# Patient Record
Sex: FEMALE | Race: WHITE | Employment: OTHER | RURAL
[De-identification: names, ages, dates, MRNs, and addresses within clinical notes are randomized per-mention and may not be internally consistent; named-entity substitution may affect disease eponyms.]

---

## 2017-02-27 ENCOUNTER — OFFICE VISIT (OUTPATIENT)
Dept: FAMILY MEDICINE CLINIC | Age: 72
End: 2017-02-27

## 2017-02-27 VITALS
BODY MASS INDEX: 32.44 KG/M2 | TEMPERATURE: 96.1 F | SYSTOLIC BLOOD PRESSURE: 140 MMHG | HEIGHT: 64 IN | DIASTOLIC BLOOD PRESSURE: 68 MMHG | HEART RATE: 73 BPM | OXYGEN SATURATION: 95 % | RESPIRATION RATE: 16 BRPM | WEIGHT: 190 LBS

## 2017-02-27 DIAGNOSIS — Z00.00 MEDICARE ANNUAL WELLNESS VISIT, SUBSEQUENT: Primary | ICD-10-CM

## 2017-02-27 DIAGNOSIS — M47.15 OSTEOARTHRITIS OF SPINE WITH MYELOPATHY, THORACOLUMBAR REGION: ICD-10-CM

## 2017-02-27 DIAGNOSIS — N18.2 CKD (CHRONIC KIDNEY DISEASE) STAGE 2, GFR 60-89 ML/MIN: ICD-10-CM

## 2017-02-27 DIAGNOSIS — D50.9 IRON DEFICIENCY ANEMIA, UNSPECIFIED: ICD-10-CM

## 2017-02-27 DIAGNOSIS — E66.9 OBESITY (BMI 30-39.9): ICD-10-CM

## 2017-02-27 DIAGNOSIS — I10 ESSENTIAL HYPERTENSION: ICD-10-CM

## 2017-02-27 DIAGNOSIS — F41.0 PANIC ATTACKS: ICD-10-CM

## 2017-02-27 RX ORDER — ATENOLOL 50 MG/1
50 TABLET ORAL
Qty: 30 TAB | Refills: 3 | Status: SHIPPED | OUTPATIENT
Start: 2017-02-27 | End: 2017-08-23 | Stop reason: SDUPTHER

## 2017-02-27 RX ORDER — ATENOLOL 50 MG/1
50 TABLET ORAL
Refills: 1 | COMMUNITY
Start: 2017-01-24 | End: 2017-02-27 | Stop reason: SDUPTHER

## 2017-02-27 RX ORDER — DIAZEPAM 5 MG/1
5 TABLET ORAL
Qty: 30 TAB | Refills: 3 | Status: SHIPPED | OUTPATIENT
Start: 2017-02-27 | End: 2017-06-28 | Stop reason: SDUPTHER

## 2017-02-27 RX ORDER — GABAPENTIN 100 MG/1
100 CAPSULE ORAL
Refills: 3 | COMMUNITY
Start: 2017-02-15 | End: 2017-08-23

## 2017-02-27 RX ORDER — TRAMADOL HYDROCHLORIDE 50 MG/1
50 TABLET ORAL
Qty: 90 TAB | Refills: 3 | Status: SHIPPED | OUTPATIENT
Start: 2017-02-27 | End: 2017-08-23 | Stop reason: SDUPTHER

## 2017-02-27 NOTE — MR AVS SNAPSHOT
Visit Information Date & Time Provider Department Dept. Phone Encounter #  
 2/27/2017 10:30 AM Moshe Mcintosh [phone] Follow-up Instructions Return in about 6 months (around 8/27/2017), or if symptoms worsen or fail to improve, for For chronic medical issues. Katherene Means Upcoming Health Maintenance Date Due  
 GLAUCOMA SCREENING Q2Y 1/15/2017 BREAST CANCER SCRN MAMMOGRAM 1/15/2018 MEDICARE YEARLY EXAM 2/28/2018 COLONOSCOPY 2/23/2021 DTaP/Tdap/Td series (2 - Td) 10/11/2026 Allergies as of 2/27/2017  Review Complete On: 2/27/2017 By: Katie Tello MD  
  
 Severity Noted Reaction Type Reactions Crestor [Rosuvastatin]  06/11/2010    Myalgia Pcn [Penicillins]  09/03/2009    Unknown (comments) Current Immunizations  Reviewed on 11/1/2016 Name Date Influenza High Dose Vaccine PF 10/12/2016 Pneumococcal Conjugate (PCV-13) 1/13/2015 Pneumococcal Vaccine (Unspecified Type) 10/15/2012 Not reviewed this visit You Were Diagnosed With   
  
 Codes Comments Medicare annual wellness visit, subsequent    -  Primary ICD-10-CM: Z00.00 ICD-9-CM: V70.0 Iron deficiency anemia, unspecified     ICD-10-CM: D50.9 ICD-9-CM: 280.9 CKD (chronic kidney disease) stage 2, GFR 60-89 ml/min     ICD-10-CM: N18.2 ICD-9-CM: 979. 2 Panic attacks     ICD-10-CM: F41.0 ICD-9-CM: 300.01 Osteoarthritis of spine with myelopathy, thoracolumbar region     ICD-10-CM: M47.15 
ICD-9-CM: 721.41 Essential hypertension     ICD-10-CM: I10 
ICD-9-CM: 401.9 Obesity (BMI 30-39. 9)     ICD-10-CM: E66.9 ICD-9-CM: 278.00 Vitals BP  
  
  
  
  
  
 140/68 (BP 1 Location: Right arm, BP Patient Position: Sitting) Vitals History BMI and BSA Data Body Mass Index Body Surface Area  
 32.61 kg/m 2 1.97 m 2 Preferred Pharmacy Pharmacy Name Phone [facility]Y #[id number] - [place]  [address] [phone] Your Updated Medication List  
  
   
This list is accurate as of: 2/27/17 11:22 AM.  Always use your most recent med list.  
  
  
  
  
 aspirin delayed-release 81 mg tablet Take 81 mg by mouth daily. atenolol 50 mg tablet Commonly known as:  TENORMIN Take 1 Tab by mouth once over twenty-four (24) hours for 30 days. CALCIUM 500+D 500 mg(1,250mg) -200 unit per tablet Generic drug:  calcium-vitamin D Take 1 Tab by mouth two (2) times daily (with meals). clindamycin phosphate 1 % Swab  
1 Dose by Apply Externally route as needed. CRANBERRY CONCENTRATE Cap Generic drug:  vitamin c-vitamin e Take 1 Cap by mouth once over twenty-four (24) hours. diazePAM 5 mg tablet Commonly known as:  VALIUM Take 1 Tab by mouth nightly for 30 days. Max Daily Amount: 5 mg.  
  
 docusate sodium 100 mg capsule Commonly known as:  Charleston Simons Take 100 mg by mouth two (2) times a day.  
  
 gabapentin 100 mg capsule Commonly known as:  NEURONTIN Take 100 mg by mouth nightly. traMADol 50 mg tablet Commonly known as:  ULTRAM  
Take 1 Tab by mouth three (3) times daily as needed for up to 30 days. Max Daily Amount: 150 mg. 1-2 tablets by mouth 3 times a day. Max Daily amount: 6 tabs VITAMIN B-12 1,000 mcg tablet Generic drug:  cyanocobalamin Take 1,000 mcg by mouth daily. VITAMIN D3 1,000 unit tablet Generic drug:  cholecalciferol Take 1,000 Units by mouth daily. Prescriptions Printed Refills  
 traMADol (ULTRAM) 50 mg tablet 3 Sig: Take 1 Tab by mouth three (3) times daily as needed for up to 30 days. Max Daily Amount: 150 mg. 1-2 tablets by mouth 3 times a day. Max Daily amount: 6 tabs Class: Print Route: Oral  
 diazePAM (VALIUM) 5 mg tablet 3 Sig: Take 1 Tab by mouth nightly for 30 days. Max Daily Amount: 5 mg. Class: Print  Route: Oral  
  
 Prescriptions Sent to Pharmacy Refills  
 atenolol (TENORMIN) 50 mg tablet 3 Sig: Take 1 Tab by mouth once over twenty-four (24) hours for 30 days. Class: Normal  
 Pharmacy: [id number] SHAYY Velasquez  [address]  #: [phone] Route: Oral  
  
Follow-up Instructions Return in about 6 months (around 8/27/2017), or if symptoms worsen or fail to improve, for For chronic medical issues. .  
  
  
Patient Instructions Well Visit, Over 72: Care Instructions Your Care Instructions Physical exams can help you stay healthy. Your doctor has checked your overall health and may have suggested ways to take good care of yourself. He or she also may have recommended tests. At home, you can help prevent illness with healthy eating, regular exercise, and other steps. Follow-up care is a key part of your treatment and safety. Be sure to make and go to all appointments, and call your doctor if you are having problems. It's also a good idea to know your test results and keep a list of the medicines you take. How can you care for yourself at home? · Reach and stay at a healthy weight. This will lower your risk for many problems, such as obesity, diabetes, heart disease, and high blood pressure. · Get at least 30 minutes of exercise on most days of the week. Walking is a good choice. You also may want to do other activities, such as running, swimming, cycling, or playing tennis or team sports. · Do not smoke. Smoking can make health problems worse. If you need help quitting, talk to your doctor about stop-smoking programs and medicines. These can increase your chances of quitting for good. · Protect your skin from too much sun. When you're outdoors from 10 a.m. to 4 p.m., stay in the shade or cover up with clothing and a hat with a wide brim. Wear sunglasses that block UV rays. Even when it's cloudy, put broad-spectrum sunscreen (SPF 30 or higher) on any exposed skin. · See a dentist one or two times a year for checkups and to have your teeth cleaned. · Wear a seat belt in the car. · Limit alcohol to 2 drinks a day for men and 1 drink a day for women. Too much alcohol can cause health problems. Follow your doctor's advice about when to have certain tests. These tests can spot problems early. For men and women · Cholesterol. Your doctor will tell you how often to have this done based on your overall health and other things that can increase your risk for heart attack and stroke. · Blood pressure. Have your blood pressure checked during a routine doctor visit. Your doctor will tell you how often to check your blood pressure based on your age, your blood pressure results, and other factors. · Diabetes. Ask your doctor whether you should have tests for diabetes. · Vision. Experts recommend that you have yearly exams for glaucoma and other age-related eye problems. · Hearing. Tell your doctor if you notice any change in your hearing. You can have tests to find out how well you hear. · Colon cancer tests. Keep having colon cancer tests as your doctor recommends. You can have one of several types of tests. · Heart attack and stroke risk. At least every 4 to 6 years, you should have your risk for heart attack and stroke assessed. Your doctor uses factors such as your age, blood pressure, cholesterol, and whether you smoke or have diabetes to show what your risk for a heart attack or stroke is over the next 10 years. · Osteoporosis. Talk to your doctor about whether you should have a bone density test to find out whether you have thinning bones. Also ask your doctor about whether you should take calcium and vitamin D supplements. For women · Pap test and pelvic exam. You may no longer need a Pap test. Talk with your doctor about whether to stop or continue to have Pap tests.  
· Breast exam and mammogram. Ask how often you should have a mammogram, which is an X-ray of your breasts. A mammogram can spot breast cancer before it can be felt and when it is easiest to treat. · Thyroid disease. Talk to your doctor about whether to have your thyroid checked as part of a regular physical exam. Women have an increased chance of a thyroid problem. For men · Prostate exam. Talk to your doctor about whether you should have a blood test (called a PSA test) for prostate cancer. Experts disagree on whether men should have this test. Some experts recommend that you discuss the benefits and risks of the test with your doctor. · Abdominal aortic aneurysm. Ask your doctor whether you should have a test to check for an aneurysm. You may need a test if you ever smoked or if your parent, brother, sister, or child has had an aneurysm. When should you call for help? Watch closely for changes in your health, and be sure to contact your doctor if you have any problems or symptoms that concern you. Where can you learn more? Go to http://thiago-uli.info/. Enter Z313 in the search box to learn more about \"Well Visit, Over 65: Care Instructions. \" Current as of: July 19, 2016 Content Version: 11.1 © 9604-3253 [employer]. Care instructions adapted under license by [employer] (which disclaims liability or warranty for this information). If you have questions about a medical condition or this instruction, always ask your healthcare professional. [address] any warranty or liability for your use of this information. Back Stretches: Exercises Your Care Instructions Here are some examples of exercises for stretching your back. Start each exercise slowly. Ease off the exercise if you start to have pain. Your doctor or physical therapist will tell you when you can start these exercises and which ones will work best for you. How to do the exercises Overhead stretch 1. Stand comfortably with your feet shoulder-width apart. 2. Looking straight ahead, raise both arms over your head and reach toward the ceiling. Do not allow your head to tilt back. 3. Hold for 15 to 30 seconds, then lower your arms to your sides. 4. Repeat 2 to 4 times. Side stretch 1. Stand comfortably with your feet shoulder-width apart. 2. Raise one arm over your head, and then lean to the other side. 3. Slide your hand down your leg as you let the weight of your arm gently stretch your side muscles. Hold for 15 to 30 seconds. 4. Repeat 2 to 4 times on each side. Press-up 1. Lie on your stomach, supporting your body with your forearms. 2. Press your elbows down into the floor to raise your upper back. As you do this, relax your stomach muscles and allow your back to arch without using your back muscles. As your press up, do not let your hips or pelvis come off the floor. 3. Hold for 15 to 30 seconds, then relax. 4. Repeat 2 to 4 times. Relax and rest 
 
1. Lie on your back with a rolled towel under your neck and a pillow under your knees. Extend your arms comfortably to your sides. 2. Relax and breathe normally. 3. Remain in this position for about 10 minutes. 4. If you can, do this 2 or 3 times each day. Follow-up care is a key part of your treatment and safety. Be sure to make and go to all appointments, and call your doctor if you are having problems. It's also a good idea to know your test results and keep a list of the medicines you take. Where can you learn more? Go to http://thiago-uli.info/. Enter Q814 in the search box to learn more about \"Back Stretches: Exercises. \" Current as of: May 23, 2016 Content Version: 11.1 © 3252-6961 [employer]. Care instructions adapted under license by [employer] (which disclaims liability or warranty for this information).  If you have questions about a medical condition or this instruction, always ask your healthcare professional. [address] any warranty or liability for your use of this information. Introducing [facility]! Dear Ashli Stark: 
Thank you for requesting a [employer] account. Our records indicate that you already have an active [employer] account. You can access your account anytime at [email or web address] Did you know that you can access your hospital and ER discharge instructions at any time in [employer]? You can also review all of your test results from your hospital stay or ER visit. Additional Information If you have questions, please visit the Frequently Asked Questions section of the [employer] website at [email or web address] Remember, [employer] is NOT to be used for urgent needs. For medical emergencies, dial 911. Now available from your iPhone and Android! Please provide this summary of care documentation to your next provider. Your primary care clinician is listed as [address]. If you have any questions after today's visit, please call [phone].

## 2017-02-27 NOTE — PROGRESS NOTES
Identified pt with two pt identifiers(name and ). Chief Complaint   Patient presents with    Annual Wellness Visit     last [address] was done 2016    Medication Evaluation     would like to clarify if is safe to use gabapentin at night when she is also taking valium and also has questions about how often she should be taking the gabapentin         Health Maintenance Due   Topic    GLAUCOMA SCREENING Q2Y     MEDICARE YEARLY EXAM        Wt Readings from Last 3 Encounters:   16 194 lb (88 kg)   16 194 lb (88 kg)   10/11/16 193 lb 6.4 oz (87.7 kg)     Temp Readings from Last 3 Encounters:   16 95.8 °F (35.4 °C) (Oral)   16 96.5 °F (35.8 °C) (Oral)   10/11/16 95.4 °F (35.2 °C) (Oral)     BP Readings from Last 3 Encounters:   16 (!) 165/100   16 155/85   10/11/16 138/77     Pulse Readings from Last 3 Encounters:   16 84   16 (!) 50   10/11/16 81         Learning Assessment:  :     Learning Assessment 5/10/2016 1/15/2014   PRIMARY LEARNER Patient Patient   PRIMARY LANGUAGE ENGLISH ENGLISH   LEARNER PREFERENCE PRIMARY DEMONSTRATION READING   ANSWERED BY patient  self   RELATIONSHIP SELF SELF       Depression Screening:  :     PHQ 2 / 9, over the last two weeks 2016   Little interest or pleasure in doing things Not at all   Feeling down, depressed or hopeless Not at all   Total Score PHQ 2 0       Fall Risk Assessment:  :     Fall Risk Assessment, last 12 mths 2016   Able to walk? Yes   Fall in past 12 months? No       Abuse Screening:  :     Abuse Screening Questionnaire 2016   Do you ever feel afraid of your partner? N   Are you in a relationship with someone who physically or mentally threatens you? N   Is it safe for you to go home?  Y       Coordination of Care Questionnaire:  :     1) Have you been to an emergency room, urgent care clinic since your last visit? no   Hospitalized since your last visit? no             2) Have you seen or consulted any other health care providers outside of [address] [place] [address] since your last visit? no  (Include any pap smears or colon screenings in this section.)    3) Do you have an Advance Directive on file? yes  Are you interested in receiving information about Advance Directives? no    Patient is accompanied by self. Reviewed record in preparation for visit and have obtained necessary documentation. Medication reconciliation up to date and corrected with patient at this time. Functional Ability:   Does the patient exhibit a steady gait? Yes, walks assisted with cane   How long did it take the patient to get up and walk from a sitting position? A few seconds   Is the patient self reliant?  (ie can do own laundry, meals, household chores)  yes     Does the patient handle his/her own medications? yes     Does the patient handle his/her own money? yes     Is the patients home safe (ie good lighting, handrails on stairs and bath, etc.)? yes     Did you notice or did patient express any hearing difficulties? yes     Did you notice or did patient express any vision difficulties? Yes, she wears glasses to correct vision     Were distance and reading eye charts used? no       Advance Care Planning:   Patient was offered the opportunity to discuss advance care planning:  yes     Does patient have an Advance Directive:  yes   If no, did you provide information on Caring Connections?   N/A

## 2017-02-27 NOTE — PATIENT INSTRUCTIONS
Well Visit, Over 72: Care Instructions  Your Care Instructions  Physical exams can help you stay healthy. Your doctor has checked your overall health and may have suggested ways to take good care of yourself. He or she also may have recommended tests. At home, you can help prevent illness with healthy eating, regular exercise, and other steps. Follow-up care is a key part of your treatment and safety. Be sure to make and go to all appointments, and call your doctor if you are having problems. It's also a good idea to know your test results and keep a list of the medicines you take. How can you care for yourself at home? · Reach and stay at a healthy weight. This will lower your risk for many problems, such as obesity, diabetes, heart disease, and high blood pressure. · Get at least 30 minutes of exercise on most days of the week. Walking is a good choice. You also may want to do other activities, such as running, swimming, cycling, or playing tennis or team sports. · Do not smoke. Smoking can make health problems worse. If you need help quitting, talk to your doctor about stop-smoking programs and medicines. These can increase your chances of quitting for good. · Protect your skin from too much sun. When you're outdoors from 10 a.m. to 4 p.m., stay in the shade or cover up with clothing and a hat with a wide brim. Wear sunglasses that block UV rays. Even when it's cloudy, put broad-spectrum sunscreen (SPF 30 or higher) on any exposed skin. · See a dentist one or two times a year for checkups and to have your teeth cleaned. · Wear a seat belt in the car. · Limit alcohol to 2 drinks a day for men and 1 drink a day for women. Too much alcohol can cause health problems. Follow your doctor's advice about when to have certain tests. These tests can spot problems early. For men and women  · Cholesterol.  Your doctor will tell you how often to have this done based on your overall health and other things that can increase your risk for heart attack and stroke. · Blood pressure. Have your blood pressure checked during a routine doctor visit. Your doctor will tell you how often to check your blood pressure based on your age, your blood pressure results, and other factors. · Diabetes. Ask your doctor whether you should have tests for diabetes. · Vision. Experts recommend that you have yearly exams for glaucoma and other age-related eye problems. · Hearing. Tell your doctor if you notice any change in your hearing. You can have tests to find out how well you hear. · Colon cancer tests. Keep having colon cancer tests as your doctor recommends. You can have one of several types of tests. · Heart attack and stroke risk. At least every 4 to 6 years, you should have your risk for heart attack and stroke assessed. Your doctor uses factors such as your age, blood pressure, cholesterol, and whether you smoke or have diabetes to show what your risk for a heart attack or stroke is over the next 10 years. · Osteoporosis. Talk to your doctor about whether you should have a bone density test to find out whether you have thinning bones. Also ask your doctor about whether you should take calcium and vitamin D supplements. For women  · Pap test and pelvic exam. You may no longer need a Pap test. Talk with your doctor about whether to stop or continue to have Pap tests. · Breast exam and mammogram. Ask how often you should have a mammogram, which is an X-ray of your breasts. A mammogram can spot breast cancer before it can be felt and when it is easiest to treat. · Thyroid disease. Talk to your doctor about whether to have your thyroid checked as part of a regular physical exam. Women have an increased chance of a thyroid problem. For men  · Prostate exam. Talk to your doctor about whether you should have a blood test (called a PSA test) for prostate cancer.  Experts disagree on whether men should have this test. Some experts recommend that you discuss the benefits and risks of the test with your doctor. · Abdominal aortic aneurysm. Ask your doctor whether you should have a test to check for an aneurysm. You may need a test if you ever smoked or if your parent, brother, sister, or child has had an aneurysm. When should you call for help? Watch closely for changes in your health, and be sure to contact your doctor if you have any problems or symptoms that concern you. Where can you learn more? Go to http://thiago-uli.info/. Enter N095 in the search box to learn more about \"Well Visit, Over 65: Care Instructions. \"  Current as of: July 19, 2016  Content Version: 11.1  © 5686-0330 [employer]. Care instructions adapted under license by [employer] (which disclaims liability or warranty for this information). If you have questions about a medical condition or this instruction, always ask your healthcare professional. [place] [address] any warranty or liability for your use of this information. Back Stretches: Exercises  Your Care Instructions  Here are some examples of exercises for stretching your back. Start each exercise slowly. Ease off the exercise if you start to have pain. Your doctor or physical therapist will tell you when you can start these exercises and which ones will work best for you. How to do the exercises  Overhead stretch    1. Stand comfortably with your feet shoulder-width apart. 2. Looking straight ahead, raise both arms over your head and reach toward the ceiling. Do not allow your head to tilt back. 3. Hold for 15 to 30 seconds, then lower your arms to your sides. 4. Repeat 2 to 4 times. Side stretch    1. Stand comfortably with your feet shoulder-width apart. 2. Raise one arm over your head, and then lean to the other side. 3. Slide your hand down your leg as you let the weight of your arm gently stretch your side muscles.  Hold for 15 to 30 seconds. 4. Repeat 2 to 4 times on each side. Press-up    1. Lie on your stomach, supporting your body with your forearms. 2. Press your elbows down into the floor to raise your upper back. As you do this, relax your stomach muscles and allow your back to arch without using your back muscles. As your press up, do not let your hips or pelvis come off the floor. 3. Hold for 15 to 30 seconds, then relax. 4. Repeat 2 to 4 times. Relax and rest    1. Lie on your back with a rolled towel under your neck and a pillow under your knees. Extend your arms comfortably to your sides. 2. Relax and breathe normally. 3. Remain in this position for about 10 minutes. 4. If you can, do this 2 or 3 times each day. Follow-up care is a key part of your treatment and safety. Be sure to make and go to all appointments, and call your doctor if you are having problems. It's also a good idea to know your test results and keep a list of the medicines you take. Where can you learn more? Go to http://thiago-uli.info/. Enter R001 in the search box to learn more about \"Back Stretches: Exercises. \"  Current as of: May 23, 2016  Content Version: 11.1  © 7434-6925 [employer]. Care instructions adapted under license by [employer] (which disclaims liability or warranty for this information). If you have questions about a medical condition or this instruction, always ask your healthcare professional. [place] [address] any warranty or liability for your use of this information.

## 2017-02-27 NOTE — PROGRESS NOTES
Demarco Presley is a 71 y.o. female and presents for annual Medicare Wellness Visit. Problem List: Reviewed with patient and discussed risk factors. Patient Active Problem List   Diagnosis Code    Knee pain M25.569    CKD (chronic kidney disease) stage 2, GFR 60-89 ml/min N18.2    Insomnia G47.00    Osteoarthritis M19.90    Senile osteoporosis M81.0    Iron deficiency anemia, unspecified D50.9    Anxiety state, unspecified F41.1    Palpitations R00.2    Bronchitis J40    UTI (lower urinary tract infection) N39.0    Intertrigo L30.4    Screening for cholesterol level Z13.220    Need for hepatitis C screening test Z11.59    Dysuria R30.0    Lung crackles R09.89    Medicare annual wellness visit, subsequent Z00.00       Current medical providers:  Patient Care Team:  Madelyn Bright MD as PCP - General (Pediatrics)  Carlos Bolden    PSH: Reviewed with patient  Past Surgical History:   Procedure Laterality Date    HX FEMUR FRACTURE TX      Left femur - 2008, R femur - 2009    HX GI  2010    Perforated ulcer, in relation to pyloric stenosis    HX ORTHOPAEDIC      6 thoracic vertabrae that were fractured in 2005     [facility] FLX W/NDSC US XM RCTM ET AL LMTD&ADJ [address] [facility]  12/6/10    Normal except Diverticulosis        SH: Reviewed with patient  Social History   Substance Use Topics    Smoking status: Former Smoker    Smokeless tobacco: Never Used    Alcohol use No       FH: Reviewed with patient  Family History   Problem Relation Age of Onset    Cancer Sister      metastatic lung Cancer    Cancer Mother      colon       Medications/Allergies: Reviewed with patient  Current Outpatient Prescriptions on File Prior to Visit   Medication Sig Dispense Refill    clindamycin phosphate 1 % swab 1 Dose by Apply Externally route as needed.  docusate sodium (COLACE) 100 mg capsule Take 100 mg by mouth two (2) times a day.       vitamin c-vitamin e (CRANBERRY CONCENTRATE) cap Take 1 Cap by mouth once over twenty-four (24) hours.  cholecalciferol (VITAMIN D3) 1,000 unit tablet Take 1,000 Units by mouth daily.  cyanocobalamin (VITAMIN B-12) 1,000 mcg tablet Take 1,000 mcg by mouth daily.  calcium-vitamin D (CALCIUM 500+D) 500 mg(1,250mg) -200 unit per tablet Take 1 Tab by mouth two (2) times daily (with meals).  aspirin delayed-release 81 mg tablet Take 81 mg by mouth daily. No current facility-administered medications on file prior to visit. Allergies   Allergen Reactions    Crestor [Rosuvastatin] Myalgia    Pcn [Penicillins] Unknown (comments)       Objective:  Visit Vitals    /68 (BP 1 Location: Right arm, BP Patient Position: Sitting)    Pulse 73    Temp 96.1 °F (35.6 °C) (Oral)    Resp 16    Ht 5' 4\" (1.626 m)    Wt 190 lb (86.2 kg)    SpO2 95%    BMI 32.61 kg/m2    Body mass index is 32.61 kg/(m^2). Assessment of cognitive impairment: Alert and oriented x 3    Depression Screen:   PHQ 2 / 9, over the last two weeks 6/21/2016   Little interest or pleasure in doing things Not at all   Feeling down, depressed or hopeless Not at all   Total Score PHQ 2 0       Fall Risk Assessment:    Fall Risk Assessment, last 12 mths 6/21/2016   Able to walk? Yes   Fall in past 12 months? No       Functional Ability:   See nursing note. Advance Care Planning:   See nursing note.      Plan:      Orders Placed This Encounter    gabapentin (NEURONTIN) 100 mg capsule    DISCONTD: atenolol (TENORMIN) 50 mg tablet    atenolol (TENORMIN) 50 mg tablet    traMADol (ULTRAM) 50 mg tablet    diazePAM (VALIUM) 5 mg tablet       Health Maintenance   Topic Date Due    GLAUCOMA SCREENING Q2Y  01/15/2017    BREAST CANCER SCRN MAMMOGRAM  01/15/2018    MEDICARE YEARLY EXAM  02/28/2018    COLONOSCOPY  02/23/2021    DTaP/Tdap/Td series (2 - Td) 10/11/2026    Hepatitis C Screening  Completed    OSTEOPOROSIS SCREENING (DEXA)  Completed    ZOSTER VACCINE AGE 60>  Completed    Pneumococcal 65+ High/Highest Risk  Completed    INFLUENZA AGE 9 TO ADULT  Completed       ASSESSMENT and PLAN    ICD-10-CM ICD-9-CM    1. Medicare annual wellness visit, subsequent Z00.00 V70.0 Anticipatory guidance discussed. Immunizations reviewed  HM updated. 2. Iron deficiency anemia, unspecified D50.9 280.9    3. CKD (chronic kidney disease) stage 2, GFR 60-89 ml/min N18.2 585.2    4. Panic attacks F41.0 300.01 diazePAM (VALIUM) 5 mg tablet   5. Osteoarthritis of spine with myelopathy, thoracolumbar region M47.15 721.41 traMADol (ULTRAM) 50 mg tablet   6. Essential hypertension I10 401.9 atenolol (TENORMIN) 50 mg tablet   7. Obesity (BMI 30-39. 9) E66.9 278.00      72F who presented for MWV. She has been doing well. She is currently taking Valium for night panic attacks and has been on the medication for almost 40 years. She denies over use and takes as prescribed. We have discussed in the past weaning, but this may not be feasible. She has not escalated her use. She is otherwise doing well. Continues to suffer with inoperable OA of spine with myofacial pain. I have discussed the diagnosis with the patient and the intended plan as seen in the above orders. The patient has received an after-visit summary and questions were answered concerning future plans. Medication Side Effects and Warnings were discussed with patient: yes   Patient Labs were reviewed and or requested: yes   Patient Past Records were reviewed and or requested: N/A    *Patient verbalized understanding and agreement with the plan. A copy of the After Visit Summary with personalized health plan was given to the patient today. Follow-up Disposition:  Return in about 6 months (around 8/27/2017), or if symptoms worsen or fail to improve, for For chronic medical issues. Wil Malik

## 2017-04-23 DIAGNOSIS — R00.2 PALPITATIONS: ICD-10-CM

## 2017-04-25 RX ORDER — ATENOLOL 50 MG/1
TABLET ORAL
Qty: 180 TAB | Refills: 0 | Status: SHIPPED | OUTPATIENT
Start: 2017-04-25 | End: 2017-08-23 | Stop reason: SDUPTHER

## 2017-06-28 DIAGNOSIS — F41.0 PANIC ATTACKS: ICD-10-CM

## 2017-06-29 RX ORDER — DIAZEPAM 5 MG/1
TABLET ORAL
Qty: 30 TAB | Refills: 1 | Status: SHIPPED | OUTPATIENT
Start: 2017-06-29 | End: 2017-08-23 | Stop reason: SDUPTHER

## 2017-08-23 ENCOUNTER — OFFICE VISIT (OUTPATIENT)
Dept: FAMILY MEDICINE CLINIC | Age: 72
End: 2017-08-23

## 2017-08-23 VITALS
HEIGHT: 64 IN | HEART RATE: 72 BPM | SYSTOLIC BLOOD PRESSURE: 126 MMHG | OXYGEN SATURATION: 97 % | WEIGHT: 186 LBS | TEMPERATURE: 96.5 F | BODY MASS INDEX: 31.76 KG/M2 | RESPIRATION RATE: 20 BRPM | DIASTOLIC BLOOD PRESSURE: 68 MMHG

## 2017-08-23 DIAGNOSIS — I10 ESSENTIAL HYPERTENSION: ICD-10-CM

## 2017-08-23 DIAGNOSIS — M47.15 OSTEOARTHRITIS OF SPINE WITH MYELOPATHY, THORACOLUMBAR REGION: ICD-10-CM

## 2017-08-23 DIAGNOSIS — F41.0 PANIC ATTACKS: ICD-10-CM

## 2017-08-23 DIAGNOSIS — E78.00 PURE HYPERCHOLESTEROLEMIA: ICD-10-CM

## 2017-08-23 DIAGNOSIS — E55.9 VITAMIN D DEFICIENCY: Primary | ICD-10-CM

## 2017-08-23 RX ORDER — TRAMADOL HYDROCHLORIDE 50 MG/1
50 TABLET ORAL
Qty: 90 TAB | Refills: 5 | Status: SHIPPED | OUTPATIENT
Start: 2017-08-23 | End: 2018-04-17 | Stop reason: SDUPTHER

## 2017-08-23 RX ORDER — DIAZEPAM 5 MG/1
5 TABLET ORAL
Qty: 30 TAB | Refills: 3 | Status: SHIPPED | OUTPATIENT
Start: 2017-08-23 | End: 2018-04-26 | Stop reason: SDUPTHER

## 2017-08-23 RX ORDER — CLINDAMYCIN PHOSPHATE 10 MG/G
GEL TOPICAL
Refills: 11 | COMMUNITY
Start: 2017-08-19 | End: 2019-11-24

## 2017-08-23 RX ORDER — TRAMADOL HYDROCHLORIDE 50 MG/1
TABLET ORAL
Refills: 3 | COMMUNITY
Start: 2017-07-09 | End: 2017-08-23 | Stop reason: SDUPTHER

## 2017-08-23 RX ORDER — ATENOLOL 50 MG/1
50 TABLET ORAL
Qty: 30 TAB | Refills: 5 | Status: SHIPPED | OUTPATIENT
Start: 2017-08-23 | End: 2017-10-19

## 2017-08-23 NOTE — MR AVS SNAPSHOT
Visit Information Date & Time Provider Department Dept. Phone Encounter #  
 8/23/2017  9:30 AM Moshe Berg [phone] [id number] Follow-up Instructions Return in about 6 months (around 2/28/2018), or if symptoms worsen or fail to improve. Upcoming Health Maintenance Date Due  
 GLAUCOMA SCREENING Q2Y 1/15/2017 INFLUENZA AGE 9 TO ADULT 8/1/2017 BREAST CANCER SCRN MAMMOGRAM 1/15/2018 MEDICARE YEARLY EXAM 2/28/2018 COLONOSCOPY 2/23/2021 DTaP/Tdap/Td series (2 - Td) 10/11/2026 Allergies as of 8/23/2017  Review Complete On: 8/23/2017 By: Alexander Prescott Severity Noted Reaction Type Reactions Crestor [Rosuvastatin]  06/11/2010    Myalgia Pcn [Penicillins]  09/03/2009    Unknown (comments) Current Immunizations  Reviewed on 11/1/2016 Name Date Influenza High Dose Vaccine PF 10/12/2016 Pneumococcal Conjugate (PCV-13) 1/13/2015 Pneumococcal Vaccine (Unspecified Type) 10/15/2012 Not reviewed this visit You Were Diagnosed With   
  
 Codes Comments Osteoarthritis of spine with myelopathy, thoracolumbar region     ICD-10-CM: M47.15 
ICD-9-CM: 721.41 Essential hypertension     ICD-10-CM: I10 
ICD-9-CM: 401.9 Panic attacks     ICD-10-CM: F41.0 ICD-9-CM: 300.01 Vitals BP Pulse Temp Resp Height(growth percentile) Weight(growth percentile) 126/68 72 96.5 °F (35.8 °C) (Temporal) 20 5' 4\" (1.626 m) 186 lb (84.4 kg) SpO2 BMI OB Status Smoking Status 97% 31.93 kg/m2 Postmenopausal Former Smoker Vitals History BMI and BSA Data Body Mass Index Body Surface Area  
 31.93 kg/m 2 1.95 m 2 Preferred Pharmacy Pharmacy Name Phone [id number] [place] - [address] [phone] Your Updated Medication List  
  
   
This list is accurate as of: 8/23/17 10:07 AM.  Always use your most recent med list.  
  
  
  
  
 aspirin delayed-release 81 mg tablet Take 81 mg by mouth daily. atenolol 50 mg tablet Commonly known as:  TENORMIN Take 1 Tab by mouth once over twenty-four (24) hours for 30 days. CALCIUM 500+D 500 mg(1,250mg) -200 unit per tablet Generic drug:  calcium-vitamin D Take 1 Tab by mouth two (2) times daily (with meals). * clindamycin phosphate 1 % Swab  
1 Dose by Apply Externally route as needed. * clindamycin 1 % topical gel Commonly known as:  CLINDAGEL  
APPLY TO FOLDS EVERY DAY  
  
 CRANBERRY CONCENTRATE Cap Generic drug:  vitamin c-vitamin e Take 1 Cap by mouth once over twenty-four (24) hours. diazePAM 5 mg tablet Commonly known as:  VALIUM Take 1 Tab by mouth nightly. Max Daily Amount: 5 mg. DIPHENHYDRAMINE HCL PO Take  by mouth. docusate sodium 100 mg capsule Commonly known as:  G[employer] Guppy Take 100 mg by mouth two (2) times a day. traMADol 50 mg tablet Commonly known as:  ULTRAM  
Take 1 Tab by mouth three (3) times daily as needed for up to 30 days. Max Daily Amount: 150 mg. 1-2 tablets by mouth 3 times a day. Max Daily amount: 6 tabs VITAMIN B-12 1,000 mcg tablet Generic drug:  cyanocobalamin Take 1,000 mcg by mouth daily. VITAMIN D3 1,000 unit tablet Generic drug:  cholecalciferol Take 1,000 Units by mouth daily. * Notice: This list has 2 medication(s) that are the same as other medications prescribed for you. Read the directions carefully, and ask your doctor or other care provider to review them with you. Prescriptions Printed Refills  
 traMADol (ULTRAM) 50 mg tablet 5 Sig: Take 1 Tab by mouth three (3) times daily as needed for up to 30 days. Max Daily Amount: 150 mg. 1-2 tablets by mouth 3 times a day. Max Daily amount: 6 tabs Class: Print Route: Oral  
 diazePAM (VALIUM) 5 mg tablet 3 Sig: Take 1 Tab by mouth nightly. Max Daily Amount: 5 mg. Class: Print Route: Oral  
  
Prescriptions Sent to Pharmacy Refills  
 atenolol (TENORMIN) 50 mg tablet 5 Sig: Take 1 Tab by mouth once over twenty-four (24) hours for 30 days. Class: Normal  
 Pharmacy: [facility]/pharmacy #[id number] - [place] - [address]  #: [phone] Route: Oral  
  
Follow-up Instructions Return in about 6 months (around 2/28/2018), or if symptoms worsen or fail to improve. Introducing [facility]! Dear Theresa Cancer: 
Thank you for requesting a [employer] account. Our records indicate that you already have an active [employer] account. You can access your account anytime at [email or web address] Did you know that you can access your hospital and ER discharge instructions at any time in [employer]? You can also review all of your test results from your hospital stay or ER visit. Additional Information If you have questions, please visit the Frequently Asked Questions section of the [employer] website at [email or web address] Remember, [employer] is NOT to be used for urgent needs. For medical emergencies, dial 911. Now available from your iPhone and Android! Please provide this summary of care documentation to your next provider. Your primary care clinician is listed as [address]. If you have any questions after today's visit, please call [phone].

## 2017-09-01 NOTE — PROGRESS NOTES
HISTORY OF PRESENT ILLNESS  Echo Hoover is a 71 y.o. female. HPI    ROS    Physical Exam    ASSESSMENT and PLAN    ICD-10-CM ICD-9-CM    1. Vitamin D deficiency E55.9 268.9 VITAMIN D, 25 HYDROXY   2. Osteoarthritis of spine with myelopathy, thoracolumbar region M47.15 721.41 traMADol (ULTRAM) 50 mg tablet   3. Essential hypertension I10 401.9 atenolol (TENORMIN) 50 mg tablet      METABOLIC PANEL, COMPREHENSIVE      CBC WITH AUTOMATED DIFF   4. Panic attacks F41.0 300.01 diazePAM (VALIUM) 5 mg tablet   5. Pure hypercholesterolemia E78.00 272.0 LIPID PANEL      CRP, HIGH SENSITIVITY     I have discussed the diagnosis with the patient and the intended treatment plan as seen in the above orders. The patient has received an after-visit summary and questions were answered concerning future plans. Asked to return should symptoms worsen or not improve with treatment. Any pending labs and studies will be relayed to patient when they become available. Pt verbalizes understanding of plan of care and denies further questions or concerns at this time. Follow-up Disposition:  Return in about 6 months (around 2/28/2018), or if symptoms worsen or fail to improve.

## 2017-09-07 ENCOUNTER — TELEPHONE (OUTPATIENT)
Dept: FAMILY MEDICINE CLINIC | Age: 72
End: 2017-09-07

## 2017-09-07 RX ORDER — CITALOPRAM 10 MG/1
10 TABLET ORAL DAILY
Qty: 30 TAB | Refills: 5 | Status: SHIPPED | OUTPATIENT
Start: 2017-09-07 | End: 2018-02-24 | Stop reason: SDUPTHER

## 2017-09-07 RX ORDER — BUPROPION HYDROCHLORIDE 100 MG/1
100 TABLET ORAL 2 TIMES DAILY
Qty: 60 TAB | Refills: 5 | Status: SHIPPED | OUTPATIENT
Start: 2017-09-07 | End: 2018-02-22 | Stop reason: SDUPTHER

## 2017-09-07 NOTE — TELEPHONE ENCOUNTER
Celexa prescribed; was already on tramadol.  Insurance contacting pharmacy about serotonin toxicity       Best contact: [phone] [facility]

## 2017-09-07 NOTE — TELEPHONE ENCOUNTER
Pt reports she would like Dr. Kush Gillespie to prescribe the medication she believes will be most suitable for pt's situation as she has never been on any medication of this type in the past and therefore has no preference.

## 2017-09-07 NOTE — TELEPHONE ENCOUNTER
Pt saw doctor 2 weeks ago and wanted to give pt anti depression medication and pt would like to actually try it please call

## 2017-09-08 NOTE — TELEPHONE ENCOUNTER
Pt stated two medications were called in the generic for Wellbutrin and will cause pt to choke and needs something else to take because does not come in any other form.

## 2017-09-08 NOTE — TELEPHONE ENCOUNTER
Returned call to pt and she reports she initially picked up the celexa the same day it was prescribed and took that for a day which was fine, as the pill size is small and then when pharmacy advised her the wellbutrin was ready and the better of the two medications in conjunction with her tramadol, she tried to take the wellbutrin instead and choked on the pill due to its size. She prefers to use the celexa, if possible out of the two medications. I warned her of the slight chance of toxicity if taken together with the tramadol. She was advised not to take both pills together at the same time and to space the dose of tramadol at least five hours away from the celexa. She was further advised if she should experience any side effects to report them right away. Pt verbalized understanding of our entire conversation. I conferred with Dr. Ale Gale as well and she agreed, as long as the pt is aware of possible s/e and has been given the indications to be aware of and wishes to proceed with medication, a trial of the medication would be ok.

## 2017-10-19 ENCOUNTER — OFFICE VISIT (OUTPATIENT)
Dept: FAMILY MEDICINE CLINIC | Age: 72
End: 2017-10-19

## 2017-10-19 VITALS
SYSTOLIC BLOOD PRESSURE: 143 MMHG | WEIGHT: 191.8 LBS | HEIGHT: 64 IN | HEART RATE: 89 BPM | OXYGEN SATURATION: 94 % | DIASTOLIC BLOOD PRESSURE: 87 MMHG | RESPIRATION RATE: 18 BRPM | BODY MASS INDEX: 32.74 KG/M2 | TEMPERATURE: 96 F

## 2017-10-19 DIAGNOSIS — Z91.89 AT RISK FOR POLYPHARMACY: Primary | ICD-10-CM

## 2017-10-19 DIAGNOSIS — M54.50 ACUTE RIGHT-SIDED LOW BACK PAIN WITHOUT SCIATICA: ICD-10-CM

## 2017-10-19 RX ORDER — NALOXONE HYDROCHLORIDE 4 MG/.1ML
SPRAY NASAL
Qty: 1 EACH | Refills: 0 | Status: SHIPPED | OUTPATIENT
Start: 2017-10-19 | End: 2019-02-22 | Stop reason: SDUPTHER

## 2017-10-19 RX ORDER — GABAPENTIN 100 MG/1
CAPSULE ORAL
Qty: 270 CAP | Refills: 4 | Status: SHIPPED | OUTPATIENT
Start: 2017-10-19 | End: 2018-12-18 | Stop reason: SDUPTHER

## 2017-10-19 RX ORDER — ATENOLOL 50 MG/1
TABLET ORAL
Refills: 5 | COMMUNITY
Start: 2017-10-04 | End: 2018-04-17 | Stop reason: SDUPTHER

## 2017-10-19 RX ORDER — TRAMADOL HYDROCHLORIDE 50 MG/1
50 TABLET ORAL
COMMUNITY
End: 2018-03-22 | Stop reason: SDUPTHER

## 2017-10-19 NOTE — PROGRESS NOTES
Chief Complaint   Patient presents with    LOW BACK PAIN     1. Have you been to the ER, urgent care clinic since your last visit? Hospitalized since your last visit? NO    2. Have you seen or consulted any other health care providers outside of the [address] [place] [address] since your last visit? Include any pap smears or colon screening.  NO Pt arrived to unit wit mother with left upper jaw pain that started yesterday. Pts mother reports that she has not been to dentist recently. Up to date with vaccines. States she gave daughter childrens pain reliever this am.  Ayad Lavender of time, was given by grandmother.

## 2017-10-19 NOTE — MR AVS SNAPSHOT
Visit Information Date & Time Provider Department Dept. Phone Encounter #  
 10/19/2017  1:00 PM Moshe Ortiz [id number] Upcoming Health Maintenance Date Due  
 GLAUCOMA SCREENING Q2Y 1/15/2017 INFLUENZA AGE 9 TO ADULT 8/1/2017 BREAST CANCER SCRN MAMMOGRAM 1/15/2018 MEDICARE YEARLY EXAM 2/28/2018 COLONOSCOPY 2/23/2021 DTaP/Tdap/Td series (2 - Td) 10/11/2026 Allergies as of 10/19/2017  Review Complete On: 10/19/2017 By: Kristal Ramirez LPN Severity Noted Reaction Type Reactions Crestor [Rosuvastatin]  06/11/2010    Myalgia Pcn [Penicillins]  09/03/2009    Unknown (comments) Current Immunizations  Reviewed on 11/1/2016 Name Date Influenza High Dose Vaccine PF 9/5/2017 12:00 AM, 10/12/2016 Pneumococcal Conjugate (PCV-13) 1/13/2015 Pneumococcal Vaccine (Unspecified Type) 10/15/2012 Not reviewed this visit You Were Diagnosed With   
  
 Codes Comments At risk for polypharmacy    -  Primary ICD-10-CM: Z91.89 ICD-9-CM: V49.89 Acute right-sided low back pain without sciatica     ICD-10-CM: M54.5 ICD-9-CM: 724.2 Vitals BP Pulse Temp Resp Height(growth percentile) Weight(growth percentile) 143/87 (BP 1 Location: Right arm, BP Patient Position: Sitting) 89 96 °F (35.6 °C) (Oral) 18 5' 4\" (1.626 m) 191 lb 12.8 oz (87 kg) SpO2 BMI OB Status Smoking Status 94% 32.92 kg/m2 Postmenopausal Former Smoker Vitals History BMI and BSA Data Body Mass Index Body Surface Area  
 32.92 kg/m 2 1.98 m 2 Preferred Pharmacy Pharmacy Name Phone [facility] #[id number] - [place] - [address] [phone] Your Updated Medication List  
  
   
This list is accurate as of: 10/19/17  2:01 PM.  Always use your most recent med list.  
  
  
  
  
 aspirin delayed-release 81 mg tablet Take 81 mg by mouth daily. atenolol 50 mg tablet Commonly known as:  TENORMIN  
TAKE 1 TABLET BY MOUTH ONCE OVER TWENTY-FOUR HOURS FOR 30 DAYS. CALCIUM 500+D 500 mg(1,250mg) -200 unit per tablet Generic drug:  calcium-vitamin D Take 1 Tab by mouth two (2) times daily (with meals). * clindamycin phosphate 1 % Swab  
1 Dose by Apply Externally route as needed. * clindamycin 1 % topical gel Commonly known as:  CLINDAGEL  
APPLY TO FOLDS EVERY DAY  
  
 CRANBERRY CONCENTRATE Cap Generic drug:  vitamin c-vitamin e Take 1 Cap by mouth once over twenty-four (24) hours. diazePAM 5 mg tablet Commonly known as:  VALIUM Take 1 Tab by mouth nightly. Max Daily Amount: 5 mg. DIPHENHYDRAMINE HCL PO Take  by mouth. FLUZONE HIGH-DOSE 2017-18 (PF) Syrg injection Generic drug:  influenza vaccine 2017-18 (65 yrs+)(PF)  
TO BE ADMINISTERED BY PHARMACIST FOR IMMUNIZATION  
  
 gabapentin 100 mg capsule Commonly known as:  NEURONTIN Take one tablet at bedtime and morning. May add to middle of day if needed. naloxone 4 mg/actuation nasal spray Commonly known as:  [employer] Use 1 spray intranasally into 1 nostril. Use a new Narcan nasal spray for subsequent doses and administer into alternating nostrils. May repeat every 2 to 3 minutes as needed. * traMADol 50 mg tablet Commonly known as:  ULTRAM  
Take 50 mg by mouth every six (6) hours as needed for Pain. * traMADol 50 mg tablet Commonly known as:  ULTRAM  
Take 1 Tab by mouth three (3) times daily as needed for up to 30 days. Max Daily Amount: 150 mg. 1-2 tablets by mouth 3 times a day. Max Daily amount: 6 tabs VITAMIN B-12 1,000 mcg tablet Generic drug:  cyanocobalamin Take 1,000 mcg by mouth daily. VITAMIN D3 1,000 unit tablet Generic drug:  cholecalciferol Take 1,000 Units by mouth daily. * Notice: This list has 4 medication(s) that are the same as other medications prescribed for you.  Read the directions carefully, and ask your doctor or other care provider to review them with you. Prescriptions Printed Refills  
 naloxone (NARCAN) 4 mg/actuation nasal spray 0 Sig: Use 1 spray intranasally into 1 nostril. Use a new Narcan nasal spray for subsequent doses and administer into alternating nostrils. May repeat every 2 to 3 minutes as needed. Class: Print Prescriptions Sent to Pharmacy Refills  
 gabapentin (NEURONTIN) 100 mg capsule 4 Sig: Take one tablet at bedtime and morning. May add to middle of day if needed. Class: Normal  
 Pharmacy: [facility]/pharmacy #[id number] - [place], [place] - [address] Ph #: [phone] We Performed the Following REFERRAL TO MASSAGE THERAPY [UAS941 Custom] Comments:  
 Needs massage of lower back, especially right side. She does have a h/o multiple thoracic fractures, but stable. She is able to have light massage therapy to help the pain in the lower back. Referral Information Referral ID Referred By Referred To  
  
 [id number] Acosta Breaker E Not Available Visits Status Start Date End Date 1 New Request 10/19/17 10/19/18 If your referral has a status of pending review or denied, additional information will be sent to support the outcome of this decision. Introducing [facility]! Dear Camille Clark: 
Thank you for requesting a [employer] account. Our records indicate that you already have an active [employer] account. You can access your account anytime at [email or web address] Did you know that you can access your hospital and ER discharge instructions at any time in [employer]? You can also review all of your test results from your hospital stay or ER visit. Additional Information If you have questions, please visit the Frequently Asked Questions section of the [employer] website at [email or web address] Remember, [employer] is NOT to be used for urgent needs. For medical emergencies, dial 911. Now available from your iPhone and Android! Please provide this summary of care documentation to your next provider. Your primary care clinician is listed as [address]. If you have any questions after today's visit, please call [phone].

## 2017-10-31 NOTE — PROGRESS NOTES
CC:  Chief Complaint   Patient presents with   Seleta Curio PAIN     HISTORY OF PRESENT ILLNESS  Kali Steel is a 71 y.o. female. HPI Comments: 80F with significant OA and osteoporosis with pathological fractures. She is here today with new c/o lower back pain not responding to present therapy. The patient is on quite a few sedating medications, but still seems to require more meds. I have discussed this with her in the past and concerned that she is on polypharmacology. We have discussed having her see a pain manager and will actively work on this. Currently, things are difficult at home. Her  has terminal cancer and is in hospice. He is with her today and stable, but will continue to grow weaker. He has been a constant support. She does note that her mood and energy have improved since starting Citalopram.     LOW BACK PAIN         ROS:  Review of Systems   Musculoskeletal: Positive for back pain. All other systems reviewed and are negative. Past Medical History:  Past Medical History:   Diagnosis Date    Anxiety     Arthritis     knees,shoulders,fingers,back,neck     CKD (chronic kidney disease) stage 2, GFR 60-89 ml/min     Dr. Zohaib Lebron Femur fracture, left [facility]) 2008    Femur fracture, right ([facility] [address] 2009    Insomnia     Intertrigo 7/7/2015    Knee pain     Dr. Zenia Eddy Osteopenia     Dr. Zenia Eddy Psychiatric disorder     anxiety    Pyloric stenosis 2010    Sleep disorder     Insomnia    Thoracic vertebral fracture ([facility] [address] 2005    Ulcer ([facility] [address] 2012    Perferatied ulcer     Medication:  Current Outpatient Prescriptions   Medication Sig Dispense Refill    atenolol (TENORMIN) 50 mg tablet TAKE 1 TABLET BY MOUTH ONCE OVER TWENTY-FOUR HOURS FOR 30 DAYS. 5    FLUZONE HIGH-DOSE 2017-18, PF, syrg injection TO BE ADMINISTERED BY PHARMACIST FOR IMMUNIZATION  0    traMADol (ULTRAM) 50 mg tablet Take 50 mg by mouth every six (6) hours as needed for Pain.       naloxone (NARCAN) 4 mg/actuation nasal spray Use 1 spray intranasally into 1 nostril. Use a new Narcan nasal spray for subsequent doses and administer into alternating nostrils. May repeat every 2 to 3 minutes as needed. 1 Each 0    gabapentin (NEURONTIN) 100 mg capsule Take one tablet at bedtime and morning. May add to middle of day if needed. 270 Cap 4    DIPHENHYDRAMINE HCL PO Take  by mouth.  clindamycin (CLINDAGEL) 1 % topical gel APPLY TO FOLDS EVERY DAY  11    diazePAM (VALIUM) 5 mg tablet Take 1 Tab by mouth nightly. Max Daily Amount: 5 mg. 30 Tab 3    clindamycin phosphate 1 % swab 1 Dose by Apply Externally route as needed.  vitamin c-vitamin e (CRANBERRY CONCENTRATE) cap Take 1 Cap by mouth once over twenty-four (24) hours.  cholecalciferol (VITAMIN D3) 1,000 unit tablet Take 1,000 Units by mouth daily.  cyanocobalamin (VITAMIN B-12) 1,000 mcg tablet Take 1,000 mcg by mouth daily.  calcium-vitamin D (CALCIUM 500+D) 500 mg(1,250mg) -200 unit per tablet Take 1 Tab by mouth two (2) times daily (with meals).  traMADol (ULTRAM) 50 mg tablet Take 1 Tab by mouth three (3) times daily as needed for up to 30 days. Max Daily Amount: 150 mg. 1-2 tablets by mouth 3 times a day. Max Daily amount: 6 tabs 90 Tab 5    aspirin delayed-release 81 mg tablet Take 81 mg by mouth daily. OBJECTIVE:  /87 (BP 1 Location: Right arm, BP Patient Position: Sitting)  Pulse 89  Temp 96 °F (35.6 °C) (Oral)   Resp 18  Ht 5' 4\" (1.626 m)  Wt 191 lb 12.8 oz (87 kg)  SpO2 94%  BMI 32.92 kg/m2  Physical Exam   Constitutional: She is oriented to person, place, and time. HENT:   Head: Normocephalic and atraumatic. Eyes: Conjunctivae and EOM are normal. Pupils are equal, round, and reactive to light. Cardiovascular: Normal rate and regular rhythm. Pulmonary/Chest: Effort normal and breath sounds normal.   Musculoskeletal: She exhibits tenderness (Lower paraspinous muscles. ).    Uses cane for ambulation. Walks very slowly with limp. Antalgic gait. Neurological: She is alert and oriented to person, place, and time. She exhibits abnormal muscle tone. Coordination abnormal.   Skin: Skin is warm. Psychiatric: She has a normal mood and affect. Her behavior is normal.   Nursing note and vitals reviewed. ASSESSMENT and PLAN    ICD-10-CM ICD-9-CM    1. At risk for polypharmacy Z91.89 V49.89 naloxone (NARCAN) 4 mg/actuation nasal spray   2. Acute right-sided low back pain without sciatica M54.5 724.2 REFERRAL TO MASSAGE THERAPY      gabapentin (NEURONTIN) 100 mg capsule     Scheduled Medications:   traMADol (ULTRAM) 50 mg tablet Take 50 mg by mouth every six (6) hours as needed for Pain.  naloxone (NARCAN) 4 mg/actuation nasal spray Use 1 spray intranasally into 1 nostril. Use a new Narcan nasal spray for subsequent doses and administer into alternating nostrils. May repeat every 2 to 3 minutes as needed. 1 Each    gabapentin (NEURONTIN) 100 mg capsule Take one tablet at bedtime and morning. May add to middle of day if needed. 270 Cap    diazePAM (VALIUM) 5 mg tablet Take 1 Tab by mouth nightly. Max Daily Amount: 5 mg. 30 Tab     I note that she is on Valium as well and I cautioned her about this with Tramadol. The patient does have significant pain. We have a Pain management schedule. She will need UDS when she returns. Given the medications, I did give her a script for Narcan. Additionally, I warned her not to take Valium and Tramadol together. She has been taking the medications responsibly so far and at this time, will continue to monitor very carefully. I have discussed the diagnosis with the patient and the intended treatment plan as seen in the above orders. The patient has received an after-visit summary and questions were answered concerning future plans. Asked to return should symptoms worsen or not improve with treatment.  Any pending labs and studies will be relayed to patient when they become available. Pt verbalizes understanding of plan of care and denies further questions or concerns at this time. Follow-up Disposition:  Return in about 4 months (around 2/19/2018), or if symptoms worsen or fail to improve.

## 2018-01-28 DIAGNOSIS — I10 ESSENTIAL HYPERTENSION: ICD-10-CM

## 2018-01-30 RX ORDER — ATENOLOL 50 MG/1
TABLET ORAL
Qty: 30 TAB | Refills: 4 | Status: SHIPPED | OUTPATIENT
Start: 2018-01-30 | End: 2018-07-05 | Stop reason: SDUPTHER

## 2018-02-22 RX ORDER — BUPROPION HYDROCHLORIDE 100 MG/1
TABLET ORAL
Qty: 60 TAB | Refills: 0 | Status: SHIPPED | OUTPATIENT
Start: 2018-02-22 | End: 2018-03-22 | Stop reason: SDUPTHER

## 2018-02-26 RX ORDER — CITALOPRAM 10 MG/1
TABLET ORAL
Qty: 30 TAB | Refills: 5 | Status: SHIPPED | OUTPATIENT
Start: 2018-02-26 | End: 2018-04-17 | Stop reason: SDUPTHER

## 2018-03-22 DIAGNOSIS — F33.1 MODERATE EPISODE OF RECURRENT MAJOR DEPRESSIVE DISORDER (HCC): ICD-10-CM

## 2018-03-22 DIAGNOSIS — G89.4 CHRONIC PAIN SYNDROME: Primary | ICD-10-CM

## 2018-03-22 RX ORDER — BUPROPION HYDROCHLORIDE 100 MG/1
TABLET ORAL
Qty: 60 TAB | Refills: 0 | Status: SHIPPED | OUTPATIENT
Start: 2018-03-22 | End: 2018-04-17 | Stop reason: SDUPTHER

## 2018-03-22 RX ORDER — TRAMADOL HYDROCHLORIDE 50 MG/1
50-100 TABLET ORAL
Qty: 90 TAB | Refills: 0 | Status: SHIPPED | OUTPATIENT
Start: 2018-03-22 | End: 2018-04-17 | Stop reason: SDUPTHER

## 2018-04-17 ENCOUNTER — OFFICE VISIT (OUTPATIENT)
Dept: FAMILY MEDICINE CLINIC | Age: 73
End: 2018-04-17

## 2018-04-17 VITALS
HEART RATE: 106 BPM | BODY MASS INDEX: 31.21 KG/M2 | RESPIRATION RATE: 20 BRPM | DIASTOLIC BLOOD PRESSURE: 70 MMHG | WEIGHT: 182.8 LBS | SYSTOLIC BLOOD PRESSURE: 110 MMHG | HEIGHT: 64 IN | TEMPERATURE: 97.6 F | OXYGEN SATURATION: 95 %

## 2018-04-17 DIAGNOSIS — G89.4 CHRONIC PAIN SYNDROME: ICD-10-CM

## 2018-04-17 DIAGNOSIS — S39.012A BACK STRAIN, INITIAL ENCOUNTER: Primary | ICD-10-CM

## 2018-04-17 DIAGNOSIS — F33.1 MODERATE EPISODE OF RECURRENT MAJOR DEPRESSIVE DISORDER (HCC): ICD-10-CM

## 2018-04-17 RX ORDER — CITALOPRAM 10 MG/1
TABLET ORAL
Qty: 90 TAB | Refills: 1 | Status: SHIPPED | OUTPATIENT
Start: 2018-04-17 | End: 2018-08-16 | Stop reason: SDUPTHER

## 2018-04-17 RX ORDER — TRAMADOL HYDROCHLORIDE 50 MG/1
50-100 TABLET ORAL
Qty: 90 TAB | Refills: 0 | Status: SHIPPED | OUTPATIENT
Start: 2018-04-17 | End: 2018-05-30 | Stop reason: SDUPTHER

## 2018-04-17 RX ORDER — BUPROPION HYDROCHLORIDE 100 MG/1
TABLET ORAL
Qty: 180 TAB | Refills: 1 | Status: SHIPPED | OUTPATIENT
Start: 2018-04-17 | End: 2018-08-16 | Stop reason: SDUPTHER

## 2018-04-17 NOTE — PROGRESS NOTES
Subjective:      Evelyn Armenta is a 68 y.o. female here with complaint of lower back pain. She states that she was attempting to remove her late 's pants approximately one week ago when she tweaked her back. Pain is located across her lower back and has been constant. Pain is described as pulling, 7/10 in intensity. Worse with standing and leaning forward. Denies urinary symptoms, bladder or bowel incontinence, paresthesias. She is currently on gabapentin and Tramadol (takes 2 in the AM and 2 in the mid afternoon). Current Outpatient Prescriptions   Medication Sig Dispense Refill    buPROPion (WELLBUTRIN) 100 mg tablet TAKE 1 TABLET BY MOUTH TWO TIMES A DAY FOR 30 DAYS. 60 Tab 0    traMADol (ULTRAM) 50 mg tablet Take 1-2 Tabs by mouth every eight (8) hours as needed for Pain. Max Daily Amount: 300 mg. 90 Tab 0    citalopram (CELEXA) 10 mg tablet TAKE 1 TABLET BY MOUTH DAILY 30 Tab 5    atenolol (TENORMIN) 50 mg tablet TAKE 1 TABLET BY MOUTH ONCE OVER TWENTY-FOUR HOURS FOR 30 DAYS. 30 Tab 4    gabapentin (NEURONTIN) 100 mg capsule Take one tablet at bedtime and morning. May add to middle of day if needed. 270 Cap 4    clindamycin (CLINDAGEL) 1 % topical gel APPLY TO FOLDS EVERY DAY  11    clindamycin phosphate 1 % swab 1 Dose by Apply Externally route as needed.  vitamin c-vitamin e (CRANBERRY CONCENTRATE) cap Take 1 Cap by mouth once over twenty-four (24) hours.  cholecalciferol (VITAMIN D3) 1,000 unit tablet Take 1,000 Units by mouth daily.  calcium-vitamin D (CALCIUM 500+D) 500 mg(1,250mg) -200 unit per tablet Take 1 Tab by mouth two (2) times daily (with meals).  naloxone (NARCAN) 4 mg/actuation nasal spray Use 1 spray intranasally into 1 nostril. Use a new Narcan nasal spray for subsequent doses and administer into alternating nostrils. May repeat every 2 to 3 minutes as needed. 1 Each 0    diazePAM (VALIUM) 5 mg tablet Take 1 Tab by mouth nightly. Max Daily Amount: 5 mg.  27 Tab 3       Allergies   Allergen Reactions    Crestor [Rosuvastatin] Myalgia    Pcn [Penicillins] Unknown (comments)       Past Medical History:   Diagnosis Date    Anxiety     Arthritis     knees,shoulders,fingers,back,neck     CKD (chronic kidney disease) stage 2, GFR 60-89 ml/min     Dr. Kaitlin oBss Femur fracture, left [facility]) 2008    Femur fracture, right ([facility] [address] 2009    Insomnia     Intertrigo 7/7/2015    Knee pain     Dr. Helen Mckeon Osteopenia     Dr. Hernández Seen disorder     anxiety    Pyloric stenosis 2010    Sleep disorder     Insomnia    Thoracic vertebral fracture ([facility] 2005    Ulcer 2012    Perferatied ulcer       Social History   Substance Use Topics    Smoking status: Former Smoker    Smokeless tobacco: Never Used    Alcohol use No        Review of Systems  Pertinent items are noted in HPI. Objective:     Visit Vitals    /70 (BP 1 Location: Left arm, BP Patient Position: Sitting)    Pulse (!) 106    Temp 97.6 °F (36.4 °C) (Oral)    Resp 20    Ht 5' 4\" (1.626 m)    Wt 182 lb 12.8 oz (82.9 kg)    SpO2 95%    BMI 31.38 kg/m2      General appearance - alert, well appearing, and in no distress  Eyes - pupils equal and reactive, extraocular eye movements intact, sclera anicteric  Chest - clear to auscultation, no wheezes, rales or rhonchi, symmetric air entry, no tachypnea, retractions or cyanosis  Heart - normal rate, regular rhythm, normal S1, S2, no murmurs, rubs, clicks or gallops  Back -  antalgic gait, limited range of motion, tenderness noted bilateral lumbar paraspinal muscles     Assessment/Plan:   Ethan Fisher is a 68 y.o. female seen for:     1. Back strain, initial encounter: discussed heat therapy, home stretches. 2. Chronic pain syndrome: controlled substance on file. Her provider is currently out of the office. Medication refilled. - traMADol (ULTRAM) 50 mg tablet; Take 1-2 Tabs by mouth every eight (8) hours as needed for Pain.  Max Daily Amount: 300 mg. Dispense: 90 Tab; Refill: 0    I have discussed the diagnosis with the patient and the intended plan as seen in the above orders. The patient has received an after-visit summary and questions were answered concerning future plans. I have discussed medication side effects and warnings with the patient as well. Patient verbalizes understanding of plan of care and denies further questions or concerns at this time. Informed patient to return to the office if symptoms worsen or if new symptoms arise. Follow-up Disposition:  Return if symptoms worsen or fail to improve.

## 2018-04-17 NOTE — PATIENT INSTRUCTIONS
Back Strain: Care Instructions  Your Care Instructions    Back strain happens when you overstretch, or pull, a muscle in your back. You may hurt your back in an accident or when you exercise or lift something. Most back pain will get better with rest and time. You can take care of yourself at home to help your back heal.  Follow-up care is a key part of your treatment and safety. Be sure to make and go to all appointments, and call your doctor if you are having problems. It's also a good idea to know your test results and keep a list of the medicines you take. How can you care for yourself at home? · Try to stay as active as you can, but stop or reduce any activity that causes pain. · Put ice or a cold pack on the sore muscle for 10 to 20 minutes at a time to stop swelling. Try this every 1 to 2 hours for 3 days (when you are awake) or until the swelling goes down. Put a thin cloth between the ice pack and your skin. · After 2 or 3 days, apply a heating pad on low or a warm cloth to your back. Some doctors suggest that you go back and forth between hot and cold treatments. · Take pain medicines exactly as directed. ¨ If the doctor gave you a prescription medicine for pain, take it as prescribed. ¨ If you are not taking a prescription pain medicine, ask your doctor if you can take an over-the-counter medicine. · Try sleeping on your side with a pillow between your legs. Or put a pillow under your knees when you lie on your back. These measures can ease pain in your lower back. · Return to your usual level of activity slowly. When should you call for help? Call 911 anytime you think you may need emergency care. For example, call if:  ? · You are unable to move a leg at all. ?Call your doctor now or seek immediate medical care if:  ? · You have new or worse symptoms in your legs, belly, or buttocks. Symptoms may include:  ¨ Numbness or tingling. ¨ Weakness. ¨ Pain.    ? · You lose bladder or bowel control. ? Watch closely for changes in your health, and be sure to contact your doctor if you are not getting better as expected. Where can you learn more? Go to http://thiago-uli.info/. Enter W718 in the search box to learn more about \"Back Strain: Care Instructions. \"  Current as of: March 21, 2017  Content Version: 11.4  © 9303-1066 [employer]. Care instructions adapted under license by [employer] (which disclaims liability or warranty for this information). If you have questions about a medical condition or this instruction, always ask your healthcare professional. [place] [address] any warranty or liability for your use of this information.

## 2018-04-17 NOTE — PROGRESS NOTES
Identified pt with two pt identifiers(name and ). Chief Complaint   Patient presents with    Back Pain     Complains of severe back pain radiating into right hip        Health Maintenance Due   Topic    GLAUCOMA SCREENING Q2Y     BREAST CANCER SCRN MAMMOGRAM     MEDICARE YEARLY EXAM        Wt Readings from Last 3 Encounters:   10/19/17 191 lb 12.8 oz (87 kg)   17 186 lb (84.4 kg)   17 190 lb (86.2 kg)     Temp Readings from Last 3 Encounters:   10/19/17 96 °F (35.6 °C) (Oral)   17 96.5 °F (35.8 °C) (Temporal)   17 96.1 °F (35.6 °C) (Oral)     BP Readings from Last 3 Encounters:   10/19/17 143/87   17 126/68   17 140/68     Pulse Readings from Last 3 Encounters:   10/19/17 89   17 72   17 73         Learning Assessment:  :     Learning Assessment 5/10/2016 1/15/2014   PRIMARY LEARNER Patient Patient   PRIMARY LANGUAGE ENGLISH ENGLISH   LEARNER PREFERENCE PRIMARY DEMONSTRATION READING   ANSWERED BY patient  self   RELATIONSHIP SELF SELF       Depression Screening:  :     PHQ over the last two weeks 2018   Little interest or pleasure in doing things More than half the days   Feeling down, depressed or hopeless Not at all   Total Score PHQ 2 2       Fall Risk Assessment:  :     Fall Risk Assessment, last 12 mths 2018   Able to walk? Yes   Fall in past 12 months? No       Abuse Screening:  :     Abuse Screening Questionnaire 2016   Do you ever feel afraid of your partner? N   Are you in a relationship with someone who physically or mentally threatens you? N   Is it safe for you to go home?  Y       Coordination of Care Questionnaire:  :     1) Have you been to an emergency room, urgent care clinic since your last visit? no   Hospitalized since your last visit? no             2) Have you seen or consulted any other health care providers outside of [facility] since your last visit? no  (Include any pap smears or colon screenings in this section.)    3) Do you have an Advance Directive on file? yes  Are you interested in receiving information about Advance Directives? no    Reviewed record in preparation for visit and have obtained necessary documentation. Medication reconciliation up to date and corrected with patient at this time.

## 2018-04-17 NOTE — MR AVS SNAPSHOT
[facility] 
 
 
 [address] [facility] 
[phone] Patient: Rahel Cook MRN: [id number] XEV:[id number] Visit Information Date & Time Provider Department Dept. Phone Encounter #  
 4/17/2018 10:15 AM Norm BreauxJia [phone] [id number] Follow-up Instructions Return if symptoms worsen or fail to improve. Your Appointments 4/26/2018  9:00 AM  
Medicare Physical with Emma Miller MD  
[address] [facility]) Appt Note: MWV/follow up [address] [facility] [address] [facility]  
  
   
 [address] Upcoming Health Maintenance Date Due  
 GLAUCOMA SCREENING Q2Y 1/15/2017 BREAST CANCER SCRN MAMMOGRAM 1/15/2018 MEDICARE YEARLY EXAM 3/14/2018 DTaP/Tdap/Td series (2 - Td) 10/11/2026 Allergies as of 4/17/2018  Review Complete On: 4/17/2018 By: Norm Breaux MD  
  
 Severity Noted Reaction Type Reactions Crestor [Rosuvastatin]  06/11/2010    Myalgia Pcn [Penicillins]  09/03/2009    Unknown (comments) Current Immunizations  Reviewed on 11/1/2016 Name Date Influenza High Dose Vaccine PF 9/5/2017 12:00 AM, 10/12/2016 Pneumococcal Conjugate (PCV-13) 1/13/2015 Pneumococcal Vaccine (Unspecified Type) 10/15/2012 Not reviewed this visit You Were Diagnosed With   
  
 Codes Comments Back strain, initial encounter    -  Primary ICD-10-CM: U35.894X ICD-9-CM: 222. 9 Chronic pain syndrome     ICD-10-CM: G89.4 ICD-9-CM: 338. 4 Vitals BP Pulse Temp Resp Height(growth percentile) Weight(growth percentile) 110/70 (BP 1 Location: Left arm, BP Patient Position: Sitting) (!) 106 97.6 °F (36.4 °C) (Oral) 20 5' 4\" (1.626 m) 182 lb 12.8 oz (82.9 kg) SpO2 BMI OB Status Smoking Status 95% 31.38 kg/m2 Postmenopausal Former Smoker BMI and BSA Data Body Mass Index Body Surface Area  
 31.38 kg/m 2 1.93 m 2 Preferred Pharmacy Pharmacy Name Phone [id number] [address] [phone] Your Updated Medication List  
  
   
This list is accurate as of 4/17/18 10:50 AM.  Always use your most recent med list.  
  
  
  
  
 atenolol 50 mg tablet Commonly known as:  TENORMIN  
TAKE 1 TABLET BY MOUTH ONCE OVER TWENTY-FOUR HOURS FOR 30 DAYS. buPROPion 100 mg tablet Commonly known as:  WELLBUTRIN  
TAKE 1 TABLET BY MOUTH TWO TIMES A DAY FOR 30 DAYS. CALCIUM 500+D 500 mg(1,250mg) -200 unit per tablet Generic drug:  calcium-vitamin D Take 1 Tab by mouth two (2) times daily (with meals). citalopram 10 mg tablet Commonly known as:  CELEXA  
TAKE 1 TABLET BY MOUTH DAILY * clindamycin phosphate 1 % Swab  
1 Dose by Apply Externally route as needed. * clindamycin 1 % topical gel Commonly known as:  CLINDAGEL  
APPLY TO FOLDS EVERY DAY  
  
 CRANBERRY CONCENTRATE Cap Generic drug:  vitamin c-vitamin e Take 1 Cap by mouth once over twenty-four (24) hours. diazePAM 5 mg tablet Commonly known as:  VALIUM Take 1 Tab by mouth nightly. Max Daily Amount: 5 mg.  
  
 gabapentin 100 mg capsule Commonly known as:  NEURONTIN Take one tablet at bedtime and morning. May add to middle of day if needed. naloxone 4 mg/actuation nasal spray Commonly known as:  [employer] Use 1 spray intranasally into 1 nostril. Use a new Narcan nasal spray for subsequent doses and administer into alternating nostrils. May repeat every 2 to 3 minutes as needed. traMADol 50 mg tablet Commonly known as:  ULTRAM  
Take 1-2 Tabs by mouth every eight (8) hours as needed for Pain. Max Daily Amount: 300 mg. VITAMIN D3 1,000 unit tablet Generic drug:  cholecalciferol Take 1,000 Units by mouth daily. * Notice: This list has 2 medication(s) that are the same as other medications prescribed for you. Read the directions carefully, and ask your doctor or other care provider to review them with you. Prescriptions Printed Refills  
 traMADol (ULTRAM) 50 mg tablet 0 Sig: Take 1-2 Tabs by mouth every eight (8) hours as needed for Pain. Max Daily Amount: 300 mg. Class: Print Route: Oral  
  
Follow-up Instructions Return if symptoms worsen or fail to improve. Patient Instructions Back Strain: Care Instructions Your Care Instructions Back strain happens when you overstretch, or pull, a muscle in your back. You may hurt your back in an accident or when you exercise or lift something. Most back pain will get better with rest and time. You can take care of yourself at home to help your back heal. 
Follow-up care is a key part of your treatment and safety. Be sure to make and go to all appointments, and call your doctor if you are having problems. It's also a good idea to know your test results and keep a list of the medicines you take. How can you care for yourself at home? · Try to stay as active as you can, but stop or reduce any activity that causes pain. · Put ice or a cold pack on the sore muscle for 10 to 20 minutes at a time to stop swelling. Try this every 1 to 2 hours for 3 days (when you are awake) or until the swelling goes down. Put a thin cloth between the ice pack and your skin. · After 2 or 3 days, apply a heating pad on low or a warm cloth to your back. Some doctors suggest that you go back and forth between hot and cold treatments. · Take pain medicines exactly as directed. ¨ If the doctor gave you a prescription medicine for pain, take it as prescribed. ¨ If you are not taking a prescription pain medicine, ask your doctor if you can take an over-the-counter medicine. · Try sleeping on your side with a pillow between your legs.  Or put a pillow under your knees when you lie on your back. These measures can ease pain in your lower back. · Return to your usual level of activity slowly. When should you call for help? Call 911 anytime you think you may need emergency care. For example, call if: 
? · You are unable to move a leg at all. ?Call your doctor now or seek immediate medical care if: 
? · You have new or worse symptoms in your legs, belly, or buttocks. Symptoms may include: ¨ Numbness or tingling. ¨ Weakness. ¨ Pain. ? · You lose bladder or bowel control. ? Watch closely for changes in your health, and be sure to contact your doctor if you are not getting better as expected. Where can you learn more? Go to http://thiago-uli.info/. Enter T195 in the search box to learn more about \"Back Strain: Care Instructions. \" Current as of: March 21, 2017 Content Version: 11.4 © 3299-2067 [employer]. Care instructions adapted under license by [employer] (which disclaims liability or warranty for this information). If you have questions about a medical condition or this instruction, always ask your healthcare professional. [place] [address] any warranty or liability for your use of this information. Introducing [facility]! Dear Mitul Shresthatingham: 
Thank you for requesting a [employer] account. Our records indicate that you have previously registered for a [employer] account but its currently inactive. Please call our [employer] support line at [phone]. Additional Information If you have questions, please visit the Frequently Asked Questions section of the [employer] website at [email or web address] and K interprisest/. Remember, [employer] is NOT to be used for urgent needs. For medical emergencies, dial 911. Now available from your iPhone and Android! Please provide this summary of care documentation to your next provider. Your primary care clinician is listed as [address]. If you have any questions after today's visit, please call [phone].

## 2018-04-26 ENCOUNTER — OFFICE VISIT (OUTPATIENT)
Dept: FAMILY MEDICINE CLINIC | Age: 73
End: 2018-04-26

## 2018-04-26 ENCOUNTER — HOSPITAL ENCOUNTER (OUTPATIENT)
Dept: LAB | Age: 73
Discharge: HOME | End: 2018-04-26
Payer: MEDICARE

## 2018-04-26 VITALS
RESPIRATION RATE: 18 BRPM | WEIGHT: 186.2 LBS | HEART RATE: 83 BPM | SYSTOLIC BLOOD PRESSURE: 120 MMHG | DIASTOLIC BLOOD PRESSURE: 84 MMHG | TEMPERATURE: 97.8 F | OXYGEN SATURATION: 95 % | BODY MASS INDEX: 31.79 KG/M2 | HEIGHT: 64 IN

## 2018-04-26 DIAGNOSIS — Z13.5 SCREENING FOR GLAUCOMA: ICD-10-CM

## 2018-04-26 DIAGNOSIS — F41.0 PANIC ATTACKS: ICD-10-CM

## 2018-04-26 DIAGNOSIS — M54.50 ACUTE RIGHT-SIDED LOW BACK PAIN WITHOUT SCIATICA: ICD-10-CM

## 2018-04-26 DIAGNOSIS — R82.90 ABNORMAL URINE: ICD-10-CM

## 2018-04-26 DIAGNOSIS — E55.9 VITAMIN D DEFICIENCY: ICD-10-CM

## 2018-04-26 DIAGNOSIS — M15.9 PRIMARY OSTEOARTHRITIS INVOLVING MULTIPLE JOINTS: ICD-10-CM

## 2018-04-26 DIAGNOSIS — Z00.00 MEDICARE ANNUAL WELLNESS VISIT, SUBSEQUENT: Primary | ICD-10-CM

## 2018-04-26 DIAGNOSIS — E78.00 PURE HYPERCHOLESTEROLEMIA: ICD-10-CM

## 2018-04-26 DIAGNOSIS — G89.29 CHRONIC BILATERAL LOW BACK PAIN WITHOUT SCIATICA: ICD-10-CM

## 2018-04-26 DIAGNOSIS — M54.50 CHRONIC BILATERAL LOW BACK PAIN WITHOUT SCIATICA: ICD-10-CM

## 2018-04-26 LAB
BILIRUB UR QL STRIP: NEGATIVE
GLUCOSE UR-MCNC: NEGATIVE MG/DL
KETONES P FAST UR STRIP-MCNC: NEGATIVE MG/DL
PH UR STRIP: 6 [PH] (ref 4.6–8)
PROT UR QL STRIP: NEGATIVE
SP GR UR STRIP: 1.02 (ref 1–1.03)
UA UROBILINOGEN AMB POC: ABNORMAL (ref 0.2–1)
URINALYSIS CLARITY POC: CLEAR
URINALYSIS COLOR POC: YELLOW
URINE BLOOD POC: ABNORMAL
URINE LEUKOCYTES POC: ABNORMAL
URINE NITRITES POC: NEGATIVE

## 2018-04-26 PROCEDURE — 80053 COMPREHEN METABOLIC PANEL: CPT

## 2018-04-26 PROCEDURE — 87086 URINE CULTURE/COLONY COUNT: CPT

## 2018-04-26 PROCEDURE — 86141 C-REACTIVE PROTEIN HS: CPT

## 2018-04-26 PROCEDURE — 87088 URINE BACTERIA CULTURE: CPT

## 2018-04-26 PROCEDURE — 85025 COMPLETE CBC W/AUTO DIFF WBC: CPT

## 2018-04-26 PROCEDURE — 36415 COLL VENOUS BLD VENIPUNCTURE: CPT

## 2018-04-26 PROCEDURE — 80061 LIPID PANEL: CPT

## 2018-04-26 PROCEDURE — 82306 VITAMIN D 25 HYDROXY: CPT

## 2018-04-26 PROCEDURE — 87186 SC STD MICRODIL/AGAR DIL: CPT

## 2018-04-26 RX ORDER — DIAZEPAM 5 MG/1
5 TABLET ORAL
Qty: 30 TAB | Refills: 3 | Status: SHIPPED | OUTPATIENT
Start: 2018-04-26 | End: 2019-02-22 | Stop reason: SDUPTHER

## 2018-04-26 RX ORDER — LIDOCAINE 50 MG/G
1 PATCH TOPICAL EVERY 24 HOURS
Qty: 30 PATCH | Refills: 5 | Status: SHIPPED | OUTPATIENT
Start: 2018-04-26 | End: 2018-05-26

## 2018-04-26 NOTE — MR AVS SNAPSHOT
[address] [place] [address] 
 
 
 [facility] [address] [facility] 
[phone] Patient: Donte Ingram MRN: [id number] LCN:[id number] Visit Information Date & Time Provider Department Dept. Phone Encounter #  
 4/26/2018  9:00 AM Moshe Ramos [id number] Follow-up Instructions Return if symptoms worsen or fail to improve. Upcoming Health Maintenance Date Due  
 GLAUCOMA SCREENING Q2Y 1/15/2017 BREAST CANCER SCRN MAMMOGRAM 4/26/2018* MEDICARE YEARLY EXAM 4/27/2019 DTaP/Tdap/Td series (2 - Td) 10/11/2026 *Topic was postponed. The date shown is not the original due date. Allergies as of 4/26/2018  Review Complete On: 4/26/2018 By: Codie Toth MD  
  
 Severity Noted Reaction Type Reactions Crestor [Rosuvastatin]  06/11/2010    Myalgia Pcn [Penicillins]  09/03/2009    Unknown (comments) Current Immunizations  Reviewed on 11/1/2016 Name Date Influenza High Dose Vaccine PF 9/5/2017 12:00 AM, 10/12/2016 Pneumococcal Conjugate (PCV-13) 1/13/2015 Pneumococcal Vaccine (Unspecified Type) 10/15/2012 Not reviewed this visit You Were Diagnosed With   
  
 Codes Comments Medicare annual wellness visit, subsequent    -  Primary ICD-10-CM: Z00.00 ICD-9-CM: V70.0 Chronic bilateral low back pain without sciatica     ICD-10-CM: M54.5, G89.29 ICD-9-CM: 724.2, 338.29 Panic attacks     ICD-10-CM: F41.0 ICD-9-CM: 300.01 Primary osteoarthritis involving multiple joints     ICD-10-CM: M15.0 ICD-9-CM: 715.09 Acute right-sided low back pain without sciatica     ICD-10-CM: M54.5 ICD-9-CM: 724.2 Screening for glaucoma     ICD-10-CM: Z13.5 ICD-9-CM: V80.1 Abnormal urine     ICD-10-CM: R82.90 ICD-9-CM: 791.9 Pure hypercholesterolemia     ICD-10-CM: E78.00 ICD-9-CM: 272.0 Vitamin D deficiency     ICD-10-CM: E55.9 ICD-9-CM: 268.9 Vitals BP Pulse Temp Resp Height(growth percentile) Weight(growth percentile) 120/84 (BP 1 Location: Right arm, BP Patient Position: Sitting) 83 97.8 °F (36.6 °C) (Oral) 18 5' 4\" (1.626 m) 186 lb 3.2 oz (84.5 kg) SpO2 BMI OB Status Smoking Status 95% 31.96 kg/m2 Postmenopausal Former Smoker BMI and BSA Data Body Mass Index Body Surface Area 31.96 kg/m 2 1.95 m 2 Preferred Pharmacy Pharmacy Name Phone [id number] [address] [phone] Your Updated Medication List  
  
   
This list is accurate as of 4/26/18 10:25 AM.  Always use your most recent med list.  
  
  
  
  
 atenolol 50 mg tablet Commonly known as:  TENORMIN  
TAKE 1 TABLET BY MOUTH ONCE OVER TWENTY-FOUR HOURS FOR 30 DAYS. buPROPion 100 mg tablet Commonly known as:  WELLBUTRIN  
TAKE 1 TABLET BY MOUTH TWO TIMES A DAY  
  
 CALCIUM 500+D 500 mg(1,250mg) -200 unit per tablet Generic drug:  calcium-vitamin D Take 1 Tab by mouth two (2) times daily (with meals). citalopram 10 mg tablet Commonly known as:  CELEXA  
TAKE 1 TABLET BY MOUTH DAILY * clindamycin phosphate 1 % Swab  
1 Dose by Apply Externally route as needed. * clindamycin 1 % topical gel Commonly known as:  CLINDAGEL  
APPLY TO FOLDS EVERY DAY  
  
 CRANBERRY CONCENTRATE Cap Generic drug:  vitamin c-vitamin e Take 1 Cap by mouth once over twenty-four (24) hours. diazePAM 5 mg tablet Commonly known as:  VALIUM Take 1 Tab by mouth nightly. Max Daily Amount: 5 mg.  
  
 gabapentin 100 mg capsule Commonly known as:  NEURONTIN Take one tablet at bedtime and morning. May add to middle of day if needed. lidocaine 5 % Commonly known as:  LIDODERM  
1 Patch by TransDERmal route every twenty-four (24) hours for 30 days. Apply patch to the affected area for 12 hours a day and remove for 12 hours a day.  
  
 naloxone 4 mg/actuation nasal spray Commonly known as:  [employer] Use 1 spray intranasally into 1 nostril. Use a new Narcan nasal spray for subsequent doses and administer into alternating nostrils. May repeat every 2 to 3 minutes as needed. traMADol 50 mg tablet Commonly known as:  ULTRAM  
Take 1-2 Tabs by mouth every eight (8) hours as needed for Pain. Max Daily Amount: 300 mg. VITAMIN D3 1,000 unit tablet Generic drug:  cholecalciferol Take 1,000 Units by mouth daily. * Notice: This list has 2 medication(s) that are the same as other medications prescribed for you. Read the directions carefully, and ask your doctor or other care provider to review them with you. Prescriptions Printed Refills  
 diazePAM (VALIUM) 5 mg tablet 3 Sig: Take 1 Tab by mouth nightly. Max Daily Amount: 5 mg. Class: Print Route: Oral  
 lidocaine (LIDODERM) 5 % 5 Si Patch by TransDERmal route every twenty-four (24) hours for 30 days. Apply patch to the affected area for 12 hours a day and remove for 12 hours a day. Class: Print Route: TransDERmal  
  
We Performed the Following CBC WITH AUTOMATED DIFF [87704 CPT(R)] CRP, HIGH SENSITIVITY [45021 CPT(R)] CULTURE, URINE [id number] CPT(R)] LIPID PANEL [54020 CPT(R)] METABOLIC PANEL, COMPREHENSIVE [01706 CPT(R)] REFERRAL TO OPHTHALMOLOGY [REF57 Custom] Comments:  
 Screening for glaucoma VITAMIN D, 25 HYDROXY [id number] CPT(R)] Follow-up Instructions Return if symptoms worsen or fail to improve. Referral Information Referral ID Referred By Referred To  
  
 [id number] [facility]  [address], [address] Visits Status Start Date End Date 1 New Request 18 If your referral has a status of pending review or denied, additional information will be sent to support the outcome of this decision. Patient Instructions Medicare Wellness Visit, Female The best way to live healthy is to have a healthy lifestyle by eating a well-balanced diet, exercising regularly, limiting alcohol and stopping smoking. Regular physical exams and screening tests are another way to keep healthy. Preventive exams provided by your health care provider can find health problems before they become diseases or illnesses. Preventive services including immunizations, screening tests, monitoring and exams can help you take care of your own health. All people over age 72 should have a pneumovax  and and a prevnar shot to prevent pneumonia. These are once in a lifetime unless you and your provider decide differently. All people over 65 should have a yearly flu shot and a tetanus vaccine every 10 years. A bone mass density to screen for osteoporosis or thinning of the bones should be done every 2 years after 65. Screening for diabetes mellitus with a blood sugar test should be done every year. Glaucoma is a disease of the eye due to increased ocular pressure that can lead to blindness and it should be done every year by an eye professional. 
 
Cardiovascular screening tests that check for elevated lipids (fatty part of blood) which can lead to heart disease and strokes should be done every 5 years. Colorectal screening that evaluates for blood or polyps in your colon should be done yearly as a stool test or every five years as a flexible sigmoidoscope or every 10 years as a colonoscopy up to age 76. Breast cancer screening with a mammogram is recommended biennially  for women age 54-69. Screening for cervical cancer with a pap smear and pelvic exam is recommended for women after age 72 years every 2 years up to age 79 or when the provider and patient decide to stop. If there is a history of cervical abnormalities or other increased risk for cancer then the test is recommended yearly. Hepatitis C screening is also recommended for anyone born between [birth year] through [address]. A shingles vaccine is also recommended once in a lifetime after age 61. Your Medicare Wellness Exam is recommended annually. Here is a list of your current Health Maintenance items with a due date: 
Health Maintenance Due Topic Date Due  Glaucoma Screening   01/15/2017  Breast Cancer Screening  01/15/2018 [employer] Annual Well Visit  03/14/2018 Medicare Wellness Visit, Female The best way to live healthy is to have a healthy lifestyle by eating a well-balanced diet, exercising regularly, limiting alcohol and stopping smoking. Regular physical exams and screening tests are another way to keep healthy. Preventive exams provided by your health care provider can find health problems before they become diseases or illnesses. Preventive services including immunizations, screening tests, monitoring and exams can help you take care of your own health. All people over age 72 should have a pneumovax  and and a prevnar shot to prevent pneumonia. These are once in a lifetime unless you and your provider decide differently. All people over 65 should have a yearly flu shot and a tetanus vaccine every 10 years. A bone mass density to screen for osteoporosis or thinning of the bones should be done every 2 years after 65. Screening for diabetes mellitus with a blood sugar test should be done every year. Glaucoma is a disease of the eye due to increased ocular pressure that can lead to blindness and it should be done every year by an eye professional. 
 
Cardiovascular screening tests that check for elevated lipids (fatty part of blood) which can lead to heart disease and strokes should be done every 5 years.  
 
Colorectal screening that evaluates for blood or polyps in your colon should be done yearly as a stool test or every five years as a flexible sigmoidoscope or every 10 years as a colonoscopy up to age 76. Breast cancer screening with a mammogram is recommended biennially  for women age 54-69. Screening for cervical cancer with a pap smear and pelvic exam is recommended for women after age 72 years every 2 years up to age 79 or when the provider and patient decide to stop. If there is a history of cervical abnormalities or other increased risk for cancer then the test is recommended yearly. Hepatitis C screening is also recommended for anyone born between [birth year] through [address]. A shingles vaccine is also recommended once in a lifetime after age 61. Your Medicare Wellness Exam is recommended annually. Here is a list of your current Health Maintenance items with a due date: 
Health Maintenance Due Topic Date Due  Glaucoma Screening   01/15/2017  Breast Cancer Screening  01/15/2018 Julian Prior Annual Well Visit  03/14/2018 Well Visit, Over 72: Care Instructions Your Care Instructions Physical exams can help you stay healthy. Your doctor has checked your overall health and may have suggested ways to take good care of yourself. He or she also may have recommended tests. At home, you can help prevent illness with healthy eating, regular exercise, and other steps. Follow-up care is a key part of your treatment and safety. Be sure to make and go to all appointments, and call your doctor if you are having problems. It's also a good idea to know your test results and keep a list of the medicines you take. How can you care for yourself at home? · Reach and stay at a healthy weight. This will lower your risk for many problems, such as obesity, diabetes, heart disease, and high blood pressure. · Get at least 30 minutes of exercise on most days of the week. Walking is a good choice. You also may want to do other activities, such as running, swimming, cycling, or playing tennis or team sports. · Do not smoke. Smoking can make health problems worse. If you need help quitting, talk to your doctor about stop-smoking programs and medicines. These can increase your chances of quitting for good. · Protect your skin from too much sun. When you're outdoors from 10 a.m. to 4 p.m., stay in the shade or cover up with clothing and a hat with a wide brim. Wear sunglasses that block UV rays. Even when it's cloudy, put broad-spectrum sunscreen (SPF 30 or higher) on any exposed skin. · See a dentist one or two times a year for checkups and to have your teeth cleaned. · Wear a seat belt in the car. · Limit alcohol to 2 drinks a day for men and 1 drink a day for women. Too much alcohol can cause health problems. Follow your doctor's advice about when to have certain tests. These tests can spot problems early. For men and women · Cholesterol. Your doctor will tell you how often to have this done based on your overall health and other things that can increase your risk for heart attack and stroke. · Blood pressure. Have your blood pressure checked during a routine doctor visit. Your doctor will tell you how often to check your blood pressure based on your age, your blood pressure results, and other factors. · Diabetes. Ask your doctor whether you should have tests for diabetes. · Vision. Experts recommend that you have yearly exams for glaucoma and other age-related eye problems. · Hearing. Tell your doctor if you notice any change in your hearing. You can have tests to find out how well you hear. · Colon cancer tests. Keep having colon cancer tests as your doctor recommends. You can have one of several types of tests. · Heart attack and stroke risk. At least every 4 to 6 years, you should have your risk for heart attack and stroke assessed.  Your doctor uses factors such as your age, blood pressure, cholesterol, and whether you smoke or have diabetes to show what your risk for a heart attack or stroke is over the next 10 years. · Osteoporosis. Talk to your doctor about whether you should have a bone density test to find out whether you have thinning bones. Also ask your doctor about whether you should take calcium and vitamin D supplements. For women · Pap test and pelvic exam. You may no longer need a Pap test. Talk with your doctor about whether to stop or continue to have Pap tests. · Breast exam and mammogram. Ask how often you should have a mammogram, which is an X-ray of your breasts. A mammogram can spot breast cancer before it can be felt and when it is easiest to treat. · Thyroid disease. Talk to your doctor about whether to have your thyroid checked as part of a regular physical exam. Women have an increased chance of a thyroid problem. For men · Prostate exam. Talk to your doctor about whether you should have a blood test (called a PSA test) for prostate cancer. Experts disagree on whether men should have this test. Some experts recommend that you discuss the benefits and risks of the test with your doctor. · Abdominal aortic aneurysm. Ask your doctor whether you should have a test to check for an aneurysm. You may need a test if you ever smoked or if your parent, brother, sister, or child has had an aneurysm. When should you call for help? Watch closely for changes in your health, and be sure to contact your doctor if you have any problems or symptoms that concern you. Where can you learn more? Go to http://thiago-uli.info/. Enter R071 in the search box to learn more about \"Well Visit, Over 65: Care Instructions. \" Current as of: May 12, 2017 Content Version: 11.4 © 8595-6681 [employer]. Care instructions adapted under license by [employer] (which disclaims liability or warranty for this information).  If you have questions about a medical condition or this instruction, always ask your healthcare professional. [employer] disclaims any warranty or liability for your use of this information. Introducing [facility]! [facility] introduces PlaceVine patient portal. Now you can access parts of your medical record, email your doctor's office, and request medication refills online. 1. In your internet browser, go to [email or web address] 2. Click on the First Time User? Click Here link in the Sign In box. You will see the New Member Sign Up page. 3. Enter your PlaceVine Access Code exactly as it appears below. You will not need to use this code after youve completed the sign-up process. If you do not sign up before the expiration date, you must request a new code. · [employer]ine Access Code: [email or web address] Expires: 7/25/2018 10:16 AM 
 
4. Enter the last four digits of your Social Security Number (xxxx) and Date of Birth (mm/dd/yyyy) as indicated and click Submit. You will be taken to the next sign-up page. 5. Create a PlaceVine ID. This will be your PlaceVine login ID and cannot be changed, so think of one that is secure and easy to remember. 6. Create a PlaceVine password. You can change your password at any time. 7. Enter your Password Reset Question and Answer. This can be used at a later time if you forget your password. 8. Enter your e-mail address. You will receive e-mail notification when new information is available in [address] 9. Click Sign Up. You can now view and download portions of your medical record. 10. Click the Download Summary menu link to download a portable copy of your medical information. If you have questions, please visit the Frequently Asked Questions section of the [employer]ine website. Remember, Place[employer]ine is NOT to be used for urgent needs. For medical emergencies, dial 911. Now available from your iPhone and Android! Please provide this summary of care documentation to your next provider. Your primary care clinician is listed as [address]. If you have any questions after today's visit, please call [phone].

## 2018-04-26 NOTE — PROGRESS NOTES
Identified pt with two pt identifiers(name and ). Chief Complaint   Patient presents with   [facility] Visit     Medicare Wellness Visit        Health Maintenance Due   Topic    GLAUCOMA SCREENING Q2Y     BREAST CANCER SCRN MAMMOGRAM     MEDICARE YEARLY EXAM        Wt Readings from Last 3 Encounters:   18 182 lb 12.8 oz (82.9 kg)   10/19/17 191 lb 12.8 oz (87 kg)   17 186 lb (84.4 kg)     Temp Readings from Last 3 Encounters:   18 97.6 °F (36.4 °C) (Oral)   10/19/17 96 °F (35.6 °C) (Oral)   17 96.5 °F (35.8 °C) (Temporal)     BP Readings from Last 3 Encounters:   18 110/70   10/19/17 143/87   17 126/68     Pulse Readings from Last 3 Encounters:   18 (!) 106   10/19/17 89   17 72         Learning Assessment:  :     Learning Assessment 5/10/2016 1/15/2014   PRIMARY LEARNER Patient Patient   PRIMARY LANGUAGE ENGLISH ENGLISH   LEARNER PREFERENCE PRIMARY DEMONSTRATION READING   ANSWERED BY patient  self   RELATIONSHIP SELF SELF       Depression Screening:  :     PHQ over the last two weeks 2018   Little interest or pleasure in doing things More than half the days   Feeling down, depressed or hopeless More than half the days   Total Score PHQ 2 4       Fall Risk Assessment:  :     Fall Risk Assessment, last 12 mths 2018   Able to walk? Yes   Fall in past 12 months? No       Abuse Screening:  :     Abuse Screening Questionnaire 2016   Do you ever feel afraid of your partner? N   Are you in a relationship with someone who physically or mentally threatens you? N   Is it safe for you to go home?  Y       Coordination of Care Questionnaire:  :     1) Have you been to an emergency room, urgent care clinic since your last visit? no   Hospitalized since your last visit? no             2) Have you seen or consulted any other health care providers outside of [facility] since your last visit?  no  (Include any pap smears or colon screenings in this section.)    3) Do you have an Advance Directive on file? yes  Are you interested in receiving information about Advance Directives? no    Reviewed record in preparation for visit and have obtained necessary documentation. Medication reconciliation up to date and corrected with patient at this time.

## 2018-04-26 NOTE — PROGRESS NOTES
This is the Subsequent Medicare Annual Wellness Exam, performed 12 months or more after the Initial AWV or the last Subsequent AWV    I have reviewed the patient's medical history in detail and updated the computerized patient record. History   73F with severe OA and chronic pain who presents for MWV. Since her last visit, she has lost her  of 46 years. It has been an extremely difficult time. Multiple changes will be needed. Past Medical History:   Diagnosis Date    Anxiety     Arthritis     knees,shoulders,fingers,back,neck     CKD (chronic kidney disease) stage 2, GFR 60-89 ml/min     Dr. Oleary Neither Femur fracture, left [facility]) 2008    Femur fracture, right ([facility] [address] 2009    Insomnia     Intertrigo 7/7/2015    Knee pain     Dr. Sandra Meyer Osteopenia     Dr. Sandra Meyer Psychiatric disorder     anxiety    Pyloric stenosis 2010    Sleep disorder     Insomnia    Thoracic vertebral fracture ([facility] [address] 2005    Ulcer 2012    Perferatied ulcer      Past Surgical History:   Procedure Laterality Date    HX FEMUR FRACTURE TX      Left femur - 2008, R femur - 2009    HX GI  2010    Perforated ulcer, in relation to pyloric stenosis    HX ORTHOPAEDIC      6 thoracic vertabrae that were fractured in 2005     [facility] RCTM ET AL LMTD&ADJ STRUX  12/6/10    Normal except Diverticulosis     Current Outpatient Prescriptions   Medication Sig Dispense Refill    diazePAM (VALIUM) 5 mg tablet Take 1 Tab by mouth nightly. Max Daily Amount: 5 mg. 30 Tab 3    lidocaine (LIDODERM) 5 % 1 Patch by TransDERmal route every twenty-four (24) hours for 30 days. Apply patch to the affected area for 12 hours a day and remove for 12 hours a day. 30 Patch 5    traMADol (ULTRAM) 50 mg tablet Take 1-2 Tabs by mouth every eight (8) hours as needed for Pain.  Max Daily Amount: 300 mg. 90 Tab 0    buPROPion (WELLBUTRIN) 100 mg tablet TAKE 1 TABLET BY MOUTH TWO TIMES A  Tab 1    citalopram (CELEXA) 10 mg tablet TAKE 1 TABLET BY MOUTH DAILY 90 Tab 1    atenolol (TENORMIN) 50 mg tablet TAKE 1 TABLET BY MOUTH ONCE OVER TWENTY-FOUR HOURS FOR 30 DAYS. 30 Tab 4    naloxone (NARCAN) 4 mg/actuation nasal spray Use 1 spray intranasally into 1 nostril. Use a new Narcan nasal spray for subsequent doses and administer into alternating nostrils. May repeat every 2 to 3 minutes as needed. 1 Each 0    gabapentin (NEURONTIN) 100 mg capsule Take one tablet at bedtime and morning. May add to middle of day if needed. 270 Cap 4    clindamycin (CLINDAGEL) 1 % topical gel APPLY TO FOLDS EVERY DAY  11    cholecalciferol (VITAMIN D3) 1,000 unit tablet Take 1,000 Units by mouth daily.  calcium-vitamin D (CALCIUM 500+D) 500 mg(1,250mg) -200 unit per tablet Take 1 Tab by mouth two (2) times daily (with meals).  clindamycin phosphate 1 % swab 1 Dose by Apply Externally route as needed.  vitamin c-vitamin e (CRANBERRY CONCENTRATE) cap Take 1 Cap by mouth once over twenty-four (24) hours.        Allergies   Allergen Reactions    Crestor [Rosuvastatin] Myalgia    Pcn [Penicillins] Unknown (comments)     Family History   Problem Relation Age of Onset    Cancer Sister      metastatic lung Cancer    Cancer Mother      colon     Social History   Substance Use Topics    Smoking status: Former Smoker    Smokeless tobacco: Never Used    Alcohol use No     Patient Active Problem List   Diagnosis Code    Knee pain M25.569    CKD (chronic kidney disease) stage 2, GFR 60-89 ml/min N18.2    Insomnia G47.00    Osteoarthritis M19.90    Senile osteoporosis M81.0    Iron deficiency anemia, unspecified D50.9    Anxiety state, unspecified F41.1    Palpitations R00.2    Bronchitis J40    UTI (lower urinary tract infection) N39.0    Intertrigo L30.4    Screening for cholesterol level Z13.220    Need for hepatitis C screening test Z11.59    Dysuria R30.0    Lung crackles R09.89    Medicare annual wellness visit, subsequent Z00.00       Depression Risk Factor Screening:     PHQ over the last two weeks 4/26/2018   Little interest or pleasure in doing things More than half the days   Feeling down, depressed or hopeless More than half the days   Total Score PHQ 2 4     Alcohol Risk Factor Screening: You do not drink alcohol or very rarely. Functional Ability and Level of Safety:   Hearing Loss  Hearing is good. Activities of Daily Living  The home contains: handrails and grab bars  Patient does total self care    Fall Risk  Fall Risk Assessment, last 12 mths 4/26/2018   Able to walk? Yes   Fall in past 12 months? No       Abuse Screen  Patient is not abused    Cognitive Screening   Evaluation of Cognitive Function:  Has your family/caregiver stated any concerns about your memory: no  Normal    Patient Care Team   Patient Care Team:  Cain Coleman MD as PCP - General (Pediatrics)  Kathie Liao    Assessment/Plan   Education and counseling provided:  Are appropriate based on today's review and evaluation  End-of-Life planning (with patient's consent)  Screening Mammography  Bone mass measurement (DEXA)  Screening for glaucoma    Diagnoses and all orders for this visit:    1. Medicare annual wellness visit, subsequent  Anticipatory guidance discussed. Immunizations reviewed  HM updated. 2. Chronic bilateral low back pain without sciatica    3. Panic attacks  -     diazePAM (VALIUM) 5 mg tablet; Take 1 Tab by mouth nightly. Max Daily Amount: 5 mg. 4. Primary osteoarthritis involving multiple joints    5. Acute right-sided low back pain without sciatica    Other orders  -     lidocaine (LIDODERM) 5 %; 1 Patch by TransDERmal route every twenty-four (24) hours for 30 days. Apply patch to the affected area for 12 hours a day and remove for 12 hours a day. Health Maintenance Due   Topic Date Due    GLAUCOMA SCREENING Q2Y  Ordered    BREAST CANCER SCRN MAMMOGRAM  Patient has refused/declined.  Risk/Benefits discussed in detail.  MEDICARE YEARLY EXAM  4/27/2018     I have discussed the diagnosis with the patient and the intended treatment plan as seen in the above orders. The patient has received an after-visit summary and questions were answered concerning future plans. Asked to return should symptoms worsen or not improve with treatment. Any pending labs and studies will be relayed to patient when they become available. Pt verbalizes understanding of plan of care and denies further questions or concerns at this time. Follow-up Disposition:  Return if symptoms worsen or fail to improve.

## 2018-04-26 NOTE — ACP (ADVANCE CARE PLANNING)
Patient recently lost her  who was her POA. She is going to update her ACP and received a new form today.

## 2018-04-26 NOTE — PATIENT INSTRUCTIONS
Medicare Wellness Visit, Female    The best way to live healthy is to have a healthy lifestyle by eating a well-balanced diet, exercising regularly, limiting alcohol and stopping smoking. Regular physical exams and screening tests are another way to keep healthy. Preventive exams provided by your health care provider can find health problems before they become diseases or illnesses. Preventive services including immunizations, screening tests, monitoring and exams can help you take care of your own health. All people over age 72 should have a pneumovax  and and a prevnar shot to prevent pneumonia. These are once in a lifetime unless you and your provider decide differently. All people over 65 should have a yearly flu shot and a tetanus vaccine every 10 years. A bone mass density to screen for osteoporosis or thinning of the bones should be done every 2 years after 65. Screening for diabetes mellitus with a blood sugar test should be done every year. Glaucoma is a disease of the eye due to increased ocular pressure that can lead to blindness and it should be done every year by an eye professional.    Cardiovascular screening tests that check for elevated lipids (fatty part of blood) which can lead to heart disease and strokes should be done every 5 years. Colorectal screening that evaluates for blood or polyps in your colon should be done yearly as a stool test or every five years as a flexible sigmoidoscope or every 10 years as a colonoscopy up to age 76. Breast cancer screening with a mammogram is recommended biennially  for women age 54-69. Screening for cervical cancer with a pap smear and pelvic exam is recommended for women after age 72 years every 2 years up to age 79 or when the provider and patient decide to stop. If there is a history of cervical abnormalities or other increased risk for cancer then the test is recommended yearly.     Hepatitis C screening is also recommended for anyone born between [birth year] through [address]. A shingles vaccine is also recommended once in a lifetime after age [facility]. Your Medicare Wellness Exam is recommended annually. Here is a list of your current Health Maintenance items with a due date:  Health Maintenance Due   Topic Date Due    Glaucoma Screening   01/15/2017    Breast Cancer Screening  01/15/2018    Annual Well Visit  03/14/2018         Medicare Wellness Visit, Female    The best way to live healthy is to have a healthy lifestyle by eating a well-balanced diet, exercising regularly, limiting alcohol and stopping smoking. Regular physical exams and screening tests are another way to keep healthy. Preventive exams provided by your health care provider can find health problems before they become diseases or illnesses. Preventive services including immunizations, screening tests, monitoring and exams can help you take care of your own health. All people over age 72 should have a pneumovax  and and a prevnar shot to prevent pneumonia. These are once in a lifetime unless you and your provider decide differently. All people over 65 should have a yearly flu shot and a tetanus vaccine every 10 years. A bone mass density to screen for osteoporosis or thinning of the bones should be done every 2 years after 65. Screening for diabetes mellitus with a blood sugar test should be done every year. Glaucoma is a disease of the eye due to increased ocular pressure that can lead to blindness and it should be done every year by an eye professional.    Cardiovascular screening tests that check for elevated lipids (fatty part of blood) which can lead to heart disease and strokes should be done every 5 years. Colorectal screening that evaluates for blood or polyps in your colon should be done yearly as a stool test or every five years as a flexible sigmoidoscope or every 10 years as a colonoscopy up to age 76.     Breast cancer screening with a mammogram is recommended biennially  for women age 54-69. Screening for cervical cancer with a pap smear and pelvic exam is recommended for women after age 72 years every 2 years up to age 79 or when the provider and patient decide to stop. If there is a history of cervical abnormalities or other increased risk for cancer then the test is recommended yearly. Hepatitis C screening is also recommended for anyone born between [birth year] through [address]. A shingles vaccine is also recommended once in a lifetime after age 61. Your Medicare Wellness Exam is recommended annually. Here is a list of your current Health Maintenance items with a due date:  Health Maintenance Due   Topic Date Due    Glaucoma Screening   01/15/2017    Breast Cancer Screening  01/15/2018    Annual Well Visit  03/14/2018          Well Visit, Over 72: Care Instructions  Your Care Instructions    Physical exams can help you stay healthy. Your doctor has checked your overall health and may have suggested ways to take good care of yourself. He or she also may have recommended tests. At home, you can help prevent illness with healthy eating, regular exercise, and other steps. Follow-up care is a key part of your treatment and safety. Be sure to make and go to all appointments, and call your doctor if you are having problems. It's also a good idea to know your test results and keep a list of the medicines you take. How can you care for yourself at home? · Reach and stay at a healthy weight. This will lower your risk for many problems, such as obesity, diabetes, heart disease, and high blood pressure. · Get at least 30 minutes of exercise on most days of the week. Walking is a good choice. You also may want to do other activities, such as running, swimming, cycling, or playing tennis or team sports. · Do not smoke. Smoking can make health problems worse. If you need help quitting, talk to your doctor about stop-smoking programs and medicines.  These can increase your chances of quitting for good. · Protect your skin from too much sun. When you're outdoors from 10 a.m. to 4 p.m., stay in the shade or cover up with clothing and a hat with a wide brim. Wear sunglasses that block UV rays. Even when it's cloudy, put broad-spectrum sunscreen (SPF 30 or higher) on any exposed skin. · See a dentist one or two times a year for checkups and to have your teeth cleaned. · Wear a seat belt in the car. · Limit alcohol to 2 drinks a day for men and 1 drink a day for women. Too much alcohol can cause health problems. Follow your doctor's advice about when to have certain tests. These tests can spot problems early. For men and women  · Cholesterol. Your doctor will tell you how often to have this done based on your overall health and other things that can increase your risk for heart attack and stroke. · Blood pressure. Have your blood pressure checked during a routine doctor visit. Your doctor will tell you how often to check your blood pressure based on your age, your blood pressure results, and other factors. · Diabetes. Ask your doctor whether you should have tests for diabetes. · Vision. Experts recommend that you have yearly exams for glaucoma and other age-related eye problems. · Hearing. Tell your doctor if you notice any change in your hearing. You can have tests to find out how well you hear. · Colon cancer tests. Keep having colon cancer tests as your doctor recommends. You can have one of several types of tests. · Heart attack and stroke risk. At least every 4 to 6 years, you should have your risk for heart attack and stroke assessed. Your doctor uses factors such as your age, blood pressure, cholesterol, and whether you smoke or have diabetes to show what your risk for a heart attack or stroke is over the next 10 years. · Osteoporosis. Talk to your doctor about whether you should have a bone density test to find out whether you have thinning bones. Also ask your doctor about whether you should take calcium and vitamin D supplements. For women  · Pap test and pelvic exam. You may no longer need a Pap test. Talk with your doctor about whether to stop or continue to have Pap tests. · Breast exam and mammogram. Ask how often you should have a mammogram, which is an X-ray of your breasts. A mammogram can spot breast cancer before it can be felt and when it is easiest to treat. · Thyroid disease. Talk to your doctor about whether to have your thyroid checked as part of a regular physical exam. Women have an increased chance of a thyroid problem. For men  · Prostate exam. Talk to your doctor about whether you should have a blood test (called a PSA test) for prostate cancer. Experts disagree on whether men should have this test. Some experts recommend that you discuss the benefits and risks of the test with your doctor. · Abdominal aortic aneurysm. Ask your doctor whether you should have a test to check for an aneurysm. You may need a test if you ever smoked or if your parent, brother, sister, or child has had an aneurysm. When should you call for help? Watch closely for changes in your health, and be sure to contact your doctor if you have any problems or symptoms that concern you. Where can you learn more? Go to http://thiago-uli.info/. Enter R539 in the search box to learn more about \"Well Visit, Over 65: Care Instructions. \"  Current as of: May 12, 2017  Content Version: 11.4  © 5586-1160 [employer]. Care instructions adapted under license by [employer] (which disclaims liability or warranty for this information). If you have questions about a medical condition or this instruction, always ask your healthcare professional. [place] [address] any warranty or liability for your use of this information.

## 2018-04-27 LAB
25(OH)D3+25(OH)D2 SERPL-MCNC: 50.4 NG/ML (ref 30–100)
ALBUMIN SERPL-MCNC: 3.9 G/DL (ref 3.5–4.8)
ALBUMIN/GLOB SERPL: 1.5 {RATIO} (ref 1.2–2.2)
ALP SERPL-CCNC: 97 IU/L (ref 39–117)
ALT SERPL-CCNC: 6 IU/L (ref 0–32)
AST SERPL-CCNC: 13 IU/L (ref 0–40)
BASOPHILS # BLD AUTO: 0 X10E3/UL (ref 0–0.2)
BASOPHILS NFR BLD AUTO: 1 %
BILIRUB SERPL-MCNC: 0.5 MG/DL (ref 0–1.2)
BUN SERPL-MCNC: 15 MG/DL (ref 8–27)
BUN/CREAT SERPL: 13 (ref 12–28)
CALCIUM SERPL-MCNC: 9.1 MG/DL (ref 8.7–10.3)
CHLORIDE SERPL-SCNC: 102 MMOL/L (ref 96–106)
CHOLEST SERPL-MCNC: 181 MG/DL (ref 100–199)
CO2 SERPL-SCNC: 26 MMOL/L (ref 18–29)
CREAT SERPL-MCNC: 1.14 MG/DL (ref 0.57–1)
CRP SERPL HS-MCNC: 11.05 MG/L (ref 0–3)
EOSINOPHIL # BLD AUTO: 0.2 X10E3/UL (ref 0–0.4)
EOSINOPHIL NFR BLD AUTO: 3 %
ERYTHROCYTE [DISTWIDTH] IN BLOOD BY AUTOMATED COUNT: 15 % (ref 12.3–15.4)
GFR SERPLBLD CREATININE-BSD FMLA CKD-EPI: 48 ML/MIN/1.73
GFR SERPLBLD CREATININE-BSD FMLA CKD-EPI: 56 ML/MIN/1.73
GLOBULIN SER CALC-MCNC: 2.6 G/DL (ref 1.5–4.5)
GLUCOSE SERPL-MCNC: 73 MG/DL (ref 65–99)
HCT VFR BLD AUTO: 38.9 % (ref 34–46.6)
HDLC SERPL-MCNC: 77 MG/DL
HGB BLD-MCNC: 12.6 G/DL (ref 11.1–15.9)
IMM GRANULOCYTES # BLD: 0 X10E3/UL (ref 0–0.1)
IMM GRANULOCYTES NFR BLD: 1 %
INTERPRETATION, 910389: NORMAL
INTERPRETATION: NORMAL
LDLC SERPL CALC-MCNC: 83 MG/DL (ref 0–99)
LYMPHOCYTES # BLD AUTO: 0.8 X10E3/UL (ref 0.7–3.1)
LYMPHOCYTES NFR BLD AUTO: 13 %
MCH RBC QN AUTO: 33.1 PG (ref 26.6–33)
MCHC RBC AUTO-ENTMCNC: 32.4 G/DL (ref 31.5–35.7)
MCV RBC AUTO: 102 FL (ref 79–97)
MONOCYTES # BLD AUTO: 0.7 X10E3/UL (ref 0.1–0.9)
MONOCYTES NFR BLD AUTO: 10 %
NEUTROPHILS # BLD AUTO: 5 X10E3/UL (ref 1.4–7)
NEUTROPHILS NFR BLD AUTO: 72 %
PDF IMAGE, 910387: NORMAL
PLATELET # BLD AUTO: 186 X10E3/UL (ref 150–379)
POTASSIUM SERPL-SCNC: 5 MMOL/L (ref 3.5–5.2)
PROT SERPL-MCNC: 6.5 G/DL (ref 6–8.5)
RBC # BLD AUTO: 3.81 X10E6/UL (ref 3.77–5.28)
SODIUM SERPL-SCNC: 143 MMOL/L (ref 134–144)
TRIGL SERPL-MCNC: 107 MG/DL (ref 0–149)
VLDLC SERPL CALC-MCNC: 21 MG/DL (ref 5–40)
WBC # BLD AUTO: 6.7 X10E3/UL (ref 3.4–10.8)

## 2018-04-28 LAB
BACTERIA UR CULT: ABNORMAL
BACTERIA UR CULT: ABNORMAL
OTHER ANTIBIOTIC SUSC ISLT: ABNORMAL

## 2018-05-30 DIAGNOSIS — G89.4 CHRONIC PAIN SYNDROME: ICD-10-CM

## 2018-05-31 RX ORDER — TRAMADOL HYDROCHLORIDE 50 MG/1
TABLET ORAL
Qty: 90 TAB | Refills: 0 | Status: SHIPPED | OUTPATIENT
Start: 2018-05-31 | End: 2018-07-06 | Stop reason: SDUPTHER

## 2018-07-05 DIAGNOSIS — I10 ESSENTIAL HYPERTENSION: ICD-10-CM

## 2018-07-05 RX ORDER — ATENOLOL 50 MG/1
TABLET ORAL
Qty: 30 TAB | Refills: 4 | Status: SHIPPED | OUTPATIENT
Start: 2018-07-05 | End: 2018-07-06 | Stop reason: SDUPTHER

## 2018-07-06 DIAGNOSIS — G89.4 CHRONIC PAIN SYNDROME: ICD-10-CM

## 2018-07-06 DIAGNOSIS — I10 ESSENTIAL HYPERTENSION: ICD-10-CM

## 2018-07-06 NOTE — TELEPHONE ENCOUNTER
Forwarded from call center. ..      Pt would like a refill on her \"tramadol\" 50 mg  And \"atenolol 50mg\"     [facility] [phone]     Best contact for the pt is [phone]

## 2018-07-09 RX ORDER — ATENOLOL 50 MG/1
TABLET ORAL
Qty: 90 TAB | Refills: 1 | Status: SHIPPED | OUTPATIENT
Start: 2018-07-09 | End: 2018-09-09 | Stop reason: SDUPTHER

## 2018-07-09 RX ORDER — TRAMADOL HYDROCHLORIDE 50 MG/1
TABLET ORAL
Qty: 90 TAB | Refills: 0 | Status: SHIPPED | OUTPATIENT
Start: 2018-07-09 | End: 2018-08-27 | Stop reason: SDUPTHER

## 2018-08-16 DIAGNOSIS — F33.1 MODERATE EPISODE OF RECURRENT MAJOR DEPRESSIVE DISORDER (HCC): ICD-10-CM

## 2018-08-16 RX ORDER — BUPROPION HYDROCHLORIDE 100 MG/1
TABLET ORAL
Qty: 180 TAB | Refills: 1 | Status: SHIPPED | OUTPATIENT
Start: 2018-08-16 | End: 2019-03-26 | Stop reason: SDUPTHER

## 2018-08-16 RX ORDER — CITALOPRAM 10 MG/1
TABLET ORAL
Qty: 90 TAB | Refills: 1 | Status: SHIPPED | OUTPATIENT
Start: 2018-08-16 | End: 2019-01-07 | Stop reason: SDUPTHER

## 2018-08-27 DIAGNOSIS — G89.4 CHRONIC PAIN SYNDROME: ICD-10-CM

## 2018-08-27 RX ORDER — TRAMADOL HYDROCHLORIDE 50 MG/1
TABLET ORAL
Qty: 90 TAB | Refills: 0 | Status: SHIPPED | OUTPATIENT
Start: 2018-08-27 | End: 2019-02-22 | Stop reason: SDUPTHER

## 2018-09-09 DIAGNOSIS — I10 ESSENTIAL HYPERTENSION: ICD-10-CM

## 2018-09-09 RX ORDER — ATENOLOL 50 MG/1
TABLET ORAL
Qty: 30 TAB | Refills: 1 | Status: SHIPPED | OUTPATIENT
Start: 2018-09-09 | End: 2018-11-02 | Stop reason: SDUPTHER

## 2018-11-02 DIAGNOSIS — I10 ESSENTIAL HYPERTENSION: ICD-10-CM

## 2018-11-02 RX ORDER — ATENOLOL 50 MG/1
TABLET ORAL
Qty: 30 TAB | Refills: 1 | Status: SHIPPED | OUTPATIENT
Start: 2018-11-02 | End: 2018-12-19 | Stop reason: SDUPTHER

## 2018-12-18 DIAGNOSIS — M54.50 ACUTE RIGHT-SIDED LOW BACK PAIN WITHOUT SCIATICA: ICD-10-CM

## 2018-12-18 RX ORDER — GABAPENTIN 100 MG/1
CAPSULE ORAL
Qty: 270 CAP | Refills: 4 | Status: SHIPPED | OUTPATIENT
Start: 2018-12-18 | End: 2019-02-26 | Stop reason: SDUPTHER

## 2018-12-19 DIAGNOSIS — I10 ESSENTIAL HYPERTENSION: ICD-10-CM

## 2018-12-19 RX ORDER — ATENOLOL 50 MG/1
TABLET ORAL
Qty: 30 TAB | Refills: 1 | Status: SHIPPED | OUTPATIENT
Start: 2018-12-19 | End: 2019-02-26 | Stop reason: SDUPTHER

## 2018-12-19 NOTE — TELEPHONE ENCOUNTER
Patient is due for a follow up appointment. I have refilled her labs, but she needs to be seen when possible in the next month for routine follow up and well care.

## 2019-01-07 RX ORDER — CITALOPRAM 10 MG/1
TABLET ORAL
Qty: 90 TAB | Refills: 1 | Status: SHIPPED | OUTPATIENT
Start: 2019-01-07 | End: 2019-10-25 | Stop reason: SDUPTHER

## 2019-02-22 ENCOUNTER — OFFICE VISIT (OUTPATIENT)
Dept: FAMILY MEDICINE CLINIC | Age: 74
End: 2019-02-22

## 2019-02-22 ENCOUNTER — HOSPITAL ENCOUNTER (OUTPATIENT)
Dept: LAB | Age: 74
Discharge: HOME | End: 2019-02-22
Payer: MEDICARE

## 2019-02-22 VITALS
RESPIRATION RATE: 20 BRPM | HEART RATE: 77 BPM | WEIGHT: 180.6 LBS | OXYGEN SATURATION: 96 % | DIASTOLIC BLOOD PRESSURE: 70 MMHG | HEIGHT: 64 IN | TEMPERATURE: 97.2 F | SYSTOLIC BLOOD PRESSURE: 108 MMHG | BODY MASS INDEX: 30.83 KG/M2

## 2019-02-22 DIAGNOSIS — E55.9 VITAMIN D DEFICIENCY: Primary | ICD-10-CM

## 2019-02-22 DIAGNOSIS — Z91.89 AT RISK FOR POLYPHARMACY: ICD-10-CM

## 2019-02-22 DIAGNOSIS — G89.4 CHRONIC PAIN SYNDROME: ICD-10-CM

## 2019-02-22 DIAGNOSIS — F41.0 PANIC ATTACKS: ICD-10-CM

## 2019-02-22 DIAGNOSIS — Z13.220 SCREENING FOR HYPERLIPIDEMIA: ICD-10-CM

## 2019-02-22 DIAGNOSIS — Z51.81 ENCOUNTER FOR MEDICATION MONITORING: ICD-10-CM

## 2019-02-22 PROCEDURE — 80307 DRUG TEST PRSMV CHEM ANLYZR: CPT

## 2019-02-22 PROCEDURE — 36415 COLL VENOUS BLD VENIPUNCTURE: CPT

## 2019-02-22 PROCEDURE — 80346 BENZODIAZEPINES1-12: CPT

## 2019-02-22 PROCEDURE — 82306 VITAMIN D 25 HYDROXY: CPT

## 2019-02-22 PROCEDURE — 80061 LIPID PANEL: CPT

## 2019-02-22 PROCEDURE — 85025 COMPLETE CBC W/AUTO DIFF WBC: CPT

## 2019-02-22 PROCEDURE — 80053 COMPREHEN METABOLIC PANEL: CPT

## 2019-02-22 RX ORDER — NALOXONE HYDROCHLORIDE 4 MG/.1ML
SPRAY NASAL
Qty: 1 EACH | Refills: 0 | Status: SHIPPED | OUTPATIENT
Start: 2019-02-22 | End: 2019-12-17

## 2019-02-22 RX ORDER — TRAMADOL HYDROCHLORIDE 50 MG/1
TABLET ORAL
Qty: 90 TAB | Refills: 0 | Status: SHIPPED | OUTPATIENT
Start: 2019-02-22 | End: 2019-08-03 | Stop reason: SDUPTHER

## 2019-02-22 RX ORDER — DIAZEPAM 5 MG/1
5 TABLET ORAL
Qty: 30 TAB | Refills: 3 | Status: SHIPPED | OUTPATIENT
Start: 2019-02-22 | End: 2019-11-04 | Stop reason: SDUPTHER

## 2019-02-22 NOTE — PROGRESS NOTES
Identified pt with two pt identifiers(name and ). Chief Complaint Patient presents with  Medication Evaluation Evaluation for medication refills Health Maintenance Due Topic  Shingrix Vaccine Age 50> (1 of 2)  GLAUCOMA SCREENING Q2Y   
 BREAST CANCER SCRN MAMMOGRAM   
 Influenza Age 5 to Adult Wt Readings from Last 3 Encounters:  
18 186 lb 3.2 oz (84.5 kg) 18 182 lb 12.8 oz (82.9 kg) 10/19/17 191 lb 12.8 oz (87 kg) Temp Readings from Last 3 Encounters:  
18 97.8 °F (36.6 °C) (Oral) 18 97.6 °F (36.4 °C) (Oral) 10/19/17 96 °F (35.6 °C) (Oral) BP Readings from Last 3 Encounters:  
18 120/84  
18 110/70  
10/19/17 143/87 Pulse Readings from Last 3 Encounters:  
18 83  
18 (!) 106  
10/19/17 89 Learning Assessment: 
:  
 
Learning Assessment 5/10/2016 1/15/2014 PRIMARY LEARNER Patient Patient PRIMARY LANGUAGE ENGLISH ENGLISH  
LEARNER PREFERENCE PRIMARY DEMONSTRATION READING  
ANSWERED BY patient  self RELATIONSHIP SELF SELF Depression Screening: 
:  
 
3 most recent PHQ Screens 2019 Little interest or pleasure in doing things More than half the days Feeling down, depressed, irritable, or hopeless More than half the days Total Score PHQ 2 4 Fall Risk Assessment: 
:  
 
Fall Risk Assessment, last 12 mths 2019 Able to walk? Yes Fall in past 12 months? No  
 
 
Abuse Screening: 
:  
 
Abuse Screening Questionnaire 2016 Do you ever feel afraid of your partner? Ed Clark Are you in a relationship with someone who physically or mentally threatens you? Ed Clark Is it safe for you to go home? Ashley Mas Coordination of Care Questionnaire: 
:  
 
1) Have you been to an emergency room, urgent care clinic since your last visit? no  
Hospitalized since your last visit? no          
 
2) Have you seen or consulted any other health care providers outside of [address] [place] [address] Dougie since your last visit? no  (Include any pap smears or colon screenings in this section.) 3) Do you have an Advance Directive on file? yes Are you interested in receiving information about Advance Directives? no 
 
Reviewed record in preparation for visit and have obtained necessary documentation. Medication reconciliation up to date and corrected with patient at this time.

## 2019-02-22 NOTE — PROGRESS NOTES
Chief Complaint Patient presents with  Medication Evaluation Evaluation for medication refills She is a 69 y.o. female who presents for follow up and also needs refills and labs. She has been stable. Very actively grieving her husbands loss. Her children check in on her and she remains independent at home. She is rattled with OA and multiple joint aches and pains. Mostly stemming from ostepenia-osteoporosis. She reports no recent hospitalizations or falls. Reviewed PmHx, RxHx, FmHx, SocHx, AllgHx and updated and dated in the chart. Patient Active Problem List  
 Diagnosis  Medicare annual wellness visit, subsequent  Lung crackles  Dysuria  Screening for cholesterol level  Need for hepatitis C screening test  
 Intertrigo  UTI (lower urinary tract infection)  Bronchitis  Palpitations  Anxiety state, unspecified  Iron deficiency anemia, unspecified  Osteoarthritis  Senile osteoporosis  Knee pain  CKD (chronic kidney disease) stage 2, GFR 60-89 ml/min  Insomnia Review of Systems - negative except as listed above in the HPI Objective:  
 
Vitals:  
 02/22/19 1055 BP: 108/70 Pulse: 77 Resp: 20 Temp: 97.2 °F (36.2 °C) TempSrc: Oral  
SpO2: 96% Weight: 180 lb 9.6 oz (81.9 kg) Height: 5' 4\" (1.626 m) Physical Examination: General appearance - alert, well appearing, and in no distress and overweight Mental status - alert, oriented to person, place, and time Chest - clear to auscultation, no wheezes, rales or rhonchi, symmetric air entry Heart - normal rate, regular rhythm, normal S1, S2, no murmurs, rubs, clicks or gallops Neurological - alert, oriented, normal speech, no focal findings or movement disorder noted Musculoskeletal - exam unchanged Extremities - peripheral pulses normal, no pedal edema, no clubbing or cyanosis Assessment/ Plan:  
 
74F who has chronic pain and a contract with me.  She is here for refills and I have reviewed her . No concerning findings. She has significant OA and just to get around, takes the below medications - or she would loose some of her mobility. In addition, she has a h/o severe panic attacks and takes the Valium ONLY when she is having these. I note that she recently lost her  with whom she had been together for 53 years. This has been difficult, but she is coping. Diagnoses and all orders for this visit: 
 
1. Chronic pain syndrome 
-     traMADol (ULTRAM) 50 mg tablet; TAKE 1-2 TABLETS BY MOUTH EVERY8 HOURS AS NEEDED FOR PAIN . MAX DAILY AMOUNT: 300 MG 2. Panic attacks 
-     diazePAM (VALIUM) 5 mg tablet; Take 1 Tab by mouth nightly. Max Daily Amount: 5 mg. 3. At risk for polypharmacy 
-     naloxone (NARCAN) 4 mg/actuation nasal spray; Use 1 spray intranasally into 1 nostril. Use a new Narcan nasal spray for subsequent doses and administer into alternating nostrils. May repeat every 2 to 3 minutes as needed. I have discussed the diagnosis with the patient and the intended treatment plan as seen in the above orders. The patient has received an after-visit summary and questions were answered concerning future plans. Asked to return should symptoms worsen or not improve with treatment. Any pending labs and studies will be relayed to patient when they become available. Pt verbalizes understanding of plan of care and denies further questions or concerns at this time. Follow-up Disposition: 
Return if symptoms worsen or fail to improve.  
 
Dougie Avitia MD 
 
 
 
 
 
 
 
 
 
 
 
.

## 2019-02-26 DIAGNOSIS — M54.50 ACUTE RIGHT-SIDED LOW BACK PAIN WITHOUT SCIATICA: ICD-10-CM

## 2019-02-26 DIAGNOSIS — I10 ESSENTIAL HYPERTENSION: ICD-10-CM

## 2019-02-26 RX ORDER — GABAPENTIN 100 MG/1
CAPSULE ORAL
Qty: 270 CAP | Refills: 1 | Status: SHIPPED | OUTPATIENT
Start: 2019-02-26 | End: 2019-11-24

## 2019-02-26 RX ORDER — ATENOLOL 50 MG/1
TABLET ORAL
Qty: 30 TAB | Refills: 1 | Status: SHIPPED | OUTPATIENT
Start: 2019-02-26 | End: 2019-04-29 | Stop reason: SDUPTHER

## 2019-02-28 LAB
25(OH)D3+25(OH)D2 SERPL-MCNC: 45.5 NG/ML (ref 30–100)
7AMINOCLONAZEPAM SERPL CFM-MCNC: NEGATIVE NG/ML
ALBUMIN SERPL-MCNC: 4.3 G/DL (ref 3.5–4.8)
ALBUMIN/GLOB SERPL: 1.5 {RATIO} (ref 1.2–2.2)
ALP SERPL-CCNC: 73 IU/L (ref 39–117)
ALPRAZ SPEC-MCNC: NEGATIVE NG/ML
ALT SERPL-CCNC: 7 IU/L (ref 0–32)
AMPHETAMINES SERPL QL SCN: NEGATIVE NG/ML
AST SERPL-CCNC: 19 IU/L (ref 0–40)
BARBITURATES SERPL QL SCN: NEGATIVE UG/ML
BASOPHILS # BLD AUTO: 0 X10E3/UL (ref 0–0.2)
BASOPHILS NFR BLD AUTO: 0 %
BENZODIAZ SERPL QL SCN: ABNORMAL NG/ML
BENZODIAZ SPEC QL: POSITIVE
BILIRUB SERPL-MCNC: 0.6 MG/DL (ref 0–1.2)
BUN SERPL-MCNC: 26 MG/DL (ref 8–27)
BUN/CREAT SERPL: 18 (ref 12–28)
CALCIUM SERPL-MCNC: 9.7 MG/DL (ref 8.7–10.3)
CANNABINOIDS SERPL QL SCN: NEGATIVE NG/ML
CHLORDIAZEP SPEC-MCNC: NEGATIVE NG/ML
CHLORIDE SERPL-SCNC: 103 MMOL/L (ref 96–106)
CHOLEST SERPL-MCNC: 182 MG/DL (ref 100–199)
CLONAZEPAM SERPL CFM-MCNC: NEGATIVE NG/ML
CO2 SERPL-SCNC: 21 MMOL/L (ref 20–29)
COCAINE+BZE SERPL QL SCN: NEGATIVE NG/ML
CREAT SERPL-MCNC: 1.45 MG/DL (ref 0.57–1)
DESALKYLFLURAZ SERPL CFM-MCNC: NEGATIVE NG/ML
DIAZEPAM SPEC-MCNC: 20 NG/ML
EOSINOPHIL # BLD AUTO: 0.2 X10E3/UL (ref 0–0.4)
EOSINOPHIL NFR BLD AUTO: 3 %
ERYTHROCYTE [DISTWIDTH] IN BLOOD BY AUTOMATED COUNT: 14.1 % (ref 12.3–15.4)
ETHANOL UR-MCNC: NEGATIVE GM/DL
FLURAZEPAM SPEC-MCNC: NEGATIVE NG/ML
GLOBULIN SER CALC-MCNC: 2.9 G/DL (ref 1.5–4.5)
GLUCOSE SERPL-MCNC: 81 MG/DL (ref 65–99)
HCT VFR BLD AUTO: 42 % (ref 34–46.6)
HDLC SERPL-MCNC: 75 MG/DL
HGB BLD-MCNC: 14 G/DL (ref 11.1–15.9)
IMM GRANULOCYTES # BLD AUTO: 0 X10E3/UL (ref 0–0.1)
IMM GRANULOCYTES NFR BLD AUTO: 1 %
INTERPRETATION, 910389: NORMAL
INTERPRETATION: NORMAL
LDLC SERPL CALC-MCNC: 84 MG/DL (ref 0–99)
LORAZEPAM SPEC-MCNC: NEGATIVE NG/ML
LYMPHOCYTES # BLD AUTO: 0.9 X10E3/UL (ref 0.7–3.1)
LYMPHOCYTES NFR BLD AUTO: 11 %
MCH RBC QN AUTO: 34.3 PG (ref 26.6–33)
MCHC RBC AUTO-ENTMCNC: 33.3 G/DL (ref 31.5–35.7)
MCV RBC AUTO: 103 FL (ref 79–97)
METHADONE SERPL QL SCN: NEGATIVE NG/ML
MIDAZOLAM SPEC-MCNC: NEGATIVE NG/ML
MONOCYTES # BLD AUTO: 0.9 X10E3/UL (ref 0.1–0.9)
MONOCYTES NFR BLD AUTO: 11 %
NEUTROPHILS # BLD AUTO: 6.1 X10E3/UL (ref 1.4–7)
NEUTROPHILS NFR BLD AUTO: 74 %
NORCHLORDIAZEP SERPL-MCNC: NEGATIVE NG/ML
NORDIAZEPAM SPEC-MCNC: 55 NG/ML
OPIATES SERPL QL SCN: NEGATIVE NG/ML
OXAZEPAM SPEC-MCNC: NEGATIVE NG/ML
OXYCODONES, 738315: NEGATIVE NG/ML
PCP SERPL QL SCN: NEGATIVE NG/ML
PDF IMAGE, 910387: NORMAL
PLATELET # BLD AUTO: 220 X10E3/UL (ref 150–379)
POTASSIUM SERPL-SCNC: 5 MMOL/L (ref 3.5–5.2)
PROPOXYPH SERPL QL SCN: NEGATIVE NG/ML
PROT SERPL-MCNC: 7.2 G/DL (ref 6–8.5)
RBC # BLD AUTO: 4.08 X10E6/UL (ref 3.77–5.28)
SODIUM SERPL-SCNC: 141 MMOL/L (ref 134–144)
TEMAZEPAM SPEC-MCNC: NEGATIVE NG/ML
TRIAZOLAM SPEC-MCNC: NEGATIVE NG/ML
TRIGL SERPL-MCNC: 113 MG/DL (ref 0–149)
VLDLC SERPL CALC-MCNC: 23 MG/DL (ref 5–40)
WBC # BLD AUTO: 8.3 X10E3/UL (ref 3.4–10.8)

## 2019-03-26 DIAGNOSIS — F33.1 MODERATE EPISODE OF RECURRENT MAJOR DEPRESSIVE DISORDER (HCC): ICD-10-CM

## 2019-03-27 RX ORDER — BUPROPION HYDROCHLORIDE 100 MG/1
TABLET ORAL
Qty: 180 TAB | Refills: 1 | Status: SHIPPED | OUTPATIENT
Start: 2019-03-27 | End: 2019-09-16 | Stop reason: SDUPTHER

## 2019-04-29 DIAGNOSIS — I10 ESSENTIAL HYPERTENSION: ICD-10-CM

## 2019-04-29 RX ORDER — ATENOLOL 50 MG/1
TABLET ORAL
Qty: 30 TAB | Refills: 1 | Status: SHIPPED | OUTPATIENT
Start: 2019-04-29 | End: 2019-05-24 | Stop reason: SDUPTHER

## 2019-05-24 DIAGNOSIS — I10 ESSENTIAL HYPERTENSION: ICD-10-CM

## 2019-05-24 RX ORDER — ATENOLOL 50 MG/1
TABLET ORAL
Qty: 30 TAB | Refills: 0 | Status: SHIPPED | OUTPATIENT
Start: 2019-05-24 | End: 2019-06-19 | Stop reason: SDUPTHER

## 2019-06-19 DIAGNOSIS — I10 ESSENTIAL HYPERTENSION: ICD-10-CM

## 2019-06-20 RX ORDER — ATENOLOL 50 MG/1
TABLET ORAL
Qty: 30 TAB | Refills: 0 | Status: SHIPPED | OUTPATIENT
Start: 2019-06-20 | End: 2019-07-13 | Stop reason: SDUPTHER

## 2019-07-13 DIAGNOSIS — I10 ESSENTIAL HYPERTENSION: ICD-10-CM

## 2019-07-15 RX ORDER — ATENOLOL 50 MG/1
TABLET ORAL
Qty: 30 TAB | Refills: 0 | Status: SHIPPED | OUTPATIENT
Start: 2019-07-15 | End: 2019-08-04 | Stop reason: SDUPTHER

## 2019-08-03 DIAGNOSIS — G89.4 CHRONIC PAIN SYNDROME: ICD-10-CM

## 2019-08-04 DIAGNOSIS — I10 ESSENTIAL HYPERTENSION: ICD-10-CM

## 2019-08-05 RX ORDER — ATENOLOL 50 MG/1
TABLET ORAL
Qty: 30 TAB | Refills: 0 | Status: SHIPPED | OUTPATIENT
Start: 2019-08-05 | End: 2019-09-18 | Stop reason: SDUPTHER

## 2019-08-05 RX ORDER — TRAMADOL HYDROCHLORIDE 50 MG/1
50-100 TABLET ORAL
Qty: 150 TAB | Refills: 0 | Status: SHIPPED | OUTPATIENT
Start: 2019-08-05 | End: 2019-11-24

## 2019-09-16 DIAGNOSIS — F33.1 MODERATE EPISODE OF RECURRENT MAJOR DEPRESSIVE DISORDER (HCC): ICD-10-CM

## 2019-09-16 RX ORDER — BUPROPION HYDROCHLORIDE 100 MG/1
TABLET ORAL
Qty: 180 TAB | Refills: 1 | Status: ON HOLD | OUTPATIENT
Start: 2019-09-16 | End: 2019-11-27 | Stop reason: SDUPTHER

## 2019-09-18 DIAGNOSIS — I10 ESSENTIAL HYPERTENSION: ICD-10-CM

## 2019-09-19 RX ORDER — ATENOLOL 50 MG/1
TABLET ORAL
Qty: 30 TAB | Refills: 0 | Status: SHIPPED | OUTPATIENT
Start: 2019-09-19 | End: 2019-10-15 | Stop reason: SDUPTHER

## 2019-10-15 DIAGNOSIS — I10 ESSENTIAL HYPERTENSION: ICD-10-CM

## 2019-10-15 RX ORDER — ATENOLOL 50 MG/1
TABLET ORAL
Qty: 30 TAB | Refills: 0 | Status: SHIPPED | OUTPATIENT
Start: 2019-10-15 | End: 2019-11-16 | Stop reason: SDUPTHER

## 2019-10-25 RX ORDER — CITALOPRAM 10 MG/1
TABLET ORAL
Qty: 90 TAB | Refills: 1 | Status: SHIPPED | OUTPATIENT
Start: 2019-10-25 | End: 2019-11-24

## 2019-11-04 DIAGNOSIS — F41.0 PANIC ATTACKS: ICD-10-CM

## 2019-11-04 RX ORDER — DIAZEPAM 5 MG/1
TABLET ORAL
Qty: 30 TAB | Refills: 1 | Status: SHIPPED | OUTPATIENT
Start: 2019-11-04 | End: 2019-11-24

## 2019-11-16 DIAGNOSIS — I10 ESSENTIAL HYPERTENSION: ICD-10-CM

## 2019-11-17 RX ORDER — ATENOLOL 50 MG/1
TABLET ORAL
Qty: 30 TAB | Refills: 0 | Status: SHIPPED | OUTPATIENT
Start: 2019-11-17 | End: 2019-11-24

## 2019-11-24 ENCOUNTER — APPOINTMENT (OUTPATIENT)
Dept: CT IMAGING | Age: 74
DRG: 556 | End: 2019-11-24
Attending: EMERGENCY MEDICINE
Payer: MEDICARE

## 2019-11-24 ENCOUNTER — APPOINTMENT (OUTPATIENT)
Dept: GENERAL RADIOLOGY | Age: 74
DRG: 556 | End: 2019-11-24
Attending: EMERGENCY MEDICINE
Payer: MEDICARE

## 2019-11-24 ENCOUNTER — HOSPITAL ENCOUNTER (INPATIENT)
Age: 74
LOS: 3 days | Discharge: NURSING, REHAB OR LONG-TERM CARE | DRG: 556 | End: 2019-11-27
Attending: EMERGENCY MEDICINE | Admitting: FAMILY MEDICINE
Payer: MEDICARE

## 2019-11-24 ENCOUNTER — APPOINTMENT (OUTPATIENT)
Dept: NON INVASIVE DIAGNOSTICS | Age: 74
DRG: 556 | End: 2019-11-24
Attending: STUDENT IN AN ORGANIZED HEALTH CARE EDUCATION/TRAINING PROGRAM
Payer: MEDICARE

## 2019-11-24 ENCOUNTER — APPOINTMENT (OUTPATIENT)
Dept: GENERAL RADIOLOGY | Age: 74
DRG: 556 | End: 2019-11-24
Attending: STUDENT IN AN ORGANIZED HEALTH CARE EDUCATION/TRAINING PROGRAM
Payer: MEDICARE

## 2019-11-24 DIAGNOSIS — G89.29 CHRONIC PAIN OF RIGHT KNEE: ICD-10-CM
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
## 2019-11-24 DIAGNOSIS — R26.2 INABILITY TO WALK: ICD-10-CM

## 2019-11-24 DIAGNOSIS — F33.1 MODERATE EPISODE OF RECURRENT MAJOR DEPRESSIVE DISORDER (HCC): ICD-10-CM

## 2019-11-24 DIAGNOSIS — M25.561 CHRONIC PAIN OF RIGHT KNEE: ICD-10-CM

## 2019-11-24 DIAGNOSIS — M15.9 PRIMARY OSTEOARTHRITIS INVOLVING MULTIPLE JOINTS: ICD-10-CM

## 2019-11-24 DIAGNOSIS — F41.9 ANXIETY: ICD-10-CM

## 2019-11-24 DIAGNOSIS — S79.911A HIP INJURY, RIGHT, INITIAL ENCOUNTER: ICD-10-CM

## 2019-11-24 DIAGNOSIS — M25.552 LEFT HIP PAIN: ICD-10-CM

## 2019-11-24 PROBLEM — N18.30 CKD (CHRONIC KIDNEY DISEASE) STAGE 3, GFR 30-59 ML/MIN (HCC): Status: ACTIVE | Noted: 2019-11-24

## 2019-11-24 PROBLEM — R82.90 ABNORMAL URINALYSIS: Status: ACTIVE | Noted: 2019-11-24
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
## 2019-11-24 LAB
ALBUMIN SERPL-MCNC: 3.4 G/DL (ref 3.5–5)
ALBUMIN/GLOB SERPL: 0.8 {RATIO} (ref 1.1–2.2)
ALP SERPL-CCNC: 74 U/L (ref 45–117)
ALT SERPL-CCNC: 14 U/L (ref 12–78)
ANION GAP SERPL CALC-SCNC: 8 MMOL/L (ref 5–15)
APPEARANCE UR: CLEAR
AST SERPL-CCNC: 25 U/L (ref 15–37)
BACTERIA URNS QL MICRO: ABNORMAL /HPF
BASOPHILS # BLD: 0 K/UL (ref 0–0.1)
BASOPHILS NFR BLD: 0 % (ref 0–1)
BILIRUB SERPL-MCNC: 0.7 MG/DL (ref 0.2–1)
BILIRUB UR QL: NEGATIVE
BNP SERPL-MCNC: 826 PG/ML
BUN SERPL-MCNC: 16 MG/DL (ref 6–20)
BUN/CREAT SERPL: 12 (ref 12–20)
CALCIUM SERPL-MCNC: 8.9 MG/DL (ref 8.5–10.1)
CHLORIDE SERPL-SCNC: 108 MMOL/L (ref 97–108)
CK SERPL-CCNC: 179 U/L (ref 26–192)
CO2 SERPL-SCNC: 25 MMOL/L (ref 21–32)
COLOR UR: ABNORMAL
COMMENT, HOLDF: NORMAL
COMMENT, HOLDF: NORMAL
CREAT SERPL-MCNC: 1.3 MG/DL (ref 0.55–1.02)
DIFFERENTIAL METHOD BLD: ABNORMAL
ECHO LA AREA 4C: 22.6 CM2
ECHO LA VOL 2C: 70.41 ML (ref 22–52)
ECHO LA VOL 4C: 63.86 ML (ref 22–52)
ECHO LA VOL BP: 67.6 ML (ref 22–52)
ECHO LA VOL/BSA BIPLANE: 35.71 ML/M2 (ref 16–28)
ECHO LA VOLUME INDEX A2C: 37.19 ML/M2 (ref 16–28)
ECHO LA VOLUME INDEX A4C: 33.73 ML/M2 (ref 16–28)
ECHO LV INTERNAL DIMENSION DIASTOLIC MMODE: 4.01 CM
ECHO LV INTERNAL DIMENSION SYSTOLIC MMODE: 2.62 CM
ECHO LV IVSD MMODE: 1.14 CM
ECHO LV POSTERIOR WALL DIASTOLIC MMODE: 1.21 CM
ECHO MV A VELOCITY: 124.59 CM/S
ECHO MV AREA PHT: 4.5 CM2
ECHO MV E DECELERATION TIME (DT): 168 MS
ECHO MV E VELOCITY: 93.71 CM/S
ECHO MV E/A RATIO: 0.75
ECHO MV PRESSURE HALF TIME (PHT): 48.7 MS
ECHO PULMONARY ARTERY SYSTOLIC PRESSURE (PASP): 75 MMHG
ECHO TV REGURGITANT MAX VELOCITY: 438.81 CM/S
ECHO TV REGURGITANT PEAK GRADIENT: 77 MMHG
EOSINOPHIL # BLD: 0.1 K/UL (ref 0–0.4)
EOSINOPHIL NFR BLD: 1 % (ref 0–7)
EPITH CASTS URNS QL MICRO: ABNORMAL /LPF
ERYTHROCYTE [DISTWIDTH] IN BLOOD BY AUTOMATED COUNT: 14.6 % (ref 11.5–14.5)
GLOBULIN SER CALC-MCNC: 4.2 G/DL (ref 2–4)
GLUCOSE SERPL-MCNC: 101 MG/DL (ref 65–100)
GLUCOSE UR STRIP.AUTO-MCNC: NEGATIVE MG/DL
HCT VFR BLD AUTO: 42.6 % (ref 35–47)
HGB BLD-MCNC: 13.2 G/DL (ref 11.5–16)
HGB UR QL STRIP: NEGATIVE
HYALINE CASTS URNS QL MICRO: ABNORMAL /LPF (ref 0–5)
IMM GRANULOCYTES # BLD AUTO: 0.1 K/UL (ref 0–0.04)
IMM GRANULOCYTES NFR BLD AUTO: 1 % (ref 0–0.5)
KETONES UR QL STRIP.AUTO: NEGATIVE MG/DL
LEUKOCYTE ESTERASE UR QL STRIP.AUTO: ABNORMAL
LVFS: 34.59 %
LYMPHOCYTES # BLD: 0.6 K/UL (ref 0.8–3.5)
LYMPHOCYTES NFR BLD: 7 % (ref 12–49)
MCH RBC QN AUTO: 33.7 PG (ref 26–34)
MCHC RBC AUTO-ENTMCNC: 31 G/DL (ref 30–36.5)
MCV RBC AUTO: 108.7 FL (ref 80–99)
MONOCYTES # BLD: 0.8 K/UL (ref 0–1)
MONOCYTES NFR BLD: 9 % (ref 5–13)
MV DEC SLOPE: 5.58
NEUTS SEG # BLD: 7.4 K/UL (ref 1.8–8)
NEUTS SEG NFR BLD: 82 % (ref 32–75)
NITRITE UR QL STRIP.AUTO: POSITIVE
NRBC # BLD: 0 K/UL (ref 0–0.01)
NRBC BLD-RTO: 0 PER 100 WBC
PH UR STRIP: 5.5 [PH] (ref 5–8)
PLATELET # BLD AUTO: 186 K/UL (ref 150–400)
PMV BLD AUTO: 9.3 FL (ref 8.9–12.9)
POTASSIUM SERPL-SCNC: 4.4 MMOL/L (ref 3.5–5.1)
PROT SERPL-MCNC: 7.6 G/DL (ref 6.4–8.2)
PROT UR STRIP-MCNC: NEGATIVE MG/DL
RBC # BLD AUTO: 3.92 M/UL (ref 3.8–5.2)
RBC #/AREA URNS HPF: ABNORMAL /HPF (ref 0–5)
RBC MORPH BLD: ABNORMAL
SAMPLES BEING HELD,HOLD: NORMAL
SAMPLES BEING HELD,HOLD: NORMAL
SODIUM SERPL-SCNC: 141 MMOL/L (ref 136–145)
SP GR UR REFRACTOMETRY: 1.01 (ref 1–1.03)
TROPONIN I SERPL-MCNC: <0.05 NG/ML
UR CULT HOLD, URHOLD: NORMAL
UROBILINOGEN UR QL STRIP.AUTO: 0.2 EU/DL (ref 0.2–1)
WBC # BLD AUTO: 9 K/UL (ref 3.6–11)
WBC URNS QL MICRO: ABNORMAL /HPF (ref 0–4)

## 2019-11-24 PROCEDURE — 82550 ASSAY OF CK (CPK): CPT

## 2019-11-24 PROCEDURE — 83880 ASSAY OF NATRIURETIC PEPTIDE: CPT

## 2019-11-24 PROCEDURE — 74011250637 HC RX REV CODE- 250/637: Performed by: STUDENT IN AN ORGANIZED HEALTH CARE EDUCATION/TRAINING PROGRAM

## 2019-11-24 PROCEDURE — 84484 ASSAY OF TROPONIN QUANT: CPT

## 2019-11-24 PROCEDURE — 74011250636 HC RX REV CODE- 250/636: Performed by: STUDENT IN AN ORGANIZED HEALTH CARE EDUCATION/TRAINING PROGRAM

## 2019-11-24 PROCEDURE — 72192 CT PELVIS W/O DYE: CPT

## 2019-11-24 PROCEDURE — 93005 ELECTROCARDIOGRAM TRACING: CPT

## 2019-11-24 PROCEDURE — 85025 COMPLETE CBC W/AUTO DIFF WBC: CPT

## 2019-11-24 PROCEDURE — 87077 CULTURE AEROBIC IDENTIFY: CPT

## 2019-11-24 PROCEDURE — 97530 THERAPEUTIC ACTIVITIES: CPT

## 2019-11-24 PROCEDURE — 87086 URINE CULTURE/COLONY COUNT: CPT

## 2019-11-24 PROCEDURE — 99285 EMERGENCY DEPT VISIT HI MDM: CPT

## 2019-11-24 PROCEDURE — 96372 THER/PROPH/DIAG INJ SC/IM: CPT

## 2019-11-24 PROCEDURE — 74011250636 HC RX REV CODE- 250/636: Performed by: EMERGENCY MEDICINE

## 2019-11-24 PROCEDURE — 71101 X-RAY EXAM UNILAT RIBS/CHEST: CPT

## 2019-11-24 PROCEDURE — 99218 HC RM OBSERVATION: CPT

## 2019-11-24 PROCEDURE — 77030038269 HC DRN EXT URIN PURWCK BARD -A

## 2019-11-24 PROCEDURE — 97165 OT EVAL LOW COMPLEX 30 MIN: CPT

## 2019-11-24 PROCEDURE — 73552 X-RAY EXAM OF FEMUR 2/>: CPT

## 2019-11-24 PROCEDURE — 74011000250 HC RX REV CODE- 250: Performed by: STUDENT IN AN ORGANIZED HEALTH CARE EDUCATION/TRAINING PROGRAM

## 2019-11-24 PROCEDURE — 87186 SC STD MICRODIL/AGAR DIL: CPT

## 2019-11-24 PROCEDURE — 36415 COLL VENOUS BLD VENIPUNCTURE: CPT

## 2019-11-24 PROCEDURE — 96374 THER/PROPH/DIAG INJ IV PUSH: CPT

## 2019-11-24 PROCEDURE — 65660000000 HC RM CCU STEPDOWN

## 2019-11-24 PROCEDURE — 81001 URINALYSIS AUTO W/SCOPE: CPT

## 2019-11-24 PROCEDURE — 97161 PT EVAL LOW COMPLEX 20 MIN: CPT

## 2019-11-24 PROCEDURE — 97535 SELF CARE MNGMENT TRAINING: CPT

## 2019-11-24 PROCEDURE — 71046 X-RAY EXAM CHEST 2 VIEWS: CPT

## 2019-11-24 PROCEDURE — 93306 TTE W/DOPPLER COMPLETE: CPT

## 2019-11-24 PROCEDURE — 80053 COMPREHEN METABOLIC PANEL: CPT

## 2019-11-24 PROCEDURE — 73502 X-RAY EXAM HIP UNI 2-3 VIEWS: CPT

## 2019-11-24 PROCEDURE — 96375 TX/PRO/DX INJ NEW DRUG ADDON: CPT

## 2019-11-24 RX ORDER — SULFAMETHOXAZOLE AND TRIMETHOPRIM 800; 160 MG/1; MG/1
1 TABLET ORAL EVERY 12 HOURS
Status: COMPLETED | OUTPATIENT
Start: 2019-11-24 | End: 2019-11-26

## 2019-11-24 RX ORDER — LANOLIN ALCOHOL/MO/W.PET/CERES
1000 CREAM (GRAM) TOPICAL DAILY
COMMUNITY
End: 2019-12-17 | Stop reason: ALTCHOICE

## 2019-11-24 RX ORDER — TRAMADOL HYDROCHLORIDE 50 MG/1
50 TABLET ORAL
Status: ON HOLD | COMMUNITY
End: 2019-11-27 | Stop reason: SDUPTHER

## 2019-11-24 RX ORDER — FUROSEMIDE 10 MG/ML
20 INJECTION INTRAMUSCULAR; INTRAVENOUS ONCE
Status: COMPLETED | OUTPATIENT
Start: 2019-11-24 | End: 2019-11-24

## 2019-11-24 RX ORDER — DIAZEPAM 5 MG/1
5 TABLET ORAL
Status: ON HOLD | COMMUNITY
End: 2019-11-27 | Stop reason: SDUPTHER

## 2019-11-24 RX ORDER — BUPROPION HYDROCHLORIDE 100 MG/1
100 TABLET ORAL 2 TIMES DAILY
Status: DISCONTINUED | OUTPATIENT
Start: 2019-11-24 | End: 2019-11-27 | Stop reason: HOSPADM

## 2019-11-24 RX ORDER — NYSTATIN 100000 U/G
CREAM TOPICAL 2 TIMES DAILY
Status: DISCONTINUED | OUTPATIENT
Start: 2019-11-24 | End: 2019-11-27 | Stop reason: HOSPADM

## 2019-11-24 RX ORDER — FENTANYL CITRATE 50 UG/ML
50 INJECTION, SOLUTION INTRAMUSCULAR; INTRAVENOUS ONCE
Status: COMPLETED | OUTPATIENT
Start: 2019-11-24 | End: 2019-11-24

## 2019-11-24 RX ORDER — ATENOLOL 25 MG/1
50 TABLET ORAL
Status: DISCONTINUED | OUTPATIENT
Start: 2019-11-24 | End: 2019-11-27 | Stop reason: HOSPADM

## 2019-11-24 RX ORDER — GABAPENTIN 100 MG/1
100 CAPSULE ORAL
Status: ON HOLD | COMMUNITY
End: 2019-11-27 | Stop reason: SDUPTHER

## 2019-11-24 RX ORDER — CITALOPRAM 10 MG/1
10 TABLET ORAL
Status: ON HOLD | COMMUNITY
End: 2019-11-27 | Stop reason: SDUPTHER

## 2019-11-24 RX ORDER — GABAPENTIN 100 MG/1
100 CAPSULE ORAL
Status: DISCONTINUED | OUTPATIENT
Start: 2019-11-24 | End: 2019-11-27 | Stop reason: HOSPADM

## 2019-11-24 RX ORDER — ENOXAPARIN SODIUM 100 MG/ML
40 INJECTION SUBCUTANEOUS EVERY 24 HOURS
Status: DISCONTINUED | OUTPATIENT
Start: 2019-11-24 | End: 2019-11-27 | Stop reason: HOSPADM

## 2019-11-24 RX ORDER — CITALOPRAM 20 MG/1
10 TABLET, FILM COATED ORAL
Status: DISCONTINUED | OUTPATIENT
Start: 2019-11-24 | End: 2019-11-27 | Stop reason: HOSPADM

## 2019-11-24 RX ORDER — SODIUM CHLORIDE 0.9 % (FLUSH) 0.9 %
5-40 SYRINGE (ML) INJECTION AS NEEDED
Status: DISCONTINUED | OUTPATIENT
Start: 2019-11-24 | End: 2019-11-27 | Stop reason: HOSPADM

## 2019-11-24 RX ORDER — DOCUSATE SODIUM 100 MG/1
100 CAPSULE, LIQUID FILLED ORAL
COMMUNITY
End: 2019-12-17

## 2019-11-24 RX ORDER — ACETAMINOPHEN 500 MG
1000 TABLET ORAL
Status: DISCONTINUED | OUTPATIENT
Start: 2019-11-24 | End: 2019-11-24

## 2019-11-24 RX ORDER — LIDOCAINE 4 G/100G
1 PATCH TOPICAL EVERY 24 HOURS
Status: DISCONTINUED | OUTPATIENT
Start: 2019-11-24 | End: 2019-11-27 | Stop reason: HOSPADM

## 2019-11-24 RX ORDER — ACETAMINOPHEN 325 MG/1
650 TABLET ORAL
Status: DISCONTINUED | OUTPATIENT
Start: 2019-11-24 | End: 2019-11-27 | Stop reason: HOSPADM

## 2019-11-24 RX ORDER — FERROUS SULFATE, DRIED 160(50) MG
1 TABLET, EXTENDED RELEASE ORAL DAILY
Status: DISCONTINUED | OUTPATIENT
Start: 2019-11-24 | End: 2019-11-27 | Stop reason: HOSPADM

## 2019-11-24 RX ORDER — ACETAMINOPHEN/DIPHENHYDRAMINE 500MG-25MG
1 TABLET ORAL
COMMUNITY
End: 2019-12-17

## 2019-11-24 RX ORDER — ATENOLOL 50 MG/1
50 TABLET ORAL
COMMUNITY
End: 2019-12-02 | Stop reason: SDUPTHER

## 2019-11-24 RX ORDER — SODIUM CHLORIDE 0.9 % (FLUSH) 0.9 %
5-40 SYRINGE (ML) INJECTION EVERY 8 HOURS
Status: DISCONTINUED | OUTPATIENT
Start: 2019-11-24 | End: 2019-11-27 | Stop reason: HOSPADM

## 2019-11-24 RX ORDER — DIAZEPAM 5 MG/1
5 TABLET ORAL
Status: DISCONTINUED | OUTPATIENT
Start: 2019-11-24 | End: 2019-11-27 | Stop reason: HOSPADM

## 2019-11-24 RX ORDER — TRAMADOL HYDROCHLORIDE 50 MG/1
50 TABLET ORAL
Status: DISCONTINUED | OUTPATIENT
Start: 2019-11-24 | End: 2019-11-27 | Stop reason: HOSPADM

## 2019-11-24 RX ORDER — MELATONIN
1000 DAILY
Status: DISCONTINUED | OUTPATIENT
Start: 2019-11-24 | End: 2019-11-24

## 2019-11-24 RX ORDER — FENTANYL CITRATE 50 UG/ML
50 INJECTION, SOLUTION INTRAMUSCULAR; INTRAVENOUS ONCE
Status: ACTIVE | OUTPATIENT
Start: 2019-11-24 | End: 2019-11-24

## 2019-11-24 RX ADMIN — TRAMADOL HYDROCHLORIDE 50 MG: 50 TABLET, FILM COATED ORAL at 12:10

## 2019-11-24 RX ADMIN — TRAMADOL HYDROCHLORIDE 50 MG: 50 TABLET, FILM COATED ORAL at 21:48

## 2019-11-24 RX ADMIN — FUROSEMIDE 20 MG: 10 INJECTION, SOLUTION INTRAMUSCULAR; INTRAVENOUS at 11:49

## 2019-11-24 RX ADMIN — Medication 10 ML: at 06:00

## 2019-11-24 RX ADMIN — BUPROPION HYDROCHLORIDE 100 MG: 100 TABLET, FILM COATED ORAL at 19:23

## 2019-11-24 RX ADMIN — SULFAMETHOXAZOLE AND TRIMETHOPRIM 1 TABLET: 800; 160 TABLET ORAL at 21:48

## 2019-11-24 RX ADMIN — ATENOLOL 50 MG: 50 TABLET ORAL at 21:48

## 2019-11-24 RX ADMIN — NYSTATIN: 100000 CREAM TOPICAL at 11:51

## 2019-11-24 RX ADMIN — SULFAMETHOXAZOLE AND TRIMETHOPRIM 1 TABLET: 800; 160 TABLET ORAL at 11:48

## 2019-11-24 RX ADMIN — BUPROPION HYDROCHLORIDE 100 MG: 100 TABLET, FILM COATED ORAL at 11:50

## 2019-11-24 RX ADMIN — Medication 10 ML: at 19:44

## 2019-11-24 RX ADMIN — ENOXAPARIN SODIUM 40 MG: 40 INJECTION SUBCUTANEOUS at 12:10

## 2019-11-24 RX ADMIN — OYSTER SHELL CALCIUM WITH VITAMIN D 1 TABLET: 500; 200 TABLET, FILM COATED ORAL at 11:50

## 2019-11-24 RX ADMIN — ACETAMINOPHEN 650 MG: 325 TABLET ORAL at 19:44

## 2019-11-24 RX ADMIN — ACETAMINOPHEN 650 MG: 325 TABLET ORAL at 11:48

## 2019-11-24 RX ADMIN — FENTANYL CITRATE 50 MCG: 50 INJECTION, SOLUTION INTRAMUSCULAR; INTRAVENOUS at 04:23

## 2019-11-24 RX ADMIN — NYSTATIN: 100000 CREAM TOPICAL at 19:23

## 2019-11-24 RX ADMIN — CITALOPRAM HYDROBROMIDE 10 MG: 20 TABLET ORAL at 21:48

## 2019-11-24 RX ADMIN — Medication 10 ML: at 11:52

## 2019-11-24 NOTE — ED PROVIDER NOTES
History anxiety, arthritis, chronic kidney disease, bilateral femur fractures, osteopenia, peptic ulcer disease, vertebral fracture; she presents via EMS accompanied by family members after sustaining a ground-level fall about 4 hours ago. She states that she got up to go to the bathroom when the fall occurred. She was walking with her cane when 1 of her feet got tangled in a blanket causing her to fall. She initially had right-sided rib pain and distal left thigh pain. However, soon after she felt a pop in her right hip and that has been hurting bad since then. She denies headache/head injury/LOC. Her back pain is no worse than normal. 
 
 
  
 
Past Medical History:  
Diagnosis Date  Anxiety  Arthritis   
 knees,shoulders,fingers,back,neck  CKD (chronic kidney disease) stage 2, GFR 60-89 ml/min Dr. Negra Peñaloza  Femur fracture, left ([address] 2008  Femur fracture, right ([facility] [address] 2009  Insomnia  Intertrigo 7/7/2015  Knee pain Dr. Roby aPrra  Osteopenia Dr. Roby Parra  Psychiatric disorder   
 anxiety  Pyloric stenosis 2010  Sleep disorder Insomnia  Thoracic vertebral fracture ([facility] [address] 2005  Ulcer 2012 Perferatied ulcer Past Surgical History:  
Procedure Laterality Date  HX FEMUR FRACTURE TX Left femur - 2008, R femur - 2009  HX GI  2010 Perforated ulcer, in relation to pyloric stenosis  HX ORTHOPAEDIC    
 6 thoracic vertabrae that were fractured in 2005  [place] COLSC FLX W/NDSC US XM RCTM ET AL LMTD&ADJ STRUX  12/6/10 Normal except Diverticulosis Family History:  
Problem Relation Age of Onset  Cancer Sister   
     metastatic lung Cancer  Cancer Mother   
     colon Social History Socioeconomic History  Marital status:  Spouse name: Not on file  Number of children: Not on file  Years of education: Not on file  Highest education level: Not on file Occupational History  Not on file Social Needs  Financial resource strain: Not on file  Food insecurity:  
  Worry: Not on file Inability: Not on file  Transportation needs:  
  Medical: Not on file Non-medical: Not on file Tobacco Use  Smoking status: Former Smoker  Smokeless tobacco: Never Used Substance and Sexual Activity  Alcohol use: No  
  Alcohol/week: 0.0 standard drinks  Drug use: No  
 Sexual activity: Yes  
  Partners: Male Lifestyle  Physical activity:  
  Days per week: Not on file Minutes per session: Not on file  Stress: Not on file Relationships  Social connections:  
  Talks on phone: Not on file Gets together: Not on file Attends [employer] service: Not on file Active member of club or organization: Not on file Attends meetings of clubs or organizations: Not on file Relationship status: Not on file  Intimate partner violence:  
  Fear of current or ex partner: Not on file Emotionally abused: Not on file Physically abused: Not on file Forced sexual activity: Not on file Other Topics Concern   Service No  
 Blood Transfusions Yes Comment: x5 due to gastric ulcer  Caffeine Concern No  
 Occupational Exposure No  
 Hobby Hazards No  
 Sleep Concern Yes  Stress Concern Yes  Weight Concern Yes  Special Diet No  
 Back Care Yes  Exercise Yes  Bike Helmet No  
 Seat Belt Yes  Self-Exams Yes Social History Narrative  Not on file ALLERGIES: Crestor [rosuvastatin] and Pcn [penicillins] Review of Systems All other systems reviewed and are negative. Vitals:  
 11/24/19 0344 BP: 117/81 Pulse: 94 Resp: 18 Temp: 98.7 °F (37.1 °C) Weight: 83.5 kg (184 lb) Height: 5' 4\" (1.626 m) Physical Exam 
Vitals signs and nursing note reviewed. Constitutional:   
   Appearance: She is well-developed. HENT:  
   Head: Normocephalic and atraumatic. Eyes: Conjunctiva/sclera: Conjunctivae normal.  
Neck: Musculoskeletal: Neck supple. Trachea: No tracheal deviation. Cardiovascular:  
   Rate and Rhythm: Normal rate and regular rhythm. Heart sounds: Normal heart sounds. No murmur. No friction rub. No gallop. Pulmonary:  
   Effort: Pulmonary effort is normal.  
   Breath sounds: Normal breath sounds. Abdominal:  
   Palpations: Abdomen is soft. Tenderness: There is no tenderness. Musculoskeletal:     
   General: No deformity. Comments: She is tender over her right hip and resists any range of motion. No rib tenderness upon palpation. Mild distal left anteromedial thigh tenderness. Skin: 
   General: Skin is warm and dry. Neurological:  
   Mental Status: She is alert. Comments: oriented MDM Procedures Progress note: We attempted to stand the patient but she is unable to support her own weight due to \"muscle spasms\". She normally is able to ambulate without difficulty with a walker and a cane. I do not feel she is going to be able to get home and function in her current condition. Julio Cesar Parada MD 
6:27 AM 
 
Hospitalist Perfect Serve for Admission 6:28 AM 
 
ED Room Number: ER03/03 Patient Name and age:  Jazz Parks 69 y.o.  female Working Diagnosis: Fall, unable to stand  
readmission: no 
Isolation Requirements:  no 
Recommended Level of Care:  med/surg Code Status:  Full Code Department:[facility] ED - [phone] Other: Patient presented via EMS after a fall with right hip, left thigh, and right sided rib pain. X-rays of her right ribs, right hip, and left femur showed no obvious acute fractures. CT of her pelvis was done as well and showed no fracture. We have attempted to stand her and walk her and she is unable to stand due to pain. Unfortunately, it looks like she will need admission for PT evaluation and possible rehab. Consult note: I spoke with the [address] [place] [address] family medicine resident. They plan to come see her.   Teresa Romero MD 
6:38 AM

## 2019-11-24 NOTE — H&P
[facility] [address]  Office [phone] Fax [phone] Admission H&P Name: Allegra Zamora MRN: [id number]  Sex: Female YOB: 1945  Age: 69 y.o. PCP: Chalino Fisher MD  
 
Source of Information: patient, medical records Chief complaint: GLF History of Present Illness Allegra Zamora is a 69 y.o. female with known bilateral femur fractures, PUD, Osteoporosis, OA, CKD3, Anxiety, and Iron deficiency Anemia who presents to the ER complaining of GLF around midnight. She states she was getting up from bed to go to the bathroom and she was walking with her cane when her foot got caught in a blanket which caused her to fall. She fell on her R side. Denies hitting her head or LOC. She had some R sided rib pain and left hip pain. She states the pain is worse with movement and that she has muscle spasms. She was unable to bear weight in the ED, so needs to be assessed by PT/OT. Denies chest pain, SOB, nausea, vomiting, abdominal pain, dizziness. In the Emergency Department, vitals were remarkable for temp 98.7, HR 94, /81, RR 18, SpO2 99% RA. Labs were remarkable for Cr 1.30 (BL 1.1-1.2),  UA + Nitrites, Moderate LE, 3+ bacteria. Imaging that was done includes R hip Xray with lucency of R acteabulum, L femur Xray with no acute abnormality, R rib xray without rib fracture, and a CT of pelvis without acute fracture. Treatment was fentanyl 50mcg x 1. EKG: Sinus tachycardia. Patient Vitals for the past 12 hrs: 
 Temp Pulse Resp BP SpO2  
11/24/19 0600    145/85   
11/24/19 0426     99 % 11/24/19 0415    143/72 96 % 11/24/19 0400    141/80 100 % 11/24/19 0345    117/81 (!) 81 % 11/24/19 0344 98.7 °F (37.1 °C) 94 18 117/81  Review of Systems Review of Systems Constitutional: Negative for chills, fatigue and fever. HENT: Negative for congestion, rhinorrhea and sore throat. Eyes: Negative for photophobia and visual disturbance. Respiratory: Negative for chest tightness and shortness of breath. Cardiovascular: Negative for chest pain, palpitations and leg swelling. Gastrointestinal: Negative for abdominal pain, nausea and vomiting. Genitourinary: Negative for dysuria, frequency, hematuria and urgency. Musculoskeletal: Positive for back pain and myalgias. Negative for arthralgias and joint swelling. Skin: Negative for wound. Erythema in skin folds of abdomen Neurological: Positive for weakness. Negative for dizziness and headaches. Psychiatric/Behavioral: Negative for confusion. The patient is nervous/anxious. Home Medications Prior to Admission medications Medication Sig Start Date End Date Taking? Authorizing Provider  
atenolol (TENORMIN) 50 mg tablet TAKE 1 TABLET BY MOUTH ONCE OVER TWENTY-FOUR HOURS FOR 30 DAYS. 11/17/19   Jessica Galvan MD  
diazePAM (VALIUM) 5 mg tablet TAKE 1 TABLET BY MOUTH NIGHTLY. MAX DAILY AMOUNT: 5MG 11/4/19   Jessica Galvan MD  
citalopram (CELEXA) 10 mg tablet TAKE 1 TABLET BY MOUTH EVERY DAY 10/25/19   Jessica Galvan MD  
buPROPion [facility]) 100 mg tablet TAKE 1 TABLET BY MOUTH TWO TIMES A DAY 9/16/19   Jessica Galvan MD  
traMADol [employer]) 50 mg tablet Take 1-2 Tabs by mouth every eight (8) hours as needed for Pain for up to 30 days. Max Daily Amount: 300 mg. TAKE 1 TO 2 TABLETS BY MOUTH EVERY 8 HOURS AS NEEDED FOR PAIN . MAX DAILY AMOUNT 300 MG 8/5/19 9/4/19  Jessica Galvan MD  
gabapentin (NEURONTIN) 100 mg capsule Take one tablet at bedtime and morning. May add to middle of day if needed. 2/26/19   Jessica Galvan MD  
naloxone [facility]) 4 mg/actuation nasal spray Use 1 spray intranasally into 1 nostril. Use a new Narcan nasal spray for subsequent doses and administer into alternating nostrils. May repeat every 2 to 3 minutes as needed.  2/22/19   Jessica Galvan MD  
 clindamycin (CLINDAGEL) 1 % topical gel APPLY TO FOLDS EVERY DAY 8/19/17   Provider, Historical  
clindamycin phosphate 1 % swab 1 Dose by Apply Externally route as needed. Provider, Historical  
vitamin c-vitamin e (CRANBERRY CONCENTRATE) cap Take 1 Cap by mouth once over twenty-four (24) hours. Provider, Historical  
cholecalciferol (VITAMIN D3) 1,000 unit tablet Take 1,000 Units by mouth daily. Provider, Historical  
calcium-vitamin D (CALCIUM 500+D) 500 mg(1,250mg) -200 unit per tablet Take 1 Tab by mouth two (2) times daily (with meals). Provider, Historical  
 
 
Allergies Allergies Allergen Reactions  Crestor [Rosuvastatin] Myalgia  Pcn [Penicillins] Unknown (comments) Past Medical History Past Medical History:  
Diagnosis Date  Anxiety  Arthritis   
 knees,shoulders,fingers,back,neck  CKD (chronic kidney disease) stage 2, GFR 60-89 ml/min Dr. Олег Bynum  Femur fracture, left ([address] 2008  Femur fracture, right ([address] 2009  Insomnia  Intertrigo 7/7/2015  Knee pain Dr. Trejo Simple  Osteopenia Dr. Trejo Simple  Psychiatric disorder   
 anxiety  Pyloric stenosis 2010  Sleep disorder Insomnia  Thoracic vertebral fracture ([address] 2005  Ulcer 2012 Perferatied ulcer Previous Hospitalization(s) Past Surgical History:  
Procedure Laterality Date  HX FEMUR FRACTURE TX Left femur - 2008, R femur - 2009  HX GI  2010 Perforated ulcer, in relation to pyloric stenosis  HX ORTHOPAEDIC    
 6 thoracic vertabrae that were fractured in 2005  [place] COLSC FLX W/NDSC US XM RCTM ET AL LMTD&ADJ STRUX  12/6/10 Normal except Diverticulosis Family History Family History Problem Relation Age of Onset  Cancer Sister   
     metastatic lung Cancer  Cancer Mother   
     colon Social History Social History Socioeconomic History  Marital status:  Spouse name: Not on file  Number of children: Not on file  Years of education: Not on file  Highest education level: Not on file Occupational History  Not on file Social Needs  Financial resource strain: Not on file  Food insecurity:  
  Worry: Not on file Inability: Not on file  Transportation needs:  
  Medical: Not on file Non-medical: Not on file Tobacco Use  Smoking status: Former Smoker  Smokeless tobacco: Never Used Substance and Sexual Activity  Alcohol use: No  
  Alcohol/week: 0.0 standard drinks  Drug use: No  
 Sexual activity: Yes  
  Partners: Male Lifestyle  Physical activity:  
  Days per week: Not on file Minutes per session: Not on file  Stress: Not on file Relationships  Social connections:  
  Talks on phone: Not on file Gets together: Not on file Attends [employer] service: Not on file Active member of club or organization: Not on file Attends meetings of clubs or organizations: Not on file Relationship status: Not on file  Intimate partner violence:  
  Fear of current or ex partner: Not on file Emotionally abused: Not on file Physically abused: Not on file Forced sexual activity: Not on file Other Topics Concern   Service No  
 Blood Transfusions Yes Comment: x5 due to gastric ulcer  Caffeine Concern No  
 Occupational Exposure No  
 Hobby Hazards No  
 Sleep Concern Yes  Stress Concern Yes  Weight Concern Yes  Special Diet No  
 Back Care Yes  Exercise Yes  Bike Helmet No  
 Seat Belt Yes  Self-Exams Yes Social History Narrative  Not on file Alcohol history: Not at all Smoking history: Non-smoker Illicit drug history: Not at all Living arrangement: patient lives alone. Ambulates: With cane Vital Signs Visit Vitals /85 Pulse 94 Temp 98.7 °F (37.1 °C) Resp 18 Ht 5' 4\" (1.626 m) Wt 184 lb (83.5 kg) SpO2 99% BMI 31.58 kg/m² Physical Exam 
Physical Exam 
Constitutional:   
   General: She is not in acute distress. Appearance: She is obese. She is not ill-appearing. HENT:  
   Head: Normocephalic and atraumatic. Mouth/Throat:  
   Mouth: Mucous membranes are moist.  
   Pharynx: No posterior oropharyngeal erythema. Eyes:  
   Conjunctiva/sclera: Conjunctivae normal.  
Cardiovascular:  
   Rate and Rhythm: Normal rate and regular rhythm. Pulses: Normal pulses. Heart sounds: Normal heart sounds. No murmur. Pulmonary:  
   Effort: Pulmonary effort is normal. No respiratory distress. Breath sounds: Normal breath sounds. No wheezing. Chest:  
   Chest wall: No tenderness. Abdominal:  
   General: Bowel sounds are normal. There is no distension. Palpations: Abdomen is soft. Tenderness: There is no tenderness. Musculoskeletal:     
   General: Tenderness present. No swelling or deformity. Right lower leg: No edema. Left lower leg: No edema. Comments: L hip and low back TTP Skin: 
   General: Skin is warm. Comments: +erythema in skin folds on abdomen Neurological:  
   General: No focal deficit present. Mental Status: She is alert. She is disoriented. Sensory: No sensory deficit. Motor: Weakness present. Laboratory Data Recent Results (from the past 8 hour(s)) CBC WITH AUTOMATED DIFF Collection Time: 11/24/19  4:18 AM  
Result Value Ref Range WBC 9.0 3.6 - 11.0 K/uL  
 RBC 3.92 3.80 - 5.20 M/uL  
 HGB 13.2 11.5 - 16.0 g/dL HCT 42.6 35.0 - 47.0 % .7 (H) 80.0 - 99.0 FL  
 MCH 33.7 26.0 - 34.0 PG  
 MCHC 31.0 30.0 - 36.5 g/dL  
 RDW 14.6 (H) 11.5 - 14.5 % PLATELET 961 999 - 458 K/uL MPV 9.3 8.9 - 12.9 FL  
 NRBC 0.0 0  WBC ABSOLUTE NRBC 0.00 0.00 - 0.01 K/uL NEUTROPHILS 82 (H) 32 - 75 % LYMPHOCYTES 7 (L) 12 - 49 % MONOCYTES 9 5 - 13 % EOSINOPHILS 1 0 - 7 % BASOPHILS 0 0 - 1 % IMMATURE GRANULOCYTES 1 (H) 0.0 - 0.5 % ABS. NEUTROPHILS 7.4 1.8 - 8.0 K/UL  
 ABS. LYMPHOCYTES 0.6 (L) 0.8 - 3.5 K/UL  
 ABS. MONOCYTES 0.8 0.0 - 1.0 K/UL  
 ABS. EOSINOPHILS 0.1 0.0 - 0.4 K/UL  
 ABS. BASOPHILS 0.0 0.0 - 0.1 K/UL  
 ABS. IMM. GRANS. 0.1 (H) 0.00 - 0.04 K/UL  
 DF SMEAR SCANNED    
 RBC COMMENTS NORMOCYTIC, NORMOCHROMIC METABOLIC PANEL, COMPREHENSIVE Collection Time: 11/24/19  4:18 AM  
Result Value Ref Range Sodium 141 136 - 145 mmol/L Potassium 4.4 3.5 - 5.1 mmol/L Chloride 108 97 - 108 mmol/L  
 CO2 25 21 - 32 mmol/L Anion gap 8 5 - 15 mmol/L Glucose 101 (H) 65 - 100 mg/dL BUN 16 6 - 20 MG/DL Creatinine 1.30 (H) 0.55 - 1.02 MG/DL  
 BUN/Creatinine ratio 12 12 - 20 GFR est AA 49 (L) >60 ml/min/1.73m2 GFR est non-AA 40 (L) >60 ml/min/1.73m2 Calcium 8.9 8.5 - 10.1 MG/DL Bilirubin, total 0.7 0.2 - 1.0 MG/DL  
 ALT (SGPT) 14 12 - 78 U/L  
 AST (SGOT) 25 15 - 37 U/L Alk. phosphatase 74 45 - 117 U/L Protein, total 7.6 6.4 - 8.2 g/dL Albumin 3.4 (L) 3.5 - 5.0 g/dL Globulin 4.2 (H) 2.0 - 4.0 g/dL A-G Ratio 0.8 (L) 1.1 - 2.2 SAMPLES BEING HELD Collection Time: 11/24/19  4:18 AM  
Result Value Ref Range SAMPLES BEING HELD SST,RD,LORENA   
 COMMENT Add-on orders for these samples will be processed based on acceptable specimen integrity and analyte stability, which may vary by analyte. URINALYSIS W/MICROSCOPIC Collection Time: 11/24/19  4:18 AM  
Result Value Ref Range Color YELLOW/STRAW Appearance CLEAR CLEAR Specific gravity 1.011 1.003 - 1.030    
 pH (UA) 5.5 5.0 - 8.0 Protein NEGATIVE  NEG mg/dL Glucose NEGATIVE  NEG mg/dL Ketone NEGATIVE  NEG mg/dL Bilirubin NEGATIVE  NEG Blood NEGATIVE  NEG Urobilinogen 0.2 0.2 - 1.0 EU/dL Nitrites POSITIVE (A) NEG Leukocyte Esterase MODERATE (A) NEG    
 WBC  0 - 4 /hpf  
 RBC 0-5 0 - 5 /hpf Epithelial cells FEW FEW /lpf Bacteria 3+ (A) NEG /hpf Hyaline cast 0-2 0 - 5 /lpf URINE CULTURE HOLD SAMPLE Collection Time: 11/24/19  4:18 AM  
Result Value Ref Range Urine culture hold URINE ON HOLD IN MICROBIOLOGY DEPT FOR 3 DAYS. IF UNPRESERVED URINE IS SUBMITTED, IT CANNOT BE USED FOR ADDITIONAL TESTING AFTER 24 HRS, RECOLLECTION WILL BE REQUIRED. Imaging CXR Results  (Last 48 hours) None CT Results  (Last 48 hours)  
          
 11/24/19 0548  CT PELV WO CONT Final result Impression:  IMPRESSION:  
1. An acute fracture is not demonstrated Narrative:  INDICATION:  Falls/pain/abnormal plain films EXAM: CT pelvis. Radiographs same day Thin section axial images were obtained. From these sagittal and coronal  
reformats were performed. CT dose reduction was achieved through use of a  
standardized protocol tailored for this examination and automatic exposure  
control for dose modulation. FINDINGS: Findings in the posterior right iliac wing and right acetabulum  
artifactual radiographs and no fractures in these regions were demonstrated. Alignment of the hips is normal. An acute fracture the pelvis is not identified. There are intramedullary rods within both femurs fixating chronic fractures  
which have healed. There is collapse of L5 which is chronic. There is no soft  
tissue hematoma. Intrapelvic bowel is nondistended. Diverticulosis of the  
sigmoid colon. You Muck Assessment and Plan Ciara Cerna is a 69 y.o. female with PMH of bilateral femur fractures, PUD, Osteoporosis, OA, CKD3, Anxiety, and Iron deficiency Anemia, who is admitted for GLF with inability to bear weight. GLF with inability to bear weight Pain and muscle spasms in hips, back, ribs.  No fractures found on R hip Xray, L femur Xray, R rib Xray, or CT pelvis. 
-admit to medical, vitals per unit routine 
-daily CBC, BMP 
-PT/OT eval 
-tylenol 650mg [id number] prn 
 
 Sinus tachycardia HR POA 94, EKG shows sinus tachycardia with . 
-will get CXR, BNP, ECHO Abnormal UA: Asymptomatic UTI. UA + Nitrites, Moderate LE, 3+ bacteria. 
-home cranberry concentrate 
-will follow up urine culture 
-will treat with bactrim x 3 days and discontinue if urine culture negative CKD3 Cr POA 1.30 (BL 1.1-1.2) -avoid nephrotoxic agents Osteoporosis s/p bilateral femur fractures in [id number] 
-continue home Calcium-vit D 500mg-200U BID and Vitamin D3 1000U daily Intertrigo in skin folds on abdomen, looks like Candida. Being treated with clindamycin ointment OP without improvement. 
-will start nystatin cream 
 
Anxiety 
-continue home wellbutrin 100mg BID and celexa 10mg daily 
-will make her home valium 5mg nightly as prn nightly Iron Deficiency Anemia Hgb POA 13.2 
-daily CBC 
-no home meds Obesity 
- PT with BMI of Body mass index is 31.58 kg/m². - Encouraging lifestyle modifications and further follow up outpatient. FEN/GI - Regular diet. Activity - Ambulate with assistance DVT prophylaxis - Lovenox GI prophylaxis - Not indicated at this time Fall prophylaxis - Fall precautions ordered. Disposition - Admit to Medical. Plan to d/c to TBD. Consulted PT/OT Code Status - DNR, discussed with patient / caregivers. Next of [facility] [address] and [address] (son) [phone] Patient Kari Pena will be discussed with Dr. Morena Santillan. 6:52 AM, 11/24/19 Rosa Greene DO Family Medicine Resident For Billing Chief Complaint Patient presents with  Fall Hospital Problems  Date Reviewed: 2/27/2019 None

## 2019-11-24 NOTE — PROGRESS NOTES
Problem: Self Care Deficits Care Plan (Adult) Goal: *Acute Goals and Plan of Care (Insert Text) Description FUNCTIONAL STATUS PRIOR TO ADMISSION: Patient was modified independent using a single point cane for functional mobility. HOME SUPPORT: The patient lived alone with family to provide assistance. Occupational Therapy Goals Initiated 11/24/2019 1. Patient will perform grooming with supervision/set-up within 7 day(s). 2.  Patient will perform bathing with moderate assistance  within 7 day(s). 3.  Patient will perform lower body dressing with moderate assistance  within 7 day(s). 4.  Patient will perform toilet transfers with moderate assistance  within 7 day(s). 5.  Patient will perform all aspects of toileting with moderate assistance  within 7 day(s). 6.  Patient will participate in upper extremity therapeutic exercise/activities with supervision/set-up for 10 minutes within 7 day(s). 7.  Patient will utilize energy conservation techniques during functional activities with verbal cues within 7 day(s). Outcome: Progressing Towards Goal 
 OCCUPATIONAL THERAPY EVALUATION Patient: Danyelle Ochoa (89 y.o. female) Date: 11/24/2019 Primary Diagnosis: Fall from ground level Balbian Good Left hip pain [M25.552] Precautions:   Fall, Skin ASSESSMENT Based on the objective data described below, the patient presents with increase pain, decrease activity tolerance, decrease strength, decrease AROM R UE, B LE edema, decrease mobility and decrease independence with self-care. Per pt, she was living alone and able to care for self. She drives 6-7Y per week to grocery store and has 16 GABINO or accessible ramp. Today, pt is total assist care. Pt c/o of \"spasms. \"  She was unable to roll side to side or bend knees up. Very limited eval due to pain. Will con't to follow pt. Anticipate she will need therapy at discharge. Current Level of Function Impacting Discharge (ADLs/self-care): total assist care Functional Outcome Measure: The patient scored 5/100 on the Barthel outcome measure which is indicative of impairment. Other factors to consider for discharge: lives alone Patient will benefit from skilled therapy intervention to address the above noted impairments. PLAN : 
Recommendations and Planned Interventions: self care training, functional mobility training, therapeutic exercise, balance training, therapeutic activities, endurance activities, patient education, home safety training, and family training/education Frequency/Duration: Patient will be followed by occupational therapy 5 times a week to address goals. Recommendation for discharge: (in order for the patient to meet his/her long term goals) Therapy up to 5 days/week in SNF setting This discharge recommendation: A follow-up discussion with the attending provider and/or case management is planned IF patient discharges home will need the following DME: to be determined (TBD) SUBJECTIVE:  
Patient stated Let me see if I can do it.  OBJECTIVE DATA SUMMARY:  
HISTORY:  
Past Medical History:  
Diagnosis Date Anxiety Arthritis   
 knees,shoulders,fingers,back,neck CKD (chronic kidney disease) stage 2, GFR 60-89 ml/min Dr. Laisha Maguire Femur fracture, left ([address] 2008 Femur fracture, right ([address] 2009 Insomnia Intertrigo 7/7/2015 Knee pain Dr. Imelda Cabello Osteopenia Dr. Imelda Cabello Psychiatric disorder   
 anxiety Pyloric stenosis 2010 Sleep disorder Insomnia Thoracic vertebral fracture ([address] 2005 Ulcer 2012 Perferatied ulcer Past Surgical History:  
Procedure Laterality Date [facility] Left femur - 2008, R femur - 2009 HX GI  2010 Perforated ulcer, in relation to pyloric stenosis HX ORTHOPAEDIC    
 6 thoracic vertabrae that were fractured in 2005 [place] COLSC FLX W/NDSC US XM RCTM ET AL LMTD&ADJ STRUX  12/6/10 Normal except Diverticulosis Expanded or extensive additional review of patient history:  
 
Home Situation Home Environment: Private residence # Steps to Enter: 16 
Rails to Enter: Yes Hand Rails : Left Wheelchair Ramp: Yes One/Two Story Residence: One story Living Alone: Yes Support Systems: Family member(s) Current DME Used/Available at Home: Mack Samir, straight, Walker, rolling, Wheelchair, Shower chair, Grab bars, Other (comment)(Hand You's) Tub or Shower Type: Shower Hand dominance: Right EXAMINATION OF PERFORMANCE DEFICITS: 
Cognitive/Behavioral Status: 
Neurologic State: Alert Orientation Level: Oriented X4 Safety/Judgement: Awareness of environment Skin: intact Edema: B LEs Hearing: 
  
 
Vision/Perceptual:   
    
    
    
  
    
    
  
 
Range of Motion: 
 
AROM: Generally decreased, functional 
PROM: Generally decreased, functional 
  
  
  
  
  
  
 
Strength: 
 
Strength: Generally decreased, functional 
  
  
  
  
 
Coordination: 
Coordination: Within functional limits Fine Motor Skills-Upper: Left Intact; Right Intact Gross Motor Skills-Upper: Left Intact; Right Intact Tone & Sensation: 
 
Tone: Normal 
Sensation: Intact Balance: 
  
 
Functional Mobility and Transfers for ADLs: 
Bed Mobility: 
Rolling: Total assistance Supine to Sit: Total assistance Sit to Supine: Total assistance Transfers: ADL Assessment: 
Feeding: Setup Upper Body Dressing: Total assistance Lower Body Dressing: Total assistance Toileting: Total assistance ADL Intervention and task modifications: 
  
 
  
 
  
 
  
 
  
 
  
 
  
 
Cognitive Retraining Safety/Judgement: Awareness of environment Therapeutic Exercise: 
  
Functional Measure: 
Barthel Index: 
 
Bathin Bladder: 0 Bowels: 0 Groomin Dressin Feedin Mobility: 0 Stairs: 0 Toilet Use: 0 Transfer (Bed to Chair and Back): 0 Total: 5/100 The Barthel ADL Index: Guidelines 1. The index should be used as a record of what a patient does, not as a record of what a patient could do. 2. The main aim is to establish degree of independence from any help, physical or verbal, however minor and for whatever reason. 3. The need for supervision renders the patient not independent. 4. A patient's performance should be established using the best available evidence. Asking the patient, friends/relatives and nurses are the usual sources, but direct observation and common sense are also important. However direct testing is not needed. 5. Usually the patient's performance over the preceding 24-48 hours is important, but occasionally longer periods will be relevant. 6. Middle categories imply that the patient supplies over 50 per cent of the effort. 7. Use of aids to be independent is allowed. Elvin Holliday., Barthel, DAvivaW. (1343). Functional evaluation: the Barthel Index. [address] [facility] (14)2. Laurent Hodgkins der [place], J.J.M.F, Nathalie Barrientos., Kelsie Spicer., [place], [address] [place] [address] (1999). Measuring the change indisability after inpatient rehabilitation; comparison of the responsiveness of the Barthel Index and Functional [place] Measure. Journal of Neurology, Neurosurgery, and Psychiatry, 66(4), 617-261. PATEL Nagy, EM Colón, & Zafar Quesada, MAvivaA. (2004.) Assessment of post-stroke quality of life in cost-effectiveness studies: The usefulness of the Barthel Index and the EuroQoL-5D. [facility], 13, 014-42 Occupational Therapy Evaluation Charge Determination History Examination Decision-Making LOW Complexity : Brief history review  LOW Complexity : 1-3 performance deficits relating to physical, cognitive , or psychosocial skils that result in activity limitations and / or participation restrictions  LOW Complexity : No comorbidities that affect functional and no verbal or physical assistance needed to complete eval tasks Based on the above components, the patient evaluation is determined to be of the following complexity level: LOW Pain Rating: 
Increase pain, no verbal score given Activity Tolerance:  
Poor Please refer to the flowsheet for vital signs taken during this treatment. After treatment patient left in no apparent distress:   
Supine in bed COMMUNICATION/EDUCATION:  
The patients plan of care was discussed with: Physical Therapist and Registered Nurse. Home safety education was provided and the patient/caregiver indicated understanding., Patient/family have participated as able in goal setting and plan of care. , and Patient/family agree to work toward stated goals and plan of care. This patients plan of care is appropriate for delegation to [facility]. Thank you for this referral. 
Cesar Hampton OTR/L Time Calculation: 35 mins

## 2019-11-24 NOTE — PROGRESS NOTES
11/24/2019 
3:16 PM 
 
Reason for Admission:   GLF 
                
RRAT Score:     10 Plan for utilizing home health:   No hx of hh, but open to it Current Advanced Directive/Advance Care Plan: Not on file Justin Santillan Transition of Care Plan:                   
CM met with pt and verified demographics. Pt lives alone in a 1 story home with about 16 steps to enter the home. PTA, pt was independent with ADLs and driving. PT has Rx coverage and uses [facility] [address]. Pt has DME- cane, WC, rollator. Pt's PCP verified-Dr. Lavinia Paz, NN notified). PT evaluated pt and are recommending SNF, however pt would prefer to go home with HH-PT. Provided pt and son with list of [place] and SNF. CM will continue to follow for needs. Plan: 1. CM continue to follow for needs. 2. SNF vs. HH Care Management Interventions PCP Verified by CM: Yes(Dr. Lavinia Teixeira, Cary Paz NN) Mode of Transport at Discharge: Other (see comment)(Tomy Adler ) Transition of Care Consult (CM Consult): Discharge Planning Current Support Network: Lives Alone, Own Home Confirm Follow Up Transport: Self Plan discussed with Pt/Family/Caregiver: Yes Discharge Location Discharge Placement: Unable to determine at this time DANIKA Vance Care Management

## 2019-11-24 NOTE — PROGRESS NOTES
[facility]: MED RECONCILIATION Comments/Recommendations:  
Patient is awake, alert, and knowledgeable about home medications Provides a medication list which is found to be inaccurate on review. Pharmacist reviewed prescription refill history with Rx Query. The patient was compounding a cream at home with clindamycin but it became too difficult so she stopped. Counseled the patient that the use of diazepam, Tylenol PM, and tramadol increase her risk of falling. She reports she uses these medications infrequently and did not use any of these medications last night. She has taken diazepam for years as needed for panic attacks. Her atenolol also helps with her panic attacks which usually occur at night. Discussed trying Tylenol at bedtime instead of Tylenol PM. Advised the patient to not take her tramadol with her diazepam due to increased risk of respiratory depression and death. Allergies: Pcn [penicillins] Prior to Admission Medications:  
 
Medication Documentation Review Audit Reviewed by Tavno Vides PHARMD (Pharmacist) on 11/24/19 at 1000 Medication Sig Documenting Provider Last Dose Status Taking?  
atenolol (TENORMIN) 50 mg tablet Take 50 mg by mouth nightly. Provider, Historical 11/23/2019 Unknown time Active Yes  
buPROPion (WELLBUTRIN) 100 mg tablet TAKE 1 TABLET BY MOUTH TWO TIMES A DAY Karen Ritchie MD 11/23/2019 Unknown time Active Yes  
calcium-vitamin D (CALCIUM 500+D) 500 mg(1,250mg) -200 unit per tablet Take 1 Tab by mouth daily. Provider, Historical 11/23/2019 Unknown time Active Yes  
citalopram (CELEXA) 10 mg tablet Take 10 mg by mouth nightly. Provider, Historical 11/23/2019 Unknown time Active Yes  
cyanocobalamin 1,000 mcg tablet Take 1,000 mcg by mouth daily. Provider, Historical 11/23/2019 Unknown time Active Yes  
diazePAM (VALIUM) 5 mg tablet Take 5 mg by mouth nightly as needed for Anxiety. Provider, Historical  Active Yes diphenhydrAMINE-acetaminophen (TYLENOL PM EXTRA STRENGTH)  mg tab Take 1 Tab by mouth nightly as needed (sleep). Provider, Historical  Active Yes  
docusate sodium (COLACE) 100 mg capsule Take 100 mg by mouth two (2) times daily as needed for Constipation. Provider, Historical  Active Yes  
gabapentin (NEURONTIN) 100 mg capsule Take 100 mg by mouth two (2) times daily as needed (pain). Provider, Historical  Active Yes  
naloxone (NARCAN) 4 mg/actuation nasal spray Use 1 spray intranasally into 1 nostril. Use a new Narcan nasal spray for subsequent doses and administer into alternating nostrils. May repeat every 2 to 3 minutes as needed. Franko Morris MD  Active Yes  
traMADol Christopher Leoncio) 50 mg tablet Take 50 mg by mouth daily as needed for Pain. Provider, Historical  Active Yes  
vitamin c-vitamin e (CRANBERRY CONCENTRATE) cap Take 1 Cap by mouth daily. Provider, Historical 11/23/2019 Unknown time Active Yes Thank you, Elin Irizarry, PharmD, BCPS

## 2019-11-24 NOTE — PROGRESS NOTES
CMS Note 
11/24/2019 Patient received and signevatd both the Observation and Cb Baptiste. Patient was given copies for their record. Karyle Chol CMS

## 2019-11-24 NOTE — ED NOTES
Performed bedside assessment prior to patient's anticipated transfer to inpatient bed. Spot check O2 sats were 81% on room air, so 3L O2 via nasal cannula was applied, and sats improved to 98%. Pt admits shortness of breath x 6 months or so and has not been evaluated for this new symptom. She has 2+ pitting edema to bilateral extremities, which she reports has been present for about the same length of time (6 months). She has S3 heart sounds and fine crackles to bilateral bases. Called [facility] to discuss findings, and MD came to ER for bedside evaluation. Will complete additional orders and cardiac workup.

## 2019-11-24 NOTE — ED NOTES
Called an updated report to receiving RN on 4th floor, patient will require remote telemetry. She is in no apparent distress at this time, continues with good oxygen sats on 3L Nasal Cannula. Patient and family updated and patient to be transported to inpatient room shortly.

## 2019-11-24 NOTE — ROUTINE PROCESS
TRANSFER - OUT REPORT: 
 
Verbal report given to Rayo RN (name) on [address]  being transferred to 4th floor (unit) for routine progression of care Report consisted of patients Situation, Background, Assessment and  
Recommendations(SBAR). Information from the following report(s) SBAR and ED Summary was reviewed with the receiving nurse. Lines:  
Peripheral IV 11/24/19 Left Forearm (Active) Site Assessment Clean, dry, & intact 11/24/2019  3:48 AM  
Phlebitis Assessment 0 11/24/2019  3:48 AM  
Infiltration Assessment 0 11/24/2019  3:48 AM  
Dressing Status Clean, dry, & intact 11/24/2019  3:48 AM  
Dressing Type Tape;Transparent 11/24/2019  3:48 AM  
Hub Color/Line Status Pink;Flushed;Patent 11/24/2019  3:48 AM  
  
 
Opportunity for questions and clarification was provided. Patient transported with: 
 O2 @ 2 liters Tech

## 2019-11-24 NOTE — ED NOTES
Attempted to stand patient, c/o \"muscle spasms. \" 3 assist with walker, patient to standing position, states inability to bear weight. Dr. Fatoumata Patel updated, at bedside to evaluate patient

## 2019-11-24 NOTE — ED TRIAGE NOTES
Pt arrives via EMS c/o a GLF after tripping over a blanket at home. States that she waited 2 hours prior to calling EMS. Initial c/o L knee pain, R rib pain. When attempting to stand with EMS, EMS reports hearing \"pop\" in R hip, now pain there. Hx of bilateral femur fx, both with titanium rods. Denies hitting head or LOC

## 2019-11-24 NOTE — PROGRESS NOTES
Problem: Mobility Impaired (Adult and Pediatric) Goal: *Acute Goals and Plan of Care (Insert Text) Description FUNCTIONAL STATUS PRIOR TO ADMISSION: Patient was modified independent using a SPC for functional mobility. HOME SUPPORT PRIOR TO ADMISSION: The patient lived alone with son(next door) to provide assistance. Physical Therapy Goals Initiated 11/24/2019 1. Patient will move from supine to sit and sit to supine , scoot up and down and roll side to side in bed with minimal assistance/contact guard assist within 7 day(s). 2.  Patient will transfer from bed to chair and chair to bed with minimal assistance/contact guard assist using the least restrictive device within 7 day(s). 3.  Patient will perform sit to stand with minimal assistance/contact guard assist within 7 day(s). 4.  Patient will ambulate with minimal assistance/contact guard assist for 15 feet with the least restrictive device within 7 day(s). 5.  Will assess stair goal as appropriate. Outcome: Progressing Towards Goal 
 PHYSICAL THERAPY EVALUATION Patient: Toma Kussmaul (89 y.o. female) Date: 11/24/2019 Primary Diagnosis: Fall from ground level Benna Mk Left hip pain [M25.552] Precautions:   Fall, Skin ASSESSMENT Based on the objective data described below, the patient presents with significant pain, decrease activity tolerance, decrease strength, decrease AROM in all extremities, B LE edema, decrease mobility and decrease independence with functional mobility and ADLs. Per pt, she was living alone and able to care for self. She drives 6-6M per week to grocery store and has 16 GABINO or accessible ramp. Pt fell yesterday tripping over a blanket and remained on the floor for 2 hours. Today, pt is total A for all mobility and attempts to roll for jamaal changing. Imaging negative. Pt c/o of \"spasms. \"  She was unable to roll side to side or bend knees up without physical assist.  Very limited eval due to pain. Also noted to require 4L/min supplemental O2 to maintain saturations >90%. Will con't to follow pt. Anticipate she will need therapy at discharge. Current Level of Function Impacting Discharge (mobility/balance): Total A Functional Outcome Measure: The patient scored 5 on the Barthel outcome measure. Other factors to consider for discharge: O2 requirement, safety, total A Patient will benefit from skilled therapy intervention to address the above noted impairments. PLAN : 
Recommendations and Planned Interventions: bed mobility training, transfer training, gait training, therapeutic exercises, neuromuscular re-education, patient and family training/education, and therapeutic activities Frequency/Duration: Patient will be followed by physical therapy:  5 times a week to address goals. Recommendation for discharge: (in order for the patient to meet his/her long term goals) Therapy up to 5 days/week in SNF setting This discharge recommendation: 
Has been made in collaboration with the attending provider and/or case management IF patient discharges home will need the following DME: patient owns DME required for discharge SUBJECTIVE:  
Patient stated The spasms are so bad.  OBJECTIVE DATA SUMMARY:  
HISTORY:   
Past Medical History:  
Diagnosis Date Anxiety Arthritis   
 knees,shoulders,fingers,back,neck CKD (chronic kidney disease) stage 2, GFR 60-89 ml/min Dr. Paul Jernigan Femur fracture, left ([address] 2008 Femur fracture, right ([address] 2009 Insomnia Intertrigo 7/7/2015 Knee pain Dr. Roberta Parra Osteopenia Dr. Roberta Parra Psychiatric disorder   
 anxiety Pyloric stenosis 2010 Sleep disorder Insomnia Thoracic vertebral fracture ([address] 2005 Ulcer 2012 Perferatied ulcer Past Surgical History:  
Procedure Laterality Date  [facility]    
 Left femur - , R femur - 2009 HX GI  2010 Perforated ulcer, in relation to pyloric stenosis HX ORTHOPAEDIC    
 6 thoracic vertabrae that were fractured in  [place] COLSC FLX W/NDSC US XM RCTM ET AL LMTD&ADJ STRUX  12/6/10 Normal except Diverticulosis Personal factors and/or comorbidities impacting plan of care: anxiety, arthritis, rafita femur fracture history Home Situation Home Environment: Private residence # Steps to Enter: 16 
Rails to Enter: Yes Hand Rails : Left Wheelchair Ramp: Yes One/Two Story Residence: One story Living Alone: Yes Support Systems: Family member(s) Current DME Used/Available at Home: [employer] [employer] [address], [place], straight, Walker, rolling, Wheelchair, Shower chair, Grab bars, Other (comment)(Hand You's) Tub or Shower Type: Shower EXAMINATION/PRESENTATION/DECISION MAKING:  
Critical Behavior: 
Neurologic State: Alert Orientation Level: Oriented X4 Safety/Judgement: Awareness of environment Hearing: 
  
Skin:  scattered brusing Edema:  
Range Of Motion: 
AROM: Generally decreased, functional 
  
  
  
PROM: Generally decreased, functional 
  
  
  
Strength:   
Strength: Generally decreased, functional 
  
  
  
  
  
  
Tone & Sensation:  
Tone: Normal 
  
  
  
  
Sensation: Intact Coordination: 
Coordination: Within functional limits Vision:  
  
Functional Mobility: 
Bed Mobility: 
Rolling: Total assistance Supine to Sit: Total assistance Sit to Supine: Total assistance Transfers: 
  
  
     
  
     
  
  
Balance:  
  
Ambulation/Gait Training: 
 
 
Functional Measure: 
Barthel Index: 
 
Bathin Bladder: 0 Bowels: 0 Groomin Dressin Feedin Mobility: 0 Stairs: 0 Toilet Use: 0 Transfer (Bed to Chair and Back): 0 Total: 5/100 The Barthel ADL Index: Guidelines 1. The index should be used as a record of what a patient does, not as a record of what a patient could do. 2. The main aim is to establish degree of independence from any help, physical or verbal, however minor and for whatever reason. 3. The need for supervision renders the patient not independent. 4. A patient's performance should be established using the best available evidence. Asking the patient, friends/relatives and nurses are the usual sources, but direct observation and common sense are also important. However direct testing is not needed. 5. Usually the patient's performance over the preceding 24-48 hours is important, but occasionally longer periods will be relevant. 6. Middle categories imply that the patient supplies over 50 per cent of the effort. 7. Use of aids to be independent is allowed. Marcus Rodgers., Barthel, D.W. (2377). Functional evaluation: the Barthel Index. [address] [facility] (14)2. JERONIMO Goss, Viry Ray., Macy Barba., [place], [address] [place] [address] (1999). Measuring the change indisability after inpatient rehabilitation; comparison of the responsiveness of the Barthel Index and Functional [place] Measure. Journal of Neurology, Neurosurgery, and Psychiatry, 66(4), 310-384. Jos Huang, N.J.A, EM Colón, & Monique Colvin M.A. (2004.) Assessment of post-stroke quality of life in cost-effectiveness studies: The usefulness of the Barthel Index and the EuroQoL-5D. [facility], 13, 285-84 Physical Therapy Evaluation Charge Determination History Examination Presentation Decision-Making MEDIUM  Complexity : 1-2 comorbidities / personal factors will impact the outcome/ POC  MEDIUM Complexity : 3 Standardized tests and measures addressing body structure, function, activity limitation and / or participation in recreation  LOW Complexity : Stable, uncomplicated  Other outcome measures Barthel  LOW Based on the above components, the patient evaluation is determined to be of the following complexity level: LOW Activity Tolerance: Poor, desaturates with exertion and requires oxygen, and requires rest breaks Please refer to the flowsheet for vital signs taken during this treatment. After treatment patient left in no apparent distress:  
Supine in bed, Call bell within reach, and Caregiver / family present COMMUNICATION/EDUCATION:  
The patients plan of care was discussed with: Registered Nurse. Fall prevention education was provided and the patient/caregiver indicated understanding., Patient/family have participated as able in goal setting and plan of care. , and Patient/family agree to work toward stated goals and plan of care. Thank you for this referral. 
Aaron Pozo, PT Time Calculation: 36 mins

## 2019-11-24 NOTE — ED NOTES
Bedside and Verbal shift change report given to [address] [place] [address] Ghassan (oncoming nurse) by Iram Douglas (offgoing nurse). Report included the following information SBAR, Kardex, ED Summary, Intake/Output, MAR and Recent Results.

## 2019-11-25 ENCOUNTER — APPOINTMENT (OUTPATIENT)
Dept: VASCULAR SURGERY | Age: 74
DRG: 556 | End: 2019-11-25
Attending: STUDENT IN AN ORGANIZED HEALTH CARE EDUCATION/TRAINING PROGRAM
Payer: MEDICARE

## 2019-11-25 ENCOUNTER — APPOINTMENT (OUTPATIENT)
Dept: CT IMAGING | Age: 74
DRG: 556 | End: 2019-11-25
Attending: STUDENT IN AN ORGANIZED HEALTH CARE EDUCATION/TRAINING PROGRAM
Payer: MEDICARE

## 2019-11-25 PROBLEM — R53.81 PHYSICAL DEBILITY: Status: ACTIVE | Noted: 2019-11-25

## 2019-11-25 LAB
ANION GAP SERPL CALC-SCNC: 5 MMOL/L (ref 5–15)
ATRIAL RATE: 110 BPM
BUN SERPL-MCNC: 12 MG/DL (ref 6–20)
BUN/CREAT SERPL: 10 (ref 12–20)
CALCIUM SERPL-MCNC: 8.8 MG/DL (ref 8.5–10.1)
CALCULATED P AXIS, ECG09: 42 DEGREES
CALCULATED R AXIS, ECG10: 53 DEGREES
CALCULATED T AXIS, ECG11: 25 DEGREES
CHLORIDE SERPL-SCNC: 104 MMOL/L (ref 97–108)
CO2 SERPL-SCNC: 30 MMOL/L (ref 21–32)
CREAT SERPL-MCNC: 1.17 MG/DL (ref 0.55–1.02)
D DIMER PPP FEU-MCNC: 7.02 MG/L FEU (ref 0–0.65)
DIAGNOSIS, 93000: NORMAL
ERYTHROCYTE [DISTWIDTH] IN BLOOD BY AUTOMATED COUNT: 14.6 % (ref 11.5–14.5)
GLUCOSE SERPL-MCNC: 85 MG/DL (ref 65–100)
HCT VFR BLD AUTO: 39.9 % (ref 35–47)
HGB BLD-MCNC: 12.1 G/DL (ref 11.5–16)
MCH RBC QN AUTO: 33.2 PG (ref 26–34)
MCHC RBC AUTO-ENTMCNC: 30.3 G/DL (ref 30–36.5)
MCV RBC AUTO: 109.6 FL (ref 80–99)
NRBC # BLD: 0 K/UL (ref 0–0.01)
NRBC BLD-RTO: 0 PER 100 WBC
P-R INTERVAL, ECG05: 130 MS
PLATELET # BLD AUTO: 136 K/UL (ref 150–400)
PMV BLD AUTO: 9.1 FL (ref 8.9–12.9)
POTASSIUM SERPL-SCNC: 4.2 MMOL/L (ref 3.5–5.1)
Q-T INTERVAL, ECG07: 344 MS
QRS DURATION, ECG06: 66 MS
QTC CALCULATION (BEZET), ECG08: 465 MS
RBC # BLD AUTO: 3.64 M/UL (ref 3.8–5.2)
SODIUM SERPL-SCNC: 139 MMOL/L (ref 136–145)
VENTRICULAR RATE, ECG03: 110 BPM
WBC # BLD AUTO: 6.7 K/UL (ref 3.6–11)

## 2019-11-25 PROCEDURE — 36415 COLL VENOUS BLD VENIPUNCTURE: CPT

## 2019-11-25 PROCEDURE — 92610 EVALUATE SWALLOWING FUNCTION: CPT

## 2019-11-25 PROCEDURE — 97535 SELF CARE MNGMENT TRAINING: CPT

## 2019-11-25 PROCEDURE — 80048 BASIC METABOLIC PNL TOTAL CA: CPT

## 2019-11-25 PROCEDURE — 94760 N-INVAS EAR/PLS OXIMETRY 1: CPT

## 2019-11-25 PROCEDURE — 74011250637 HC RX REV CODE- 250/637: Performed by: STUDENT IN AN ORGANIZED HEALTH CARE EDUCATION/TRAINING PROGRAM

## 2019-11-25 PROCEDURE — 85027 COMPLETE CBC AUTOMATED: CPT

## 2019-11-25 PROCEDURE — 65660000000 HC RM CCU STEPDOWN

## 2019-11-25 PROCEDURE — 74011250636 HC RX REV CODE- 250/636: Performed by: STUDENT IN AN ORGANIZED HEALTH CARE EDUCATION/TRAINING PROGRAM

## 2019-11-25 PROCEDURE — 74011000250 HC RX REV CODE- 250: Performed by: STUDENT IN AN ORGANIZED HEALTH CARE EDUCATION/TRAINING PROGRAM

## 2019-11-25 PROCEDURE — 77010033678 HC OXYGEN DAILY

## 2019-11-25 PROCEDURE — 85379 FIBRIN DEGRADATION QUANT: CPT

## 2019-11-25 PROCEDURE — 97530 THERAPEUTIC ACTIVITIES: CPT

## 2019-11-25 PROCEDURE — 93970 EXTREMITY STUDY: CPT

## 2019-11-25 RX ORDER — MORPHINE SULFATE 2 MG/ML
1 INJECTION, SOLUTION INTRAMUSCULAR; INTRAVENOUS ONCE
Status: COMPLETED | OUTPATIENT
Start: 2019-11-25 | End: 2019-11-25

## 2019-11-25 RX ORDER — DOCUSATE SODIUM 100 MG/1
100 CAPSULE, LIQUID FILLED ORAL DAILY
Status: DISCONTINUED | OUTPATIENT
Start: 2019-11-25 | End: 2019-11-27 | Stop reason: HOSPADM

## 2019-11-25 RX ADMIN — SULFAMETHOXAZOLE AND TRIMETHOPRIM 1 TABLET: 800; 160 TABLET ORAL at 21:55

## 2019-11-25 RX ADMIN — BUPROPION HYDROCHLORIDE 100 MG: 100 TABLET, FILM COATED ORAL at 09:50

## 2019-11-25 RX ADMIN — TRAMADOL HYDROCHLORIDE 50 MG: 50 TABLET, FILM COATED ORAL at 20:01

## 2019-11-25 RX ADMIN — GABAPENTIN 100 MG: 100 CAPSULE ORAL at 15:59

## 2019-11-25 RX ADMIN — Medication 10 ML: at 21:55

## 2019-11-25 RX ADMIN — OYSTER SHELL CALCIUM WITH VITAMIN D 1 TABLET: 500; 200 TABLET, FILM COATED ORAL at 09:50

## 2019-11-25 RX ADMIN — SULFAMETHOXAZOLE AND TRIMETHOPRIM 1 TABLET: 800; 160 TABLET ORAL at 09:50

## 2019-11-25 RX ADMIN — TRAMADOL HYDROCHLORIDE 50 MG: 50 TABLET, FILM COATED ORAL at 12:36

## 2019-11-25 RX ADMIN — ATENOLOL 50 MG: 50 TABLET ORAL at 21:55

## 2019-11-25 RX ADMIN — Medication 10 ML: at 17:47

## 2019-11-25 RX ADMIN — TRAMADOL HYDROCHLORIDE 50 MG: 50 TABLET, FILM COATED ORAL at 05:53

## 2019-11-25 RX ADMIN — BUPROPION HYDROCHLORIDE 100 MG: 100 TABLET, FILM COATED ORAL at 18:11

## 2019-11-25 RX ADMIN — GABAPENTIN 100 MG: 100 CAPSULE ORAL at 00:47

## 2019-11-25 RX ADMIN — DOCUSATE SODIUM 100 MG: 100 CAPSULE, LIQUID FILLED ORAL at 09:49

## 2019-11-25 RX ADMIN — NYSTATIN: 100000 CREAM TOPICAL at 09:50

## 2019-11-25 RX ADMIN — CITALOPRAM HYDROBROMIDE 10 MG: 20 TABLET ORAL at 21:55

## 2019-11-25 RX ADMIN — MORPHINE SULFATE 1 MG: 2 INJECTION, SOLUTION INTRAMUSCULAR; INTRAVENOUS at 17:43

## 2019-11-25 RX ADMIN — NYSTATIN: 100000 CREAM TOPICAL at 18:18

## 2019-11-25 RX ADMIN — DIAZEPAM 5 MG: 5 TABLET ORAL at 00:47

## 2019-11-25 RX ADMIN — ENOXAPARIN SODIUM 40 MG: 40 INJECTION SUBCUTANEOUS at 09:49

## 2019-11-25 NOTE — PROGRESS NOTES
Problem: Mobility Impaired (Adult and Pediatric) Goal: *Acute Goals and Plan of Care (Insert Text) Description FUNCTIONAL STATUS PRIOR TO ADMISSION: Patient was modified independent using a SPC for functional mobility. HOME SUPPORT PRIOR TO ADMISSION: The patient lived alone with son(next door) to provide assistance. Physical Therapy Goals Initiated 11/24/2019 1. Patient will move from supine to sit and sit to supine , scoot up and down and roll side to side in bed with minimal assistance/contact guard assist within 7 day(s). 2.  Patient will transfer from bed to chair and chair to bed with minimal assistance/contact guard assist using the least restrictive device within 7 day(s). 3.  Patient will perform sit to stand with minimal assistance/contact guard assist within 7 day(s). 4.  Patient will ambulate with minimal assistance/contact guard assist for 15 feet with the least restrictive device within 7 day(s). 5.  Will assess stair goal as appropriate. Outcome: Progressing Towards Goal 
 PHYSICAL THERAPY TREATMENT Patient: Selina Rdz (18 y.o. female) Date: 11/25/2019 Diagnosis: Fall from ground level Amira Quivers Left hip pain [M25.552] Fall from ground level Amira Quivers <principal problem not specified> Precautions: Fall, Skin Chart, physical therapy assessment, plan of care and goals were reviewed. ASSESSMENT Patient continues with skilled PT services and is slowly progressing towards goals. Pt is unrealistic with her goal to return home at this time. She continues to require Max A x 2 for bed mobility, sit<>stand transfer and unable to attempt ambulation d/t L knee pain. Transferred to [facility] and then recliner by moving the bed and replacing with commode. Poor standing tolerance, decreased weight bearing through LEs and continues to c/o muscle spasms. Left up in chair with all needs in reach and son present in room. Current Level of Function Impacting Discharge (mobility/balance): Max A x 2 Other factors to consider for discharge: living alone prior to admission PLAN : 
Patient continues to benefit from skilled intervention to address the above impairments. Continue treatment per established plan of care. to address goals. Recommendation for discharge: (in order for the patient to meet his/her long term goals) Therapy up to 5 days/week in SNF setting This discharge recommendation: 
Has been made in collaboration with the attending provider and/or case management IF patient discharges home will need the following DME: to be determined (TBD) SUBJECTIVE:  
Patient stated I need to get the S&*^ outta here. (referring to a BM) OBJECTIVE DATA SUMMARY:  
Critical Behavior: 
Neurologic State: Alert Orientation Level: Oriented X4 Cognition: Follows commands Safety/Judgement: Awareness of environment Functional Mobility Training: 
Bed Mobility: 
Rolling: Maximum assistance Supine to Sit: Maximum assistance;Assist x2 Transfers: 
Sit to Stand: Maximum assistance;Assist x2 Stand to Sit: Maximum assistance;Assist x2 Balance: 
Sitting: Intact Sitting - Static: Good (unsupported) Sitting - Dynamic: Fair (occasional) Standing: Impaired Standing - Static: Poor Standing - Dynamic : Poor;Constant support Ambulation/Gait Training: 
  
  
  
  
 
 
Activity Tolerance:  
Fair-limited by pain and requiring O2 Please refer to the flowsheet for vital signs taken during this treatment. After treatment patient left in no apparent distress:  
Sitting in chair, Call bell within reach, and Caregiver / family present COMMUNICATION/COLLABORATION:  
The patients plan of care was discussed with: Registered Nurse Laura Arreguin, PT Time Calculation: 37 mins

## 2019-11-25 NOTE — PROGRESS NOTES
Speech Pathology bedside swallow evaluation/discharge Patient: Jayden Anderson (79 y.o. female) Date: 11/25/2019 Primary Diagnosis: Fall from ground level Don Bar Left hip pain [M25.552] Fall from ground level Don Bar Precautions:   Fall, Skin ASSESSMENT : 
Based on the objective data described below, the patient presents with a functional oral pharyngeal swallow. No overt s/s of aspiration noted during eval. Patient also presents with intact speech and language skills. No aphasia noted. No dysarthria noted. No voice concerns. Patient c/o difficulty digesting soft solids like bread, her stomach backing up, and requiring small meals d/t feelings of fullness. Suspect this is d/t esophageal concerns, as patient was able to sit up to eat and breathe, talk, and eat during session. Patient was admitted 11-24 with GLF resulting in L knee pain and R rib pain. CXR: mild interstitial prominence may reflect pulmonary edema. PMH: anxiety, arthritis, CKD, bilateral femoral fractures, osteopenia, PUD, vertebral fracture Skilled acute therapy provided by a speech-language pathologist is not indicated at this time. PLAN : 
Recommendations: 
Ok for regular diet, thins Discharge Recommendations: None SUBJECTIVE:  
Patient stated I have a hard time with bread. OBJECTIVE:  
 
Past Medical History:  
Diagnosis Date  Anxiety  Arthritis   
 knees,shoulders,fingers,back,neck  CKD (chronic kidney disease) stage 2, GFR 60-89 ml/min Dr. Ramos Innocent  Femur fracture, left ([address] 2008  Femur fracture, right ([address] 2009  Insomnia  Intertrigo 7/7/2015  Knee pain Dr. Chase Whitehead  Osteopenia Dr. Chase Whitehead  Psychiatric disorder   
 anxiety  Pyloric stenosis 2010  Sleep disorder Insomnia  Thoracic vertebral fracture ([address] 2005  Ulcer 2012 Perferatied ulcer Past Surgical History:  
Procedure Laterality Date  HX FEMUR FRACTURE [place]    
 Left femur - 2008, R femur - 2009  HX GI  2010 Perforated ulcer, in relation to pyloric stenosis  HX ORTHOPAEDIC    
 6 thoracic vertabrae that were fractured in 2005  MD COLSC FLX W/NDSC US XM RCTM ET AL LMTD&ADJ STRUX  12/6/10 Normal except Diverticulosis Prior Level of Function/Home Situation: lives independently Home Situation Home Environment: Private residence # Steps to Enter: 16 
Rails to Enter: Yes Hand Rails : Left Wheelchair Ramp: Yes One/Two Story Residence: One story Living Alone: Yes Support Systems: Family member(s) Current DME Used/Available at Home: Sandy Olya, straight, Walker, rolling, Wheelchair, Shower chair, Grab bars, Other (comment)(Hand You's) Tub or Shower Type: Shower Diet prior to admission: regular, thins Current Diet:  Regular, thins Cognitive and Communication Status: 
Neurologic State: Alert Orientation Level: Oriented X4 Cognition: Follows commands Perception: Appears intact Perseveration: No perseveration noted Safety/Judgement: Awareness of environment Oral Assessment: 
Oral Assessment Labial: No impairment Dentition: Upper dentures; Lower dentures Oral Hygiene: [facility] Lingual: No impairment Velum: No impairment Mandible: No impairment Gag Reflex: (deferred testing) P.O. Trials: 
Patient Position: patient sitting upright in bed Vocal quality prior to P.O.: No impairment Consistency Presented: Thin liquid; Solid How Presented: Self-fed/presented;Spoon How Much: (3 bites of jello; 4 bites of mashed potatoes; 2 sips of water) ORAL PHASE: 
Bolus Acceptance: No impairment Bolus Formation/Control: No impairment Propulsion: No impairment Oral Residue: None PHARYNGEAL PHASE: 
Initiation of Swallow: No impairment Laryngeal Elevation: Functional 
Aspiration Signs/Symptoms: None Pharyngeal Phase Characteristics: No impairment, issues, or problems Effective Modifications: None Cues for Modifications: None Comments: Patient reported significant discomfort in back from cracked vertebrae. NOMS:  
The NOMS functional outcome measure was used to quantify this patient's level of swallowing impairment. Based on the NOMS, the patient was determined to be at level 7 for swallow function NOMS Swallowing Levels: 
Level 1 (CN): NPO Level 2 (CM): NPO but takes consistency in therapy Level 3 (CL): Takes less than 50% of nutrition p.o. and continues with nonoral feedings; and/or safe with mod cues; and/or max diet restriction Level 4 (CK): Safe swallow but needs mod cues; and/or mod diet restriction; and/or still requires some nonoral feeding/supplements Level 5 (CJ): Safe swallow with min diet restriction; and/or needs min cues Level 6 (CI): Independent with p.o.; rare cues; usually self cues; may need to avoid some foods or needs extra time Level 7 (CH): Independent for all p.o. ISABEL. (2003). National Outcomes Measurement System (NOMS): Adult Speech-Language Pathology User's Guide. After treatment:  
[] Patient left in no apparent distress sitting up in chair 
[x] Patient left in no apparent distress in bed 
[x] Call bell left within reach [x] Nursing notified 
[] Caregiver present 
[] Bed alarm activated COMMUNICATION/EDUCATION:  
The patients plan of care including findings, recommendations, and recommended diet changes were discussed with: Registered Nurse. Patient was educated regarding purpose of SLP visit and verbalized understanding. Son was also present and verbalized understanding. [] Posted safety precautions in patient's room. [] Patient/family have participated as able and agree with findings and recommendations. [] Patient is unable to participate in plan of care at this time. Thank you for this referral. 
Nini Slipper Time Calculation: 15 mins Regarding student involvement in patient care: A student participated in this treatment session.  Per CMS Medicare statements and ISABEL guidelines I certify that the following was true: 1. I was present and directly observed the entire session. 2. I made all skilled judgments and clinical decisions regarding care. 3. I am the practitioner responsible for assessment, treatment, and documentation.

## 2019-11-25 NOTE — PROGRESS NOTES
11/25/2019 
3:13 PM 
CM met with pt and pt's son re dispo and PT's SNF recommendation. Pt expressed she wished to return home, but expressed an understanding that this is not a safe discharge plan. Pt and son to discuss SNF recommendation, and provide preferences in the morning on 11/25/19. Pt will require 3 midnight stay under Medicare to qualify for SNF (DC Wed). CM to continue to monitor.

## 2019-11-25 NOTE — PROGRESS NOTES
Yeny Vasquez [address] Office [phone] Fax [phone] Assessment and Plan Suresh Ledezma is a 69 y.o. female with history of bilateral femur fractures, PUD, Osteoporosis, OA, CKD3, Anxiety, and Iron deficiency Anemia, who is admitted for GLF with inability to bear weight. She has spent 1 night(s) in the hospital. 
 
24 Hour Events: No acute events. GLF with inability to bear weight Pain and muscle spasms in hips, back, ribs. No fractures found on R hip Xray, L femur Xray, R rib Xray, or CT pelvis. -PT/OT eval 
-tylenol 650mg [id number] prn, ultram 50mg q6H prn 
-continued home gabapentin 100mg BID prn 
  
Pulmonary HTN HR POA 94, EKG shows sinus tachycardia with . CXR with possible pulmonary edema. ECHO with normal function, EF 61-65%, with severe pulmonary hypertension. -recommend OP follow up 
  
Abnormal UA: Asymptomatic UTI. UA + Nitrites, Moderate LE, 3+ bacteria. 
-home cranberry concentrate 
-follow up urine culture 
-continue bactrim x D2/3 and discontinue if urine culture negative 
  
CKD3 Cr POA 1.30 (BL 1.1-1.2) -avoid nephrotoxic agents 
  
Osteoporosis s/p bilateral femur fractures in [id number] 
-continue home Calcium-vit D 500mg-200U BID and Vitamin D3 1000U daily 
  
Intertrigo in skin folds on abdomen, looks like Candida. Being treated with clindamycin ointment OP without improvement. 
-continue nystatin cream 
  
Anxiety 
-continue home wellbutrin 100mg BID and celexa 10mg daily 
-will make her home valium 5mg nightly as prn nightly 
  
Iron Deficiency Anemia Hgb POA 13.2 
-daily CBC 
-no home meds 
  
Obesity 
- PT with BMI of Body mass index is 31.58 kg/m². - Encouraging lifestyle modifications and further follow up outpatient. FEN/GI - Regular diet. Activity - Ambulate with assistance DVT prophylaxis - Lovenox GI prophylaxis - Not indicated at this time Fall prophylaxis - Fall precautions ordered. Code Status - DNR, discussed with patient / caregivers. Patient Donnalee Shires will be discussed with Dr. Tee Sheehan. Radha Montoya DO Family Medicine Resident Subjective / Objective Pt still with a lot of pain with movement. Denies chest pain, SOB, nausea, vomiting, abdominal pain, dizziness. Inpatient Medications: 
Current Facility-Administered Medications Medication Dose Route Frequency  sodium chloride (NS) flush 5-40 mL  5-40 mL IntraVENous Q8H  
 sodium chloride (NS) flush 5-40 mL  5-40 mL IntraVENous PRN  
 sodium chloride (NS) flush 5-40 mL  5-40 mL IntraVENous Q8H  
 sodium chloride (NS) flush 5-40 mL  5-40 mL IntraVENous PRN  
 acetaminophen (TYLENOL) tablet 650 mg  650 mg Oral Q4H PRN  
 enoxaparin (LOVENOX) injection 40 mg  40 mg SubCUTAneous Q24H  
 trimethoprim-sulfamethoxazole (BACTRIM DS, SEPTRA DS) 160-800 mg per tablet 1 Tab  1 Tab Oral Q12H  nystatin (MYCOSTATIN) 100,000 unit/gram cream   Topical BID  atenolol (TENORMIN) tablet 50 mg  50 mg Oral QHS  buPROPion [facility]) tablet 100 mg  100 mg Oral BID  calcium-vitamin D (OS-BRENDEN) 500 mg-200 unit tablet  1 Tab Oral DAILY  citalopram (CELEXA) tablet 10 mg  10 mg Oral QHS  diazePAM (VALIUM) tablet 5 mg  5 mg Oral QHS PRN  
 traMADol (ULTRAM) tablet 50 mg  50 mg Oral Q6H PRN  
 lidocaine 4 % patch 1 Patch  1 Patch TransDERmal Q24H  
 gabapentin (NEURONTIN) capsule 100 mg  100 mg Oral BID PRN Temp (24hrs), Av.4 °F (36.9 °C), Min:98.1 °F (36.7 °C), Max:98.8 °F (37.1 °C) Respiratory: O2 Flow Rate (L/min): 3 l/min O2 Device: Nasal cannula Visit Vitals /72 (BP 1 Location: Right arm, BP Patient Position: At rest) Pulse 90 Temp 98.1 °F (36.7 °C) Resp 18 Ht 5' 4\" (1.626 m) Wt 185 lb 3 oz (84 kg) SpO2 96% BMI 31.79 kg/m² Physical Exam: 
Physical Exam 
Constitutional:   
   General: She is not in acute distress. Appearance: Normal appearance. She is obese. HENT:  
   Head: Normocephalic and atraumatic. Eyes:  
   Conjunctiva/sclera: Conjunctivae normal.  
Cardiovascular:  
   Rate and Rhythm: Normal rate and regular rhythm. Pulses: Normal pulses. Heart sounds: Normal heart sounds. No murmur. Pulmonary:  
   Effort: Pulmonary effort is normal. No respiratory distress. Breath sounds: Normal breath sounds. No wheezing. Comments: No crackles Abdominal:  
   General: There is no distension. Palpations: Abdomen is soft. Tenderness: There is no tenderness. Musculoskeletal:     
   General: Tenderness present. No swelling. Right lower leg: No edema. Left lower leg: No edema. Comments: Tenderness on low back, L knee Skin: 
   General: Skin is warm. Findings: Bruising present. Neurological:  
   Mental Status: She is alert and oriented to person, place, and time. I/O: 
Date 11/24/19 0700 - 11/25/19 7952 11/25/19 0700 - 11/26/19 9595 Shift [id number] [id number] 24 Hour Total 1368-0571 6916-8613 24 Hour Total  
INTAKE  
P.O.  450 450     
  P. O.  450 450 Shift Total(mL/kg)  450(5.4) 450(5.4) OUTPUT Urine(mL/kg/hr) 650(0.6) 750 1400 Urine Voided  Urine Output (mL) (External Female Catheter 11/24/19)  400 400 Shift Total(mL/kg) 650(7.7) 750(8.9) 1400(16.7) NET -650 -300 -950 Weight (kg) 84 84 84 84 84 84 CBC: 
Recent Labs  
  11/24/19 
0418 WBC 9.0 HGB 13.2 HCT 42.6  Metabolic Panel: 
Recent Labs  
  11/24/19 
0418   
K 4.4  
 CO2 25 BUN 16  
CREA 1.30* * CA 8.9 ALB 3.4* SGOT 25 ALT 14 For Billing Chief Complaint Patient presents with  Fall Hospital Problems  Date Reviewed: 2/27/2019 Codes Class Noted POA Left hip pain ICD-10-CM: M25.552 ICD-9-CM: 719.45  11/24/2019 Unknown Fall from ground level ICD-10-CM: N69.01QA ICD-9-CM: A800.5  11/24/2019 Unknown CKD (chronic kidney disease) stage 3, GFR 30-59 ml/min ([facility]) ICD-10-CM: N18.3 ICD-9-CM: 585.3  11/24/2019 Unknown Abnormal urinalysis ICD-10-CM: R82.90 ICD-9-CM: 791.9  11/24/2019 Unknown Intertrigo ICD-10-CM: L30.4 ICD-9-CM: 695.89  7/7/2015 Yes Anxiety ICD-10-CM: F41.9 ICD-9-CM: 300.00  2/7/2012 Yes Iron deficiency anemia, unspecified ICD-10-CM: D50.9 ICD-9-CM: 280.9  1/12/2012 Yes Senile osteoporosis ICD-10-CM: M81.0 ICD-9-CM: 733.01  12/30/2009 Yes

## 2019-11-25 NOTE — PROGRESS NOTES
Problem: Self Care Deficits Care Plan (Adult) Goal: *Acute Goals and Plan of Care (Insert Text) Description FUNCTIONAL STATUS PRIOR TO ADMISSION: Patient was modified independent using a single point cane for functional mobility. HOME SUPPORT: The patient lived alone with family to provide assistance. Occupational Therapy Goals Initiated 11/24/2019 1. Patient will perform grooming with supervision/set-up within 7 day(s). 2.  Patient will perform bathing with moderate assistance  within 7 day(s). 3.  Patient will perform lower body dressing with moderate assistance  within 7 day(s). 4.  Patient will perform toilet transfers with moderate assistance  within 7 day(s). 5.  Patient will perform all aspects of toileting with moderate assistance  within 7 day(s). 6.  Patient will participate in upper extremity therapeutic exercise/activities with supervision/set-up for 10 minutes within 7 day(s). 7.  Patient will utilize energy conservation techniques during functional activities with verbal cues within 7 day(s). Outcome: Progressing Towards Goal 
 OCCUPATIONAL THERAPY TREATMENT Patient: Lolita Duffy (22 y.o. female) Date: 11/25/2019 Diagnosis: Fall from ground level Andra Raider Left hip pain [M25.552] Fall from ground level Andra Raider <principal problem not specified> Precautions: Fall, Skin Chart, occupational therapy assessment, plan of care, and goals were reviewed. ASSESSMENT Patient continues with skilled OT services and is progressing towards goals. She wanted to try to use BSC, max assist x 2 to stand and unable to take steps towards commode. Dep for hygiene.  after tx and left seated in chair. Pt verbalizes pain L upper LE. Son present for tx. Current Level of Function Impacting Discharge (ADLs): Dep LB self care Other factors to consider for discharge: PLAN : 
Patient continues to benefit from skilled intervention to address the above impairments. Continue treatment per established plan of care. to address goals. Recommend with staff: Bedside commode assist x 2 for toileting Recommend next OT session: Cont towards goals Recommendation for discharge: (in order for the patient to meet his/her long term goals) Therapy up to 5 days/week in SNF setting This discharge recommendation: 
Has not yet been discussed the attending provider and/or case management IF patient discharges home will need the following DME: none SUBJECTIVE:  
Patient stated I want to go home OBJECTIVE DATA SUMMARY:  
Cognitive/Behavioral Status: 
Neurologic State: Alert Orientation Level: Oriented X4 Cognition: Follows commands Perception: Appears intact Perseveration: No perseveration noted Safety/Judgement: Awareness of environment Functional Mobility and Transfers for ADLs: 
Bed Mobility: 
  
Max assist 
Transfers: 
  
Functional Transfers Toilet Transfer : Maximum assistance;Assist x2 Cues: Physical assistance; Tactile cues provided;Verbal cues provided Adaptive Equipment: Bedside commode;Walker (comment) Balance: 
 Poor standing balance, ADL Intervention: Toileting Bowel Hygiene: Total assistance (dependent) Cognitive Retraining Safety/Judgement: Awareness of environment Therapeutic Exercises:  
Pt encouraged to engage with UE exercises to increase strength and endurance for functional tasks. Activity Tolerance:  
Fair Please refer to the flowsheet for vital signs taken during this treatment. After treatment patient left in no apparent distress:  
Sitting in chair and Call bell within reach COMMUNICATION/COLLABORATION:  
The patients plan of care was discussed with: Physical Therapist, Occupational Therapist, and Registered Nurse LISA Sutton Time Calculation: 30 mins

## 2019-11-25 NOTE — PROGRESS NOTES
Yeny Delgado Jesus [address] Office [phone] Fax [phone] Assessment and Plan Ciara Cerna is a 69 y.o. female with history of bilateral femur fractures, PUD, Osteoporosis, OA, CKD3, Anxiety, and Iron deficiency Anemia, who is admitted for GLF with inability to bear weight. She has spent 2 night(s) in the hospital. 
 
24 Hour Events: No acute events. GLF with inability to bear weight Pain and muscle spasms in hips, back, ribs. No fractures found on R hip Xray, L femur Xray, R rib Xray, or CT pelvis. -PT/OT eval 
-tylenol 650mg [id number] prn, ultram 50mg q6H prn 
-continued home gabapentin 100mg BID prn 
  
Pulmonary HTN HR POA 94, EKG shows sinus tachycardia with . CXR with possible pulmonary edema. ECHO with normal function, EF 61-65%, with severe pulmonary hypertension. -recommend OP follow up with Cardiology Elevated D-Dimer with new O2 requirement. Patient denies shortness of breath. Duplex negative for DVT. -Patient refused CTA chest after lengthy discussion about reason to get the test. 
  
Abnormal UA: Asymptomatic UTI. UA + Nitrites, Moderate LE, 3+ bacteria. 
-home cranberry concentrate 
-follow up urine culture 
-continue bactrim x D3/3 and discontinue if urine culture negative 
  
CKD3 Cr POA 1.30 (BL 1.1-1.2) -avoid nephrotoxic agents 
  
Osteoporosis s/p bilateral femur fractures in 2963-2754 
-continue home Calcium-vit D 500mg-200U BID and Vitamin D3 1000U daily 
  
Intertrigo in skin folds on abdomen, looks like Candida. Being treated with clindamycin ointment OP without improvement. 
-continue nystatin cream 
  
Anxiety 
-continue home wellbutrin 100mg BID and celexa 10mg daily 
-will make her home valium 5mg nightly as prn nightly 
  
Iron Deficiency Anemia Hgb POA 13.2 
-daily CBC 
-no home meds 
  
Obesity 
- PT with BMI of Body mass index is 31.58 kg/m². - Encouraging lifestyle modifications and further follow up outpatient. FEN/GI - Regular diet. Activity - Ambulate with assistance DVT prophylaxis - Lovenox GI prophylaxis - Not indicated at this time Fall prophylaxis - Fall precautions ordered. Code Status - DNR, discussed with patient / caregivers. Patient Lolita Duffy will be discussed with Dr. Heidy Sanderson. Luc Keys DO Family Medicine Resident Subjective / Objective Pt complains of neck pain this morning. Denies chest pain, SOB, nausea, vomiting, abdominal pain, dizziness. Inpatient Medications: 
Current Facility-Administered Medications Medication Dose Route Frequency  docusate sodium (COLACE) capsule 100 mg  100 mg Oral DAILY  sodium chloride (NS) flush 5-40 mL  5-40 mL IntraVENous Q8H  
 sodium chloride (NS) flush 5-40 mL  5-40 mL IntraVENous PRN  
 sodium chloride (NS) flush 5-40 mL  5-40 mL IntraVENous Q8H  
 sodium chloride (NS) flush 5-40 mL  5-40 mL IntraVENous PRN  
 acetaminophen (TYLENOL) tablet 650 mg  650 mg Oral Q4H PRN  
 enoxaparin (LOVENOX) injection 40 mg  40 mg SubCUTAneous Q24H  
 trimethoprim-sulfamethoxazole (BACTRIM DS, SEPTRA DS) 160-800 mg per tablet 1 Tab  1 Tab Oral Q12H  nystatin (MYCOSTATIN) 100,000 unit/gram cream   Topical BID  atenolol (TENORMIN) tablet 50 mg  50 mg Oral QHS  buPROPion [facility]) tablet 100 mg  100 mg Oral BID  calcium-vitamin D (OS-BRENDEN) 500 mg-200 unit tablet  1 Tab Oral DAILY  citalopram (CELEXA) tablet 10 mg  10 mg Oral QHS  diazePAM (VALIUM) tablet 5 mg  5 mg Oral QHS PRN  
 traMADol (ULTRAM) tablet 50 mg  50 mg Oral Q6H PRN  
 lidocaine 4 % patch 1 Patch  1 Patch TransDERmal Q24H  
 gabapentin (NEURONTIN) capsule 100 mg  100 mg Oral BID PRN Temp (24hrs), Av.4 °F (36.9 °C), Min:98 °F (36.7 °C), Max:98.8 °F (37.1 °C) Respiratory: O2 Flow Rate (L/min): 2 l/min O2 Device: Nasal cannula Visit Vitals /61 (BP 1 Location: Right arm, BP Patient Position: At rest) Pulse 86 Temp 98.2 °F (36.8 °C) Resp 16 Ht 5' 4\" (1.626 m) Wt 185 lb 3 oz (84 kg) SpO2 91% BMI 31.79 kg/m² Physical Exam: 
Physical Exam 
Constitutional:   
   General: She is not in acute distress. Appearance: Normal appearance. She is obese. HENT:  
   Head: Normocephalic and atraumatic. Eyes:  
   Conjunctiva/sclera: Conjunctivae normal.  
Cardiovascular:  
   Rate and Rhythm: Normal rate and regular rhythm. Pulses: Normal pulses. Heart sounds: Normal heart sounds. No murmur. Pulmonary:  
   Effort: Pulmonary effort is normal. No respiratory distress. Breath sounds: Normal breath sounds. No wheezing. Comments: No crackles Abdominal:  
   General: There is no distension. Palpations: Abdomen is soft. Tenderness: There is no tenderness. Musculoskeletal:     
   General: Tenderness present. No swelling. Right lower leg: No edema. Left lower leg: No edema. Comments: Tenderness on low back, L knee Skin: 
   General: Skin is warm. Findings: Bruising present. Neurological:  
   Mental Status: She is alert and oriented to person, place, and time. I/O: 
Date 11/25/19 0700 - 11/26/19 1945 11/26/19 0700 - 11/27/19 0601 Shift 8226-1383 8876-5081 24 Hour Total 8959-4836 3479-1022 24 Hour Total  
INTAKE Shift Total(mL/kg) OUTPUT Urine(mL/kg/hr) Urine Occurrence(s) 1 x  1 x Stool Stool Occurrence(s) 1 x  1 x Shift Total(mL/kg) NET Weight (kg) 84 84 84 84 84 84 CBC: 
Recent Labs  
  11/25/19 0528 11/24/19 0418 WBC 6.7 9.0 HGB 12.1 13.2 HCT 39.9 42.6 * 186 Metabolic Panel: 
Recent Labs  
  11/25/19 0528 11/24/19 0418  141  
K 4.2 4.4  
 108 CO2 30 25 BUN 12 16 CREA 1.17* 1.30* GLU 85 101* CA 8.8 8.9 ALB  --  3.4* SGOT  --  25 ALT  --  14 For Billing Chief Complaint Patient presents with  Fall Hospital Problems  Date Reviewed: 11/25/2019 Codes Class Noted POA Physical debility ICD-10-CM: R53.81 ICD-9-CM: 799.3  11/25/2019 Yes * (Principal) Left hip pain ICD-10-CM: M25.552 ICD-9-CM: 719.45  11/24/2019 Yes Fall from ground level ICD-10-CM: C49.98SL ICD-9-CM: E888.9  11/24/2019 Yes CKD (chronic kidney disease) stage 3, GFR 30-59 ml/min ([facility]) ICD-10-CM: N18.3 ICD-9-CM: 585.3  11/24/2019 Yes Abnormal urinalysis ICD-10-CM: R82.90 ICD-9-CM: 791.9  11/24/2019 Yes Intertrigo ICD-10-CM: L30.4 ICD-9-CM: 695.89  7/7/2015 Yes Anxiety ICD-10-CM: F41.9 ICD-9-CM: 300.00  2/7/2012 Yes Iron deficiency anemia, unspecified ICD-10-CM: D50.9 ICD-9-CM: 280.9  1/12/2012 Yes Senile osteoporosis ICD-10-CM: M81.0 ICD-9-CM: 733.01  12/30/2009 Yes

## 2019-11-26 ENCOUNTER — APPOINTMENT (OUTPATIENT)
Dept: GENERAL RADIOLOGY | Age: 74
DRG: 556 | End: 2019-11-26
Attending: STUDENT IN AN ORGANIZED HEALTH CARE EDUCATION/TRAINING PROGRAM
Payer: MEDICARE

## 2019-11-26 LAB
ANION GAP SERPL CALC-SCNC: 7 MMOL/L (ref 5–15)
BUN SERPL-MCNC: 13 MG/DL (ref 6–20)
BUN/CREAT SERPL: 11 (ref 12–20)
CALCIUM SERPL-MCNC: 8.5 MG/DL (ref 8.5–10.1)
CHLORIDE SERPL-SCNC: 102 MMOL/L (ref 97–108)
CO2 SERPL-SCNC: 29 MMOL/L (ref 21–32)
CREAT SERPL-MCNC: 1.2 MG/DL (ref 0.55–1.02)
ERYTHROCYTE [DISTWIDTH] IN BLOOD BY AUTOMATED COUNT: 14.8 % (ref 11.5–14.5)
GLUCOSE SERPL-MCNC: 88 MG/DL (ref 65–100)
HCT VFR BLD AUTO: 37.6 % (ref 35–47)
HGB BLD-MCNC: 12 G/DL (ref 11.5–16)
MCH RBC QN AUTO: 33.9 PG (ref 26–34)
MCHC RBC AUTO-ENTMCNC: 31.9 G/DL (ref 30–36.5)
MCV RBC AUTO: 106.2 FL (ref 80–99)
NRBC # BLD: 0 K/UL (ref 0–0.01)
NRBC BLD-RTO: 0 PER 100 WBC
PLATELET # BLD AUTO: 151 K/UL (ref 150–400)
PMV BLD AUTO: 9.6 FL (ref 8.9–12.9)
POTASSIUM SERPL-SCNC: 4.2 MMOL/L (ref 3.5–5.1)
RBC # BLD AUTO: 3.54 M/UL (ref 3.8–5.2)
SODIUM SERPL-SCNC: 138 MMOL/L (ref 136–145)
WBC # BLD AUTO: 7.1 K/UL (ref 3.6–11)

## 2019-11-26 PROCEDURE — 97530 THERAPEUTIC ACTIVITIES: CPT

## 2019-11-26 PROCEDURE — 85027 COMPLETE CBC AUTOMATED: CPT

## 2019-11-26 PROCEDURE — 80048 BASIC METABOLIC PNL TOTAL CA: CPT

## 2019-11-26 PROCEDURE — 51798 US URINE CAPACITY MEASURE: CPT | Performed by: FAMILY MEDICINE

## 2019-11-26 PROCEDURE — 36415 COLL VENOUS BLD VENIPUNCTURE: CPT

## 2019-11-26 PROCEDURE — 73562 X-RAY EXAM OF KNEE 3: CPT

## 2019-11-26 PROCEDURE — 77010033678 HC OXYGEN DAILY

## 2019-11-26 PROCEDURE — 94760 N-INVAS EAR/PLS OXIMETRY 1: CPT

## 2019-11-26 PROCEDURE — 77030038269 HC DRN EXT URIN PURWCK BARD -A

## 2019-11-26 PROCEDURE — 74011000250 HC RX REV CODE- 250: Performed by: STUDENT IN AN ORGANIZED HEALTH CARE EDUCATION/TRAINING PROGRAM

## 2019-11-26 PROCEDURE — 65270000029 HC RM PRIVATE

## 2019-11-26 PROCEDURE — 74011250637 HC RX REV CODE- 250/637: Performed by: STUDENT IN AN ORGANIZED HEALTH CARE EDUCATION/TRAINING PROGRAM

## 2019-11-26 PROCEDURE — 97110 THERAPEUTIC EXERCISES: CPT

## 2019-11-26 PROCEDURE — 65660000000 HC RM CCU STEPDOWN

## 2019-11-26 PROCEDURE — 97535 SELF CARE MNGMENT TRAINING: CPT

## 2019-11-26 PROCEDURE — 74011250636 HC RX REV CODE- 250/636: Performed by: STUDENT IN AN ORGANIZED HEALTH CARE EDUCATION/TRAINING PROGRAM

## 2019-11-26 RX ORDER — NYSTATIN 100000 U/G
CREAM TOPICAL 2 TIMES DAILY
Qty: 15 G | Refills: 0 | Status: SHIPPED
Start: 2019-11-27 | End: 2020-01-01 | Stop reason: ALTCHOICE

## 2019-11-26 RX ORDER — LIDOCAINE 4 G/100G
1 PATCH TOPICAL EVERY 24 HOURS
Qty: 30 PATCH | Refills: 0 | Status: SHIPPED
Start: 2019-11-26 | End: 2020-02-10 | Stop reason: SDUPTHER

## 2019-11-26 RX ADMIN — SULFAMETHOXAZOLE AND TRIMETHOPRIM 1 TABLET: 800; 160 TABLET ORAL at 21:28

## 2019-11-26 RX ADMIN — Medication 10 ML: at 05:22

## 2019-11-26 RX ADMIN — TRAMADOL HYDROCHLORIDE 50 MG: 50 TABLET, FILM COATED ORAL at 05:21

## 2019-11-26 RX ADMIN — DOCUSATE SODIUM 100 MG: 100 CAPSULE, LIQUID FILLED ORAL at 09:53

## 2019-11-26 RX ADMIN — ENOXAPARIN SODIUM 40 MG: 40 INJECTION SUBCUTANEOUS at 09:52

## 2019-11-26 RX ADMIN — TRAMADOL HYDROCHLORIDE 50 MG: 50 TABLET, FILM COATED ORAL at 17:50

## 2019-11-26 RX ADMIN — Medication 10 ML: at 21:51

## 2019-11-26 RX ADMIN — ATENOLOL 50 MG: 50 TABLET ORAL at 21:27

## 2019-11-26 RX ADMIN — OYSTER SHELL CALCIUM WITH VITAMIN D 1 TABLET: 500; 200 TABLET, FILM COATED ORAL at 09:52

## 2019-11-26 RX ADMIN — BUPROPION HYDROCHLORIDE 100 MG: 100 TABLET, FILM COATED ORAL at 17:50

## 2019-11-26 RX ADMIN — BUPROPION HYDROCHLORIDE 100 MG: 100 TABLET, FILM COATED ORAL at 09:52

## 2019-11-26 RX ADMIN — SULFAMETHOXAZOLE AND TRIMETHOPRIM 1 TABLET: 800; 160 TABLET ORAL at 09:52

## 2019-11-26 RX ADMIN — CITALOPRAM HYDROBROMIDE 10 MG: 20 TABLET ORAL at 21:27

## 2019-11-26 RX ADMIN — GABAPENTIN 100 MG: 100 CAPSULE ORAL at 09:52

## 2019-11-26 RX ADMIN — TRAMADOL HYDROCHLORIDE 50 MG: 50 TABLET, FILM COATED ORAL at 12:31

## 2019-11-26 RX ADMIN — NYSTATIN: 100000 CREAM TOPICAL at 17:51

## 2019-11-26 RX ADMIN — NYSTATIN: 100000 CREAM TOPICAL at 09:53

## 2019-11-26 RX ADMIN — Medication 10 ML: at 17:52

## 2019-11-26 RX ADMIN — Medication 10 ML: at 15:54

## 2019-11-26 NOTE — PROGRESS NOTES
Problem: Mobility Impaired (Adult and Pediatric) Goal: *Acute Goals and Plan of Care (Insert Text) Description FUNCTIONAL STATUS PRIOR TO ADMISSION: Patient was modified independent using a SPC for functional mobility. HOME SUPPORT PRIOR TO ADMISSION: The patient lived alone with son(next door) to provide assistance. Physical Therapy Goals Initiated 11/24/2019 1. Patient will move from supine to sit and sit to supine , scoot up and down and roll side to side in bed with minimal assistance/contact guard assist within 7 day(s). 2.  Patient will transfer from bed to chair and chair to bed with minimal assistance/contact guard assist using the least restrictive device within 7 day(s). 3.  Patient will perform sit to stand with minimal assistance/contact guard assist within 7 day(s). 4.  Patient will ambulate with minimal assistance/contact guard assist for 15 feet with the least restrictive device within 7 day(s). 5.  Will assess stair goal as appropriate. Outcome: Progressing Towards Goal 
 PHYSICAL THERAPY TREATMENT Patient: Nellie Wang (75 y.o. female) Date: 11/26/2019 Diagnosis: Fall from ground level Rupal Walker Left hip pain [M25.552] Fall from ground level Rupal Walker Left hip pain Precautions: Fall, Skin Chart, physical therapy assessment, plan of care and goals were reviewed. ASSESSMENT Patient continues with skilled PT services and is slowly progressing towards goals. Pt limited by poor tolerance to activity, high pain affecting mobility and requiring up to Max A x 2 for functional transfer. Pt able to stand 3 reps with increasing duration and less assistance(Mod A x 2 vs Max x 2). Attempted to transfer to chair however pt giving up and attempting to sit although in between the chair and bed. Total A x 2 to return safety to EOB. Pt is NOT SAFE for bed to chair transfer at this time without PT/OT. Recommend use of susie lift for patient and staff safety. RN aware. Current Level of Function Impacting Discharge (mobility/balance): Max-Total A x 2 Other factors to consider for discharge: poor pain control, high fall risk, only A x 1 available at home PLAN : 
Patient continues to benefit from skilled intervention to address the above impairments. Continue treatment per established plan of care. to address goals. Recommendation for discharge: (in order for the patient to meet his/her long term goals) Therapy up to 5 days/week in SNF setting This discharge recommendation: 
Has been made in collaboration with the attending provider and/or case management IF patient discharges home will need the following DME: to be determined (TBD) SUBJECTIVE:  
Patient stated I need to rest longer.  OBJECTIVE DATA SUMMARY:  
Critical Behavior: 
Neurologic State: Alert Orientation Level: Oriented X4 Cognition: Impaired decision making Safety/Judgement: Awareness of environment Functional Mobility Training: 
Bed Mobility: 
Rolling: Maximum assistance;Assist x2 Supine to Sit: Maximum assistance;Assist x2 Sit to Supine: Maximum assistance;Assist x2 Transfers: 
Sit to Stand: Moderate assistance;Assist x2 Stand to Sit: Maximum assistance;Assist x2 Balance: 
Sitting: Intact Sitting - Static: Good (unsupported) Sitting - Dynamic: Fair (occasional) Standing: Impaired Standing - Static: Fair Standing - Dynamic : Poor Ambulation/Gait Training: 
  
  
  
  
  
  
  
  
  
  
  
  
  
  
  
  
  
  
Stairs: Therapeutic Exercises: Ankle pumps, hip IR/ER, heel slides, quad sets Activity Tolerance:  
Fair, requires rest breaks, and observed SOB with activity Please refer to the flowsheet for vital signs taken during this treatment. After treatment patient left in no apparent distress:  
Supine in bed, Call bell within reach, Bed / chair alarm activated, and Caregiver / family present COMMUNICATION/COLLABORATION:  
The patients plan of care was discussed with: Registered Nurse Richar Henriquez, PT Time Calculation: 43 mins

## 2019-11-26 NOTE — PROGRESS NOTES
CMS Note 
11/26/2019 Patient received and signed their 2nd IM letter. Patient was given a copy for their record. Svitlana Reynolds CMS

## 2019-11-26 NOTE — PROGRESS NOTES
Patient is scheduled for CT. Patient refusing CT scan today because she is tired and transferring is painful. Patient was very difficult to get up and required maximum assistance with lift team for doppler earlier today. Have been unable to gain IV access with 20 gauge to [facility].

## 2019-11-26 NOTE — PROGRESS NOTES
Spiritual Care Partner Volunteer visited patient on the [address] [place] [address]. Surgical Ortho unit on 11/26/19. Documented by: 
[employer] Briana Martin.  Paging Service: [phone]

## 2019-11-26 NOTE — PROGRESS NOTES
Bedside shift change report given to Cj (oncoming nurse) by Donavan Ivory (offgoing nurse). Report included the following information SBAR, Kardex, Procedure Summary, Intake/Output and Recent Results.

## 2019-11-27 ENCOUNTER — APPOINTMENT (OUTPATIENT)
Dept: CT IMAGING | Age: 74
DRG: 556 | End: 2019-11-27
Attending: STUDENT IN AN ORGANIZED HEALTH CARE EDUCATION/TRAINING PROGRAM
Payer: MEDICARE

## 2019-11-27 VITALS
OXYGEN SATURATION: 95 % | WEIGHT: 181.2 LBS | SYSTOLIC BLOOD PRESSURE: 107 MMHG | DIASTOLIC BLOOD PRESSURE: 69 MMHG | HEART RATE: 94 BPM | RESPIRATION RATE: 18 BRPM | HEIGHT: 64 IN | BODY MASS INDEX: 30.93 KG/M2 | TEMPERATURE: 98.5 F

## 2019-11-27 LAB
ANION GAP SERPL CALC-SCNC: 8 MMOL/L (ref 5–15)
BACTERIA SPEC CULT: ABNORMAL
BACTERIA SPEC CULT: ABNORMAL
BUN SERPL-MCNC: 15 MG/DL (ref 6–20)
BUN/CREAT SERPL: 12 (ref 12–20)
CALCIUM SERPL-MCNC: 8.8 MG/DL (ref 8.5–10.1)
CC UR VC: ABNORMAL
CHLORIDE SERPL-SCNC: 99 MMOL/L (ref 97–108)
CO2 SERPL-SCNC: 28 MMOL/L (ref 21–32)
CREAT SERPL-MCNC: 1.28 MG/DL (ref 0.55–1.02)
ERYTHROCYTE [DISTWIDTH] IN BLOOD BY AUTOMATED COUNT: 14.6 % (ref 11.5–14.5)
GLUCOSE SERPL-MCNC: 99 MG/DL (ref 65–100)
HCT VFR BLD AUTO: 34.5 % (ref 35–47)
HGB BLD-MCNC: 11 G/DL (ref 11.5–16)
MCH RBC QN AUTO: 33.6 PG (ref 26–34)
MCHC RBC AUTO-ENTMCNC: 31.9 G/DL (ref 30–36.5)
MCV RBC AUTO: 105.5 FL (ref 80–99)
NRBC # BLD: 0 K/UL (ref 0–0.01)
NRBC BLD-RTO: 0 PER 100 WBC
PLATELET # BLD AUTO: 142 K/UL (ref 150–400)
PMV BLD AUTO: 9.6 FL (ref 8.9–12.9)
POTASSIUM SERPL-SCNC: 4.1 MMOL/L (ref 3.5–5.1)
RBC # BLD AUTO: 3.27 M/UL (ref 3.8–5.2)
SERVICE CMNT-IMP: ABNORMAL
SODIUM SERPL-SCNC: 135 MMOL/L (ref 136–145)
WBC # BLD AUTO: 9.5 K/UL (ref 3.6–11)

## 2019-11-27 PROCEDURE — 74011250637 HC RX REV CODE- 250/637: Performed by: STUDENT IN AN ORGANIZED HEALTH CARE EDUCATION/TRAINING PROGRAM

## 2019-11-27 PROCEDURE — 71275 CT ANGIOGRAPHY CHEST: CPT

## 2019-11-27 PROCEDURE — 77030038269 HC DRN EXT URIN PURWCK BARD -A

## 2019-11-27 PROCEDURE — 85027 COMPLETE CBC AUTOMATED: CPT

## 2019-11-27 PROCEDURE — 74011636320 HC RX REV CODE- 636/320: Performed by: RADIOLOGY

## 2019-11-27 PROCEDURE — 36415 COLL VENOUS BLD VENIPUNCTURE: CPT

## 2019-11-27 PROCEDURE — 74011250636 HC RX REV CODE- 250/636: Performed by: STUDENT IN AN ORGANIZED HEALTH CARE EDUCATION/TRAINING PROGRAM

## 2019-11-27 PROCEDURE — 77030027138 HC INCENT SPIROMETER -A

## 2019-11-27 PROCEDURE — 80048 BASIC METABOLIC PNL TOTAL CA: CPT

## 2019-11-27 RX ORDER — DIAZEPAM 5 MG/1
5 TABLET ORAL
Qty: 30 TAB | Refills: 0 | Status: SHIPPED | OUTPATIENT
Start: 2019-11-27 | End: 2020-01-01

## 2019-11-27 RX ORDER — CITALOPRAM 10 MG/1
10 TABLET ORAL
Qty: 30 TAB | Refills: 0 | Status: SHIPPED | OUTPATIENT
Start: 2019-11-27 | End: 2020-01-01 | Stop reason: SDUPTHER

## 2019-11-27 RX ORDER — TRAMADOL HYDROCHLORIDE 50 MG/1
50 TABLET ORAL
Qty: 30 TAB | Refills: 0 | Status: SHIPPED | OUTPATIENT
Start: 2019-11-27 | End: 2019-12-17

## 2019-11-27 RX ORDER — BUPROPION HYDROCHLORIDE 100 MG/1
TABLET ORAL
Qty: 60 TAB | Refills: 0 | Status: SHIPPED | OUTPATIENT
Start: 2019-11-27 | End: 2020-02-10 | Stop reason: SDUPTHER

## 2019-11-27 RX ORDER — GABAPENTIN 100 MG/1
100 CAPSULE ORAL
Qty: 30 CAP | Refills: 0 | Status: SHIPPED | OUTPATIENT
Start: 2019-11-27 | End: 2019-12-17

## 2019-11-27 RX ADMIN — TRAMADOL HYDROCHLORIDE 50 MG: 50 TABLET, FILM COATED ORAL at 12:11

## 2019-11-27 RX ADMIN — DOCUSATE SODIUM 100 MG: 100 CAPSULE, LIQUID FILLED ORAL at 11:08

## 2019-11-27 RX ADMIN — BUPROPION HYDROCHLORIDE 100 MG: 100 TABLET, FILM COATED ORAL at 12:11

## 2019-11-27 RX ADMIN — ENOXAPARIN SODIUM 40 MG: 40 INJECTION SUBCUTANEOUS at 11:09

## 2019-11-27 RX ADMIN — OYSTER SHELL CALCIUM WITH VITAMIN D 1 TABLET: 500; 200 TABLET, FILM COATED ORAL at 11:08

## 2019-11-27 RX ADMIN — TRAMADOL HYDROCHLORIDE 50 MG: 50 TABLET, FILM COATED ORAL at 03:05

## 2019-11-27 RX ADMIN — IOPAMIDOL 80 ML: 755 INJECTION, SOLUTION INTRAVENOUS at 13:15

## 2019-11-27 NOTE — PROGRESS NOTES
11/27/2019  
 
12:15 PM 
CM verified patient has a qualifying hospital stay for [facility]. 10:10 AM 
Transition of Care Plan to SNF/Rehab 
 
SNF/Rehab Transition: 
Patient has been accepted to [facility] and meets criteria for admission. Patient will transported by [facility] ambulance and expected to leave at 2:00 PM. Communication to Patient/Family: Met with patient and pt's son, Srinivasa Campbell, and they are agreeable to the transition plan. Communication to SNF/Rehab: 
Bedside RN has been notified to update the transition plan to the facility and call report (phone number [phone], Wing 2, Room 217B). Discharge information has been updated on the AVS. Discharge instructions to be fax'd to facility via [facility]. SNF/Rehab Transition: 
Patient to follow-up with Home Health if recommended at time of discharge from facility. PCP/Specialist: Isreal Manriquez Ambulatory Care Management: None Nursing to discuss the following applicable information during report:  
 
Nathan Gandhi with (X) only those applicable: 
 
Medication: 
[]  Medications will be available at the facility []  IV Antibiotics []  Controlled Substance - hard copy to be sent with patient  
[]  Weekly Labs Documents: 
[] Hard RX 
[] MAR 
[] Kardex [] AVS 
[]Transfer Summary []Discharge Equipment: 
[]  CPAP/BiPAP []  Wound Vacuum 
[]  Jovel or Urinary Device 
[]  PICC/Central Line 
[]  Nebulizer 
[]  Ventilator Treatment: 
[]Isolation (for MRSA, VRE, etc.) []Surgical Drain Management []Tracheostomy Care 
[]Dressing Changes []Dialysis with transportation and chair time []PEG Care []Oxygen []Daily Weights for Heart Failure Dietary: 
[]Any diet limitations []Tube Feedings []Total Parenteral Management (TPN) Eligible for Medicaid Long Term Services and Supports Yes: 
[] Eligible for medical assistance or will become eligible within 180 days and UAI completed. [] Provider/Patient and/or support system has requested screening. [] UAI copy provided to patient or responsible party 
[] UAI unavailable at discharge will send once processed to SNF provider. [] UAI unavailable at discharged mailed to patient No:  
[] Private pay and is not financially eligible for Medicaid within the next 180 days. [] Reside out-of-state. [] A residents of a state owned/operated facility that is licensed 
by [address] [employer] [facility] or [facility] 
[] Enrollment in [facility] hospice services 
[] [address] [place] [address] 
[x] Patient /Family declines to have screening completed or provide financial information for screening - Pt refuses UAI as she is returning home after rehab. Financial Resources: 
Medicaid   
[] Initiated and application pending  
[] Full coverage Advanced Care Plan: 
[]Surrogate Decision Maker of Care 
[x]POA [x]Communicated Code Status  (DDNR\", \"Full\") Other

## 2019-11-27 NOTE — PROGRESS NOTES
Bedside and Verbal shift change report given to [phone] (oncoming nurse) by Hola Dallas (offgoing nurse). Report included the following information SBAR, Kardex, Intake/Output and MAR.

## 2019-11-27 NOTE — DISCHARGE SUMMARY
Yeny Vasquez [address]  Office [phone] Fax [phone] Discharge / Transfer / Off-Service Note Name: Mireya Martinez MRN: [id number]  Sex: Female YOB: 1945  Age: 69 y.o. PCP: Mike Cash MD  
 
Date of admission: 11/24/2019 Date of discharge/transfer: 11/27/2019 Attending physician at admission: Dr. Rafael Norman Attending physician at discharge/transfer: Dr. Rafael Norman Resident physician at discharge/transfer: Kamilla Jama DO, PGY1 Consultants during hospitalization None Admission diagnoses Fall from ground level Noemi Beal Left hip pain [M25.552] Fall from ground level Noemi Beal Recommended follow-up after discharge 1. PCP 2. Cardiology Things to follow up on with PCP: 
-resolution of your pain 
-coordination with Cardiology for the Pulmonary Hypertension found on ECHO 
-monitor Heart rate and oxygen saturation Medication Changes: 1. New Medications: · Nystatin cream BID to abdominal skin folds · Lidocaine patch q24H prn 2. Modified Medications: None 3. Discontinued Medications: None History of Present Illness Per admitting provider, Hima Lima is a 69 y.o. female with known bilateral femur fractures, PUD, Osteoporosis, OA, CKD3, Anxiety, and Iron deficiency Anemia who presents to the ER complaining of GLF around midnight. She states she was getting up from bed to go to the bathroom and she was walking with her cane when her foot got caught in a blanket which caused her to fall. She fell on her R side. Denies hitting her head or LOC. She had some R sided rib pain and left hip pain. She states the pain is worse with movement and that she has muscle spasms. She was unable to bear weight in the ED, so needs to be assessed by PT/OT. Denies chest pain, SOB, nausea, vomiting, abdominal pain, dizziness. \" HOSPITAL COURSE 
Ms. Gin Carrasco was admitted into the Family Medicine Service from 11/24/2019 to 11/27/2019 for GLF without fracture. During the course of this admission, the following conditions were addressed/managed; 
 
GLF with inability to bear weight Pain and muscle spasms in hips, back, ribs. No fractures found on R hip Xray, L femur Xray, R rib Xray, or CT pelvis. PT/OT recommends SNF. Continue home tylenol 650mg t7jwsin prn, ultram 50mg q6H prn, gabapentin 100mg BID prn. Added lidocaine patch. -SNF placement 
-lidocaine patch 
-follow up with PCP  
  
Pulmonary HTN ECHO with normal function, EF 61-65%, with severe pulmonary hypertension. -recommend OP follow up with Cardiology 
  
Elevated D-Dimer with new O2 requirement. Patient denies shortness of breath. Duplex negative for DVT. CTA chest negative for PE. 
  
Abnormal UA: Asymptomatic UTI. UA + Nitrites, Moderate LE, 3+ bacteria. Patient treated with 3 days of Bactrim. Continue home cranberry concentrate. Urine culture shows E. Coli, sensitive to bactrim.  
  
CKD3 Stable. Cr POA 1.30 (BL 1.1-1.2).   
Osteoporosis s/p bilateral femur fractures in 8577-8692. Continue home Calcium-vit D 500mg-200U BID and Vitamin D3 1000U daily. 
  
Intertrigo in skin folds on abdomen, looks like Candida. Being treated with clindamycin ointment OP without improvement. Continue nystatin cream BID. 
  
Anxiety Continue home wellbutrin 100mg BID, celexa 10mg daily, valium 5mg nightly as prn nightly. 
  
Iron Deficiency Anemia Stable. Hgb POA 13.2. No home meds. 
  
Obesity 
- PT with BMI of Body mass index is 31.58 kg/m². - Encouraging lifestyle modifications and further follow up outpatient. Physical exam at discharge: 
 
Constitutional:   
   General: She is not in acute distress. Appearance: Normal appearance. She is obese. Comments: Confused HENT:  
   Head: Normocephalic and atraumatic. Eyes:  
   Conjunctiva/sclera: Conjunctivae normal.  
Cardiovascular:  
   Rate and Rhythm: Normal rate and regular rhythm. Pulses: Normal pulses. Heart sounds: Normal heart sounds. No murmur. Pulmonary:  
   Effort: Pulmonary effort is normal. No respiratory distress. Breath sounds: Normal breath sounds. No wheezing. Comments: No crackles Abdominal:  
   General: There is no distension. Palpations: Abdomen is soft. Tenderness: There is no tenderness. Musculoskeletal:     
   General: No swelling or tenderness. Right lower leg: No edema. Left lower leg: No edema. Skin: 
   General: Skin is warm. Findings: Bruising present. Neurological:  
   Mental Status: She is alert. Condition at discharge: Stable. Labs Recent Labs  
  11/27/19 
0548 11/26/19 
7500 11/25/19 
6481 WBC 9.5 7.1 6.7 HGB 11.0* 12.0 12.1 HCT 34.5* 37.6 39.9 * 151 136* Recent Labs  
  11/27/19 
0548 11/26/19 
9156 11/25/19 
0044 * 138 139  
K 4.1 4.2 4.2 CL 99 102 104 CO2 28 29 30 BUN 15 13 12 CREA 1.28* 1.20* 1.17* GLU 99 88 85  
CA 8.8 8.5 8.8 No results for input(s): SGOT, GPT, ALT, AP, TBIL, TBILI, TP, ALB, GLOB, GGT, AML, LPSE in the last 72 hours. No lab exists for component: AMYP, HLPSE No results for input(s): PH, PCO2, PO2, TNIPOC, TROIQ, INR, PTP, APTT, FE, TIBC, PSAT, FERR, GLUCPOC, INREXT in the last 72 hours. No lab exists for component: [facility] Cultures · Urine culture 11/24/2019: E.Coli sensitive to Bactrim Procedures / Diagnostic Studies · None Imaging Results from Hospital Encounter encounter on 11/24/19 XR KNEE LT 3 V Narrative EXAM: XR KNEE LT 3 V 
 
INDICATION: Left knee pain. COMPARISON: Left femur from November 24, 2019. FINDINGS: Three views of the left knee demonstrate no acute fracture or other 
acute osseous or articular abnormality. Mid femoral shaft fracture is unchanged 
in appearance; associated internal fixation hardware remains intact. There is a 
minimal tricompartmental osteoarthritis. There is a small knee joint effusion. Impression IMPRESSION: No acute abnormality. Minimal tricompartmental osteoarthritis with 
small knee joint effusion. Results from Hospital Encounter encounter on 11/24/19 CTA CHEST W OR W WO CONT Narrative INDICATION: Fall ground-level, left hip pain. Right rib pain. CT pulmonary angiogram is performed with 2.5 mm collimation. 80 mL of nonionic IV Isovue-370 was administered for exam. 3D coronal and sagittal postprocessed 
images were performed for this examination. CT dose reduction was achieved through use of a standardized protocol tailored 
for this examination and automatic exposure control for dose modulation. Chest:  
 
CTPA: The pulmonary arteries are well opacified and there is no evidence of 
pulmonary embolism. The thoracic aorta is normal in course and caliber and there 
is no aortic dissection. Lymph nodes: There is no axillary, mediastinal or hilar lymphadenopathy. Heart: The heart is normal in the size and there is no pericardial effusion. Lungs: There are biapical centrilobular emphysematous changes. There are 
bibasilar increased interstitial markings, mild atelectasis and bronchiectasis. Pleura: There is no pleural fluid or pneumothorax. Bones: The osseous structures are diffusely demineralized. There are T6, T8, T10 
and T11 age indeterminate moderate compression deformities. Upper abdomen: The visualized upper abdominal structures are normal. 
  
 Impression IMPRESSION:  
1. No evidence pulmonary embolism. 2. T6, T8, T10, T11 age-indeterminate compression deformities. MR can be 
performed for further evaluation, as indicated. Chronic diagnoses Problem List as of 11/27/2019 Date Reviewed: 11/25/2019 Codes Class Noted - Resolved Physical debility ICD-10-CM: R53.81 ICD-9-CM: 799.3  11/25/2019 - Present * (Principal) Left hip pain ICD-10-CM: M25.552 ICD-9-CM: 719.45  11/24/2019 - Present Fall from ground level ICD-10-CM: P31.78KC ICD-9-CM: E888.9  11/24/2019 - Present CKD (chronic kidney disease) stage 3, GFR 30-59 ml/min ([facility]) ICD-10-CM: N18.3 ICD-9-CM: 585.3  11/24/2019 - Present Abnormal urinalysis ICD-10-CM: R82.90 ICD-9-CM: 791.9  11/24/2019 - Present Lung crackles ICD-10-CM: R09.89 ICD-9-CM: 786.7  10/29/2015 - Present Dysuria ICD-10-CM: R30.0 ICD-9-CM: 788.1  9/18/2015 - Present Need for hepatitis C screening test ICD-10-CM: Z11.59 
ICD-9-CM: V73.89  7/23/2015 - Present Intertrigo ICD-10-CM: L30.4 ICD-9-CM: 695.89  7/7/2015 - Present UTI (lower urinary tract infection) ICD-10-CM: N39.0 ICD-9-CM: 599.0  5/21/2015 - Present Bronchitis ICD-10-CM: J40 ICD-9-CM: 659  3/2/2015 - Present Palpitations ICD-10-CM: R00.2 ICD-9-CM: 785.1  7/24/2012 - Present Anxiety ICD-10-CM: F41.9 ICD-9-CM: 300.00  2/7/2012 - Present Iron deficiency anemia, unspecified ICD-10-CM: D50.9 ICD-9-CM: 280.9  1/12/2012 - Present Osteoarthritis ICD-10-CM: M19.90 ICD-9-CM: 715.90  12/30/2009 - Present Senile osteoporosis ICD-10-CM: M81.0 ICD-9-CM: 733.01  12/30/2009 - Present Knee pain ICD-10-CM: M25.569 ICD-9-CM: 719.46  Unknown - Present CKD (chronic kidney disease) stage 2, GFR 60-89 ml/min ICD-10-CM: N18.2 ICD-9-CM: 741. 2  Unknown - Present Insomnia ICD-10-CM: G47.00 ICD-9-CM: 780.52  Unknown - Present RESOLVED: Medicare annual wellness visit, subsequent ICD-10-CM: Z00.00 ICD-9-CM: V70.0  1/15/2016 - 9/26/2019 RESOLVED: Screening for cholesterol level ICD-10-CM: G21.772 ICD-9-CM: V77.91  7/23/2015 - 9/26/2019 RESOLVED: Osteopenia ICD-10-CM: M85.80 ICD-9-CM: 733.90  Unknown - 12/30/2009 RESOLVED: Anxiety ICD-10-CM: F41.9 ICD-9-CM: 300.00  Unknown - 7/24/2012 Discharge/Transfer Medications Current Discharge Medication List  
  
 START taking these medications Details  
lidocaine 4 % patch 1 Patch by TransDERmal route every twenty-four (24) hours. Qty: 30 Patch, Refills: 0  
  
nystatin (MYCOSTATIN) topical cream Apply  to affected area two (2) times a day. Qty: 15 g, Refills: 0 CONTINUE these medications which have CHANGED Details  
traMADol (ULTRAM) 50 mg tablet Take 1 Tab by mouth daily as needed for Pain for up to 30 days. Qty: 30 Tab, Refills: 0 Associated Diagnoses: Left hip pain; Chronic pain of right knee; Primary osteoarthritis involving multiple joints  
  
gabapentin (NEURONTIN) 100 mg capsule Take 1 Cap by mouth two (2) times daily as needed (pain). Max Daily Amount: 200 mg. Qty: 30 Cap, Refills: 0 Associated Diagnoses: Left hip pain; Chronic pain of right knee; Primary osteoarthritis involving multiple joints  
  
diazePAM (VALIUM) 5 mg tablet Take 1 Tab by mouth nightly as needed for Anxiety. Max Daily Amount: 5 mg. Qty: 30 Tab, Refills: 0 Associated Diagnoses: Anxiety; Left hip pain; Chronic pain of right knee; Primary osteoarthritis involving multiple joints buPROPion (WELLBUTRIN) 100 mg tablet TAKE 1 TABLET BY MOUTH TWO TIMES A DAY Qty: 60 Tab, Refills: 0 Associated Diagnoses: Moderate episode of recurrent major depressive disorder (HCC)  
  
citalopram (CELEXA) 10 mg tablet Take 1 Tab by mouth nightly. Qty: 30 Tab, Refills: 0 CONTINUE these medications which have NOT CHANGED Details  
atenolol (TENORMIN) 50 mg tablet Take 50 mg by mouth nightly. cyanocobalamin 1,000 mcg tablet Take 1,000 mcg by mouth daily. diphenhydrAMINE-acetaminophen (TYLENOL PM EXTRA STRENGTH)  mg tab Take 1 Tab by mouth nightly as needed (sleep). docusate sodium (COLACE) 100 mg capsule Take 100 mg by mouth two (2) times daily as needed for Constipation.   
  
naloxone (NARCAN) 4 mg/actuation nasal spray Use 1 spray intranasally into 1 nostril. Use a new Narcan nasal spray for subsequent doses and administer into alternating nostrils. May repeat every 2 to 3 minutes as needed. Qty: 1 Each, Refills: 0 Associated Diagnoses: At risk for polypharmacy  
  
vitamin c-vitamin e (CRANBERRY CONCENTRATE) cap Take 1 Cap by mouth daily. calcium-vitamin D (CALCIUM 500+D) 500 mg(1,250mg) -200 unit per tablet Take 1 Tab by mouth daily. Diet:  Regular diet. Activity:  As tolerated Disposition: SNF Rehab Discharge instructions to patient/family Please seek medical attention for any new or worsening symptoms particularly fever, chest pain, shortness of breath, abdominal pain, nausea, vomiting Follow up plans/appointments Follow-up Information Follow up With Specialties Details Why Contact Info Britni Pina MD Pediatrics, Family Practice Schedule an appointment as soon as possible for a visit in 2 days Follow up with PCP. [address] [facility] 
[phone] Devora Mares MD Cardiology Schedule an appointment as soon as possible for a visit in 2 weeks Follow up with Cardiology. [facility] [address] [place] [phone] [address] [place] [address] [address] Yoly Rivera DO Family Medicine Resident For Billing Chief Complaint Patient presents with  Fall Hospital Problems  Date Reviewed: 11/25/2019 Codes Class Noted POA Physical debility ICD-10-CM: R53.81 ICD-9-CM: 799.3  11/25/2019 Yes * (Principal) Left hip pain ICD-10-CM: M25.552 ICD-9-CM: 719.45  11/24/2019 Yes Fall from ground level ICD-10-CM: M13.16KV ICD-9-CM: E888.9  11/24/2019 Yes CKD (chronic kidney disease) stage 3, GFR 30-59 ml/min ([facility]) ICD-10-CM: N18.3 ICD-9-CM: 585.3  11/24/2019 Yes Abnormal urinalysis ICD-10-CM: R82.90 ICD-9-CM: 791.9  11/24/2019 Yes Intertrigo ICD-10-CM: L30.4 ICD-9-CM: 695.89  7/7/2015 Yes Anxiety ICD-10-CM: F41.9 ICD-9-CM: 300.00  2/7/2012 Yes Iron deficiency anemia, unspecified ICD-10-CM: D50.9 ICD-9-CM: 280.9  1/12/2012 Yes Senile osteoporosis ICD-10-CM: M81.0 ICD-9-CM: 733.01  12/30/2009 Yes

## 2019-11-27 NOTE — PROGRESS NOTES
Bedside shift change report given to Marta Salazar (oncoming nurse) by Ashley Niño (offgoing nurse). Report included the following information SBAR, Kardex, Intake/Output, MAR and Recent Results.

## 2019-11-27 NOTE — PROGRESS NOTES
Problem: Pressure Injury - Risk of 
Goal: *Prevention of pressure injury Description Document Morteza Scale and appropriate interventions in the flowsheet. Outcome: Progressing Towards Goal 
Note: Pressure Injury Interventions: 
Sensory Interventions: Assess changes in LOC Moisture Interventions: Absorbent underpads Activity Interventions: Increase time out of bed Mobility Interventions: HOB 30 degrees or less Nutrition Interventions: Document food/fluid/supplement intake Friction and Shear Interventions: Lift team/patient mobility team 
 
  
 
 
 
  
Problem: Falls - Risk of 
Goal: *Absence of Falls Description Document Opal Gagnon Fall Risk and appropriate interventions in the flowsheet. Outcome: Progressing Towards Goal 
Note: Fall Risk Interventions: 
Mobility Interventions: Bed/chair exit alarm Mentation Interventions: Adequate sleep, hydration, pain control Medication Interventions: Bed/chair exit alarm Elimination Interventions: Call light in reach History of Falls Interventions: Door open when patient unattended

## 2019-11-27 NOTE — PROGRESS NOTES
Pt removed all her leads and pulse ox monitoring cables. Pt requesting \"can I see the papers that I signed that say I have to give prior notice before leaving here? \"  Attempted to educate patient that she is physically unable to leave at this time - she requires oxygen and is unable to walk independently. Pt was unable to transfer from bed to chair yesterday without HEAVY assistance. Pt is oriented to self, place (hospital near [place]) and time (2019 almost Thanksgiving) but is very paranoid - doesn't understand why her doctors are encouraging her to have a test (CTA to rule-out PE) despite education from the MD and RN staff. Continues to refuse monitoring and CTA. Pt now talking to her son, Erasmo Casanova, on her cell phone -  \"I DID take care of myself this morning, I got out of bed and went to bathroom and took care of myself. You may never see your mother alive again if you leave me here. \"  Pt has not been out of bed since working with PT yesterday. Pt currently in a room close to the nurses station. Bed alarm on. Frequent rounding.   Will follow up with [facility].

## 2019-11-27 NOTE — PROGRESS NOTES
Yeny Vasquez [address] Office [phone] Fax [phone] Assessment and Plan Rodrigo Oconnor is a 69 y.o. female with history of bilateral femur fractures, PUD, Osteoporosis, OA, CKD3, Anxiety, and Iron deficiency Anemia, who is admitted for GLF with inability to bear weight. She has spent 3 night(s) in the hospital. 
 
24 Hour Events: No acute events. GLF with inability to bear weight Pain and muscle spasms in hips, back, ribs. No fractures found on R hip Xray, L femur Xray, R rib Xray, or CT pelvis. -PT/OT - recommend SNF 
-tylenol 650mg [id number] prn, ultram 50mg q6H prn 
-continued home gabapentin 100mg BID prn 
  
Pulmonary HTN HR POA 94, EKG shows sinus tachycardia with . CXR with possible pulmonary edema. ECHO with normal function, EF 61-65%, with severe pulmonary hypertension. -recommend OP follow up with Cardiology Elevated D-Dimer with new O2 requirement. Patient denies shortness of breath. Duplex negative for DVT. -Patient refused CTA chest 
  
Abnormal UA: Asymptomatic UTI. UA + Nitrites, Moderate LE, 3+ bacteria. Urine culture with GNR. S/p 3 days of Bactrim. -home cranberry concentrate 
-follow up final urine culture 
  
CKD3 Cr POA 1.30 (BL 1.1-1.2) -avoid nephrotoxic agents 
  
Osteoporosis s/p bilateral femur fractures in 2575-2798 
-continue home Calcium-vit D 500mg-200U BID and Vitamin D3 1000U daily 
  
Intertrigo in skin folds on abdomen, looks like Candida. Being treated with clindamycin ointment OP without improvement. 
-continue nystatin cream 
  
Anxiety 
-continue home wellbutrin 100mg BID and celexa 10mg daily 
-will make her home valium 5mg nightly as prn nightly 
  
Iron Deficiency Anemia Hgb POA 13.2 
-daily CBC 
-no home meds 
  
Obesity 
- PT with BMI of Body mass index is 31.58 kg/m². - Encouraging lifestyle modifications and further follow up outpatient. FEN/GI - Regular diet. Activity - Ambulate with assistance DVT prophylaxis - Lovenox GI prophylaxis - Not indicated at this time Fall prophylaxis - Fall precautions ordered. Code Status - DNR, discussed with patient / caregivers. Patient Selina Rdz will be discussed with Dr. Luis Holman. oDnna Andujar DO Family Medicine Resident Subjective / Objective Pt with no complaints this AM. States she wants to go home to make some pies. Asks where she can put her tele leads and finger pulse ox so she can leave. Denies chest pain, SOB, nausea, vomiting, abdominal pain, dizziness. Inpatient Medications: 
Current Facility-Administered Medications Medication Dose Route Frequency  docusate sodium (COLACE) capsule 100 mg  100 mg Oral DAILY  sodium chloride (NS) flush 5-40 mL  5-40 mL IntraVENous Q8H  
 sodium chloride (NS) flush 5-40 mL  5-40 mL IntraVENous PRN  
 sodium chloride (NS) flush 5-40 mL  5-40 mL IntraVENous Q8H  
 sodium chloride (NS) flush 5-40 mL  5-40 mL IntraVENous PRN  
 acetaminophen (TYLENOL) tablet 650 mg  650 mg Oral Q4H PRN  
 enoxaparin (LOVENOX) injection 40 mg  40 mg SubCUTAneous Q24H  nystatin (MYCOSTATIN) 100,000 unit/gram cream   Topical BID  atenolol (TENORMIN) tablet 50 mg  50 mg Oral QHS  buPROPion [facility] tablet 100 mg  100 mg Oral BID  calcium-vitamin D (OS-BRENDEN) 500 mg-200 unit tablet  1 Tab Oral DAILY  citalopram (CELEXA) tablet 10 mg  10 mg Oral QHS  diazePAM (VALIUM) tablet 5 mg  5 mg Oral QHS PRN  
 traMADol (ULTRAM) tablet 50 mg  50 mg Oral Q6H PRN  
 lidocaine 4 % patch 1 Patch  1 Patch TransDERmal Q24H  
 gabapentin (NEURONTIN) capsule 100 mg  100 mg Oral BID PRN Temp (24hrs), Av.3 °F (36.8 °C), Min:98 °F (36.7 °C), Max:98.6 °F (37 °C) Respiratory: O2 Flow Rate (L/min): 2 l/min O2 Device: Nasal cannula Visit Vitals /73 (BP 1 Location: Right arm, BP Patient Position: At rest) Pulse (!) 106 Temp 98 °F (36.7 °C) Resp 18 Ht 5' 4\" (1.626 m) Wt 181 lb 3.2 oz (82.2 kg) SpO2 90% BMI 31.10 kg/m² Physical Exam: 
Physical Exam 
Constitutional:   
   General: She is not in acute distress. Appearance: Normal appearance. She is obese. Comments: Confused HENT:  
   Head: Normocephalic and atraumatic. Eyes:  
   Conjunctiva/sclera: Conjunctivae normal.  
Cardiovascular:  
   Rate and Rhythm: Normal rate and regular rhythm. Pulses: Normal pulses. Heart sounds: Normal heart sounds. No murmur. Pulmonary:  
   Effort: Pulmonary effort is normal. No respiratory distress. Breath sounds: Normal breath sounds. No wheezing. Comments: No crackles Abdominal:  
   General: There is no distension. Palpations: Abdomen is soft. Tenderness: There is no tenderness. Musculoskeletal:     
   General: No swelling or tenderness. Right lower leg: No edema. Left lower leg: No edema. Skin: 
   General: Skin is warm. Findings: Bruising present. Neurological:  
   Mental Status: She is alert. I/O: 
Date 11/26/19 0700 - 11/27/19 0415 11/27/19 0700 - 11/28/19 5311 Shift 6819-3965 3751-7167 24 Hour Total 5847-7953 7914-2917 24 Hour Total  
INTAKE Shift Total(mL/kg) OUTPUT Urine(mL/kg/hr) 600(0.6) 100 700 Urine Voided 600  600 Urine Output (mL) (External Female Catheter 11/24/19)  100 100 Shift Total(mL/kg) 600(7.1) 100(1.2) 700(8.5) NET -600 -100 -700 Weight (kg) 84 82.2 82.2 82.2 82.2 82.2 CBC: 
Recent Labs  
  11/26/19 
0713 11/25/19 0528 WBC 7.1 6.7 HGB 12.0 12.1 HCT 37.6 39.9  136* Metabolic Panel: 
Recent Labs  
  11/26/19 
0713 11/25/19 0528  139  
K 4.2 4.2  104 CO2 29 30 BUN 13 12 CREA 1.20* 1.17* GLU 88 85  
CA 8.5 8.8 For Billing Chief Complaint Patient presents with  [facility] Problems  Date Reviewed: 11/25/2019 Codes Class Noted POA Physical debility ICD-10-CM: R53.81 ICD-9-CM: 799.3  11/25/2019 Yes * (Principal) Left hip pain ICD-10-CM: M25.552 ICD-9-CM: 719.45  11/24/2019 Yes Fall from ground level ICD-10-CM: L26.80OZ ICD-9-CM: E888.9  11/24/2019 Yes CKD (chronic kidney disease) stage 3, GFR 30-59 ml/min ([facility]) ICD-10-CM: N18.3 ICD-9-CM: 585.3  11/24/2019 Yes Abnormal urinalysis ICD-10-CM: R82.90 ICD-9-CM: 791.9  11/24/2019 Yes Intertrigo ICD-10-CM: L30.4 ICD-9-CM: 695.89  7/7/2015 Yes Anxiety ICD-10-CM: F41.9 ICD-9-CM: 300.00  2/7/2012 Yes Iron deficiency anemia, unspecified ICD-10-CM: D50.9 ICD-9-CM: 280.9  1/12/2012 Yes Senile osteoporosis ICD-10-CM: M81.0 ICD-9-CM: 733.01  12/30/2009 Yes

## 2019-11-27 NOTE — PROGRESS NOTES
11/27/2019 1:03 PM  CM verified patient has a qualifying hospital stay for Merg[facility] with [facility]. FREDY Mccabe

## 2019-11-27 NOTE — DISCHARGE INSTRUCTIONS
[facility] - [facility] / [address] DISCHARGE INSTRUCTIONS    Allegra Zamora / [id number] : 1945    Admission date: 2019 Discharge date: 2019       Primary care provider: Chalino Fisher MD    Discharging provider:  Brittnee Harper, [address] [facility] Resident  Dr. Safia Huggins - Attending, Family Medicine   . . . . . . . . . . . . . . . . . . . . . . . . . . . . . . . . . . . . . . . . . . . . . . . . . . . . . . . . . . . . . . . . . . . . . . . Radha Sow [address] COURSE:  GLF with inability to bear weight Pain and muscle spasms in hips, back, ribs. No fractures found on R hip Xray, L femur Xray, R rib Xray, or CT pelvis. PT/OT recommends SNF. Continue home tylenol 650mg [id number] prn, ultram 50mg q6H prn, gabapentin 100mg BID prn. Added lidocaine patch. -SNF placement  -lidocaine patch  -follow up with PCP      Pulmonary HTN ECHO with normal function, EF 61-65%, with severe pulmonary hypertension. -recommend OP follow up with Cardiology     Elevated D-Dimer with new O2 requirement. Patient denies shortness of breath. Duplex negative for DVT. CTA chest negative for PE.     Abnormal UA: Asymptomatic UTI. UA + Nitrites, Moderate LE, 3+ bacteria. Patient treated with 3 days of Bactrim. Continue home cranberry concentrate. Urine culture shows E. Coli, sensitive to bactrim.      CKD3 Stable. Cr POA 1.30 (BL 1.1-1.2).    Osteoporosis s/p bilateral femur fractures in 2232-7721. Continue home Calcium-vit D 500mg-200U BID and Vitamin D3 1000U daily.     Intertrigo in skin folds on abdomen, looks like Candida. Being treated with clindamycin ointment OP without improvement. Continue nystatin cream BID.     Anxiety Continue home wellbutrin 100mg BID, celexa 10mg daily, valium 5mg nightly as prn nightly.     Iron Deficiency Anemia Stable. Hgb POA 13.2. No home meds.     Obesity  - PT with BMI of Body mass index is 31.58 kg/m². - Encouraging lifestyle modifications and further follow up outpatient. FOLLOW-UP CARE RECOMMENDATIONS:  Follow-up Information     Follow up With Specialties Details Why Contact Info    Yfn Hodge MD Pediatrics, Family Practice Schedule an appointment as soon as possible for a visit in 2 days Follow up with PCP. Ignacio 13  [address] [facility]      Jacqueline Bell MD Cardiology Schedule an appointment as soon as possible for a visit in 2 weeks Follow up with Cardiology. Sherice 104  [address]       [address]  [phone]            It is very important that you keep follow-up appointment(s). Bring discharge papers, medication list (and/or medication bottles) to follow-up appointments for review by outpatient provider(s). MEDICATION CHANGES:  START using nystatin cream on rash under abdominal folds BID until rash is clear. CAN use lidocaine patch transdermal every 24H prn for pain. FOLLOW-UP TESTS RECOMMENDED: None    ONGOING TREATMENT PLAN:   -resolution of your pain  -coordination with Cardiology for the Pulmonary Hypertension found on ECHO  -monitor Heart rate and oxygen saturation      PENDING TEST RESULTS:  At the time of discharge the following test results are still pending: None. Please review these results as they become available. Specific symptoms to watch for: chest pain, shortness of breath, fever, chills, nausea, vomiting, diarrhea, change in mentation, falling, weakness, bleeding.     DIET:  Regular Diet    ACTIVITY:  PT/OT Eval and Treat    WOUND CARE: None    EQUIPMENT needed:  Per PT/OT    INCIDENTAL FINDINGS:  Pulmonary Hypertension found on ECHO    GOALS OF CARE:  x  Eventual return to home/independent/assisted living     Long term SNF      Hospice     No rehospitalization     Patient condition at discharge:   Functional status    Poor    x  Deconditioned Independent   Cognition    Lucid   x  Forgetful (some sensescence)     Dementia   Catheters/lines (plus indication)    Jovel     PICC      PEG    x  None   Code status    Full code    x  DNR    . . . . . . . . . . . . . . . . . . . . . . . . . . . . . . . . . . . . . . . . . . . . . . . . . . . . . . . . . . . . . . . . . . . . . . . Sushant Dover      CHRONIC MEDICAL CONDITIONS:  Problem List as of 11/27/2019 Date Reviewed: 11/25/2019          Codes Class Noted - Resolved    Physical debility ICD-10-CM: R53.81  ICD-9-CM: 799.3  11/25/2019 - Present        * (Principal) Left hip pain ICD-10-CM: M25.552  ICD-9-CM: 719.45  11/24/2019 - Present        Fall from ground level ICD-10-CM: W18.30XA  ICD-9-CM: E888.9  11/24/2019 - Present        CKD (chronic kidney disease) stage 3, GFR 30-59 ml/min ([facility]) ICD-10-CM: N18.3  ICD-9-CM: 585.3  11/24/2019 - Present        Abnormal urinalysis ICD-10-CM: R82.90  ICD-9-CM: 791.9  11/24/2019 - Present        Lung crackles ICD-10-CM: R09.89  ICD-9-CM: 786.7  10/29/2015 - Present        Dysuria ICD-10-CM: R30.0  ICD-9-CM: 788.1  9/18/2015 - Present        Need for hepatitis C screening test ICD-10-CM: Z11.59  ICD-9-CM: V73.89  7/23/2015 - Present        Intertrigo ICD-10-CM: L30.4  ICD-9-CM: 695.89  7/7/2015 - Present        UTI (lower urinary tract infection) ICD-10-CM: N39.0  ICD-9-CM: 599.0  5/21/2015 - Present        Bronchitis ICD-10-CM: J40  ICD-9-CM: 490  3/2/2015 - Present        Palpitations ICD-10-CM: R00.2  ICD-9-CM: 785.1  7/24/2012 - Present        Anxiety ICD-10-CM: F41.9  ICD-9-CM: 300.00  2/7/2012 - Present        Iron deficiency anemia, unspecified ICD-10-CM: D50.9  ICD-9-CM: 280.9  1/12/2012 - Present        Osteoarthritis ICD-10-CM: M19.90  ICD-9-CM: 715.90  12/30/2009 - Present        Senile osteoporosis ICD-10-CM: M81.0  ICD-9-CM: 733.01  12/30/2009 - Present        Knee pain ICD-10-CM: M25.569  ICD-9-CM: 719.46  Unknown - Present        CKD (chronic kidney disease) stage 2, GFR 60-89 ml/min ICD-10-CM: N18.2  ICD-9-CM: 895. 2  Unknown - Present        Insomnia ICD-10-CM: G47.00  ICD-9-CM: 780.52  Unknown - Present        RESOLVED: Medicare annual wellness visit, subsequent ICD-10-CM: Z00.00  ICD-9-CM: V70.0  1/15/2016 - 9/26/2019        RESOLVED: Screening for cholesterol level ICD-10-CM: Z13.220  ICD-9-CM: V77.91  7/23/2015 - 9/26/2019        RESOLVED: Osteopenia ICD-10-CM: M85.80  ICD-9-CM: 733.90  Unknown - 12/30/2009        RESOLVED: Anxiety ICD-10-CM: F41.9  ICD-9-CM: 300.00  Unknown - 7/24/2012              Information obtained by :   I understand that if any problems occur once I am at home I am to contact my physician. I understand and acknowledge receipt of the instructions indicated above.

## 2019-11-28 NOTE — PROGRESS NOTES
[address] report to [facility] and spoke to Maisha vista . Patient is going to [address] [facility] 2 room 217 B. All belongings to .

## 2019-11-29 ENCOUNTER — PATIENT OUTREACH (OUTPATIENT)
Dept: INTERNAL MEDICINE CLINIC | Age: 74
End: 2019-11-29

## 2019-11-29 NOTE — PROGRESS NOTES
Patient discharged to a SNF [facility], [facility] [place] [employer] [facility] Medicare).

## 2019-12-02 RX ORDER — ATENOLOL 50 MG/1
50 TABLET ORAL
Qty: 30 TAB | Refills: 0 | Status: SHIPPED | OUTPATIENT
Start: 2019-12-02 | End: 2020-01-01

## 2019-12-03 ENCOUNTER — PATIENT OUTREACH (OUTPATIENT)
Dept: CASE MANAGEMENT | Age: 74
End: 2019-12-03

## 2019-12-03 NOTE — PROGRESS NOTES
Community Care Team documentation for patient in [facility] Initial Follow Up Patient was discharged to [facility], [facility]. Information included in this progress note has been provided to SNF. Hospital Admission and Diagnosis:  [facility] 11/24-11/27  Fall from ground level RRAT Score: 10 Advance Care Planning: Advance Directive on file PCP : Nathalia Hernandez MD 
 
SNF Attending: Rashad Davidson MD 
 
Spoke with SNF team.  Fall reported 12/11, L Hip and back pain reported, sent to [facility], pt returned same day. Provided needed hospital follow up appointments:  Luciana Montemayor MD Cardiology in 2 weeks; PCP within 5 days of SNF discharge. PT/OT update: Currently min assist with transfers. Ambulating a few steps in parallel bars. Standing tolerance of 4 minutes. LOS/ Disposition:  Family meeting to be held. Per chart, pt lives alone. Community Care Team will follow up weekly with [facility] until discharge. Medications were not reconciled and general patient assessment was not completed during this [facility] outreach.

## 2019-12-10 ENCOUNTER — PATIENT OUTREACH (OUTPATIENT)
Dept: CASE MANAGEMENT | Age: 74
End: 2019-12-10

## 2019-12-11 NOTE — PROGRESS NOTES
Community Care Team Documentation for Patient in [facility] Subsequent Follow up Patient remains at [facility] ([facility]). See previous [facility] Team notes. Spoke with SNF team.  SNF Medical update: Patient will plan to be weaned off of 3L of O2, patient has hip pain PT/OT update: Currently sit to stand with min assist, transfers with min assist, ambulating 20 ft using RW with min assist.  LOS/ Disposition: Plan for discharge on 12/23. Medications were not reconciled and general patient assessment was not completed during this skilled nursing facility outreach. Iram Lugo RN, BSN,  [facility] Team 
[phone]

## 2019-12-17 ENCOUNTER — OFFICE VISIT (OUTPATIENT)
Dept: FAMILY MEDICINE CLINIC | Age: 74
End: 2019-12-17

## 2019-12-17 ENCOUNTER — PATIENT OUTREACH (OUTPATIENT)
Dept: CASE MANAGEMENT | Age: 74
End: 2019-12-17

## 2019-12-17 VITALS
HEART RATE: 95 BPM | TEMPERATURE: 97.4 F | DIASTOLIC BLOOD PRESSURE: 60 MMHG | HEIGHT: 64 IN | BODY MASS INDEX: 31.41 KG/M2 | WEIGHT: 184 LBS | SYSTOLIC BLOOD PRESSURE: 106 MMHG | RESPIRATION RATE: 20 BRPM | OXYGEN SATURATION: 98 %

## 2019-12-17 DIAGNOSIS — R29.6 MULTIPLE FALLS: ICD-10-CM

## 2019-12-17 DIAGNOSIS — R26.89 DECREASED MOBILITY: ICD-10-CM

## 2019-12-17 DIAGNOSIS — M25.552 LEFT HIP PAIN: ICD-10-CM

## 2019-12-17 DIAGNOSIS — R53.81 PHYSICAL DEBILITY: Primary | ICD-10-CM

## 2019-12-17 DIAGNOSIS — M25.561 CHRONIC PAIN OF RIGHT KNEE: ICD-10-CM

## 2019-12-17 DIAGNOSIS — M15.9 PRIMARY OSTEOARTHRITIS INVOLVING MULTIPLE JOINTS: ICD-10-CM

## 2019-12-17 DIAGNOSIS — G89.29 CHRONIC PAIN OF RIGHT KNEE: ICD-10-CM

## 2019-12-17 DIAGNOSIS — Z79.899 POLYPHARMACY: ICD-10-CM

## 2019-12-17 RX ORDER — GABAPENTIN 100 MG/1
100 CAPSULE ORAL DAILY
Qty: 30 CAP | Refills: 5 | Status: SHIPPED | OUTPATIENT
Start: 2019-12-17 | End: 2020-01-02 | Stop reason: SDUPTHER

## 2019-12-17 RX ORDER — NALOXONE HYDROCHLORIDE 4 MG/.1ML
SPRAY NASAL
Qty: 1 EACH | Refills: 3 | Status: SHIPPED | OUTPATIENT
Start: 2019-12-17 | End: 2021-01-01 | Stop reason: CLARIF

## 2019-12-17 RX ORDER — LIDOCAINE 50 MG/G
1 PATCH TOPICAL EVERY 24 HOURS
Qty: 30 PATCH | Refills: 3 | Status: SHIPPED | OUTPATIENT
Start: 2019-12-17 | End: 2020-01-16

## 2019-12-17 RX ORDER — SPIRONOLACTONE 25 MG/1
25 TABLET ORAL DAILY
COMMUNITY
Start: 2019-12-13 | End: 2020-02-10 | Stop reason: ALTCHOICE

## 2019-12-17 RX ORDER — TRAMADOL HYDROCHLORIDE 50 MG/1
50 TABLET ORAL
Qty: 30 TAB | Refills: 0 | Status: SHIPPED | OUTPATIENT
Start: 2019-12-17 | End: 2020-01-16

## 2019-12-17 NOTE — PATIENT INSTRUCTIONS
Use of Multiple Drugs: Care Instructions Your Care Instructions You have had treatment to help your body get rid of a combination of any of these drugs: · Prescription medicines · Over-the-counter medicines · Alcohol · Illegal drugs Taking some drugs together may cause a bad reaction. They can have unexpected or stronger effects on your body and mind. For example, benzodiazepines (such as alprazolam and lorazepam) and alcohol both depress the nervous system. Taken together, each one is stronger than when it is taken by itself. You are getting better, but it takes time for the drugs to leave your body. It may take up to 2 weeks for your mind to clear and your mood to improve. Depending on the drugs you took, the doctor might have: · Watched your symptoms or done tests to find out what drugs were in your body. · Treated you to control your breathing, blood pressure, and heart rate. · Tried to remove the drugs from your body by pumping your stomach or giving you a substance by mouth that absorbs chemicals. · Given you a substance that neutralizes chemicals (antidote). · Given you oxygen to help you breathe. · Given you fluids. The doctor also watched you carefully to make sure you were recovering safely. Follow-up care is a key part of your treatment and safety. Be sure to make and go to all appointments, and call your doctor if you are having problems. It's also a good idea to know your test results and keep a list of the medicines you take. How can you care for yourself at home? · Adopt healthy habits to ease withdrawal symptoms. When you use substances like alcohol and some drugs regularly, your body gets used to them. This is called physical dependence. If you are physically dependent on substances, you may have withdrawal symptoms when you stop taking them. These symptoms may include trembling, feeling restless, and sweating. To help get past these: ? Get plenty of rest. 
 ? Drink lots of fluids. ? Stay active. ? Eat a healthy diet. · Drink fluids to soothe a sore throat. If you had a tube in your throat to help you breathe, you may have a sore throat or hoarseness that can last a few days. Drinking fluids may help. · If you use opioids, ask your doctor or pharmacist about having a naloxone rescue kit on hand. · Get help to stop using drugs. Talk to your doctor about substance use treatment programs. When should you call for help? Call 911 anytime you think you may need emergency care. For example, call if: 
  · You feel you cannot stop from hurting yourself or someone else.  
[facility] your doctor now or seek immediate medical care if: 
  · You have new or worse symptoms of withdrawal, such as trembling, feeling restless, and sweating, that you can't manage at home.  
 Watch closely for changes in your health, and be sure to contact your doctor if: 
  · You do not get better as expected.  
  · You need help finding the right place to get help with drug or alcohol problems. Where can you learn more? Go to http://thiago-uli.info/. Enter B109 in the search box to learn more about \"Use of Multiple Drugs: Care Instructions. \" Current as of: February 5, 2019 Content Version: 12.2 © 9645-4260 [employer]. Care instructions adapted under license by [employer] (which disclaims liability or warranty for this information). If you have questions about a medical condition or this instruction, always ask your healthcare professional. [place] [address] any warranty or liability for your use of this information. Use of Multiple Drugs: Care Instructions Your Care Instructions You have had treatment to help your body get rid of a combination of any of these drugs: · Prescription medicines · Over-the-counter medicines · Alcohol · Illegal drugs Taking some drugs together may cause a bad reaction.  They can have unexpected or stronger effects on your body and mind. For example, benzodiazepines (such as alprazolam and lorazepam) and alcohol both depress the nervous system. Taken together, each one is stronger than when it is taken by itself. You are getting better, but it takes time for the drugs to leave your body. It may take up to 2 weeks for your mind to clear and your mood to improve. Depending on the drugs you took, the doctor might have: · Watched your symptoms or done tests to find out what drugs were in your body. · Treated you to control your breathing, blood pressure, and heart rate. · Tried to remove the drugs from your body by pumping your stomach or giving you a substance by mouth that absorbs chemicals. · Given you a substance that neutralizes chemicals (antidote). · Given you oxygen to help you breathe. · Given you fluids. The doctor also watched you carefully to make sure you were recovering safely. Follow-up care is a key part of your treatment and safety. Be sure to make and go to all appointments, and call your doctor if you are having problems. It's also a good idea to know your test results and keep a list of the medicines you take. How can you care for yourself at home? · Adopt healthy habits to ease withdrawal symptoms. When you use substances like alcohol and some drugs regularly, your body gets used to them. This is called physical dependence. If you are physically dependent on substances, you may have withdrawal symptoms when you stop taking them. These symptoms may include trembling, feeling restless, and sweating. To help get past these: ? Get plenty of rest. 
? Drink lots of fluids. ? Stay active. ? Eat a healthy diet. · Drink fluids to soothe a sore throat. If you had a tube in your throat to help you breathe, you may have a sore throat or hoarseness that can last a few days. Drinking fluids may help.  
· If you use opioids, ask your doctor or pharmacist about having a naloxone rescue kit on hand. · Get help to stop using drugs. Talk to your doctor about substance use treatment programs. When should you call for help? Call 911 anytime you think you may need emergency care. For example, call if: 
  · You feel you cannot stop from hurting yourself or someone else.  
[facility] your doctor now or seek immediate medical care if: 
  · You have new or worse symptoms of withdrawal, such as trembling, feeling restless, and sweating, that you can't manage at home.  
 Watch closely for changes in your health, and be sure to contact your doctor if: 
  · You do not get better as expected.  
  · You need help finding the right place to get help with drug or alcohol problems. Where can you learn more? Go to http://thiago-uli.info/. Enter B109 in the search box to learn more about \"Use of Multiple Drugs: Care Instructions. \" Current as of: February 5, 2019 Content Version: 12.2 © 8048-2895 [employer]. Care instructions adapted under license by [employer] (which disclaims liability or warranty for this information). If you have questions about a medical condition or this instruction, always ask your healthcare professional. [address] any warranty or liability for your use of this information.

## 2019-12-17 NOTE — PROGRESS NOTES
Chief Complaint: 
Chief Complaint Patient presents with  
[facility] Follow Up  
  [facility] 11/24-27/19 went to SNF home 12/12/19  Fall  
  12/11/19 went to  and back to [facility] - she fell 3 times History of Present Illness: 
74F who presents post hospitalization at [facility] 11/24 - 27, then went to a SNF until 12/12/2019. She was suppose to stay until 12/23, but left early due to multiple falls and felt like her care was subpar there. She is not at home and her daughter, and other relatives are providing much of her care. She was admitted with GLF without fractures, but had difficulties with ambulating and bearing weight due to pain. Today, it tooks us about 45 minutes to address her medications and medications for pain. I discussed with Ms. Florance Severance and her daughter that the combination of medications is concerning if all taken together. We discussed that she is on several sedating medications: Tramadol, Gabapentin and Valium. She had been taking the Valium with Atenolol only when needed for night time anxiety, but since being home, her daughter reports that she has been giving this to her almost every night. Additionally, she takes Tramadol in the day time and Gabapentin in the evening as well. Today, the patient arrives and is in a wheelchair, but can ambulate for very short distance. She will need home care and home assistance with PT/OT at home. The patient has already stated that she would never want to return to a SNF or any assisted living situation. She also wanted a DNR form today and this was provided and signed. I have reviewed and outlined her hospital course below: HOSPITAL COURSE - [facility] 11/24-: Ms. Florance Severance was admitted into the Family Medicine Service from 11/24/2019 to 11/27/2019 for GLF without fracture.  During the course of this admission, the following conditions were addressed/managed; 
  
GLF with inability to bear weight Pain and muscle spasms in hips, back, ribs. No fractures found on R hip Xray, L femur Xray, R rib Xray, or CT pelvis. PT/OT recommends SNF. Continue home tylenol 650mg [id number] prn, ultram 50mg q6H prn, gabapentin 100mg BID prn. Added lidocaine patch. -SNF placement 
-lidocaine patch 
-follow up with PCP  
  
Pulmonary HTN ECHO with normal function, EF 61-65%, with severe pulmonary hypertension. -recommend OP follow up with Cardiology 
  
Elevated D-Dimer with new O2 requirement. Patient denies shortness of breath. Duplex negative for DVT. CTA chest negative for PE. 
  
Abnormal UA: Asymptomatic UTI. UA + Nitrites, Moderate LE, 3+ bacteria. Patient treated with 3 days of Bactrim. Continue home cranberry concentrate. Urine culture shows E. Coli, sensitive to bactrim.  
  
CKD3 Stable. Cr POA 1.30 (BL 1.1-1.2).   
Osteoporosis s/p bilateral femur fractures in 2480-1714. Continue home Calcium-vit D 500mg-200U BID and Vitamin D3 1000U daily. 
  
Intertrigo in skin folds on abdomen, looks like Candida. Being treated with clindamycin ointment OP without improvement. Continue nystatin cream BID. 
  
Anxiety Continue home wellbutrin 100mg BID, celexa 10mg daily, valium 5mg nightly as prn nightly. 
  
Iron Deficiency Anemia Stable. Hgb POA 13.2. No home meds. 
  
Obesity 
- PT with BMI of Body mass index is 31.58 kg/m². - Encouraging lifestyle modifications and further follow up outpatient. Reviewed PmHx, RxHx, FmHx, SocHx, AllgHx and updated and dated in the chart. Patient Active Problem List  
 Diagnosis  Physical debility  Left hip pain  Fall from ground level  CKD (chronic kidney disease) stage 3, GFR 30-59 ml/min ([facility])  Abnormal urinalysis  Lung crackles  Dysuria  Need for hepatitis C screening test  
 Intertrigo  UTI (lower urinary tract infection)  Bronchitis  Palpitations  Anxiety  Iron deficiency anemia, unspecified  Osteoarthritis  Senile osteoporosis  Knee pain  CKD (chronic kidney disease) stage 2, GFR 60-89 ml/min  Insomnia Medications: 
Current Outpatient Medications Medication Sig Dispense Refill  spironolactone (ALDACTONE) 25 mg tablet Take 25 mg by mouth daily.  Oxygen 2L  traMADol (ULTRAM) 50 mg tablet Take 1 Tab by mouth daily as needed for Pain for up to 30 days. 30 Tab 0  
 gabapentin (NEURONTIN) 100 mg capsule Take 1 Cap by mouth daily. Max Daily Amount: 100 mg. 30 Cap 5  
 naloxone (NARCAN) 4 mg/actuation nasal spray Use 1 spray intranasally, then discard. Repeat with new spray every 2 min as needed for opioid overdose symptoms, alternating nostrils. 1 Each 3  
 lidocaine (LIDODERM) 5 % 1 Patch by TransDERmal route every twenty-four (24) hours for 30 days. Apply patch to the affected area for 12 hours a day and remove for 12 hours a day. 30 Patch 3  
 atenolol (TENORMIN) 50 mg tablet Take 1 Tab by mouth nightly for 30 days. 30 Tab 0  
 diazePAM (VALIUM) 5 mg tablet Take 1 Tab by mouth nightly as needed for Anxiety. Max Daily Amount: 5 mg. 30 Tab 0  
 buPROPion (WELLBUTRIN) 100 mg tablet TAKE 1 TABLET BY MOUTH TWO TIMES A DAY 60 Tab 0  
 citalopram (CELEXA) 10 mg tablet Take 1 Tab by mouth nightly. 30 Tab 0  
 lidocaine 4 % patch 1 Patch by TransDERmal route every twenty-four (24) hours. 30 Patch 0  
 nystatin (MYCOSTATIN) topical cream Apply  to affected area two (2) times a day. 15 g 0  
 calcium-vitamin D (CALCIUM 500+D) 500 mg(1,250mg) -200 unit per tablet Take 1 Tab by mouth daily. Past Medical History: 
Past Medical History:  
Diagnosis Date  Anxiety  Arthritis   
 knees,shoulders,fingers,back,neck  CKD (chronic kidney disease) stage 2, GFR 60-89 ml/min Dr. Lili Echavarria  Femur fracture, left ([facility] 2008  Femur fracture, right ([facility] 2009  Insomnia  Intertrigo 7/7/2015  Knee pain Dr. Luis Winter  Osteopenia Dr. Luis Winter  Psychiatric disorder   
 anxiety  Pyloric stenosis 2010  Sleep disorder Insomnia  Thoracic vertebral fracture ([facility] [address] 2005  Ulcer 2012 Perferatied ulcer Review of Systems - negative except as listed above in the HPI Objective:  
 
Vitals:  
 12/17/19 1350 BP: 106/60 Pulse: 95 Resp: 20 Temp: 97.4 °F (36.3 °C) TempSrc: Oral  
SpO2: 98% Weight: 184 lb (83.5 kg) Height: 5' 4\" (1.626 m) Physical Examination:  
General appearance - unwell appearing, chronically ill Mental status - alert, oriented to person, place, and time Mouth - mucous membranes moist, pharynx normal without lesions Neck - supple, no significant adenopathy Chest - clear to auscultation, no wheezes, rales or rhonchi, symmetric air entry Heart - normal rate, regular rhythm, normal S1, S2, no murmurs, rubs, clicks or gallops Neurological - decreased strength, decreased ROM. Needs assist with wheelchair. Musculoskeletal - abnormal exam of right lower leg - swelling and bruising from previous fall, abnormal exam of left lower leg swelling and upper thigh bruising from fall several weeks ago. Extremities - pedal edema 2 +, non-pitting Skin - normal coloration and turgor, no rashes, no suspicious skin lesions noted Assessment/ Plan:  
 
50S with complicated history. She has significant Osteoporosis and for some time, had been under the care of Dr. Cassandra Jacobson, rheumatology. Recently, she has had worsening pain and chronic multiple joint pains with debility and frequent falls. We reviewed her recent hospitalization and she will need in home PT/OT. She recently was at [facility], but left early due to multiple falls there and reports poor care. The patient also has become dependent on many medications for her pain. In addition to Valium, Tramadol and Gabapentin, she is also taking Tylenol PM for insomnia at night. We reviewed her medications in detail and discussed the concerns of taking all medications together.  Currently, her medications are spaced safely. She should take the Valium and Atenolol only as needed for anxiety and NOT every night. She takes a Tramadol during the day if needed for pain and Gabapentin later in the evening. I advised NOT taking all of these medications at the same time. This could lead to sedation and increase likelihood of falls. Additionally, she will be given a script for Narcan and the importance of having this on hand was discussed in detail. Diagnoses and all orders for this visit: 1. Physical debility 
 -     REFERRAL TO HOME HEALTH Will need PT/OT and home care aid. 2. Left hip pain 
-     traMADol (ULTRAM) 50 mg tablet; Take 1 Tab by mouth daily as needed for Pain for up to 30 days. 
-     gabapentin (NEURONTIN) 100 mg capsule; Take 1 Cap by mouth daily. Max Daily Amount: 100 mg. 
-     [address] 3. Chronic pain of right knee 
-     traMADol (ULTRAM) 50 mg tablet; Take 1 Tab by mouth daily as needed for Pain for up to 30 days. 
-     gabapentin (NEURONTIN) 100 mg capsule; Take 1 Cap by mouth daily. Max Daily Amount: 100 mg. 
-     [address] 4. Primary osteoarthritis involving multiple joints 
-     traMADol (ULTRAM) 50 mg tablet; Take 1 Tab by mouth daily as needed for Pain for up to 30 days. 
-     gabapentin (NEURONTIN) 100 mg capsule; Take 1 Cap by mouth daily. Max Daily Amount: 100 mg. 
-     lidocaine (LIDODERM) 5 %; 1 Patch by TransDERmal route every twenty-four (24) hours for 30 days. Apply patch to the affected area for 12 hours a day and remove for 12 hours a day. 5. Polypharmacy 
-     naloxone (NARCAN) 4 mg/actuation nasal spray; Use 1 spray intranasally, then discard. Repeat with new spray every 2 min as needed for opioid overdose symptoms, alternating nostrils. 6. Decreased mobility 
-     REFERRAL TO HOME HEALTH 7. Multiple falls As discussed above. Fall prevention discussed and plan put in place. More than 50% of this 60-minute visit was spent in face to face care with the patient and educating about her medications and reviewing her general concerns as above. I have discussed the diagnosis with the patient and the intended treatment plan as seen in the above orders. The patient has received an after-visit summary and questions were answered concerning future plans. Asked to return should symptoms worsen or not improve with treatment. Any pending labs and studies will be relayed to patient when they become available. Pt verbalizes understanding of plan of care and denies further questions or concerns at this time. Follow-up and Dispositions · Return in about 1 month (around 1/17/2020), or if symptoms worsen or fail to improve. Ester Vasques MD 
 
Patient Instructions Use of Multiple Drugs: Care Instructions Your Care Instructions You have had treatment to help your body get rid of a combination of any of these drugs: · Prescription medicines · Over-the-counter medicines · Alcohol · Illegal drugs Taking some drugs together may cause a bad reaction. They can have unexpected or stronger effects on your body and mind. For example, benzodiazepines (such as alprazolam and lorazepam) and alcohol both depress the nervous system. Taken together, each one is stronger than when it is taken by itself. You are getting better, but it takes time for the drugs to leave your body. It may take up to 2 weeks for your mind to clear and your mood to improve. Depending on the drugs you took, the doctor might have: · Watched your symptoms or done tests to find out what drugs were in your body. · Treated you to control your breathing, blood pressure, and heart rate. · Tried to remove the drugs from your body by pumping your stomach or giving you a substance by mouth that absorbs chemicals. · Given you a substance that neutralizes chemicals (antidote). · Given you oxygen to help you breathe. · Given you fluids. The doctor also watched you carefully to make sure you were recovering safely. Follow-up care is a key part of your treatment and safety. Be sure to make and go to all appointments, and call your doctor if you are having problems. It's also a good idea to know your test results and keep a list of the medicines you take. How can you care for yourself at home? · Adopt healthy habits to ease withdrawal symptoms. When you use substances like alcohol and some drugs regularly, your body gets used to them. This is called physical dependence. If you are physically dependent on substances, you may have withdrawal symptoms when you stop taking them. These symptoms may include trembling, feeling restless, and sweating. To help get past these: ? Get plenty of rest. 
? Drink lots of fluids. ? Stay active. ? Eat a healthy diet. · Drink fluids to soothe a sore throat. If you had a tube in your throat to help you breathe, you may have a sore throat or hoarseness that can last a few days. Drinking fluids may help. · If you use opioids, ask your doctor or pharmacist about having a naloxone rescue kit on hand. · Get help to stop using drugs. Talk to your doctor about substance use treatment programs. When should you call for help? Call 911 anytime you think you may need emergency care. For example, call if: 
  · You feel you cannot stop from hurting yourself or someone else.  
[facility] your doctor now or seek immediate medical care if: 
  · You have new or worse symptoms of withdrawal, such as trembling, feeling restless, and sweating, that you can't manage at home.  
 Watch closely for changes in your health, and be sure to contact your doctor if: 
  · You do not get better as expected.  
  · You need help finding the right place to get help with drug or alcohol problems. Where can you learn more? Go to http://thiago-uli.info/. Enter B109 in the search box to learn more about \"Use of Multiple Drugs: Care Instructions. \" Current as of: February 5, 2019 Content Version: 12.2 © 6910-9283 [employer]. Care instructions adapted under license by [employer] (which disclaims liability or warranty for this information). If you have questions about a medical condition or this instruction, always ask your healthcare professional. [place] [address] any warranty or liability for your use of this information.

## 2019-12-17 NOTE — PROGRESS NOTES
Identified pt with two pt identifiers(name and ). Chief Complaint Patient presents with  
[facility] Follow Up  
  [facility] - went to SNF home 19  Fall  
  19 went to  and back to [facility] - she fell 3 times Health Maintenance Due Topic  Shingrix Vaccine Age 50> (1 of 2)  GLAUCOMA SCREENING Q2Y  MEDICARE YEARLY EXAM   
 
 
Wt Readings from Last 3 Encounters:  
19 184 lb (83.5 kg)  
19 181 lb 3.2 oz (82.2 kg) 19 180 lb 9.6 oz (81.9 kg) Temp Readings from Last 3 Encounters:  
19 97.4 °F (36.3 °C) (Oral)  
19 98.5 °F (36.9 °C)  
19 97.2 °F (36.2 °C) (Oral) BP Readings from Last 3 Encounters:  
19 106/60  
19 107/69  
19 108/70 Pulse Readings from Last 3 Encounters:  
19 95  
19 94  
19 77 Learning Assessment: 
:  
 
Learning Assessment 5/10/2016 1/15/2014 PRIMARY LEARNER Patient Patient PRIMARY LANGUAGE ENGLISH ENGLISH  
LEARNER PREFERENCE PRIMARY DEMONSTRATION READING  
ANSWERED BY patient  self RELATIONSHIP SELF SELF Depression Screening: 
:  
 
3 most recent PHQ Screens 2019 Little interest or pleasure in doing things More than half the days Feeling down, depressed, irritable, or hopeless More than half the days Total Score PHQ 2 4 Fall Risk Assessment: 
:  
 
Fall Risk Assessment, last 12 mths 2019 Able to walk? Yes Fall in past 12 months? No  
 
 
Abuse Screening: 
:  
 
Abuse Screening Questionnaire 2016 Do you ever feel afraid of your partner? Lyndee Paci Are you in a relationship with someone who physically or mentally threatens you? Roxannedee Paci Is it safe for you to go home? Julio Sullivan Coordination of Care Questionnaire: 
:  
 
1) Have you been to an emergency room, urgent care clinic since your last visit? yes Hospitalized since your last visit? yes 2) Have you seen or consulted any other health care providers outside of [facility] since your last visit? no  (Include any pap smears or colon screenings in this section.) 3) Do you have an Advance Directive on file? yes Are you interested in receiving information about Advance Directives? no 
 
Patient is accompanied by daughter in law I have received verbal consent from Ivana Torres to discuss any/all medical information while they are present in the room. Reviewed record in preparation for visit and have obtained necessary documentation. Medication reconciliation up to date and corrected with patient at this time.

## 2019-12-18 ENCOUNTER — TELEPHONE (OUTPATIENT)
Dept: FAMILY MEDICINE CLINIC | Age: 74
End: 2019-12-18

## 2019-12-18 ENCOUNTER — HOME HEALTH ADMISSION (OUTPATIENT)
Dept: HOME HEALTH SERVICES | Facility: HOME HEALTH | Age: 74
End: 2019-12-18
Payer: MEDICARE

## 2019-12-18 ENCOUNTER — PATIENT OUTREACH (OUTPATIENT)
Dept: INTERNAL MEDICINE CLINIC | Age: 74
End: 2019-12-18

## 2019-12-18 DIAGNOSIS — R22.43 LOCALIZED SWELLING OF BOTH LOWER LEGS: Primary | ICD-10-CM

## 2019-12-18 NOTE — PROGRESS NOTES
Community Care Team Documentation for Patient in [facility] Discharge Note Patient has been discharged from [facility] ([facility]). See [facility] notes. PCP : Nathalia Hernandez MD 
Ambulatory  or Care Transition Nurse: Victorina Nixon RN Note routed to [facility] or [facility]. Spoke with SNF team.  Confirmed patient discharged on 12/13 per family request using [facility]. Community Care Team will sign off at this time. Medications were not reconciled and general patient assessment was not completed during this skilled nursing facility outreach. Iram Waller RN, BSN,  [facility] Team 
[phone]

## 2019-12-18 NOTE — PROGRESS NOTES
Received notification of patient's discharge from [facility] 12/13/2019. Per patient's EMR  Patient attended  hospital follow up appointment with Dr. Emmanuelle Marmolejo 12/18/2019 medications reviewed. Will follow up with patient 12/20/2019.

## 2019-12-18 NOTE — TELEPHONE ENCOUNTER
Pt's family wants to know if pt can have Asprin for swelling in her legs and wants a referral to see a specialist about the fluid build up in legs as well    [facility] # [phone]

## 2019-12-18 NOTE — TELEPHONE ENCOUNTER
Pt was send yesterday and diagnosis with dementia and needs direction what to do as pt is getting mean and not herself    Call Anne Gudino # [phone]

## 2019-12-20 ENCOUNTER — HOME CARE VISIT (OUTPATIENT)
Dept: SCHEDULING | Facility: HOME HEALTH | Age: 74
End: 2019-12-20
Payer: MEDICARE

## 2019-12-20 ENCOUNTER — HOSPITAL ENCOUNTER (OUTPATIENT)
Dept: ULTRASOUND IMAGING | Age: 74
Discharge: HOME | End: 2019-12-20
Attending: INTERNAL MEDICINE
Payer: MEDICARE

## 2019-12-20 VITALS — OXYGEN SATURATION: 98 % | HEART RATE: 89 BPM | TEMPERATURE: 97.8 F

## 2019-12-20 DIAGNOSIS — R22.43 LOCALIZED SWELLING OF BOTH LOWER LEGS: ICD-10-CM

## 2019-12-20 PROCEDURE — G0151 HHCP-SERV OF PT,EA 15 MIN: HCPCS

## 2019-12-20 PROCEDURE — 3331090001 HH PPS REVENUE CREDIT

## 2019-12-20 PROCEDURE — G0299 HHS/HOSPICE OF RN EA 15 MIN: HCPCS

## 2019-12-20 PROCEDURE — 3331090002 HH PPS REVENUE DEBIT

## 2019-12-20 PROCEDURE — 400013 HH SOC

## 2019-12-20 PROCEDURE — 93970 EXTREMITY STUDY: CPT

## 2019-12-21 PROCEDURE — 3331090001 HH PPS REVENUE CREDIT

## 2019-12-21 PROCEDURE — 3331090002 HH PPS REVENUE DEBIT

## 2019-12-21 NOTE — PROGRESS NOTES
I called and spoke with Ilene Shen, her daughter about the findings. No evidence of DVT. However, she is still having swelling and discomfort in the legs. This may be multifactorial including being on Gabapentin and venous insufficiency. Will consider diuretic after her HHC begins - She is not SOB or having any CHF symptoms at this time.

## 2019-12-22 PROCEDURE — 3331090002 HH PPS REVENUE DEBIT

## 2019-12-22 PROCEDURE — 3331090001 HH PPS REVENUE CREDIT

## 2019-12-23 ENCOUNTER — HOME CARE VISIT (OUTPATIENT)
Dept: SCHEDULING | Facility: HOME HEALTH | Age: 74
End: 2019-12-23
Payer: MEDICARE

## 2019-12-23 ENCOUNTER — PATIENT OUTREACH (OUTPATIENT)
Dept: INTERNAL MEDICINE CLINIC | Age: 74
End: 2019-12-23

## 2019-12-23 VITALS
DIASTOLIC BLOOD PRESSURE: 76 MMHG | TEMPERATURE: 97.6 F | HEART RATE: 70 BPM | RESPIRATION RATE: 20 BRPM | SYSTOLIC BLOOD PRESSURE: 130 MMHG

## 2019-12-23 PROCEDURE — G0151 HHCP-SERV OF PT,EA 15 MIN: HCPCS

## 2019-12-23 PROCEDURE — 3331090001 HH PPS REVENUE CREDIT

## 2019-12-23 PROCEDURE — 3331090002 HH PPS REVENUE DEBIT

## 2019-12-23 NOTE — PROGRESS NOTES
Outgoing call placed spoke to patient's daughter Hilda Finney. Patient identified utilizing 2 identifiers. Introduced self and reason for the call. Patient daughter verbalized understanding. This writer's contact information given. Patient was discharged from [facility] 12/13/2019, attended hospital follow up appointment with Dr. Yoli Simms 12/17/2019. Per Dr. Yoli Simms office visit notes patient continues to have difficulty with ambulation. [address]. referral sent. Patient was seen by therapist 12/20/2019. Patient's daughter states the family is arranging personal caregivers to assist with ADL. Patient's daughter states \"it is a slow process, but they are working to manage patient's care. Will continue to monitor patient's progress.

## 2019-12-24 ENCOUNTER — TELEPHONE (OUTPATIENT)
Dept: FAMILY MEDICINE CLINIC | Age: 74
End: 2019-12-24

## 2019-12-24 ENCOUNTER — HOME CARE VISIT (OUTPATIENT)
Dept: SCHEDULING | Facility: HOME HEALTH | Age: 74
End: 2019-12-24
Payer: MEDICARE

## 2019-12-24 VITALS
TEMPERATURE: 97.2 F | DIASTOLIC BLOOD PRESSURE: 60 MMHG | RESPIRATION RATE: 16 BRPM | HEART RATE: 82 BPM | SYSTOLIC BLOOD PRESSURE: 92 MMHG

## 2019-12-24 DIAGNOSIS — R22.43 LOCALIZED SWELLING OF BOTH LOWER LEGS: Primary | ICD-10-CM

## 2019-12-24 PROCEDURE — G0299 HHS/HOSPICE OF RN EA 15 MIN: HCPCS

## 2019-12-24 PROCEDURE — 3331090001 HH PPS REVENUE CREDIT

## 2019-12-24 PROCEDURE — 3331090002 HH PPS REVENUE DEBIT

## 2019-12-24 RX ORDER — BUMETANIDE 1 MG/1
1 TABLET ORAL DAILY
Qty: 30 TAB | Refills: 3 | Status: SHIPPED | OUTPATIENT
Start: 2019-12-24 | End: 2020-01-01

## 2019-12-24 NOTE — TELEPHONE ENCOUNTER
The home health nurse with [facility], is calling to speak to  or her nurse regarding a medication for the pts legs that is not working. Please advise.  [phone]

## 2019-12-25 VITALS
RESPIRATION RATE: 24 BRPM | TEMPERATURE: 98.3 F | SYSTOLIC BLOOD PRESSURE: 124 MMHG | DIASTOLIC BLOOD PRESSURE: 64 MMHG | HEART RATE: 86 BPM

## 2019-12-25 PROCEDURE — 3331090002 HH PPS REVENUE DEBIT

## 2019-12-25 PROCEDURE — 3331090001 HH PPS REVENUE CREDIT

## 2019-12-26 PROCEDURE — 3331090002 HH PPS REVENUE DEBIT

## 2019-12-26 PROCEDURE — 3331090001 HH PPS REVENUE CREDIT

## 2019-12-27 ENCOUNTER — HOME CARE VISIT (OUTPATIENT)
Dept: SCHEDULING | Facility: HOME HEALTH | Age: 74
End: 2019-12-27
Payer: MEDICARE

## 2019-12-27 VITALS
SYSTOLIC BLOOD PRESSURE: 100 MMHG | DIASTOLIC BLOOD PRESSURE: 58 MMHG | HEART RATE: 99 BPM | OXYGEN SATURATION: 97 % | TEMPERATURE: 97.3 F

## 2019-12-27 PROCEDURE — G0157 HHC PT ASSISTANT EA 15: HCPCS

## 2019-12-27 PROCEDURE — G0299 HHS/HOSPICE OF RN EA 15 MIN: HCPCS

## 2019-12-27 PROCEDURE — 3331090001 HH PPS REVENUE CREDIT

## 2019-12-27 PROCEDURE — 3331090002 HH PPS REVENUE DEBIT

## 2019-12-28 VITALS
TEMPERATURE: 97.2 F | SYSTOLIC BLOOD PRESSURE: 118 MMHG | HEART RATE: 82 BPM | DIASTOLIC BLOOD PRESSURE: 62 MMHG | RESPIRATION RATE: 22 BRPM

## 2019-12-28 PROCEDURE — 3331090001 HH PPS REVENUE CREDIT

## 2019-12-28 PROCEDURE — 3331090002 HH PPS REVENUE DEBIT

## 2019-12-29 PROCEDURE — 3331090002 HH PPS REVENUE DEBIT

## 2019-12-29 PROCEDURE — 3331090001 HH PPS REVENUE CREDIT

## 2019-12-30 ENCOUNTER — HOME CARE VISIT (OUTPATIENT)
Dept: SCHEDULING | Facility: HOME HEALTH | Age: 74
End: 2019-12-30
Payer: MEDICARE

## 2019-12-30 VITALS
RESPIRATION RATE: 18 BRPM | TEMPERATURE: 97.6 F | SYSTOLIC BLOOD PRESSURE: 125 MMHG | DIASTOLIC BLOOD PRESSURE: 80 MMHG | HEART RATE: 97 BPM | OXYGEN SATURATION: 97 %

## 2019-12-30 PROCEDURE — G0152 HHCP-SERV OF OT,EA 15 MIN: HCPCS

## 2019-12-30 PROCEDURE — 3331090001 HH PPS REVENUE CREDIT

## 2019-12-30 PROCEDURE — 3331090002 HH PPS REVENUE DEBIT

## 2019-12-31 ENCOUNTER — PATIENT OUTREACH (OUTPATIENT)
Dept: INTERNAL MEDICINE CLINIC | Age: 74
End: 2019-12-31

## 2019-12-31 ENCOUNTER — HOME CARE VISIT (OUTPATIENT)
Dept: SCHEDULING | Facility: HOME HEALTH | Age: 74
End: 2019-12-31
Payer: MEDICARE

## 2019-12-31 VITALS
TEMPERATURE: 97.8 F | HEART RATE: 85 BPM | DIASTOLIC BLOOD PRESSURE: 50 MMHG | OXYGEN SATURATION: 97 % | RESPIRATION RATE: 16 BRPM | SYSTOLIC BLOOD PRESSURE: 80 MMHG

## 2019-12-31 VITALS
OXYGEN SATURATION: 96 % | TEMPERATURE: 97.4 F | DIASTOLIC BLOOD PRESSURE: 54 MMHG | HEART RATE: 97 BPM | SYSTOLIC BLOOD PRESSURE: 80 MMHG

## 2019-12-31 PROCEDURE — 3331090001 HH PPS REVENUE CREDIT

## 2019-12-31 PROCEDURE — 3331090002 HH PPS REVENUE DEBIT

## 2019-12-31 PROCEDURE — G0157 HHC PT ASSISTANT EA 15: HCPCS

## 2019-12-31 PROCEDURE — G0299 HHS/HOSPICE OF RN EA 15 MIN: HCPCS

## 2019-12-31 NOTE — TELEPHONE ENCOUNTER
deborah with [facility] stated she saw pt today and blood pressure was really low today and reading was 80/58  Pt stated she is on a fluid pill and nurse could not find anything and wants to know what pt is on if anything     Call deborah with update on what to do for pt at [phone]

## 2019-12-31 NOTE — PROGRESS NOTES
Patient has graduated from the Transitions of Care Coordination  program on 12/29/2019. Patient/family has the ability to self-manage at this time Care management goals have been completed. Patient was not referred to the [place] team for further management. Patient has Care Transition Nurse's contact information for any further questions, concerns, or needs.   Patients upcoming visits:    Future Appointments   Date Time Provider Massimo Cook   12/31/2019 To Be Determined Ken Edmonds  [address]   12/31/2019 11:00 AM [facility]   1/2/2020 To Be Determined [address]   1/2/2020 To Be Determined Ken Edmonds  [address]   1/3/2020 11:00 AM [address]   1/7/2020 To Be Determined [facility]   1/7/2020 To Be Determined Fabiola Buenrostro RN [facility]   1/9/2020 To Be Determined [facility]   1/9/2020 To Be Determined Fabiola Buenrostro RN [facility]   1/10/2020 To Be Determined [facility]   1/14/2020 To Be Determined [facility]   1/14/2020 To Be Determined [address]   1/14/2020 To Be Determined Fabiola Buenrostro RN [facility]   1/16/2020 To Be Determined [facility] [address]   1/16/2020 To Be Determined Fabiola Buenrostro RN [address]   1/16/2020  2:00 PM Glo Saenz MD [address] [place] [address] Way   1/17/2020 To Be Determined Theresa Blackwell, PT [address]   1/21/2020 To Be Determined Fabiola Buenrostro RN [address]   1/23/2020 To Be Determined Fabiola Buenrostro RN [address]   1/29/2020 To Be Determined Fabiola Buenrostro, RN [address]   2/5/2020 To Be Determined Fabiola Buenrostro  [facility] [address]   2/13/2020 To Be Determined Fabiola Buenrostro, RN [address]

## 2019-12-31 NOTE — TELEPHONE ENCOUNTER
Per her medication list, it appears that she is on Bumex and Aldactone. Will you call nurse to relay this?   Thanks

## 2020-01-01 ENCOUNTER — TELEPHONE (OUTPATIENT)
Dept: FAMILY MEDICINE CLINIC | Age: 75
End: 2020-01-01

## 2020-01-01 ENCOUNTER — HOME CARE VISIT (OUTPATIENT)
Dept: SCHEDULING | Facility: HOME HEALTH | Age: 75
End: 2020-01-01
Payer: MEDICARE

## 2020-01-01 ENCOUNTER — APPOINTMENT (OUTPATIENT)
Dept: GENERAL RADIOLOGY | Age: 75
End: 2020-01-01
Attending: EMERGENCY MEDICINE
Payer: MEDICARE

## 2020-01-01 ENCOUNTER — TELEPHONE (OUTPATIENT)
Dept: NEUROLOGY | Age: 75
End: 2020-01-01

## 2020-01-01 ENCOUNTER — HOME CARE VISIT (OUTPATIENT)
Dept: HOME HEALTH SERVICES | Facility: HOME HEALTH | Age: 75
End: 2020-01-01
Payer: MEDICARE

## 2020-01-01 ENCOUNTER — HOME HEALTH ADMISSION (OUTPATIENT)
Dept: HOME HEALTH SERVICES | Facility: HOME HEALTH | Age: 75
End: 2020-01-01
Payer: MEDICARE

## 2020-01-01 ENCOUNTER — HOSPITAL ENCOUNTER (OUTPATIENT)
Dept: GENERAL RADIOLOGY | Age: 75
Discharge: HOME | End: 2020-03-09
Attending: INTERNAL MEDICINE
Payer: MEDICARE

## 2020-01-01 ENCOUNTER — VIRTUAL VISIT (OUTPATIENT)
Dept: FAMILY MEDICINE CLINIC | Age: 75
End: 2020-01-01
Payer: MEDICARE

## 2020-01-01 ENCOUNTER — OFFICE VISIT (OUTPATIENT)
Dept: FAMILY MEDICINE CLINIC | Age: 75
End: 2020-01-01

## 2020-01-01 ENCOUNTER — VIRTUAL VISIT (OUTPATIENT)
Dept: FAMILY MEDICINE CLINIC | Age: 75
End: 2020-01-01

## 2020-01-01 ENCOUNTER — APPOINTMENT (OUTPATIENT)
Dept: CT IMAGING | Age: 75
End: 2020-01-01
Attending: EMERGENCY MEDICINE
Payer: MEDICARE

## 2020-01-01 ENCOUNTER — OFFICE VISIT (OUTPATIENT)
Dept: NEUROLOGY | Age: 75
End: 2020-01-01
Payer: MEDICARE

## 2020-01-01 ENCOUNTER — HOSPITAL ENCOUNTER (EMERGENCY)
Age: 75
Discharge: HOME | End: 2020-12-13
Attending: EMERGENCY MEDICINE
Payer: MEDICARE

## 2020-01-01 ENCOUNTER — OFFICE VISIT (OUTPATIENT)
Dept: NEUROLOGY | Age: 75
End: 2020-01-01

## 2020-01-01 VITALS
OXYGEN SATURATION: 97 % | TEMPERATURE: 98.1 F | RESPIRATION RATE: 16 BRPM | SYSTOLIC BLOOD PRESSURE: 118 MMHG | DIASTOLIC BLOOD PRESSURE: 68 MMHG | HEART RATE: 87 BPM

## 2020-01-01 VITALS
OXYGEN SATURATION: 100 % | TEMPERATURE: 98.1 F | HEART RATE: 80 BPM | WEIGHT: 168.3 LBS | DIASTOLIC BLOOD PRESSURE: 55 MMHG | BODY MASS INDEX: 28.01 KG/M2 | RESPIRATION RATE: 18 BRPM | SYSTOLIC BLOOD PRESSURE: 107 MMHG

## 2020-01-01 VITALS
RESPIRATION RATE: 16 BRPM | HEART RATE: 70 BPM | SYSTOLIC BLOOD PRESSURE: 113 MMHG | DIASTOLIC BLOOD PRESSURE: 68 MMHG | WEIGHT: 168.9 LBS | OXYGEN SATURATION: 98 % | TEMPERATURE: 97.8 F | BODY MASS INDEX: 28.11 KG/M2

## 2020-01-01 VITALS
TEMPERATURE: 98 F | RESPIRATION RATE: 18 BRPM | DIASTOLIC BLOOD PRESSURE: 75 MMHG | OXYGEN SATURATION: 97 % | SYSTOLIC BLOOD PRESSURE: 128 MMHG | HEART RATE: 75 BPM

## 2020-01-01 VITALS — WEIGHT: 174.8 LBS | SYSTOLIC BLOOD PRESSURE: 132 MMHG | DIASTOLIC BLOOD PRESSURE: 78 MMHG | BODY MASS INDEX: 29.09 KG/M2

## 2020-01-01 VITALS
HEART RATE: 76 BPM | OXYGEN SATURATION: 97 % | TEMPERATURE: 98 F | RESPIRATION RATE: 18 BRPM | DIASTOLIC BLOOD PRESSURE: 75 MMHG | SYSTOLIC BLOOD PRESSURE: 126 MMHG

## 2020-01-01 VITALS
HEART RATE: 82 BPM | OXYGEN SATURATION: 98 % | BODY MASS INDEX: 28.46 KG/M2 | TEMPERATURE: 97.8 F | DIASTOLIC BLOOD PRESSURE: 70 MMHG | RESPIRATION RATE: 16 BRPM | SYSTOLIC BLOOD PRESSURE: 136 MMHG | WEIGHT: 171 LBS

## 2020-01-01 VITALS
HEART RATE: 84 BPM | RESPIRATION RATE: 16 BRPM | SYSTOLIC BLOOD PRESSURE: 116 MMHG | OXYGEN SATURATION: 98 % | WEIGHT: 171 LBS | TEMPERATURE: 98 F | BODY MASS INDEX: 28.46 KG/M2 | DIASTOLIC BLOOD PRESSURE: 67 MMHG

## 2020-01-01 VITALS
SYSTOLIC BLOOD PRESSURE: 126 MMHG | WEIGHT: 166.8 LBS | BODY MASS INDEX: 27.76 KG/M2 | RESPIRATION RATE: 16 BRPM | DIASTOLIC BLOOD PRESSURE: 71 MMHG | TEMPERATURE: 98.1 F | HEART RATE: 74 BPM | OXYGEN SATURATION: 98 %

## 2020-01-01 VITALS
DIASTOLIC BLOOD PRESSURE: 66 MMHG | OXYGEN SATURATION: 99 % | SYSTOLIC BLOOD PRESSURE: 119 MMHG | RESPIRATION RATE: 16 BRPM | BODY MASS INDEX: 28.26 KG/M2 | HEART RATE: 77 BPM | TEMPERATURE: 97.9 F | WEIGHT: 169.8 LBS

## 2020-01-01 VITALS
SYSTOLIC BLOOD PRESSURE: 133 MMHG | OXYGEN SATURATION: 97 % | HEIGHT: 65 IN | HEART RATE: 93 BPM | DIASTOLIC BLOOD PRESSURE: 78 MMHG | WEIGHT: 174 LBS | BODY MASS INDEX: 28.99 KG/M2 | TEMPERATURE: 97.8 F

## 2020-01-01 VITALS
OXYGEN SATURATION: 98 % | SYSTOLIC BLOOD PRESSURE: 121 MMHG | HEART RATE: 78 BPM | WEIGHT: 165 LBS | RESPIRATION RATE: 15 BRPM | DIASTOLIC BLOOD PRESSURE: 67 MMHG | TEMPERATURE: 97.9 F | BODY MASS INDEX: 27.46 KG/M2

## 2020-01-01 VITALS
SYSTOLIC BLOOD PRESSURE: 121 MMHG | HEART RATE: 88 BPM | OXYGEN SATURATION: 98 % | DIASTOLIC BLOOD PRESSURE: 77 MMHG | BODY MASS INDEX: 28.47 KG/M2 | RESPIRATION RATE: 17 BRPM | TEMPERATURE: 98.1 F | WEIGHT: 171.1 LBS

## 2020-01-01 VITALS
HEART RATE: 80 BPM | TEMPERATURE: 97.3 F | SYSTOLIC BLOOD PRESSURE: 117 MMHG | BODY MASS INDEX: 28.62 KG/M2 | OXYGEN SATURATION: 97 % | RESPIRATION RATE: 16 BRPM | WEIGHT: 172 LBS | DIASTOLIC BLOOD PRESSURE: 70 MMHG

## 2020-01-01 VITALS
RESPIRATION RATE: 16 BRPM | DIASTOLIC BLOOD PRESSURE: 67 MMHG | HEART RATE: 70 BPM | BODY MASS INDEX: 28.32 KG/M2 | WEIGHT: 170.2 LBS | OXYGEN SATURATION: 98 % | TEMPERATURE: 97.9 F | SYSTOLIC BLOOD PRESSURE: 111 MMHG

## 2020-01-01 VITALS
OXYGEN SATURATION: 98 % | HEART RATE: 89 BPM | RESPIRATION RATE: 16 BRPM | DIASTOLIC BLOOD PRESSURE: 69 MMHG | TEMPERATURE: 97.9 F | SYSTOLIC BLOOD PRESSURE: 123 MMHG

## 2020-01-01 VITALS
BODY MASS INDEX: 28.09 KG/M2 | HEART RATE: 86 BPM | RESPIRATION RATE: 16 BRPM | TEMPERATURE: 98.1 F | SYSTOLIC BLOOD PRESSURE: 117 MMHG | WEIGHT: 168.8 LBS | DIASTOLIC BLOOD PRESSURE: 67 MMHG | OXYGEN SATURATION: 98 %

## 2020-01-01 VITALS
SYSTOLIC BLOOD PRESSURE: 113 MMHG | HEART RATE: 71 BPM | BODY MASS INDEX: 28.36 KG/M2 | WEIGHT: 170.4 LBS | RESPIRATION RATE: 16 BRPM | DIASTOLIC BLOOD PRESSURE: 67 MMHG | OXYGEN SATURATION: 98 % | TEMPERATURE: 98 F

## 2020-01-01 VITALS
TEMPERATURE: 98 F | DIASTOLIC BLOOD PRESSURE: 67 MMHG | HEART RATE: 80 BPM | SYSTOLIC BLOOD PRESSURE: 131 MMHG | OXYGEN SATURATION: 97 % | RESPIRATION RATE: 16 BRPM

## 2020-01-01 VITALS
WEIGHT: 166.2 LBS | DIASTOLIC BLOOD PRESSURE: 65 MMHG | HEART RATE: 84 BPM | OXYGEN SATURATION: 97 % | SYSTOLIC BLOOD PRESSURE: 122 MMHG | BODY MASS INDEX: 27.66 KG/M2 | TEMPERATURE: 98 F | RESPIRATION RATE: 16 BRPM

## 2020-01-01 VITALS
SYSTOLIC BLOOD PRESSURE: 126 MMHG | OXYGEN SATURATION: 98 % | BODY MASS INDEX: 27.82 KG/M2 | RESPIRATION RATE: 16 BRPM | TEMPERATURE: 97.9 F | WEIGHT: 167.2 LBS | HEART RATE: 87 BPM | DIASTOLIC BLOOD PRESSURE: 67 MMHG

## 2020-01-01 VITALS
RESPIRATION RATE: 16 BRPM | DIASTOLIC BLOOD PRESSURE: 69 MMHG | BODY MASS INDEX: 27.82 KG/M2 | WEIGHT: 167.2 LBS | TEMPERATURE: 98.1 F | HEART RATE: 80 BPM | SYSTOLIC BLOOD PRESSURE: 120 MMHG | OXYGEN SATURATION: 98 %

## 2020-01-01 VITALS
SYSTOLIC BLOOD PRESSURE: 116 MMHG | HEART RATE: 89 BPM | BODY MASS INDEX: 27.86 KG/M2 | OXYGEN SATURATION: 98 % | DIASTOLIC BLOOD PRESSURE: 70 MMHG | RESPIRATION RATE: 16 BRPM | WEIGHT: 167.4 LBS | TEMPERATURE: 98.1 F

## 2020-01-01 VITALS
WEIGHT: 169.4 LBS | DIASTOLIC BLOOD PRESSURE: 72 MMHG | RESPIRATION RATE: 16 BRPM | HEART RATE: 86 BPM | TEMPERATURE: 98.2 F | OXYGEN SATURATION: 98 % | BODY MASS INDEX: 28.19 KG/M2 | SYSTOLIC BLOOD PRESSURE: 126 MMHG

## 2020-01-01 VITALS
WEIGHT: 173 LBS | OXYGEN SATURATION: 99 % | SYSTOLIC BLOOD PRESSURE: 121 MMHG | BODY MASS INDEX: 28.79 KG/M2 | DIASTOLIC BLOOD PRESSURE: 70 MMHG | RESPIRATION RATE: 17 BRPM | HEART RATE: 87 BPM | TEMPERATURE: 98.2 F

## 2020-01-01 VITALS
BODY MASS INDEX: 30.22 KG/M2 | DIASTOLIC BLOOD PRESSURE: 89 MMHG | WEIGHT: 177 LBS | HEIGHT: 64 IN | SYSTOLIC BLOOD PRESSURE: 149 MMHG | OXYGEN SATURATION: 97 % | TEMPERATURE: 97.8 F | RESPIRATION RATE: 18 BRPM | HEART RATE: 89 BPM

## 2020-01-01 VITALS — TEMPERATURE: 97 F | HEART RATE: 86 BPM | OXYGEN SATURATION: 97 % | RESPIRATION RATE: 18 BRPM

## 2020-01-01 VITALS
OXYGEN SATURATION: 98 % | BODY MASS INDEX: 28.09 KG/M2 | DIASTOLIC BLOOD PRESSURE: 67 MMHG | SYSTOLIC BLOOD PRESSURE: 117 MMHG | WEIGHT: 168.8 LBS | HEART RATE: 87 BPM | TEMPERATURE: 98 F | RESPIRATION RATE: 16 BRPM

## 2020-01-01 VITALS
TEMPERATURE: 97.8 F | SYSTOLIC BLOOD PRESSURE: 95 MMHG | OXYGEN SATURATION: 97 % | HEART RATE: 92 BPM | DIASTOLIC BLOOD PRESSURE: 58 MMHG | RESPIRATION RATE: 18 BRPM

## 2020-01-01 VITALS
SYSTOLIC BLOOD PRESSURE: 124 MMHG | DIASTOLIC BLOOD PRESSURE: 68 MMHG | OXYGEN SATURATION: 96 % | TEMPERATURE: 98 F | HEART RATE: 70 BPM | RESPIRATION RATE: 16 BRPM

## 2020-01-01 VITALS — TEMPERATURE: 97 F

## 2020-01-01 VITALS
DIASTOLIC BLOOD PRESSURE: 68 MMHG | TEMPERATURE: 97.8 F | RESPIRATION RATE: 18 BRPM | SYSTOLIC BLOOD PRESSURE: 100 MMHG | OXYGEN SATURATION: 95 % | HEART RATE: 70 BPM

## 2020-01-01 DIAGNOSIS — G89.29 CHRONIC PAIN OF RIGHT KNEE: ICD-10-CM

## 2020-01-01 DIAGNOSIS — F41.8 ANXIETY ABOUT HEALTH: ICD-10-CM

## 2020-01-01 DIAGNOSIS — M25.552 LEFT HIP PAIN: ICD-10-CM

## 2020-01-01 DIAGNOSIS — M25.561 CHRONIC PAIN OF RIGHT KNEE: ICD-10-CM

## 2020-01-01 DIAGNOSIS — M15.9 PRIMARY OSTEOARTHRITIS INVOLVING MULTIPLE JOINTS: ICD-10-CM

## 2020-01-01 DIAGNOSIS — G89.4 CHRONIC PAIN SYNDROME: ICD-10-CM

## 2020-01-01 DIAGNOSIS — G89.29 CHRONIC PAIN OF MULTIPLE SITES: Primary | ICD-10-CM

## 2020-01-01 DIAGNOSIS — F41.9 SEVERE ANXIETY: ICD-10-CM

## 2020-01-01 DIAGNOSIS — G31.84 MCI (MILD COGNITIVE IMPAIRMENT): Primary | ICD-10-CM

## 2020-01-01 DIAGNOSIS — N39.0 URINARY TRACT INFECTION WITHOUT HEMATURIA, SITE UNSPECIFIED: Primary | ICD-10-CM

## 2020-01-01 DIAGNOSIS — Z87.01 H/O: PNEUMONIA: ICD-10-CM

## 2020-01-01 DIAGNOSIS — N18.30 CKD (CHRONIC KIDNEY DISEASE) STAGE 3, GFR 30-59 ML/MIN (HCC): ICD-10-CM

## 2020-01-01 DIAGNOSIS — R35.0 URINARY FREQUENCY: ICD-10-CM

## 2020-01-01 DIAGNOSIS — F41.9 ANXIETY: ICD-10-CM

## 2020-01-01 DIAGNOSIS — R41.89 COGNITIVE DECLINE: ICD-10-CM

## 2020-01-01 DIAGNOSIS — R26.81 GAIT INSTABILITY: ICD-10-CM
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
## 2020-01-01 DIAGNOSIS — S20.212A CONTUSION OF LEFT CHEST WALL, INITIAL ENCOUNTER: ICD-10-CM

## 2020-01-01 DIAGNOSIS — G31.84 MCI (MILD COGNITIVE IMPAIRMENT): ICD-10-CM

## 2020-01-01 DIAGNOSIS — G31.84 MILD COGNITIVE IMPAIRMENT WITH MEMORY LOSS: Primary | ICD-10-CM

## 2020-01-01 DIAGNOSIS — R41.3 MEMORY LOSS: ICD-10-CM

## 2020-01-01 DIAGNOSIS — R52 CHRONIC PAIN OF MULTIPLE SITES: Primary | ICD-10-CM

## 2020-01-01 DIAGNOSIS — R46.89 COGNITIVE AND BEHAVIORAL CHANGES: Primary | ICD-10-CM

## 2020-01-01 DIAGNOSIS — R30.0 DYSURIA: Primary | ICD-10-CM
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
## 2020-01-01 DIAGNOSIS — F33.1 MODERATE EPISODE OF RECURRENT MAJOR DEPRESSIVE DISORDER (HCC): ICD-10-CM

## 2020-01-01 DIAGNOSIS — R41.89 COGNITIVE AND BEHAVIORAL CHANGES: Primary | ICD-10-CM

## 2020-01-01 DIAGNOSIS — R21 RASH: ICD-10-CM

## 2020-01-01 DIAGNOSIS — T14.8XXA BRUISE OF MUSCLE: Primary | ICD-10-CM

## 2020-01-01 DIAGNOSIS — F32.1 MODERATE MAJOR DEPRESSION (HCC): ICD-10-CM

## 2020-01-01 DIAGNOSIS — R22.43 LOCALIZED SWELLING OF BOTH LOWER LEGS: ICD-10-CM

## 2020-01-01 DIAGNOSIS — S09.90XA INJURY OF HEAD, INITIAL ENCOUNTER: ICD-10-CM

## 2020-01-01 DIAGNOSIS — S81.802A WOUND OF LEFT LOWER EXTREMITY, INITIAL ENCOUNTER: Primary | ICD-10-CM

## 2020-01-01 DIAGNOSIS — G31.84 MILD COGNITIVE IMPAIRMENT: Primary | ICD-10-CM

## 2020-01-01 DIAGNOSIS — Z91.89 AT RISK FOR POLYPHARMACY: ICD-10-CM

## 2020-01-01 LAB
ALBUMIN SERPL-MCNC: 3.5 G/DL (ref 3.5–5)
ALBUMIN SERPL-MCNC: 3.8 G/DL (ref 3.7–4.7)
ALBUMIN SERPL-MCNC: 3.9 G/DL (ref 3.7–4.7)
ALBUMIN SERPL-MCNC: NORMAL G/DL
ALBUMIN/GLOB SERPL: 0.9 {RATIO} (ref 1.1–2.2)
ALBUMIN/GLOB SERPL: 1.2 {RATIO} (ref 1.2–2.2)
ALBUMIN/GLOB SERPL: 1.2 {RATIO} (ref 1.2–2.2)
ALP SERPL-CCNC: 56 U/L (ref 45–117)
ALP SERPL-CCNC: 69 IU/L (ref 39–117)
ALP SERPL-CCNC: 72 IU/L (ref 39–117)
ALP SERPL-CCNC: NORMAL U/L
ALT SERPL-CCNC: 13 U/L (ref 12–78)
ALT SERPL-CCNC: 6 IU/L (ref 0–32)
ALT SERPL-CCNC: 7 IU/L (ref 0–32)
ALT SERPL-CCNC: NORMAL U/L
ANION GAP SERPL CALC-SCNC: 3 MMOL/L (ref 5–15)
APPEARANCE UR: CLEAR
AST SERPL-CCNC: 15 IU/L (ref 0–40)
AST SERPL-CCNC: 17 IU/L (ref 0–40)
AST SERPL-CCNC: 26 U/L (ref 15–37)
AST SERPL-CCNC: NORMAL U/L
ATRIAL RATE: 91 BPM
BACTERIA SPEC CULT: ABNORMAL
BACTERIA URNS QL MICRO: NEGATIVE /HPF
BASOPHILS # BLD AUTO: 0 X10E3/UL (ref 0–0.2)
BASOPHILS # BLD AUTO: 0 X10E3/UL (ref 0–0.2)
BASOPHILS # BLD AUTO: 0.1 X10E3/UL (ref 0–0.2)
BASOPHILS # BLD: 0.1 K/UL (ref 0–0.1)
BASOPHILS NFR BLD AUTO: 0 %
BASOPHILS NFR BLD AUTO: 0 %
BASOPHILS NFR BLD AUTO: 1 %
BASOPHILS NFR BLD: 1 % (ref 0–1)
BILIRUB SERPL-MCNC: 0.3 MG/DL (ref 0–1.2)
BILIRUB SERPL-MCNC: 0.3 MG/DL (ref 0–1.2)
BILIRUB SERPL-MCNC: 0.6 MG/DL (ref 0.2–1)
BILIRUB SERPL-MCNC: NORMAL MG/DL
BILIRUB UR QL STRIP: NEGATIVE
BILIRUB UR QL: NEGATIVE
BUN SERPL-MCNC: 23 MG/DL (ref 8–27)
BUN SERPL-MCNC: 24 MG/DL (ref 6–20)
BUN SERPL-MCNC: 26 MG/DL (ref 8–27)
BUN SERPL-MCNC: NORMAL MG/DL
BUN/CREAT SERPL: 12 (ref 12–20)
BUN/CREAT SERPL: 17 (ref 12–28)
BUN/CREAT SERPL: 19 (ref 12–28)
CALCIUM SERPL-MCNC: 9.4 MG/DL (ref 8.7–10.3)
CALCIUM SERPL-MCNC: 9.5 MG/DL (ref 8.5–10.1)
CALCIUM SERPL-MCNC: 9.9 MG/DL (ref 8.7–10.3)
CALCIUM SERPL-MCNC: NORMAL MG/DL
CALCULATED P AXIS, ECG09: 38 DEGREES
CALCULATED R AXIS, ECG10: 63 DEGREES
CALCULATED T AXIS, ECG11: 37 DEGREES
CC UR VC: ABNORMAL
CHLORIDE SERPL-SCNC: 102 MMOL/L (ref 97–108)
CHLORIDE SERPL-SCNC: 94 MMOL/L (ref 96–106)
CHLORIDE SERPL-SCNC: 96 MMOL/L (ref 96–106)
CHLORIDE SERPL-SCNC: NORMAL MMOL/L
CK SERPL-CCNC: 143 U/L (ref 26–192)
CO2 SERPL-SCNC: 27 MMOL/L (ref 20–29)
CO2 SERPL-SCNC: 30 MMOL/L (ref 20–29)
CO2 SERPL-SCNC: 31 MMOL/L (ref 21–32)
CO2 SERPL-SCNC: NORMAL MMOL/L
COLOR UR: ABNORMAL
COMMENT, HOLDF: NORMAL
CREAT SERPL-MCNC: 1.34 MG/DL (ref 0.57–1)
CREAT SERPL-MCNC: 1.38 MG/DL (ref 0.57–1)
CREAT SERPL-MCNC: 1.94 MG/DL (ref 0.55–1.02)
CREAT SERPL-MCNC: NORMAL MG/DL
DIAGNOSIS, 93000: NORMAL
DIFFERENTIAL METHOD BLD: ABNORMAL
EOSINOPHIL # BLD AUTO: 0.1 X10E3/UL (ref 0–0.4)
EOSINOPHIL # BLD AUTO: 0.2 X10E3/UL (ref 0–0.4)
EOSINOPHIL # BLD AUTO: 0.3 X10E3/UL (ref 0–0.4)
EOSINOPHIL # BLD: 0.1 K/UL (ref 0–0.4)
EOSINOPHIL NFR BLD AUTO: 2 %
EOSINOPHIL NFR BLD AUTO: 3 %
EOSINOPHIL NFR BLD AUTO: 4 %
EOSINOPHIL NFR BLD: 2 % (ref 0–7)
EPITH CASTS URNS QL MICRO: ABNORMAL /LPF
ERYTHROCYTE [DISTWIDTH] IN BLOOD BY AUTOMATED COUNT: 11.9 % (ref 11.7–15.4)
ERYTHROCYTE [DISTWIDTH] IN BLOOD BY AUTOMATED COUNT: 11.9 % (ref 11.7–15.4)
ERYTHROCYTE [DISTWIDTH] IN BLOOD BY AUTOMATED COUNT: 12.8 % (ref 11.7–15.4)
ERYTHROCYTE [DISTWIDTH] IN BLOOD BY AUTOMATED COUNT: 13.9 % (ref 11.5–14.5)
ETHANOL SERPL-MCNC: <10 MG/DL
GLOBULIN SER CALC-MCNC: 3.2 G/DL (ref 1.5–4.5)
GLOBULIN SER CALC-MCNC: 3.2 G/DL (ref 1.5–4.5)
GLOBULIN SER CALC-MCNC: 3.9 G/DL (ref 2–4)
GLUCOSE SERPL-MCNC: 82 MG/DL (ref 65–100)
GLUCOSE SERPL-MCNC: 84 MG/DL (ref 65–99)
GLUCOSE SERPL-MCNC: ABNORMAL MG/DL
GLUCOSE SERPL-MCNC: NORMAL MG/DL
GLUCOSE UR STRIP.AUTO-MCNC: NEGATIVE MG/DL
GLUCOSE UR-MCNC: NEGATIVE MG/DL
HCT VFR BLD AUTO: 33.1 % (ref 34–46.6)
HCT VFR BLD AUTO: 33.1 % (ref 34–46.6)
HCT VFR BLD AUTO: 34.2 % (ref 35–47)
HCT VFR BLD AUTO: 35.9 % (ref 34–46.6)
HGB BLD-MCNC: 10.7 G/DL (ref 11.1–15.9)
HGB BLD-MCNC: 10.9 G/DL (ref 11.5–16)
HGB BLD-MCNC: 11.1 G/DL (ref 11.1–15.9)
HGB BLD-MCNC: 11.2 G/DL (ref 11.1–15.9)
HGB UR QL STRIP: NEGATIVE
HYALINE CASTS URNS QL MICRO: ABNORMAL /LPF (ref 0–5)
IMM GRANULOCYTES # BLD AUTO: 0 X10E3/UL (ref 0–0.1)
IMM GRANULOCYTES # BLD AUTO: 0.1 K/UL (ref 0–0.04)
IMM GRANULOCYTES NFR BLD AUTO: 0 %
IMM GRANULOCYTES NFR BLD AUTO: 0 %
IMM GRANULOCYTES NFR BLD AUTO: 1 %
IMM GRANULOCYTES NFR BLD AUTO: 1 % (ref 0–0.5)
KETONES P FAST UR STRIP-MCNC: NEGATIVE MG/DL
KETONES UR QL STRIP.AUTO: NEGATIVE MG/DL
LEUKOCYTE ESTERASE UR QL STRIP.AUTO: ABNORMAL
LYMPHOCYTES # BLD AUTO: 0.7 X10E3/UL (ref 0.7–3.1)
LYMPHOCYTES # BLD AUTO: 0.9 X10E3/UL (ref 0.7–3.1)
LYMPHOCYTES # BLD AUTO: 1 X10E3/UL (ref 0.7–3.1)
LYMPHOCYTES # BLD: 0.8 K/UL (ref 0.8–3.5)
LYMPHOCYTES NFR BLD AUTO: 10 %
LYMPHOCYTES NFR BLD AUTO: 12 %
LYMPHOCYTES NFR BLD AUTO: 17 %
LYMPHOCYTES NFR BLD: 14 % (ref 12–49)
MAGNESIUM SERPL-MCNC: 2.3 MG/DL (ref 1.6–2.4)
MCH RBC QN AUTO: 33.6 PG (ref 26.6–33)
MCH RBC QN AUTO: 34.4 PG (ref 26.6–33)
MCH RBC QN AUTO: 34.6 PG (ref 26.6–33)
MCH RBC QN AUTO: 35.9 PG (ref 26–34)
MCHC RBC AUTO-ENTMCNC: 31.2 G/DL (ref 31.5–35.7)
MCHC RBC AUTO-ENTMCNC: 31.9 G/DL (ref 30–36.5)
MCHC RBC AUTO-ENTMCNC: 32.3 G/DL (ref 31.5–35.7)
MCHC RBC AUTO-ENTMCNC: 33.5 G/DL (ref 31.5–35.7)
MCV RBC AUTO: 103 FL (ref 79–97)
MCV RBC AUTO: 106 FL (ref 79–97)
MCV RBC AUTO: 108 FL (ref 79–97)
MCV RBC AUTO: 112.5 FL (ref 80–99)
MONOCYTES # BLD AUTO: 0.7 X10E3/UL (ref 0.1–0.9)
MONOCYTES # BLD AUTO: 0.7 X10E3/UL (ref 0.1–0.9)
MONOCYTES # BLD AUTO: 0.8 X10E3/UL (ref 0.1–0.9)
MONOCYTES # BLD: 0.6 K/UL (ref 0–1)
MONOCYTES NFR BLD AUTO: 10 %
MONOCYTES NFR BLD AUTO: 11 %
MONOCYTES NFR BLD AUTO: 11 %
MONOCYTES NFR BLD: 10 % (ref 5–13)
NEUTROPHILS # BLD AUTO: 4.2 X10E3/UL (ref 1.4–7)
NEUTROPHILS # BLD AUTO: 5.2 X10E3/UL (ref 1.4–7)
NEUTROPHILS # BLD AUTO: 5.3 X10E3/UL (ref 1.4–7)
NEUTROPHILS NFR BLD AUTO: 69 %
NEUTROPHILS NFR BLD AUTO: 73 %
NEUTROPHILS NFR BLD AUTO: 76 %
NEUTS SEG # BLD: 4.3 K/UL (ref 1.8–8)
NEUTS SEG NFR BLD: 72 % (ref 32–75)
NITRITE UR QL STRIP.AUTO: NEGATIVE
NRBC # BLD: 0 K/UL (ref 0–0.01)
NRBC BLD-RTO: 0 PER 100 WBC
P-R INTERVAL, ECG05: 146 MS
PH UR STRIP: 5 [PH] (ref 4.6–8)
PH UR STRIP: 7 [PH] (ref 5–8)
PHOSPHATE SERPL-MCNC: 3 MG/DL (ref 2.6–4.7)
PLATELET # BLD AUTO: 179 K/UL (ref 150–400)
PLATELET # BLD AUTO: 186 X10E3/UL (ref 150–450)
PLATELET # BLD AUTO: 196 X10E3/UL (ref 150–450)
PLATELET # BLD AUTO: 226 X10E3/UL (ref 150–450)
PMV BLD AUTO: 9 FL (ref 8.9–12.9)
POTASSIUM SERPL-SCNC: 3.6 MMOL/L (ref 3.5–5.2)
POTASSIUM SERPL-SCNC: 3.8 MMOL/L (ref 3.5–5.2)
POTASSIUM SERPL-SCNC: 5 MMOL/L (ref 3.5–5.1)
POTASSIUM SERPL-SCNC: NORMAL MMOL/L
PROT SERPL-MCNC: 7 G/DL (ref 6–8.5)
PROT SERPL-MCNC: 7.1 G/DL (ref 6–8.5)
PROT SERPL-MCNC: 7.4 G/DL (ref 6.4–8.2)
PROT SERPL-MCNC: NORMAL G/DL
PROT UR QL STRIP: NEGATIVE
PROT UR STRIP-MCNC: NEGATIVE MG/DL
Q-T INTERVAL, ECG07: 376 MS
QRS DURATION, ECG06: 66 MS
QTC CALCULATION (BEZET), ECG08: 462 MS
RBC # BLD AUTO: 3.04 M/UL (ref 3.8–5.2)
RBC # BLD AUTO: 3.11 X10E6/UL (ref 3.77–5.28)
RBC # BLD AUTO: 3.21 X10E6/UL (ref 3.77–5.28)
RBC # BLD AUTO: 3.33 X10E6/UL (ref 3.77–5.28)
RBC #/AREA URNS HPF: ABNORMAL /HPF (ref 0–5)
RBC MORPH BLD: ABNORMAL
SAMPLES BEING HELD,HOLD: NORMAL
SERVICE CMNT-IMP: ABNORMAL
SODIUM SERPL-SCNC: 136 MMOL/L (ref 136–145)
SODIUM SERPL-SCNC: 146 MMOL/L (ref 134–144)
SODIUM SERPL-SCNC: 146 MMOL/L (ref 134–144)
SODIUM SERPL-SCNC: NORMAL MMOL/L
SP GR UR REFRACTOMETRY: 1 (ref 1–1.03)
SP GR UR STRIP: 1.01 (ref 1–1.03)
TROPONIN I SERPL-MCNC: <0.05 NG/ML
UA UROBILINOGEN AMB POC: NORMAL (ref 0.2–1)
UA: UC IF INDICATED,UAUC: ABNORMAL
URINALYSIS CLARITY POC: CLEAR
URINALYSIS COLOR POC: YELLOW
URINE BLOOD POC: NORMAL
URINE LEUKOCYTES POC: NORMAL
URINE NITRITES POC: NEGATIVE
UROBILINOGEN UR QL STRIP.AUTO: 0.2 EU/DL (ref 0.2–1)
VENTRICULAR RATE, ECG03: 91 BPM
WBC # BLD AUTO: 6 K/UL (ref 3.6–11)
WBC # BLD AUTO: 6.2 X10E3/UL (ref 3.4–10.8)
WBC # BLD AUTO: 6.8 X10E3/UL (ref 3.4–10.8)
WBC # BLD AUTO: 7.2 X10E3/UL (ref 3.4–10.8)
WBC URNS QL MICRO: ABNORMAL /HPF (ref 0–4)

## 2020-01-01 PROCEDURE — 71046 X-RAY EXAM CHEST 2 VIEWS: CPT

## 2020-01-01 PROCEDURE — 3331090001 HH PPS REVENUE CREDIT

## 2020-01-01 PROCEDURE — A6216 NON-STERILE GAUZE<=16 SQ IN: HCPCS

## 2020-01-01 PROCEDURE — 3331090002 HH PPS REVENUE DEBIT

## 2020-01-01 PROCEDURE — G0300 HHS/HOSPICE OF LPN EA 15 MIN: HCPCS

## 2020-01-01 PROCEDURE — 83735 ASSAY OF MAGNESIUM: CPT

## 2020-01-01 PROCEDURE — 93005 ELECTROCARDIOGRAM TRACING: CPT

## 2020-01-01 PROCEDURE — 82550 ASSAY OF CK (CPK): CPT

## 2020-01-01 PROCEDURE — 77030038269 HC DRN EXT URIN PURWCK BARD -A

## 2020-01-01 PROCEDURE — 84100 ASSAY OF PHOSPHORUS: CPT

## 2020-01-01 PROCEDURE — 96136 PSYCL/NRPSYC TST PHY/QHP 1ST: CPT | Performed by: CLINICAL NEUROPSYCHOLOGIST

## 2020-01-01 PROCEDURE — A6446 CONFORM BAND S W>=3" <5"/YD: HCPCS

## 2020-01-01 PROCEDURE — 96137 PSYCL/NRPSYC TST PHY/QHP EA: CPT | Performed by: CLINICAL NEUROPSYCHOLOGIST

## 2020-01-01 PROCEDURE — 400014 HH F/U

## 2020-01-01 PROCEDURE — G0299 HHS/HOSPICE OF RN EA 15 MIN: HCPCS

## 2020-01-01 PROCEDURE — 74011250637 HC RX REV CODE- 250/637: Performed by: EMERGENCY MEDICINE

## 2020-01-01 PROCEDURE — A6457 TUBULAR DRESSING: HCPCS

## 2020-01-01 PROCEDURE — 96139 PSYCL/NRPSYC TST TECH EA: CPT | Performed by: CLINICAL NEUROPSYCHOLOGIST

## 2020-01-01 PROCEDURE — 400013 HH SOC

## 2020-01-01 PROCEDURE — A6402 STERILE GAUZE <= 16 SQ IN: HCPCS

## 2020-01-01 PROCEDURE — 3331090003 HH PPS REVENUE ADJ

## 2020-01-01 PROCEDURE — 96132 NRPSYC TST EVAL PHYS/QHP 1ST: CPT | Performed by: CLINICAL NEUROPSYCHOLOGIST

## 2020-01-01 PROCEDURE — A4450 NON-WATERPROOF TAPE: HCPCS

## 2020-01-01 PROCEDURE — A6223 GAUZE >16<=48 NO W/SAL W/O B: HCPCS

## 2020-01-01 PROCEDURE — 80307 DRUG TEST PRSMV CHEM ANLYZR: CPT

## 2020-01-01 PROCEDURE — A4452 WATERPROOF TAPE: HCPCS

## 2020-01-01 PROCEDURE — A6260 WOUND CLEANSER ANY TYPE/SIZE: HCPCS

## 2020-01-01 PROCEDURE — A6240 HYDROCOLLD DRG FILLER PASTE: HCPCS

## 2020-01-01 PROCEDURE — 99442 PR PHYS/QHP TELEPHONE EVALUATION 11-20 MIN: CPT | Performed by: INTERNAL MEDICINE

## 2020-01-01 PROCEDURE — 70450 CT HEAD/BRAIN W/O DYE: CPT

## 2020-01-01 PROCEDURE — 96133 NRPSYC TST EVAL PHYS/QHP EA: CPT | Performed by: CLINICAL NEUROPSYCHOLOGIST

## 2020-01-01 PROCEDURE — 74011250636 HC RX REV CODE- 250/636: Performed by: EMERGENCY MEDICINE

## 2020-01-01 PROCEDURE — 71045 X-RAY EXAM CHEST 1 VIEW: CPT

## 2020-01-01 PROCEDURE — 80053 COMPREHEN METABOLIC PANEL: CPT

## 2020-01-01 PROCEDURE — 81001 URINALYSIS AUTO W/SCOPE: CPT

## 2020-01-01 PROCEDURE — 99442 PR PHYS/QHP TELEPHONE EVALUATION 11-20 MIN: CPT | Performed by: FAMILY MEDICINE

## 2020-01-01 PROCEDURE — 90832 PSYTX W PT 30 MINUTES: CPT | Performed by: CLINICAL NEUROPSYCHOLOGIST

## 2020-01-01 PROCEDURE — 85025 COMPLETE CBC W/AUTO DIFF WBC: CPT

## 2020-01-01 PROCEDURE — 84484 ASSAY OF TROPONIN QUANT: CPT

## 2020-01-01 PROCEDURE — A9270 NON-COVERED ITEM OR SERVICE: HCPCS

## 2020-01-01 PROCEDURE — 36415 COLL VENOUS BLD VENIPUNCTURE: CPT

## 2020-01-01 PROCEDURE — 99285 EMERGENCY DEPT VISIT HI MDM: CPT

## 2020-01-01 PROCEDURE — 96138 PSYCL/NRPSYC TECH 1ST: CPT | Performed by: CLINICAL NEUROPSYCHOLOGIST

## 2020-01-01 RX ORDER — DIAZEPAM 5 MG/1
TABLET ORAL
Qty: 30 TAB | Refills: 1 | Status: SHIPPED | OUTPATIENT
Start: 2020-01-01 | End: 2020-01-01

## 2020-01-01 RX ORDER — ATENOLOL 50 MG/1
TABLET ORAL
Qty: 30 TAB | Refills: 0 | Status: SHIPPED | OUTPATIENT
Start: 2020-01-01 | End: 2020-01-01

## 2020-01-01 RX ORDER — NITROFURANTOIN (MACROCRYSTALS) 100 MG/1
100 CAPSULE ORAL
Qty: 10 CAP | Refills: 0 | Status: SHIPPED | OUTPATIENT
Start: 2020-01-01 | End: 2020-01-01

## 2020-01-01 RX ORDER — SULFAMETHOXAZOLE AND TRIMETHOPRIM 800; 160 MG/1; MG/1
1 TABLET ORAL 2 TIMES DAILY
Qty: 20 TAB | Refills: 0 | Status: SHIPPED | OUTPATIENT
Start: 2020-01-01 | End: 2020-01-01

## 2020-01-01 RX ORDER — DIAZEPAM 5 MG/1
TABLET ORAL
Qty: 30 TAB | Refills: 1 | Status: SHIPPED | OUTPATIENT
Start: 2020-01-01 | End: 2020-01-01 | Stop reason: ALTCHOICE

## 2020-01-01 RX ORDER — BUPROPION HYDROCHLORIDE 100 MG/1
100 TABLET, EXTENDED RELEASE ORAL DAILY
Qty: 60 TAB | Refills: 5 | Status: SHIPPED | OUTPATIENT
Start: 2020-01-01 | End: 2020-01-01 | Stop reason: DRUGHIGH

## 2020-01-01 RX ORDER — CLINDAMYCIN PHOSPHATE 10 MG/G
GEL TOPICAL 2 TIMES DAILY
Qty: 30 G | Refills: 5 | Status: SHIPPED | OUTPATIENT
Start: 2020-01-01 | End: 2020-01-01 | Stop reason: ALTCHOICE

## 2020-01-01 RX ORDER — SULFAMETHOXAZOLE AND TRIMETHOPRIM 800; 160 MG/1; MG/1
1 TABLET ORAL 2 TIMES DAILY
Qty: 10 TAB | Refills: 0 | Status: SHIPPED | OUTPATIENT
Start: 2020-01-01 | End: 2020-01-01

## 2020-01-01 RX ORDER — DIAZEPAM 5 MG/1
TABLET ORAL
Qty: 30 TAB | Refills: 1 | Status: SHIPPED | OUTPATIENT
Start: 2020-01-01 | End: 2020-01-01 | Stop reason: SDUPTHER

## 2020-01-01 RX ORDER — BUPROPION HYDROCHLORIDE 100 MG/1
TABLET, EXTENDED RELEASE ORAL
Qty: 180 TAB | Refills: 1 | Status: SHIPPED | OUTPATIENT
Start: 2020-01-01 | End: 2020-01-01 | Stop reason: ALTCHOICE

## 2020-01-01 RX ORDER — LIDOCAINE 50 MG/G
PATCH TOPICAL
Qty: 30 PATCH | Refills: 3 | Status: SHIPPED | OUTPATIENT
Start: 2020-01-01 | End: 2021-01-01 | Stop reason: CLARIF

## 2020-01-01 RX ORDER — BUPROPION HYDROCHLORIDE 100 MG/1
TABLET ORAL
Qty: 180 TAB | Refills: 1 | Status: SHIPPED | OUTPATIENT
Start: 2020-01-01

## 2020-01-01 RX ORDER — DIAZEPAM 5 MG/1
TABLET ORAL
Qty: 30 TAB | Refills: 1 | Status: SHIPPED | OUTPATIENT
Start: 2020-01-01 | End: 2021-01-01

## 2020-01-01 RX ORDER — BUPROPION HYDROCHLORIDE 100 MG/1
TABLET, EXTENDED RELEASE ORAL
Qty: 60 TAB | Refills: 0 | Status: SHIPPED | OUTPATIENT
Start: 2020-01-01 | End: 2020-01-01

## 2020-01-01 RX ORDER — CITALOPRAM 10 MG/1
10 TABLET ORAL
Qty: 90 TAB | Refills: 1 | Status: SHIPPED | OUTPATIENT
Start: 2020-01-01 | End: 2020-01-01

## 2020-01-01 RX ORDER — TRAMADOL HYDROCHLORIDE 50 MG/1
50-100 TABLET ORAL
Qty: 150 TAB | Refills: 0 | Status: SHIPPED | OUTPATIENT
Start: 2020-01-01 | End: 2020-01-01 | Stop reason: SDUPTHER

## 2020-01-01 RX ORDER — CLINDAMYCIN PHOSPHATE 10 MG/G
GEL TOPICAL 2 TIMES DAILY
Qty: 60 G | Refills: 5 | Status: SHIPPED | OUTPATIENT
Start: 2020-01-01 | End: 2021-01-01 | Stop reason: CLARIF

## 2020-01-01 RX ORDER — TRAMADOL HYDROCHLORIDE 50 MG/1
50-100 TABLET ORAL
Qty: 150 TAB | Refills: 0 | Status: SHIPPED | OUTPATIENT
Start: 2020-01-01 | End: 2020-01-01

## 2020-01-01 RX ORDER — BUPROPION HYDROCHLORIDE 100 MG/1
100 TABLET ORAL 2 TIMES DAILY
Qty: 60 TAB | Refills: 1 | Status: SHIPPED | OUTPATIENT
Start: 2020-01-01 | End: 2020-01-01

## 2020-01-01 RX ORDER — BUPROPION HYDROCHLORIDE 100 MG/1
TABLET, EXTENDED RELEASE ORAL
Qty: 60 TAB | Refills: 0 | Status: SHIPPED | OUTPATIENT
Start: 2020-01-01 | End: 2020-01-01 | Stop reason: SDUPTHER

## 2020-01-01 RX ORDER — BUMETANIDE 1 MG/1
TABLET ORAL
Qty: 90 TAB | Refills: 1 | Status: SHIPPED | OUTPATIENT
Start: 2020-01-01 | End: 2021-01-01 | Stop reason: CLARIF

## 2020-01-01 RX ORDER — ATENOLOL 50 MG/1
TABLET ORAL
Qty: 90 TAB | Refills: 1 | Status: SHIPPED | OUTPATIENT
Start: 2020-01-01 | End: 2021-01-01 | Stop reason: CLARIF

## 2020-01-01 RX ORDER — ACETAMINOPHEN 500 MG
1000 TABLET ORAL
Status: COMPLETED | OUTPATIENT
Start: 2020-01-01 | End: 2020-01-01

## 2020-01-01 RX ORDER — TRAMADOL HYDROCHLORIDE 50 MG/1
50 TABLET ORAL DAILY
COMMUNITY

## 2020-01-01 RX ORDER — CITALOPRAM 10 MG/1
TABLET ORAL
Qty: 90 TAB | Refills: 1 | Status: SHIPPED | OUTPATIENT
Start: 2020-01-01

## 2020-01-01 RX ORDER — GABAPENTIN 100 MG/1
100 CAPSULE ORAL DAILY
Qty: 90 CAP | Refills: 1 | Status: SHIPPED | OUTPATIENT
Start: 2020-01-01

## 2020-01-01 RX ADMIN — SODIUM CHLORIDE 1000 ML: 900 INJECTION, SOLUTION INTRAVENOUS at 04:37

## 2020-01-01 RX ADMIN — ACETAMINOPHEN 1000 MG: 500 TABLET ORAL at 07:41

## 2020-01-02 ENCOUNTER — HOME CARE VISIT (OUTPATIENT)
Dept: SCHEDULING | Facility: HOME HEALTH | Age: 75
End: 2020-01-02
Payer: MEDICARE

## 2020-01-02 PROCEDURE — 3331090002 HH PPS REVENUE DEBIT

## 2020-01-02 PROCEDURE — G0158 HHC OT ASSISTANT EA 15: HCPCS

## 2020-01-02 PROCEDURE — G0299 HHS/HOSPICE OF RN EA 15 MIN: HCPCS

## 2020-01-02 PROCEDURE — 3331090001 HH PPS REVENUE CREDIT

## 2020-01-03 ENCOUNTER — HOME CARE VISIT (OUTPATIENT)
Dept: SCHEDULING | Facility: HOME HEALTH | Age: 75
End: 2020-01-03
Payer: MEDICARE

## 2020-01-03 VITALS
OXYGEN SATURATION: 97 % | HEART RATE: 98 BPM | TEMPERATURE: 97.3 F | DIASTOLIC BLOOD PRESSURE: 56 MMHG | SYSTOLIC BLOOD PRESSURE: 100 MMHG

## 2020-01-03 VITALS
RESPIRATION RATE: 16 BRPM | OXYGEN SATURATION: 94 % | HEART RATE: 77 BPM | TEMPERATURE: 97.6 F | SYSTOLIC BLOOD PRESSURE: 112 MMHG | DIASTOLIC BLOOD PRESSURE: 60 MMHG

## 2020-01-03 VITALS
RESPIRATION RATE: 16 BRPM | OXYGEN SATURATION: 94 % | TEMPERATURE: 97.6 F | DIASTOLIC BLOOD PRESSURE: 60 MMHG | HEART RATE: 77 BPM | SYSTOLIC BLOOD PRESSURE: 112 MMHG

## 2020-01-03 PROCEDURE — 3331090002 HH PPS REVENUE DEBIT

## 2020-01-03 PROCEDURE — G0157 HHC PT ASSISTANT EA 15: HCPCS

## 2020-01-03 PROCEDURE — 3331090001 HH PPS REVENUE CREDIT

## 2020-01-04 PROCEDURE — 3331090001 HH PPS REVENUE CREDIT

## 2020-01-04 PROCEDURE — 3331090002 HH PPS REVENUE DEBIT

## 2020-01-05 PROCEDURE — 3331090002 HH PPS REVENUE DEBIT

## 2020-01-05 PROCEDURE — 3331090001 HH PPS REVENUE CREDIT

## 2020-01-06 ENCOUNTER — HOME CARE VISIT (OUTPATIENT)
Dept: SCHEDULING | Facility: HOME HEALTH | Age: 75
End: 2020-01-06
Payer: MEDICARE

## 2020-01-06 VITALS
OXYGEN SATURATION: 100 % | SYSTOLIC BLOOD PRESSURE: 140 MMHG | HEART RATE: 98 BPM | TEMPERATURE: 97 F | DIASTOLIC BLOOD PRESSURE: 78 MMHG | RESPIRATION RATE: 16 BRPM

## 2020-01-06 PROCEDURE — G0151 HHCP-SERV OF PT,EA 15 MIN: HCPCS

## 2020-01-06 PROCEDURE — 3331090002 HH PPS REVENUE DEBIT

## 2020-01-06 PROCEDURE — 3331090001 HH PPS REVENUE CREDIT

## 2020-01-07 ENCOUNTER — HOME CARE VISIT (OUTPATIENT)
Dept: SCHEDULING | Facility: HOME HEALTH | Age: 75
End: 2020-01-07
Payer: MEDICARE

## 2020-01-07 ENCOUNTER — HOME CARE VISIT (OUTPATIENT)
Dept: HOME HEALTH SERVICES | Facility: HOME HEALTH | Age: 75
End: 2020-01-07
Payer: MEDICARE

## 2020-01-07 PROCEDURE — 3331090001 HH PPS REVENUE CREDIT

## 2020-01-07 PROCEDURE — G0299 HHS/HOSPICE OF RN EA 15 MIN: HCPCS

## 2020-01-07 PROCEDURE — 3331090002 HH PPS REVENUE DEBIT

## 2020-01-07 PROCEDURE — G0152 HHCP-SERV OF OT,EA 15 MIN: HCPCS

## 2020-01-08 VITALS
TEMPERATURE: 97.2 F | SYSTOLIC BLOOD PRESSURE: 118 MMHG | DIASTOLIC BLOOD PRESSURE: 68 MMHG | HEART RATE: 100 BPM | RESPIRATION RATE: 18 BRPM | OXYGEN SATURATION: 97 %

## 2020-01-08 VITALS
TEMPERATURE: 98.6 F | DIASTOLIC BLOOD PRESSURE: 80 MMHG | SYSTOLIC BLOOD PRESSURE: 140 MMHG | RESPIRATION RATE: 20 BRPM | HEART RATE: 121 BPM | OXYGEN SATURATION: 97 %

## 2020-01-08 PROCEDURE — 3331090001 HH PPS REVENUE CREDIT

## 2020-01-08 PROCEDURE — 3331090002 HH PPS REVENUE DEBIT

## 2020-01-09 ENCOUNTER — HOME CARE VISIT (OUTPATIENT)
Dept: SCHEDULING | Facility: HOME HEALTH | Age: 75
End: 2020-01-09
Payer: MEDICARE

## 2020-01-09 PROCEDURE — 3331090002 HH PPS REVENUE DEBIT

## 2020-01-09 PROCEDURE — G0152 HHCP-SERV OF OT,EA 15 MIN: HCPCS

## 2020-01-09 PROCEDURE — 3331090001 HH PPS REVENUE CREDIT

## 2020-01-09 PROCEDURE — G0299 HHS/HOSPICE OF RN EA 15 MIN: HCPCS

## 2020-01-10 ENCOUNTER — HOME CARE VISIT (OUTPATIENT)
Dept: SCHEDULING | Facility: HOME HEALTH | Age: 75
End: 2020-01-10
Payer: MEDICARE

## 2020-01-10 ENCOUNTER — TELEPHONE (OUTPATIENT)
Dept: FAMILY MEDICINE CLINIC | Age: 75
End: 2020-01-10

## 2020-01-10 VITALS
RESPIRATION RATE: 18 BRPM | DIASTOLIC BLOOD PRESSURE: 68 MMHG | SYSTOLIC BLOOD PRESSURE: 120 MMHG | TEMPERATURE: 97.8 F | OXYGEN SATURATION: 9 %

## 2020-01-10 PROCEDURE — G0151 HHCP-SERV OF PT,EA 15 MIN: HCPCS

## 2020-01-10 PROCEDURE — 3331090001 HH PPS REVENUE CREDIT

## 2020-01-10 PROCEDURE — 3331090002 HH PPS REVENUE DEBIT

## 2020-01-10 NOTE — TELEPHONE ENCOUNTER
[facility] is calling wanting to get a verbal order for Twice a week therapy for two weeks. They would like to do speech therapy because patient has been choking on various foods.      Contact: [phone]

## 2020-01-11 PROCEDURE — 3331090001 HH PPS REVENUE CREDIT

## 2020-01-11 PROCEDURE — 3331090002 HH PPS REVENUE DEBIT

## 2020-01-12 VITALS
TEMPERATURE: 97.2 F | HEART RATE: 88 BPM | DIASTOLIC BLOOD PRESSURE: 80 MMHG | RESPIRATION RATE: 18 BRPM | OXYGEN SATURATION: 96 % | SYSTOLIC BLOOD PRESSURE: 130 MMHG

## 2020-01-12 VITALS
HEART RATE: 80 BPM | RESPIRATION RATE: 18 BRPM | TEMPERATURE: 98.7 F | DIASTOLIC BLOOD PRESSURE: 78 MMHG | OXYGEN SATURATION: 98 % | SYSTOLIC BLOOD PRESSURE: 126 MMHG

## 2020-01-12 PROCEDURE — 3331090001 HH PPS REVENUE CREDIT

## 2020-01-12 PROCEDURE — 3331090002 HH PPS REVENUE DEBIT

## 2020-01-13 ENCOUNTER — HOME CARE VISIT (OUTPATIENT)
Dept: SCHEDULING | Facility: HOME HEALTH | Age: 75
End: 2020-01-13
Payer: MEDICARE

## 2020-01-13 VITALS
SYSTOLIC BLOOD PRESSURE: 118 MMHG | OXYGEN SATURATION: 94 % | DIASTOLIC BLOOD PRESSURE: 70 MMHG | TEMPERATURE: 97.7 F | HEART RATE: 118 BPM

## 2020-01-13 VITALS
TEMPERATURE: 97.8 F | BODY MASS INDEX: 31.58 KG/M2 | SYSTOLIC BLOOD PRESSURE: 104 MMHG | HEART RATE: 116 BPM | OXYGEN SATURATION: 96 % | WEIGHT: 184 LBS | DIASTOLIC BLOOD PRESSURE: 60 MMHG | RESPIRATION RATE: 20 BRPM

## 2020-01-13 VITALS
SYSTOLIC BLOOD PRESSURE: 117 MMHG | HEART RATE: 121 BPM | TEMPERATURE: 98 F | OXYGEN SATURATION: 96 % | DIASTOLIC BLOOD PRESSURE: 58 MMHG | RESPIRATION RATE: 18 BRPM

## 2020-01-13 PROCEDURE — G0299 HHS/HOSPICE OF RN EA 15 MIN: HCPCS

## 2020-01-13 PROCEDURE — 3331090002 HH PPS REVENUE DEBIT

## 2020-01-13 PROCEDURE — G0153 HHCP-SVS OF S/L PATH,EA 15MN: HCPCS

## 2020-01-13 PROCEDURE — 3331090001 HH PPS REVENUE CREDIT

## 2020-01-13 PROCEDURE — G0157 HHC PT ASSISTANT EA 15: HCPCS

## 2020-01-14 ENCOUNTER — HOME CARE VISIT (OUTPATIENT)
Dept: SCHEDULING | Facility: HOME HEALTH | Age: 75
End: 2020-01-14
Payer: MEDICARE

## 2020-01-14 ENCOUNTER — TELEPHONE (OUTPATIENT)
Dept: FAMILY MEDICINE CLINIC | Age: 75
End: 2020-01-14

## 2020-01-14 VITALS
SYSTOLIC BLOOD PRESSURE: 122 MMHG | DIASTOLIC BLOOD PRESSURE: 68 MMHG | HEART RATE: 122 BPM | RESPIRATION RATE: 18 BRPM | TEMPERATURE: 97.8 F | OXYGEN SATURATION: 98 %

## 2020-01-14 PROCEDURE — 3331090002 HH PPS REVENUE DEBIT

## 2020-01-14 PROCEDURE — G0152 HHCP-SERV OF OT,EA 15 MIN: HCPCS

## 2020-01-14 PROCEDURE — 3331090001 HH PPS REVENUE CREDIT

## 2020-01-14 NOTE — TELEPHONE ENCOUNTER
[facility] calling because the nurse that visits with Ms. Alfred Tello wants to make  aware that her resting HR has been ranging 120-130

## 2020-01-15 ENCOUNTER — HOSPITAL ENCOUNTER (OUTPATIENT)
Dept: LAB | Age: 75
Discharge: HOME | End: 2020-01-15

## 2020-01-15 ENCOUNTER — OFFICE VISIT (OUTPATIENT)
Dept: FAMILY MEDICINE CLINIC | Age: 75
End: 2020-01-15

## 2020-01-15 VITALS
OXYGEN SATURATION: 93 % | DIASTOLIC BLOOD PRESSURE: 82 MMHG | HEIGHT: 64 IN | WEIGHT: 170 LBS | TEMPERATURE: 97.6 F | BODY MASS INDEX: 29.02 KG/M2 | HEART RATE: 127 BPM | SYSTOLIC BLOOD PRESSURE: 124 MMHG | RESPIRATION RATE: 16 BRPM

## 2020-01-15 DIAGNOSIS — R00.0 TACHYCARDIA: ICD-10-CM

## 2020-01-15 DIAGNOSIS — R53.81 MALAISE AND FATIGUE: ICD-10-CM

## 2020-01-15 DIAGNOSIS — R00.0 TACHYCARDIA: Primary | ICD-10-CM

## 2020-01-15 DIAGNOSIS — R53.83 MALAISE AND FATIGUE: ICD-10-CM

## 2020-01-15 DIAGNOSIS — I10 ESSENTIAL HYPERTENSION: ICD-10-CM

## 2020-01-15 PROCEDURE — 3331090001 HH PPS REVENUE CREDIT

## 2020-01-15 PROCEDURE — 3331090002 HH PPS REVENUE DEBIT

## 2020-01-15 NOTE — PROGRESS NOTES
Florencia Macedo is a 77 y.o. female Chief Complaint Patient presents with  Follow-up 1 mo f/u  Pain (Chronic)  
  left hip pain and knee pain  Fall Visit Vitals /82 (BP 1 Location: Right arm, BP Patient Position: Sitting) Pulse (!) 127 Temp 97.6 °F (36.4 °C) (Oral) Resp 16 Ht 5' 4\" (1.626 m) Wt 170 lb (77.1 kg) SpO2 93% BMI 29.18 kg/m² 1. Have you been to the ER, urgent care clinic since your last visit? Hospitalized since your last visit? NO 
 
2. Have you seen or consulted any other health care providers outside of the [address] [place] [address] since your last visit? Include any pap smears or colon screening.

## 2020-01-16 ENCOUNTER — HOME CARE VISIT (OUTPATIENT)
Dept: SCHEDULING | Facility: HOME HEALTH | Age: 75
End: 2020-01-16
Payer: MEDICARE

## 2020-01-16 ENCOUNTER — HOME CARE VISIT (OUTPATIENT)
Dept: HOME HEALTH SERVICES | Facility: HOME HEALTH | Age: 75
End: 2020-01-16
Payer: MEDICARE

## 2020-01-16 VITALS
OXYGEN SATURATION: 97 % | HEART RATE: 116 BPM | TEMPERATURE: 98.6 F | DIASTOLIC BLOOD PRESSURE: 67 MMHG | SYSTOLIC BLOOD PRESSURE: 118 MMHG

## 2020-01-16 VITALS
DIASTOLIC BLOOD PRESSURE: 70 MMHG | TEMPERATURE: 97.3 F | SYSTOLIC BLOOD PRESSURE: 122 MMHG | OXYGEN SATURATION: 96 % | HEART RATE: 115 BPM

## 2020-01-16 LAB
ALBUMIN SERPL-MCNC: 3.6 G/DL (ref 3.5–5)
ALBUMIN/GLOB SERPL: 0.9 {RATIO} (ref 1.1–2.2)
ALP SERPL-CCNC: 120 U/L (ref 45–117)
ALT SERPL-CCNC: 8 U/L (ref 12–78)
ANION GAP SERPL CALC-SCNC: 8 MMOL/L (ref 5–15)
AST SERPL-CCNC: 13 U/L (ref 15–37)
BASOPHILS # BLD: 0.1 K/UL (ref 0–0.1)
BASOPHILS NFR BLD: 1 % (ref 0–1)
BILIRUB SERPL-MCNC: 0.7 MG/DL (ref 0.2–1)
BUN SERPL-MCNC: 18 MG/DL (ref 6–20)
BUN/CREAT SERPL: 12 (ref 12–20)
CALCIUM SERPL-MCNC: 9.8 MG/DL (ref 8.5–10.1)
CHLORIDE SERPL-SCNC: 96 MMOL/L (ref 97–108)
CO2 SERPL-SCNC: 34 MMOL/L (ref 21–32)
CREAT SERPL-MCNC: 1.45 MG/DL (ref 0.55–1.02)
DIFFERENTIAL METHOD BLD: ABNORMAL
EOSINOPHIL # BLD: 0.1 K/UL (ref 0–0.4)
EOSINOPHIL NFR BLD: 2 % (ref 0–7)
ERYTHROCYTE [DISTWIDTH] IN BLOOD BY AUTOMATED COUNT: 13.5 % (ref 11.5–14.5)
GLOBULIN SER CALC-MCNC: 4 G/DL (ref 2–4)
GLUCOSE SERPL-MCNC: 92 MG/DL (ref 65–100)
HCT VFR BLD AUTO: 39.3 % (ref 35–47)
HGB BLD-MCNC: 12.1 G/DL (ref 11.5–16)
IMM GRANULOCYTES # BLD AUTO: 0 K/UL (ref 0–0.04)
IMM GRANULOCYTES NFR BLD AUTO: 0 % (ref 0–0.5)
LYMPHOCYTES # BLD: 1.1 K/UL (ref 0.8–3.5)
LYMPHOCYTES NFR BLD: 18 % (ref 12–49)
MCH RBC QN AUTO: 34 PG (ref 26–34)
MCHC RBC AUTO-ENTMCNC: 30.8 G/DL (ref 30–36.5)
MCV RBC AUTO: 110.4 FL (ref 80–99)
MONOCYTES # BLD: 0.8 K/UL (ref 0–1)
MONOCYTES NFR BLD: 12 % (ref 5–13)
NEUTS SEG # BLD: 4.2 K/UL (ref 1.8–8)
NEUTS SEG NFR BLD: 67 % (ref 32–75)
NRBC # BLD: 0 K/UL (ref 0–0.01)
NRBC BLD-RTO: 0 PER 100 WBC
PLATELET # BLD AUTO: 211 K/UL (ref 150–400)
PMV BLD AUTO: 9.3 FL (ref 8.9–12.9)
POTASSIUM SERPL-SCNC: 3.3 MMOL/L (ref 3.5–5.1)
PROT SERPL-MCNC: 7.6 G/DL (ref 6.4–8.2)
RBC # BLD AUTO: 3.56 M/UL (ref 3.8–5.2)
RBC MORPH BLD: ABNORMAL
RBC MORPH BLD: ABNORMAL
SODIUM SERPL-SCNC: 138 MMOL/L (ref 136–145)
WBC # BLD AUTO: 6.3 K/UL (ref 3.6–11)

## 2020-01-16 PROCEDURE — 3331090002 HH PPS REVENUE DEBIT

## 2020-01-16 PROCEDURE — G0157 HHC PT ASSISTANT EA 15: HCPCS

## 2020-01-16 PROCEDURE — 3331090001 HH PPS REVENUE CREDIT

## 2020-01-16 PROCEDURE — G0152 HHCP-SERV OF OT,EA 15 MIN: HCPCS

## 2020-01-17 LAB — TSH SERPL-ACNC: 1.47 UIU/ML (ref 0.45–4.5)

## 2020-01-17 PROCEDURE — 3331090001 HH PPS REVENUE CREDIT

## 2020-01-17 PROCEDURE — 3331090002 HH PPS REVENUE DEBIT

## 2020-01-17 NOTE — PROGRESS NOTES
Chief Complaint: 
Chief Complaint Patient presents with  Follow-up 1 mo f/u  Pain (Chronic)  
  left hip pain and knee pain  Fall History of Present Illness: 
75F who presents for follow up after prolonged SNF and hospitalization for debility and worsening pain. She is present with her son today. She is now at home for the past month and here for follow up. At home, she is receiving OT/PT and SN. She has a bout 1-2 weeks left per her report. Her son and DIL share time staying with her overnight. Today, she is here primarily to evaluate fast heart rate. I note that in November, she had episodes of this in the hospital and underwent a work up showing normal ECHO with severe pulmonary hypertension (she is O2 dependent) and EF 61-65%. In addition, she suffers from chronic pain and anxiety and has been on long term valium, which she is taking daily. She admits to poor fluid and water intake. She denies CP or awareness of tachycardia. The patient feels like she can manage alone at nights and would like her son and DIL to stop staying with her. However, she is just getting back to baseline and we discussed that after we obtain labs and evaluate her tachycardia, they should continue to stay. I spoke with her son privately (he is on the HiPAA). He agrees that she is improving. However, there is an alternate concern that part of the reason that Maria Kehr may want her family to cut back on staying with her, is that she would have free access to her medications and there had been a concern that she was using them indiscriminately, especially the Valium. I once again discussed and reviewed medications and it will be difficult to wean her from Valium. It probably would cause more harm than good and provides some relief of her pulmonary hypertension. But, she needs to use the medication as prescribed and discriminantly. Reviewed PmHx, RxHx, FmHx, SocHx, AllgHx and updated and dated in the chart. Patient Active Problem List  
 Diagnosis  Physical debility  Left hip pain  Fall from ground level  CKD (chronic kidney disease) stage 3, GFR 30-59 ml/min ([facility])  Abnormal urinalysis  Lung crackles  Dysuria  Need for hepatitis C screening test  
 Intertrigo  UTI (lower urinary tract infection)  Bronchitis  Palpitations  Anxiety  Iron deficiency anemia, unspecified  Osteoarthritis  Senile osteoporosis  Knee pain  CKD (chronic kidney disease) stage 2, GFR 60-89 ml/min  Insomnia Review of Systems - negative except as listed above in the HPI Objective:  
 
Vitals:  
 01/15/20 1359 BP: 124/82 Pulse: (!) 127 Resp: 16 Temp: 97.6 °F (36.4 °C) TempSrc: Oral  
SpO2: 93% Weight: 170 lb (77.1 kg) Height: 5' 4\" (1.626 m) Physical Examination:  
General appearance - chronically ill appearing Mental status - alert, oriented to person, place, and time Chest - clear to auscultation, no wheezes, rales or rhonchi, symmetric air entry Heart - tachy rate, regular rhythm, normal S1, S2, no murmurs, rubs, clicks or gallops Neurological - alert, oriented, normal speech, no focal findings or movement disorder noted Musculoskeletal - Wheelchair, but transfers and bears weight. Extremities - peripheral pulses normal, no pedal edema, no clubbing or cyanosis Assessment/ Plan:  
Diagnoses and all orders for this visit: 
 
1. Tachycardia -     AMB POC EKG ROUTINE W/ 12 LEADS, INTER & REP 
-     METABOLIC PANEL, COMPREHENSIVE; Future -     CBC WITH AUTOMATED DIFF; Future - Encouraged to wear O2 at all times. - Needs to improve fluid intake . - Worrisome symptoms CP, palpitations would warrant ER evaluation. 2. Essential hypertension -     METABOLIC PANEL, COMPREHENSIVE; Future -     CBC WITH AUTOMATED DIFF; Future 3. Malaise and fatigue 
-     THYROID CASCADE PROFILE; Future I have discussed the diagnosis with the patient and the intended treatment plan as seen in the above orders. The patient has received an after-visit summary and questions were answered concerning future plans. Asked to return should symptoms worsen or not improve with treatment. Any pending labs and studies will be relayed to patient when they become available. Pt verbalizes understanding of plan of care and denies further questions or concerns at this time. Follow-up and Dispositions · Return if symptoms worsen or fail to improve.  
  
 
 
Mady Guajardo MD

## 2020-01-18 PROCEDURE — 3331090002 HH PPS REVENUE DEBIT

## 2020-01-18 PROCEDURE — 3331090001 HH PPS REVENUE CREDIT

## 2020-01-19 PROCEDURE — 3331090001 HH PPS REVENUE CREDIT

## 2020-01-19 PROCEDURE — 3331090002 HH PPS REVENUE DEBIT

## 2020-01-20 ENCOUNTER — TELEPHONE (OUTPATIENT)
Dept: FAMILY MEDICINE CLINIC | Age: 75
End: 2020-01-20

## 2020-01-20 DIAGNOSIS — R41.89 COGNITIVE CHANGES: Primary | ICD-10-CM

## 2020-01-20 PROCEDURE — 3331090002 HH PPS REVENUE DEBIT

## 2020-01-20 PROCEDURE — 3331090001 HH PPS REVENUE CREDIT

## 2020-01-20 NOTE — TELEPHONE ENCOUNTER
Daughter Imagene Mcardle is calling and would like to check status on lab results and would like to know if Dr. Mary Ellen Suazo would recommend someone for her to take patient to for cognitive issues.   Please call daughter at [phone]

## 2020-01-21 ENCOUNTER — HOME CARE VISIT (OUTPATIENT)
Dept: SCHEDULING | Facility: HOME HEALTH | Age: 75
End: 2020-01-21
Payer: MEDICARE

## 2020-01-21 VITALS
DIASTOLIC BLOOD PRESSURE: 60 MMHG | SYSTOLIC BLOOD PRESSURE: 108 MMHG | TEMPERATURE: 96.8 F | OXYGEN SATURATION: 95 % | RESPIRATION RATE: 16 BRPM | HEART RATE: 115 BPM

## 2020-01-21 PROCEDURE — G0151 HHCP-SERV OF PT,EA 15 MIN: HCPCS

## 2020-01-21 PROCEDURE — 3331090002 HH PPS REVENUE DEBIT

## 2020-01-21 PROCEDURE — 3331090001 HH PPS REVENUE CREDIT

## 2020-01-22 ENCOUNTER — HOME CARE VISIT (OUTPATIENT)
Dept: SCHEDULING | Facility: HOME HEALTH | Age: 75
End: 2020-01-22
Payer: MEDICARE

## 2020-01-22 ENCOUNTER — TELEPHONE (OUTPATIENT)
Dept: FAMILY MEDICINE CLINIC | Age: 75
End: 2020-01-22

## 2020-01-22 VITALS
DIASTOLIC BLOOD PRESSURE: 60 MMHG | RESPIRATION RATE: 18 BRPM | OXYGEN SATURATION: 97 % | HEART RATE: 124 BPM | SYSTOLIC BLOOD PRESSURE: 114 MMHG | TEMPERATURE: 97.6 F

## 2020-01-22 PROCEDURE — G0299 HHS/HOSPICE OF RN EA 15 MIN: HCPCS

## 2020-01-22 PROCEDURE — 3331090002 HH PPS REVENUE DEBIT

## 2020-01-22 PROCEDURE — 3331090001 HH PPS REVENUE CREDIT

## 2020-01-22 NOTE — TELEPHONE ENCOUNTER
Home Health nurse is calling and stated that patients HR is 124 and she is not taking attentolol and has not been since 1/1/20. Per Daughter -n-law home health nurse took her off of med. Informed Kasie Saucedo that patient needs to be on Attentolol per Dr. Loco Ely and Kasie Saucedo stated that they will start her on it tonight. I have gotten an appt with Dr. Sher Rear 7/30/20 at 2:20. Should you want her to get in sooner a peer to peer phone call will need to be made. Daughter hung up with clear understanding of all information.   Any questions for Kasie Saucedo she can be reached at [phone]

## 2020-01-23 PROCEDURE — 3331090002 HH PPS REVENUE DEBIT

## 2020-01-23 PROCEDURE — 3331090001 HH PPS REVENUE CREDIT

## 2020-01-24 ENCOUNTER — HOME CARE VISIT (OUTPATIENT)
Dept: HOME HEALTH SERVICES | Facility: HOME HEALTH | Age: 75
End: 2020-01-24
Payer: MEDICARE

## 2020-01-24 PROCEDURE — 3331090001 HH PPS REVENUE CREDIT

## 2020-01-24 PROCEDURE — 3331090002 HH PPS REVENUE DEBIT

## 2020-01-25 PROCEDURE — 3331090002 HH PPS REVENUE DEBIT

## 2020-01-25 PROCEDURE — 3331090001 HH PPS REVENUE CREDIT

## 2020-01-26 PROCEDURE — 3331090002 HH PPS REVENUE DEBIT

## 2020-01-26 PROCEDURE — 3331090001 HH PPS REVENUE CREDIT

## 2020-01-27 PROCEDURE — 3331090001 HH PPS REVENUE CREDIT

## 2020-01-27 PROCEDURE — 3331090002 HH PPS REVENUE DEBIT

## 2020-01-28 PROCEDURE — 3331090001 HH PPS REVENUE CREDIT

## 2020-01-28 PROCEDURE — 3331090002 HH PPS REVENUE DEBIT

## 2020-01-29 ENCOUNTER — TELEPHONE (OUTPATIENT)
Dept: FAMILY MEDICINE CLINIC | Age: 75
End: 2020-01-29

## 2020-01-29 PROCEDURE — 3331090001 HH PPS REVENUE CREDIT

## 2020-01-29 PROCEDURE — 3331090002 HH PPS REVENUE DEBIT

## 2020-01-29 NOTE — TELEPHONE ENCOUNTER
The pt's Daughter in law called to report back pain and was advised the pt would need to be seen. Caller stated patient declined being seen in fear of being readmitted to rehab. Please contact to advise.

## 2020-01-30 PROCEDURE — 3331090002 HH PPS REVENUE DEBIT

## 2020-01-30 PROCEDURE — 3331090001 HH PPS REVENUE CREDIT

## 2020-01-31 ENCOUNTER — HOME CARE VISIT (OUTPATIENT)
Dept: SCHEDULING | Facility: HOME HEALTH | Age: 75
End: 2020-01-31
Payer: MEDICARE

## 2020-01-31 PROCEDURE — 3331090001 HH PPS REVENUE CREDIT

## 2020-01-31 PROCEDURE — G0300 HHS/HOSPICE OF LPN EA 15 MIN: HCPCS

## 2020-01-31 PROCEDURE — 3331090002 HH PPS REVENUE DEBIT

## 2020-02-01 PROCEDURE — 3331090002 HH PPS REVENUE DEBIT

## 2020-02-01 PROCEDURE — 3331090001 HH PPS REVENUE CREDIT

## 2020-02-02 PROCEDURE — 3331090002 HH PPS REVENUE DEBIT

## 2020-02-02 PROCEDURE — 3331090001 HH PPS REVENUE CREDIT

## 2020-02-03 VITALS
SYSTOLIC BLOOD PRESSURE: 114 MMHG | RESPIRATION RATE: 18 BRPM | HEART RATE: 70 BPM | DIASTOLIC BLOOD PRESSURE: 68 MMHG | TEMPERATURE: 98.2 F | OXYGEN SATURATION: 98 %

## 2020-02-03 PROCEDURE — 3331090002 HH PPS REVENUE DEBIT

## 2020-02-03 PROCEDURE — 3331090001 HH PPS REVENUE CREDIT

## 2020-02-04 PROCEDURE — 3331090002 HH PPS REVENUE DEBIT

## 2020-02-04 PROCEDURE — 3331090001 HH PPS REVENUE CREDIT

## 2020-02-05 ENCOUNTER — HOME CARE VISIT (OUTPATIENT)
Dept: SCHEDULING | Facility: HOME HEALTH | Age: 75
End: 2020-02-05
Payer: MEDICARE

## 2020-02-05 PROCEDURE — 3331090001 HH PPS REVENUE CREDIT

## 2020-02-05 PROCEDURE — G0300 HHS/HOSPICE OF LPN EA 15 MIN: HCPCS

## 2020-02-05 PROCEDURE — 3331090002 HH PPS REVENUE DEBIT

## 2020-02-05 NOTE — TELEPHONE ENCOUNTER
Call daughter: Gerrit Phalen from 1/15/ show increased Cr and low potassium. I assume this is why Bumex was discontinued. Recommend pt elevate legs and low salt diet for now. FU when Dr Michael Mandujano gets back in a week if getting worse.

## 2020-02-05 NOTE — TELEPHONE ENCOUNTER
Dr Sherin Horne pt    Pt's daughter calling and on fluid medication and was taken off of medication on 1/23/2020 but now legs becoming swollen and hard.      Call Aaron Berrios at [phone] for questions on what to do for pt

## 2020-02-05 NOTE — TELEPHONE ENCOUNTER
She slides not falls when her feet are swollen. She has been keeping them up. Brandon Calderon said she has been doing everything to help. She is worried about her falling. I told her I would call her back if Dr Laura Morris would let her have any bumex.

## 2020-02-06 VITALS
DIASTOLIC BLOOD PRESSURE: 78 MMHG | TEMPERATURE: 98.2 F | SYSTOLIC BLOOD PRESSURE: 128 MMHG | OXYGEN SATURATION: 98 % | RESPIRATION RATE: 18 BRPM | HEART RATE: 76 BPM

## 2020-02-06 PROCEDURE — 3331090001 HH PPS REVENUE CREDIT

## 2020-02-06 PROCEDURE — 3331090002 HH PPS REVENUE DEBIT

## 2020-02-07 PROCEDURE — 3331090002 HH PPS REVENUE DEBIT

## 2020-02-07 PROCEDURE — 3331090001 HH PPS REVENUE CREDIT

## 2020-02-08 PROCEDURE — 3331090002 HH PPS REVENUE DEBIT

## 2020-02-08 PROCEDURE — 3331090001 HH PPS REVENUE CREDIT

## 2020-02-09 PROCEDURE — 3331090002 HH PPS REVENUE DEBIT

## 2020-02-09 PROCEDURE — 3331090001 HH PPS REVENUE CREDIT

## 2020-02-10 ENCOUNTER — HOSPITAL ENCOUNTER (OUTPATIENT)
Dept: LAB | Age: 75
Discharge: HOME | End: 2020-02-10

## 2020-02-10 ENCOUNTER — HOSPITAL ENCOUNTER (OUTPATIENT)
Dept: GENERAL RADIOLOGY | Age: 75
Discharge: HOME | End: 2020-02-10
Attending: INTERNAL MEDICINE
Payer: MEDICARE

## 2020-02-10 ENCOUNTER — OFFICE VISIT (OUTPATIENT)
Dept: FAMILY MEDICINE CLINIC | Age: 75
End: 2020-02-10

## 2020-02-10 VITALS
BODY MASS INDEX: 31.41 KG/M2 | WEIGHT: 184 LBS | HEIGHT: 64 IN | RESPIRATION RATE: 18 BRPM | HEART RATE: 92 BPM | OXYGEN SATURATION: 97 % | DIASTOLIC BLOOD PRESSURE: 60 MMHG | SYSTOLIC BLOOD PRESSURE: 118 MMHG | TEMPERATURE: 98.4 F

## 2020-02-10 DIAGNOSIS — R09.89 RESPIRATORY CRACKLES OF BOTH LUNGS: ICD-10-CM

## 2020-02-10 DIAGNOSIS — M62.838 MUSCLE SPASM: ICD-10-CM

## 2020-02-10 DIAGNOSIS — J18.9 COMMUNITY ACQUIRED PNEUMONIA OF LEFT LOWER LOBE OF LUNG: ICD-10-CM

## 2020-02-10 DIAGNOSIS — E87.6 HYPOKALEMIA: ICD-10-CM

## 2020-02-10 DIAGNOSIS — N28.9 RENAL INSUFFICIENCY: ICD-10-CM

## 2020-02-10 DIAGNOSIS — I10 ESSENTIAL HYPERTENSION: ICD-10-CM

## 2020-02-10 DIAGNOSIS — I10 ESSENTIAL HYPERTENSION: Primary | ICD-10-CM

## 2020-02-10 LAB
ALBUMIN SERPL-MCNC: 3 G/DL (ref 3.5–5)
ALBUMIN/GLOB SERPL: 0.9 {RATIO} (ref 1.1–2.2)
ALP SERPL-CCNC: 108 U/L (ref 45–117)
ALT SERPL-CCNC: 7 U/L (ref 12–78)
ANION GAP SERPL CALC-SCNC: 3 MMOL/L (ref 5–15)
AST SERPL-CCNC: 10 U/L (ref 15–37)
BASOPHILS # BLD: 0.1 K/UL (ref 0–0.1)
BASOPHILS NFR BLD: 1 % (ref 0–1)
BILIRUB SERPL-MCNC: 0.4 MG/DL (ref 0.2–1)
BUN SERPL-MCNC: 10 MG/DL (ref 6–20)
BUN/CREAT SERPL: 9 (ref 12–20)
CALCIUM SERPL-MCNC: 9.3 MG/DL (ref 8.5–10.1)
CHLORIDE SERPL-SCNC: 104 MMOL/L (ref 97–108)
CO2 SERPL-SCNC: 33 MMOL/L (ref 21–32)
CREAT SERPL-MCNC: 1.1 MG/DL (ref 0.55–1.02)
DIFFERENTIAL METHOD BLD: ABNORMAL
EOSINOPHIL # BLD: 0.3 K/UL (ref 0–0.4)
EOSINOPHIL NFR BLD: 4 % (ref 0–7)
ERYTHROCYTE [DISTWIDTH] IN BLOOD BY AUTOMATED COUNT: 13.3 % (ref 11.5–14.5)
GLOBULIN SER CALC-MCNC: 3.5 G/DL (ref 2–4)
GLUCOSE SERPL-MCNC: 82 MG/DL (ref 65–100)
HCT VFR BLD AUTO: 34.7 % (ref 35–47)
HGB BLD-MCNC: 10.5 G/DL (ref 11.5–16)
IMM GRANULOCYTES # BLD AUTO: 0.1 K/UL (ref 0–0.04)
IMM GRANULOCYTES NFR BLD AUTO: 1 % (ref 0–0.5)
LYMPHOCYTES # BLD: 0.9 K/UL (ref 0.8–3.5)
LYMPHOCYTES NFR BLD: 13 % (ref 12–49)
MAGNESIUM SERPL-MCNC: 2 MG/DL (ref 1.6–2.4)
MCH RBC QN AUTO: 33.4 PG (ref 26–34)
MCHC RBC AUTO-ENTMCNC: 30.3 G/DL (ref 30–36.5)
MCV RBC AUTO: 110.5 FL (ref 80–99)
MONOCYTES # BLD: 0.8 K/UL (ref 0–1)
MONOCYTES NFR BLD: 12 % (ref 5–13)
NEUTS SEG # BLD: 4.5 K/UL (ref 1.8–8)
NEUTS SEG NFR BLD: 69 % (ref 32–75)
NRBC # BLD: 0 K/UL (ref 0–0.01)
NRBC BLD-RTO: 0 PER 100 WBC
PLATELET # BLD AUTO: 216 K/UL (ref 150–400)
PMV BLD AUTO: 9.1 FL (ref 8.9–12.9)
POTASSIUM SERPL-SCNC: 4.6 MMOL/L (ref 3.5–5.1)
PROT SERPL-MCNC: 6.5 G/DL (ref 6.4–8.2)
RBC # BLD AUTO: 3.14 M/UL (ref 3.8–5.2)
RBC MORPH BLD: ABNORMAL
SODIUM SERPL-SCNC: 140 MMOL/L (ref 136–145)
WBC # BLD AUTO: 6.7 K/UL (ref 3.6–11)

## 2020-02-10 PROCEDURE — 3331090001 HH PPS REVENUE CREDIT

## 2020-02-10 PROCEDURE — 71046 X-RAY EXAM CHEST 2 VIEWS: CPT

## 2020-02-10 PROCEDURE — 3331090002 HH PPS REVENUE DEBIT

## 2020-02-10 RX ORDER — ACETAMINOPHEN 500 MG
1000 TABLET ORAL
COMMUNITY

## 2020-02-10 RX ORDER — ATENOLOL 50 MG/1
50 TABLET ORAL
COMMUNITY
Start: 2020-02-04 | End: 2020-01-01

## 2020-02-10 RX ORDER — DOXYCYCLINE 100 MG/1
100 TABLET ORAL 2 TIMES DAILY
Qty: 20 TAB | Refills: 0 | Status: SHIPPED | OUTPATIENT
Start: 2020-02-10 | End: 2020-01-01

## 2020-02-10 RX ORDER — TRAMADOL HYDROCHLORIDE 50 MG/1
50 TABLET ORAL
COMMUNITY
End: 2020-01-01

## 2020-02-10 RX ORDER — ASPIRIN 81 MG/1
81 TABLET ORAL DAILY
COMMUNITY
End: 2021-01-01 | Stop reason: CLARIF

## 2020-02-10 RX ORDER — DOCUSATE SODIUM 100 MG/1
300 CAPSULE, LIQUID FILLED ORAL
COMMUNITY

## 2020-02-10 RX ORDER — LIDOCAINE 50 MG/G
PATCH TOPICAL
COMMUNITY
Start: 2020-02-02 | End: 2020-01-01 | Stop reason: CLARIF

## 2020-02-10 RX ORDER — BUPROPION HYDROCHLORIDE 100 MG/1
TABLET, EXTENDED RELEASE ORAL
COMMUNITY
Start: 2019-12-13 | End: 2020-01-01

## 2020-02-10 NOTE — PROGRESS NOTES
Identified pt with two pt identifiers(name and ). Chief Complaint   Patient presents with    Abnormal Lab Results    Foot Swelling     occurring in both feet    Spasms     back spasms are continuing to occur - has had several times where she does not fall but she will slide when she loses her balance    Chest Congestion     has been experiencing chest congestion        Health Maintenance Due   Topic    Shingrix Vaccine Age 50> (1 of 2)    GLAUCOMA SCREENING Q2Y     Medicare Yearly Exam        Wt Readings from Last 3 Encounters:   02/10/20 184 lb (83.5 kg)   01/15/20 170 lb (77.1 kg)   20 184 lb (83.5 kg)     Temp Readings from Last 3 Encounters:   02/10/20 98.4 °F (36.9 °C) (Oral)   20 98.2 °F (36.8 °C) (Temporal)   20 98.2 °F (36.8 °C) (Oral)     BP Readings from Last 3 Encounters:   02/10/20 118/60   20 128/78   20 114/68     Pulse Readings from Last 3 Encounters:   02/10/20 92   20 76   20 70         Learning Assessment:  :     Learning Assessment 5/10/2016 1/15/2014   PRIMARY LEARNER Patient Patient   PRIMARY LANGUAGE ENGLISH ENGLISH   LEARNER PREFERENCE PRIMARY DEMONSTRATION READING   ANSWERED BY patient  self   RELATIONSHIP SELF SELF       Depression Screening:  :     3 most recent PHQ Screens 2019   Little interest or pleasure in doing things More than half the days   Feeling down, depressed, irritable, or hopeless More than half the days   Total Score PHQ 2 4       Fall Risk Assessment:  :     Fall Risk Assessment, last 12 mths 2019   Able to walk? Yes   Fall in past 12 months? No       Abuse Screening:  :     Abuse Screening Questionnaire 2016   Do you ever feel afraid of your partner? N   Are you in a relationship with someone who physically or mentally threatens you? N   Is it safe for you to go home?  Y       Coordination of Care Questionnaire:  :     1) Have you been to an emergency room, urgent care clinic since your last visit? no Hospitalized since your last visit? no             2) Have you seen or consulted any other health care providers outside of [address] [place] [address] since your last visit? no  (Include any pap smears or colon screenings in this section.)    3) Do you have an Advance Directive on file? no  Are you interested in receiving information about Advance Directives? no    Patient is accompanied by her son and daughter in law. I have received verbal consent from Tiajuana Leyden to discuss any/all medical information while they are present in the room. Reviewed record in preparation for visit and have obtained necessary documentation. Medication reconciliation up to date and corrected with patient at this time.

## 2020-02-10 NOTE — PROGRESS NOTES
Spoke with patient's son and conveyed the findings above. I have sent a script for Doxycycline to her pharmacy to start tonight. I also would like her to take a Bumex tomorrow during the day so she won't be up during the night. Of note, the compression fractures are not new and so further imaging is not necessary at this time. Son verbalized understanding the plan.

## 2020-02-11 DIAGNOSIS — R71.8 ELEVATED MCV: Primary | ICD-10-CM

## 2020-02-11 DIAGNOSIS — D64.9 ANEMIA, UNSPECIFIED TYPE: ICD-10-CM

## 2020-02-11 DIAGNOSIS — R20.3 HYPERESTHESIA: ICD-10-CM

## 2020-02-11 LAB — VIT B12 SERPL-MCNC: 390 PG/ML (ref 193–986)

## 2020-02-11 PROCEDURE — 3331090001 HH PPS REVENUE CREDIT

## 2020-02-11 PROCEDURE — 3331090002 HH PPS REVENUE DEBIT

## 2020-02-11 NOTE — PROGRESS NOTES
I called and discussed labs with patient's DIL (she is on the HiPAA). Selin Mike has had 2-doses now of the Doxycycline. Encouraged to drink more fluids. Also, will send a B12 as added as her MCV was quite elevated. Lastly, she can have a Bumex today, but needs to make sure she is well hydrated as well. Low sodium meals are truong. DIL verbalized understanding the plan.

## 2020-02-13 NOTE — PROGRESS NOTES
Please let Ms. Jeromy Casper son or daughter know that her B12 was normal.  
Will continue to monitor. Continue with treatment outlined. Thanks,  
Dr. Mervat Bryant

## 2020-02-13 NOTE — PROGRESS NOTES
Chief Complaint:  Chief Complaint   Patient presents with    Abnormal Lab Results    Foot Swelling     occurring in both feet    Spasms     back spasms are continuing to occur - has had several times where she does not fall but she will slide when she loses her balance    Chest Congestion     has been experiencing chest congestion       History of Present Illness:  75F who presents with her son and DIL. She is here for follow up and repeat of recent labs that showed renal insufficiency and a few electrolyte abnormalities. She also continues to have bilateral foot swelling. No pain. She also has had a cough and congestion that is worsening over the past few days. Denies fever or chills. However, family has noticed a change in her mood. She tends to be irritable and has had some cognitive changes. In general, Ms. Lakeisha Rivera wants to be \"left alone\", but she is unsafe to do so. Either her son or her DIL spend the night with her. She does have some help from aids during the day. She is high risk for fall and needs to have someone monitor her medication use. Since her last visit, she did restart the Atenolol and her tachycardia is mostly resolved. She still is taking Valium as needed. She has severe osteoporosis with multiple compression fractures and c/o persistent back pain and feels like she is having spasms across her shoulders and back. She is unsteady on her feet and walks with a walker. Reviewed PmHx, RxHx, FmHx, SocHx, AllgHx and updated and dated in the chart.     Patient Active Problem List    Diagnosis    Physical debility    Left hip pain    Fall from ground level    CKD (chronic kidney disease) stage 3, GFR 30-59 ml/min ([facility])    Abnormal urinalysis    Lung crackles    Dysuria    Need for hepatitis C screening test    Intertrigo    UTI (lower urinary tract infection)    Bronchitis    Palpitations    Anxiety    Iron deficiency anemia, unspecified    Osteoarthritis    Senile osteoporosis    Knee pain    CKD (chronic kidney disease) stage 2, GFR 60-89 ml/min    Insomnia       Review of Systems - negative except as listed above in the HPI    Objective:     Vitals:    02/10/20 1448   BP: 118/60   Pulse: 92   Resp: 18   Temp: 98.4 °F (36.9 °C)   TempSrc: Oral   SpO2: 97%   Weight: 184 lb (83.5 kg)   Height: 5' 4\" (1.626 m)     Physical Examination:   General appearance - alert, well appearing, and in no distress and overweight  Mental status - agitated  Chest - clear to auscultation, no wheezes, rales or rhonchi, symmetric air entry, rales noted bilateral lower lobes, decreased air entry noted globally, rhonchi noted bilaterally  Heart - normal rate, regular rhythm, normal S1, S2, no murmurs, rubs, clicks or gallops  Back exam - antalgic gait, limited range of motion, pain with motion noted during exam, scoliosis noted thoracic and lumbar spine, abnormal neurological exam.   Neurological - cranial nerves II through XII intact, abnormal neurological exam unchanged from prior examinations, paraparesis of legs noted  Musculoskeletal - osteoarthritic changes noted in both hands  Extremities - pedal edema 1-2 +    Assessment/ Plan:     Diagnoses and all orders for this visit:    1. Essential hypertension  -     METABOLIC PANEL, COMPREHENSIVE; Future  -     MAGNESIUM; Future    2. Hypokalemia  -     METABOLIC PANEL, COMPREHENSIVE; Future  -     MAGNESIUM; Future    3. Renal insufficiency  -     MAGNESIUM; Future    4. Muscle spasm  -     MAGNESIUM; Future    5. Respiratory crackles of both lungs  -     XR CHEST PA LAT; Future  -     CBC WITH AUTOMATED DIFF; Future  -     doxycycline (ADOXA) 100 mg tablet; Take 1 Tab by mouth two (2) times a day for 10 days. 6. Community acquired pneumonia of left lower lobe of lung ([facility] [address]     XR Results (most recent):  Results from Hospital Encounter encounter on 02/10/20   XR CHEST PA LAT    Narrative INDICATION:  Respiratory crackles of both lungs.  Chronic renal disease. EXAM: Chest 2 views. Comparison November 24, 2019    FINDINGS: Heart size is normal. Aorta is atherosclerotic and ectatic. Pulmonary  vasculature is not engorged however there is increased interstitial markings  throughout the lungs, slightly more prominent than on the previous study. This  is slightly more confluent at the left base. No pneumothorax. Bones are  osteopenic with multiple thoracic compression deformities differentiating acute  from chronic fracture is difficult however the patient is more kyphotic. Chronic  high riding right humeral head      Impression IMPRESSION:  1. Interstitial markings throughout the lungs slightly increased with increasing  confluence at the left base. Cannot exclude developing pneumonia  2. Severe osteopenia with worsening kyphosis. Multiple chronic compression  deformities. Differentiating an acute fracture from a chronic fracture is  difficult. Recommend MR imaging if clinically indicated    EPIC email sent to Dr. Michael Mandujano at the time of the dictation. I have discussed the diagnosis with the patient and the intended treatment plan as seen in the above orders. The patient has received an after-visit summary and questions were answered concerning future plans. Asked to return should symptoms worsen or not improve with treatment. Any pending labs and studies will be relayed to patient when they become available. Pt verbalizes understanding of plan of care and denies further questions or concerns at this time. Follow-up and Dispositions    · Return if symptoms worsen or fail to improve.        Ben Maki MD

## 2020-02-13 NOTE — TELEPHONE ENCOUNTER
----- Message from Pavel Brown MD sent at 2/12/2020  7:13 PM EST -----  Please let Ms. Salo Altamirano son or daughter know that her B12 was normal.   Will continue to monitor. Continue with treatment outlined.    Thanks,   Dr. Leobardo Herr

## 2020-02-18 NOTE — TELEPHONE ENCOUNTER
Marjan Hart came by and is requesting a Portable oxygen tank to attach to pt's walker. Pt is becoming close to  tripping on her oxygen cord and feels this would be helpful. Also Marjan Hart is wanting to know if Melatonin would be helpful with other medications she is taking.      Best contact: [phone]

## 2020-02-19 NOTE — TELEPHONE ENCOUNTER
I spoke to [address] and they said if she had a basket they could furnish a small tank. I spoke to [facility] and they have [address] [place] [address] for their O2. I told her to call them and ask what they have and Dr Brice Rhodes will sign off for it. Dr Brice Rhodes said no to the melatonin. She said benadryl or tylenol PM once in a while would be ok.

## 2020-02-20 NOTE — TELEPHONE ENCOUNTER
Issac Arguelles said she thought Dr Roderick Garcia had said no benadryl but they could use melatonin. She just wanted to make sure. She said they have not given her anything. They do have help coming. She said all was good.

## 2020-02-27 NOTE — TELEPHONE ENCOUNTER
The pt's DIL is wanting to know if the appt on 3/4/20 will be to address mini mental health eval, or just a f/u from pneumonia

## 2020-02-27 NOTE — TELEPHONE ENCOUNTER
It will be for both. Both to do the minimental status and always to assess how she is doing.      Thanks,   Dr. Kae Joel

## 2020-03-04 NOTE — PROGRESS NOTES
Identified pt with two pt identifiers(name and ). Chief Complaint Patient presents with  Pneumonia  
  here for follow up to be certain pneumonia has cleared  Altered mental status  
  do mini mental health exam  
 Medication Refill  
  all medications and would also like script for clindamycin cream she was prescribed in the past by another provider  Medication Evaluation  
  would like tramadol increased and would also like to take gabapentin in the a.m. as well Health Maintenance Due Topic  Shingrix Vaccine Age 50> (1 of 2)  GLAUCOMA SCREENING Q2Y  Breast Cancer Screen Mammogram   
 Medicare Yearly Exam   
 
 
Wt Readings from Last 3 Encounters:  
20 177 lb (80.3 kg) 02/10/20 184 lb (83.5 kg) 01/15/20 170 lb (77.1 kg) Temp Readings from Last 3 Encounters:  
20 97.8 °F (36.6 °C) (Oral) 02/15/20 97 °F (36.1 °C)  
02/10/20 98.4 °F (36.9 °C) (Oral) BP Readings from Last 3 Encounters:  
20 149/89  
02/10/20 118/60  
20 128/78 Pulse Readings from Last 3 Encounters:  
20 89  
02/15/20 86  
02/10/20 92 Learning Assessment: 
:  
 
Learning Assessment 5/10/2016 1/15/2014 PRIMARY LEARNER Patient Patient PRIMARY LANGUAGE ENGLISH ENGLISH  
LEARNER PREFERENCE PRIMARY DEMONSTRATION READING  
ANSWERED BY patient  self RELATIONSHIP SELF SELF Depression Screening: 
:  
 
3 most recent PHQ Screens 3/4/2020 Little interest or pleasure in doing things Not at all Feeling down, depressed, irritable, or hopeless Not at all Total Score PHQ 2 0 Fall Risk Assessment: 
:  
 
Fall Risk Assessment, last 12 mths 3/4/2020 Able to walk? Yes Fall in past 12 months? Yes Fall with injury? Yes  
Number of falls in past 12 months 3 Fall Risk Score 4 Abuse Screening: 
:  
 
Abuse Screening Questionnaire 3/4/2020 2016 Do you ever feel afraid of your partner?  Gillian Calabrese  
 Are you in a relationship with someone who physically or mentally threatens you? Josue Zamudio Is it safe for you to go home? Janine Velasquez Coordination of Care Questionnaire: 
:  
 
1) Have you been to an emergency room, urgent care clinic since your last visit? no  
Hospitalized since your last visit? no          
 
2) Have you seen or consulted any other health care providers outside of [address] [place] [address] since your last visit? no  (Include any pap smears or colon screenings in this section.) 3) Do you have an Advance Directive on file? Yes Are you interested in receiving information about Advance Directives? no 
 
Patient is accompanied by her family. I have received verbal consent from Maik Waite to discuss any/all medical information while they are present in the room. Reviewed record in preparation for visit and have obtained necessary documentation. Medication reconciliation up to date and corrected with patient at this time.

## 2020-03-04 NOTE — PROGRESS NOTES
Chief Complaint: 
Chief Complaint Patient presents with  Pneumonia  
  here for follow up to be certain pneumonia has cleared  Altered mental status  
  do mini mental health exam  
 Medication Refill  
  all medications and would also like script for clindamycin cream she was prescribed in the past by another provider  Medication Evaluation  
  would like tramadol increased and would also like to take gabapentin in the a.m. as well History of Present Illness: 
75F who presents primarily to have a mini mental status test. She also has the following concerns: 
1. Discussed that she needs to have a CXR to make sure pneumonia is resolved. 2. Has been noted to have increased cognitive changes. We will do the mini mental test and also send her for neuropsychological testing. 3. Patient would like a refill of Clindamycin Gel which she uses for between her legs and under her breast. Had been given by a previous Patient is irritable. 4. Patient reports \"I am not long for this life and I need to have less pain. I would like my tramadol increased. \" The patient presented today with 2 daughter in laws and 1 of her sons. We reviewed where we are to this point. Elías Davidson remains frustrated by what she sees are mounting independence issues. She feels like her family is encroaching on her independence and yet, realizes that she needs more and more help. \"I am never leaving my home. \" Currently, a son a DIL stay at her home overnight and they do have someone who comes weekly to help with ADL's. Once again, we discussed polypharmacology and will give a referral to pain management at this time. It is unsafe to increase her meds at this time without pain management involvement. Reviewed PmHx, RxHx, FmHx, SocHx, AllgHx and updated and dated in the chart. Patient Active Problem List  
 Diagnosis  Physical debility  Left hip pain  Fall from ground level  CKD (chronic kidney disease) stage 3, GFR 30-59 ml/min ([facility])  Abnormal urinalysis  Lung crackles  Dysuria  Need for hepatitis C screening test  
 Intertrigo  UTI (lower urinary tract infection)  Bronchitis  Palpitations  Anxiety  Iron deficiency anemia, unspecified  Osteoarthritis  Senile osteoporosis  Knee pain  CKD (chronic kidney disease) stage 2, GFR 60-89 ml/min  Insomnia Review of Systems - negative except as listed above in the HPI Review of Systems Musculoskeletal: Positive for back pain, joint pain and myalgias. Psychiatric/Behavioral: Positive for depression. The patient is nervous/anxious. All other systems reviewed and are negative. Objective:  
 
Vitals:  
 03/04/20 1141 BP: 149/89 Pulse: 89 Resp: 18 Temp: 97.8 °F (36.6 °C) TempSrc: Oral  
SpO2: 97% Weight: 177 lb (80.3 kg) Height: 5' 4\" (1.626 m) Physical Examination:  
General appearance - chronically ill appearing, wheel chair bound Mental status - alert, oriented to person, place, and time Chest - clear to auscultation, no wheezes, rales or rhonchi, symmetric air entry Heart - normal rate, regular rhythm, normal S1, S2, no murmurs, rubs, clicks or gallops Neurological - abnormal findings: weakness, abnormal neurological exam unchanged from prior examinations Musculoskeletal - osteoporosis with significant scoliosis, unable to ambulate more than 10-20 feet without assitance Extremities - pedal edema chronic, not worse Assessment/ Plan:  
75F who is having increased difficulties with independence, ADL's and general health. She has the assistance of family, but is also resentful of what she sees as her independence being taken away. In addition, she has chronic pain and has developed some maladaptive behaviors and requesting more and more pain management.  At this time, I would like to send her to see a pain manager to assist in this. The patient has multiple chronic fractures from osteoporosis. However, it is not clear if these are causing pain or not. She had a fall in November and pelvic fracture that is healing. No Kyphoplasty has ever been done. No evidence of continued pelvic fracture. In addition, the patient had a mini mental status test in the office today and scored 25/30. Will refer to neuropsychology. I believe that her biggest concerns are depression and pain. Diagnoses and all orders for this visit: 1. Cognitive and behavioral changes 
-     REFERRAL TO NEUROLOGY 2. Rash 
-     clindamycin (CLINDAGEL) 1 % topical gel; Apply  to affected area two (2) times a day. use thin film on affected area 3. H/O: pneumonia Comments: Follow up pneumonia from recent admission. Orders: 
-     XR CHEST PA LAT; Future 4. Chronic pain syndrome 
-     REFERRAL TO PAIN MANAGEMENT 5. CKD (chronic kidney disease) stage 3, GFR 30-59 ml/min ([facility]) Assessment & Plan: 
Stable, based on history, physical exam and review of pertinent labs, studies and medications; meds reconciled; continue current treatment plan, lifestyle modifications recommended, medication compliance emphasized. Key CKD Meds Patient is on no CKD meds. Lab Results Component Value Date/Time GFR est non-AA 49 02/10/2020 03:44 PM  
 GFR est AA 59 02/10/2020 03:44 PM  
 Creatinine 1.10 02/10/2020 03:44 PM  
 BUN 10 02/10/2020 03:44 PM  
 Sodium 140 02/10/2020 03:44 PM  
 Potassium 4.6 02/10/2020 03:44 PM  
 Chloride 104 02/10/2020 03:44 PM  
 CO2 33 02/10/2020 03:44 PM  
 Calcium 9.3 02/10/2020 03:44 PM  
 Magnesium 2.0 02/10/2020 03:44 PM  
 VITAMIN D, 25-HYDROXY 45.5 02/22/2019 11:17 AM  
 
Estimated Creatinine Clearance: 46 mL/min (A) (by C-G formula based on SCr of 1.1 mg/dL (H)). 6. Moderate episode of recurrent major depressive disorder (HCC) Assessment & Plan: Stable, based on history, physical exam and review of pertinent labs, studies and medications; meds reconciled; continue current treatment plan. Key Psychotherapeutic Meds buPROPion SR (WELLBUTRIN SR) 100 mg SR tablet (Taking) TAKE ONE TABLET BY MOUTH 2 TIMES A DAY  
 citalopram (CELEXA) 10 mg tablet (Taking) Take 1 Tab by mouth nightly. naloxone (NARCAN) 4 mg/actuation nasal spray Use 1 spray intranasally, then discard. Repeat with new spray every 2 min as needed for opioid overdose symptoms, alternating nostrils. Other [address] Meds   
    
  
 traMADol (ULTRAM) 50 mg tablet (Taking) Take 50 mg by mouth. Every 6 hours prn at qhs  
 gabapentin (NEURONTIN) 100 mg capsule (Taking) Take 1 Cap by mouth daily. Max Daily Amount: 100 mg. Lab Results Component Value Date/Time Sodium 140 02/10/2020 03:44 PM  
 Creatinine 1.10 02/10/2020 03:44 PM  
 TSH 1.470 01/15/2020 03:05 PM  
 WBC 6.7 02/10/2020 03:44 PM  
 ALT (SGPT) 7 02/10/2020 03:44 PM  
 AST (SGOT) 10 02/10/2020 03:44 PM  
 
 
I have discussed the diagnosis with the patient and the intended treatment plan as seen in the above orders. The patient has received an after-visit summary and questions were answered concerning future plans. Asked to return should symptoms worsen or not improve with treatment. Any pending labs and studies will be relayed to patient when they become available. Pt verbalizes understanding of plan of care and denies further questions or concerns at this time. Follow-up and Dispositions · Return in about 3 months (around 6/4/2020), or if symptoms worsen or fail to improve. Tata James MD 
 
Patient Instructions Learning About Making a Plan for Managing Chronic Pain How can you plan for managing your pain? You and your doctor will work to make your plan. Your plan can include more than one type of pain control.  You may take prescription or over-the-counter drugs. You can also use physical treatments, like massage and acupuncture. Other things can help too, such as meditation or a type of counseling to change how you think about your pain. It's important to let your doctor know how your pain is affecting your life. The goal of a pain management plan isn't to totally get rid of pain. Instead, the goal is to control the pain enough so that daily activities are easier. If your pain isn't controlled well enough, talk with your doctor. You may need to make a new plan. Or your doctor may refer you to a specialist. 
Sample pain management plan Here is an example of a pain management plan. You can work with your doctor to complete it. My Goals Example: \"Control pain long enough to walk the dog each day\" or \"Reduce or stop taking pain medicines by the end of summer. \"  
________________________________________________ 
________________________________________________ My Non-Medicine Strategies Example: \"Complete my physical therapy exercises each day\" or \"Do yoga twice a week. \" 
________________________________________________ 
________________________________________________ 
________________________________________________ My Medicines That Help With Pain Include prescription and over-the-counter medicines. Medicine: ______________________ How much to take: ______________________________ How often to take it: ______________________________ Other instructions: ______________________________ Medicine: ______________________ How much to take: ______________________________ How often to take it: ______________________________ Other instructions: ______________________________ My Next Steps If I'm not meeting my goals, my doctor recommends: 
________________________________________________ 
________________________________________________ ________________________________________________ Where can you learn more? Go to http://thiago-uli.info/. Enter P490 in the search box to learn more about \"Learning About Making a Plan for Managing Chronic Pain. \" Current as of: March 28, 2019 Content Version: 12.2 © 8863-7528 [employer]. Care instructions adapted under license by [employer] (which disclaims liability or warranty for this information). If you have questions about a medical condition or this instruction, always ask your healthcare professional. [address] any warranty or liability for your use of this information.

## 2020-03-06 PROBLEM — S32.9XXA: Status: ACTIVE | Noted: 2020-01-01
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
## 2020-03-06 PROBLEM — S32.9XXA: Chronic | Status: RESOLVED | Noted: 2020-01-01 | Resolved: 2020-01-01

## 2020-03-06 PROBLEM — S32.9XXA: Status: RESOLVED | Noted: 2020-01-01 | Resolved: 2020-01-01

## 2020-03-06 PROBLEM — F33.1 MODERATE EPISODE OF RECURRENT MAJOR DEPRESSIVE DISORDER (HCC): Status: ACTIVE | Noted: 2020-01-01

## 2020-03-06 NOTE — ASSESSMENT & PLAN NOTE
Lab Results Component Value Date/Time  WBC 6.7 02/10/2020 03:44 PM  
 HGB 10.5 02/10/2020 03:44 PM  
 HCT 34.7 02/10/2020 03:44 PM  
 PLATELET 306 63/23/5625 03:44 PM  
 Creatinine 1.10 02/10/2020 03:44 PM  
 BUN 10 02/10/2020 03:44 PM  
 Potassium 4.6 02/10/2020 03:44 PM

## 2020-03-06 NOTE — ASSESSMENT & PLAN NOTE
Stable, based on history, physical exam and review of pertinent labs, studies and medications; meds reconciled; continue current treatment plan. Key Psychotherapeutic Meds buPROPion SR (WELLBUTRIN SR) 100 mg SR tablet (Taking) TAKE ONE TABLET BY MOUTH 2 TIMES A DAY  
 citalopram (CELEXA) 10 mg tablet (Taking) Take 1 Tab by mouth nightly. naloxone (NARCAN) 4 mg/actuation nasal spray Use 1 spray intranasally, then discard. Repeat with new spray every 2 min as needed for opioid overdose symptoms, alternating nostrils. Other [address] [place] Meds   
    
  
 traMADol (ULTRAM) 50 mg tablet (Taking) Take 50 mg by mouth. Every 6 hours prn at qhs  
 gabapentin (NEURONTIN) 100 mg capsule (Taking) Take 1 Cap by mouth daily. Max Daily Amount: 100 mg. Lab Results Component Value Date/Time  Sodium 140 02/10/2020 03:44 PM  
 Creatinine 1.10 02/10/2020 03:44 PM  
 TSH 1.470 01/15/2020 03:05 PM  
 WBC 6.7 02/10/2020 03:44 PM  
 ALT (SGPT) 7 02/10/2020 03:44 PM  
 AST (SGOT) 10 02/10/2020 03:44 PM

## 2020-03-06 NOTE — PATIENT INSTRUCTIONS
Learning About Making a Plan for Managing Chronic Pain How can you plan for managing your pain? You and your doctor will work to make your plan. Your plan can include more than one type of pain control. You may take prescription or over-the-counter drugs. You can also use physical treatments, like massage and acupuncture. Other things can help too, such as meditation or a type of counseling to change how you think about your pain. It's important to let your doctor know how your pain is affecting your life. The goal of a pain management plan isn't to totally get rid of pain. Instead, the goal is to control the pain enough so that daily activities are easier. If your pain isn't controlled well enough, talk with your doctor. You may need to make a new plan. Or your doctor may refer you to a specialist. 
Sample pain management plan Here is an example of a pain management plan. You can work with your doctor to complete it. My Goals Example: \"Control pain long enough to walk the dog each day\" or \"Reduce or stop taking pain medicines by the end of summer. \"  
________________________________________________ 
________________________________________________ My Non-Medicine Strategies Example: \"Complete my physical therapy exercises each day\" or \"Do yoga twice a week. \" 
________________________________________________ 
________________________________________________ 
________________________________________________ My Medicines That Help With Pain Include prescription and over-the-counter medicines. Medicine: ______________________ How much to take: ______________________________ How often to take it: ______________________________ Other instructions: ______________________________ Medicine: ______________________ How much to take: ______________________________ How often to take it: ______________________________ Other instructions: ______________________________ My Next Steps If I'm not meeting my goals, my doctor recommends: 
________________________________________________ 
________________________________________________ 
________________________________________________ Where can you learn more? Go to http://thiago-uli.info/. Enter P490 in the search box to learn more about \"Learning About Making a Plan for Managing Chronic Pain. \" Current as of: March 28, 2019 Content Version: 12.2 © 5461-4456 [employer]. Care instructions adapted under license by "[employer] (which disclaims liability or warranty for this information). If you have questions about a medical condition or this instruction, always ask your healthcare professional. [address] any warranty or liability for your use of this information.

## 2020-03-06 NOTE — ASSESSMENT & PLAN NOTE
Stable, based on history, physical exam and review of pertinent labs, studies and medications; meds reconciled; continue current treatment plan, lifestyle modifications recommended, medication compliance emphasized. Key CKD Meds Patient is on no CKD meds. Lab Results Component Value Date/Time GFR est non-AA 49 02/10/2020 03:44 PM  
 GFR est AA 59 02/10/2020 03:44 PM  
 Creatinine 1.10 02/10/2020 03:44 PM  
 BUN 10 02/10/2020 03:44 PM  
 Sodium 140 02/10/2020 03:44 PM  
 Potassium 4.6 02/10/2020 03:44 PM  
 Chloride 104 02/10/2020 03:44 PM  
 CO2 33 02/10/2020 03:44 PM  
 Calcium 9.3 02/10/2020 03:44 PM  
 Magnesium 2.0 02/10/2020 03:44 PM  
 VITAMIN D, 25-HYDROXY 45.5 02/22/2019 11:17 AM  
 
Estimated Creatinine Clearance: 46 mL/min (A) (by C-G formula based on SCr of 1.1 mg/dL (H)).

## 2020-03-11 NOTE — PROGRESS NOTES
Xray of lungs showed no acute process. She does have multiple compression fractures, but no other acute findings.

## 2020-03-13 NOTE — TELEPHONE ENCOUNTER
----- Message from Rolando Goetz MD sent at 3/11/2020 12:04 PM EDT -----  Xray of lungs showed no acute process. She does have multiple compression fractures, but no other acute findings.

## 2020-03-30 NOTE — PROGRESS NOTES
Parth Rodriguez is a 77 y.o. female who was seen by synchronous (real-time) audio-video technology on 3/30/2020. Consent: 
She and/or her healthcare decision maker is aware that this patient-initiated Telehealth encounter is a billable service, with coverage as determined by her insurance carrier. She is aware that she may receive a bill and has provided verbal consent to proceed: Yes I was in the office while conducting this encounter. Assessment & Plan:  
Diagnoses and all orders for this visit: 1. Bruise of muscle Cool compress to the areas. Keep leg elevated. 2. Urinary frequency 
-     nitrofurantoin (MACRODANTIN) 100 mg capsule; Take 1 Cap by mouth nightly for 10 days. 
- Should take this if symptoms worsen or do not improve. Currently, she is taking Azo and cranberry juice. 3. Fall, initial encounter No injury to head. I have documented the areas of concern. I spent at least 15 minutes with this established patient, and >50% of the time was spent counseling and/or coordinating care regarding evaluation of the bruising and possible UTI symptoms. [address] Subjective:  
Parth Rodriguez was seen for Bleeding/Bruising (S/p fall with bruise on L-leg, L-hip and buttocks. ); Urinary Frequency; and Fall. She reports that the fall happened on Saturday. She denies hitting her head. Her DIL was in the background and there had been some concern about possible UTI or occult infection. No fever or chills. No SOB. Urine has an orange color from the Azo. She denies UTI symptoms. However, understands that a UA sample can be dropped to the office. She does have a visiting nurse who will be coming Mon-Fri starting tomorrow. Prior to Admission medications Medication Sig Start Date End Date Taking? Authorizing Provider  
nitrofurantoin (MACRODANTIN) 100 mg capsule Take 1 Cap by mouth nightly for 10 days.  3/30/20 4/9/20 Yes Yeison Benavides MD  
 atenoloL (TENORMIN) 50 mg tablet TAKE 1 TAB BY MOUTH NIGHTLY FOR 30 DAYS. 3/25/20   Boy Reyes MD  
diazePAM (VALIUM) 5 mg tablet TAKE 1 TABLET BY MOUTH NIGHTLY. MAX DAILY AMOUNT: 5MG 3/24/20   Boy Reyes MD  
buPROPion SR [facility] SR) 100 mg SR tablet TAKE ONE TABLET BY MOUTH 2 TIMES A DAY 3/19/20   Boy Reyes MD  
bumetanide (BUMEX) 1 mg tablet TAKE 1 TABLET BY MOUTH EVERY DAY 3/19/20   Boy Reyes MD  
clindamycin (CLINDAGEL) 1 % topical gel Apply  to affected area two (2) times a day. use thin film on affected area 3/4/20   Boy Reyes MD  
lidocaine (LIDODERM) 5 % APPLY 1 PATCH TO AFFECTED AREA FOR 12 HOURS, THEN REMOVE FOR 12 HOURS DAILY FOR 30 DAYS 2/2/20   Provider, Historical  
traMADol (ULTRAM) 50 mg tablet Take 50 mg by mouth. Every 6 hours prn at [facility]    Provider, Historical  
aspirin delayed-release 81 mg tablet Take 81 mg by mouth daily. Provider, Historical  
acetaminophen (TYLENOL EXTRA STRENGTH) 500 mg tablet Take 1,000 mg by mouth every six (6) hours as needed for Pain. Provider, Historical  
docusate sodium (STOOL SOFTENER) 100 mg capsule Take 300 mg by mouth every morning. Provider, Historical  
gabapentin (NEURONTIN) 100 mg capsule Take 1 Cap by mouth daily. Max Daily Amount: 100 mg. Patient taking differently: Take 100 mg by mouth two (2) times a day. 1/2/20   Reji Chiang, NP Oxygen 2L    Provider, Historical  
naloxone (NARCAN) 4 mg/actuation nasal spray Use 1 spray intranasally, then discard. Repeat with new spray every 2 min as needed for opioid overdose symptoms, alternating nostrils. 12/17/19   Boy Reyes MD  
citalopram (CELEXA) 10 mg tablet Take 1 Tab by mouth nightly. 11/27/19   Heather Archer MD  
nystatin (MYCOSTATIN) topical cream Apply  to affected area two (2) times a day.  11/27/19   Amy Shorts, DO  
calcium-vitamin D (CALCIUM 500+D) 500 mg(1,250mg) -200 unit per tablet Take 1 Tab by mouth daily. Provider, Historical  
 
Allergies Allergen Reactions  Pcn [Penicillins] Unknown (comments) Childhood reaction rash Patient Active Problem List  
Diagnosis Code  Knee pain M25.569  
 CKD (chronic kidney disease) stage 2, GFR 60-89 ml/min N18.2  Insomnia G47.00  
 Osteoarthritis M19.90  Senile osteoporosis M81.0  Iron deficiency anemia, unspecified D50.9  Anxiety F41.9  Palpitations R00.2  Bronchitis J40  
 UTI (lower urinary tract infection) N39.0  Intertrigo L30.4  Need for hepatitis C screening test Z11.59  Dysuria R30.0  Lung crackles R09.89  Left hip pain M25.552  Fall from ground level W28.20RS  CKD (chronic kidney disease) stage 3, GFR 30-59 ml/min ([facility]) N18.3  Abnormal urinalysis R82.90  Physical debility R53.81  
 Fall W19. Dirk Lawman  Moderate episode of recurrent major depressive disorder ([facility]) F33.1 Patient Active Problem List  
 Diagnosis Date Noted  Fall 03/06/2020  Moderate episode of recurrent major depressive disorder ([facility] [address] 03/06/2020  Physical debility 11/25/2019  Left hip pain 11/24/2019  Fall from ground level 11/24/2019  CKD (chronic kidney disease) stage 3, GFR 30-59 ml/min ([facility]) 11/24/2019  Abnormal urinalysis 11/24/2019  Lung crackles 10/29/2015  Dysuria 09/18/2015  Need for hepatitis C screening test 07/23/2015  Intertrigo 07/07/2015  UTI (lower urinary tract infection) 05/21/2015  Bronchitis 03/02/2015  Palpitations 07/24/2012  Anxiety 02/07/2012  Iron deficiency anemia, unspecified 01/12/2012  Osteoarthritis 12/30/2009  Senile osteoporosis 12/30/2009  Knee pain  CKD (chronic kidney disease) stage 2, GFR 60-89 ml/min  Insomnia Current Outpatient Medications Medication Sig Dispense Refill  nitrofurantoin (MACRODANTIN) 100 mg capsule Take 1 Cap by mouth nightly for 10 days.  10 Cap 0  
  atenoloL (TENORMIN) 50 mg tablet TAKE 1 TAB BY MOUTH NIGHTLY FOR 30 DAYS. 30 Tab 0  
 diazePAM (VALIUM) 5 mg tablet TAKE 1 TABLET BY MOUTH NIGHTLY. MAX DAILY AMOUNT: 5MG 30 Tab 1  
 buPROPion SR (WELLBUTRIN SR) 100 mg SR tablet TAKE ONE TABLET BY MOUTH 2 TIMES A DAY 60 Tab 0  
 bumetanide (BUMEX) 1 mg tablet TAKE 1 TABLET BY MOUTH EVERY DAY 90 Tab 1  clindamycin (CLINDAGEL) 1 % topical gel Apply  to affected area two (2) times a day. use thin film on affected area 30 g 5  
 lidocaine (LIDODERM) 5 % APPLY 1 PATCH TO AFFECTED AREA FOR 12 HOURS, THEN REMOVE FOR 12 HOURS DAILY FOR 30 DAYS  traMADol (ULTRAM) 50 mg tablet Take 50 mg by mouth. Every 6 hours prn at hs    
 aspirin delayed-release 81 mg tablet Take 81 mg by mouth daily.  acetaminophen (TYLENOL EXTRA STRENGTH) 500 mg tablet Take 1,000 mg by mouth every six (6) hours as needed for Pain.  docusate sodium (STOOL SOFTENER) 100 mg capsule Take 300 mg by mouth every morning.  gabapentin (NEURONTIN) 100 mg capsule Take 1 Cap by mouth daily. Max Daily Amount: 100 mg. (Patient taking differently: Take 100 mg by mouth two (2) times a day.) 90 Cap 0  
 Oxygen 2L  naloxone (NARCAN) 4 mg/actuation nasal spray Use 1 spray intranasally, then discard. Repeat with new spray every 2 min as needed for opioid overdose symptoms, alternating nostrils. 1 Each 3  
 citalopram (CELEXA) 10 mg tablet Take 1 Tab by mouth nightly. 30 Tab 0  
 nystatin (MYCOSTATIN) topical cream Apply  to affected area two (2) times a day. 15 g 0  
 calcium-vitamin D (CALCIUM 500+D) 500 mg(1,250mg) -200 unit per tablet Take 1 Tab by mouth daily. Allergies Allergen Reactions  Pcn [Penicillins] Unknown (comments) Childhood reaction rash Past Medical History:  
Diagnosis Date  Anxiety  Arthritis   
 knees,shoulders,fingers,back,neck  CKD (chronic kidney disease) stage 2, GFR 60-89 ml/min Dr. David Grimaldo  Femur fracture, left ([address] 2008  Femur fracture, right ([facility] [address] 2009  Insomnia  Intertrigo 7/7/2015  Knee pain Dr. Bren Willett  Moderate episode of recurrent major depressive disorder ([facility] [address] 3/6/2020  Osteopenia Dr. Bren Willett  Psychiatric disorder   
 anxiety  Pyloric stenosis 2010  Sleep disorder Insomnia  Thoracic vertebral fracture ([facility] [address] 2005  Ulcer 2012 Perferatied ulcer Past Surgical History:  
Procedure Laterality Date  HX FEMUR FRACTURE TX Left femur - 2008, R femur - 2009  HX GI  2010 Perforated ulcer, in relation to pyloric stenosis  HX ORTHOPAEDIC    
 6 thoracic vertabrae that were fractured in 2005  [place] COLSC FLX W/NDSC US XM RCTM ET AL LMTD&ADJ STRUX  12/6/10 Normal except Diverticulosis Family History Problem Relation Age of Onset  Cancer Sister   
     metastatic lung Cancer  Cancer Mother   
     colon Social History Tobacco Use  Smoking status: Former Smoker  Smokeless tobacco: Never Used Substance Use Topics  Alcohol use: No  
  Alcohol/week: 0.0 standard drinks Review of Systems Constitutional: Negative for fever. Genitourinary: Positive for frequency. Negative for dysuria, flank pain and urgency. Musculoskeletal: Positive for falls. Bruising of L-leg, hip and buttocks. No open lesions. All other systems reviewed and are negative. PHYSICAL EXAMINATION: 
Vital Signs: (As obtained by patient/caregiver at home) There were no vitals taken for this visit. Constitutional: [x] Appears well-developed and well-nourished [x] No apparent distress   
  [] Abnormal - moderately obese Mental status: [x] Alert and awake  [x] Oriented to person/place/time [x] Able to follow commands   
[] Abnormal - Eyes:   EOM    [x]  Normal    [] Abnormal -  
Sclera  [x]  Normal    [] Abnormal - 
        Discharge [x]  None visible   [] Abnormal - HENT: [x] Normocephalic, atraumatic  [] Abnormal -  
 [x] Mouth/Throat: Mucous membranes are moist 
 
External Ears [x] Normal  [] Abnormal - Neck: [x] No visualized mass [] Abnormal - Pulmonary/Chest: [x] Respiratory effort normal   [x] No visualized signs of difficulty breathing or respiratory distress 
      [] Abnormal - Musculoskeletal:   [] Normal gait with no signs of ataxia [] Normal range of motion of neck [x] Abnormal - Large ecchymosis and hematoma over L-lower leg, smaller hematoma over L-hip and L-buttocks. No open lesions or skin breakdown. Neurological:        [] No Facial Asymmetry (Cranial nerve 7 motor function) (limited exam due to video visit) [x] No gaze palsy [x] Abnormal - Patient has Ptosis of R-eye at baseline. Skin:        [x] No significant exanthematous lesions or discoloration noted on facial skin   
     [x] Abnormal - as noted above. Psychiatric:       [x] Normal Affect [] Abnormal -  
     [x] No Hallucinations Other pertinent observable physical exam findings: We discussed the expected course, resolution and complications of the diagnosis(es) in detail. Medication risks, benefits, costs, interactions, and alternatives were discussed as indicated. I advised her to contact the office if her condition worsens, changes or fails to improve as anticipated. She expressed understanding with the diagnosis(es) and plan. Pursuant to the emergency declaration under the [facility], [facility] waiver authority and the [employer] Act, this Virtual  Visit was conducted, with patient's consent, to reduce the patient's risk of exposure to COVID-19 and provide continuity of care for an established patient. Services were provided through a video synchronous discussion virtually to substitute for in-person clinic visit.  
 
Hyacinth Hammans, MD

## 2020-04-21 NOTE — PROGRESS NOTES
Consent: Brice Iyer, who was seen by synchronous (real-time) audio-video technology, and/or her healthcare decision maker, is aware that this patient-initiated, Telehealth encounter on 4/21/2020 is a billable service, with coverage as determined by her insurance carrier. She is aware that she may receive a bill and has provided verbal consent to proceed: Yes. Assessment & Plan:  
Diagnoses and all orders for this visit: 
 
1. Wound of left lower extremity, initial encounter 
-     [facility] who had a fall about 1-2 weeks ago and developed a hematoma on the L-lower leg. Had been healing well and then skin came off and she has a 2\" x 1/14\" wound in the area and depth of about 1/16th inches as measured by her son whilst on a virtual visit. I have instructed: 1. Cleanse wound 2 x daily. 2. Adaptic dressing directly over the wound (vaseline infused dressing) 3. Telfa pad 4. Kerlex and paper tape to the bandaging and not on skin. She is home bound and decreased mobility. Would benefit from [facility] to evaluate and monitor healing of this wound. No fever or chills. No notable cellulitis surrounding the tissue. Follow-up and Dispositions · Return in about 1 week (around 4/28/2020), or if symptoms worsen or fail to improve, for Wound check. I spent at least 15 minutes with this established patient, and >50% of the time was spent counseling and/or coordinating care regarding left leg wound [address] Subjective:  
Brice Iyer is a 77 y.o. female who was seen for Dressing Change (Left leg wound from previous hematoma. Skin came off over the weekend. Needs dressing care and home care to assess with SNN. ) Prior to Admission medications Medication Sig Start Date End Date Taking? Authorizing Provider  
atenoloL (TENORMIN) 50 mg tablet TAKE 1 TAB BY MOUTH NIGHTLY FOR 30 DAYS.  4/17/20   Melonie Hinojosa MD  
buPROPion SR [facility] SR) 100 mg SR tablet TAKE 1 TABLET BY MOUTH TWICE A DAY 4/11/20   Loren Green MD  
diazePAM (VALIUM) 5 mg tablet TAKE 1 TABLET BY MOUTH NIGHTLY. MAX DAILY AMOUNT: 5MG 3/24/20   Loren Green MD  
bumetanide (BUMEX) 1 mg tablet TAKE 1 TABLET BY MOUTH EVERY DAY 3/19/20   Loren Green MD  
clindamycin (CLINDAGEL) 1 % topical gel Apply  to affected area two (2) times a day. use thin film on affected area 3/4/20   Loren Green MD  
lidocaine (LIDODERM) 5 % APPLY 1 PATCH TO AFFECTED AREA FOR 12 HOURS, THEN REMOVE FOR 12 HOURS DAILY FOR 30 DAYS 2/2/20   Provider, Historical  
traMADol (ULTRAM) 50 mg tablet Take 50 mg by mouth. Every 6 hours prn at [facility]    Provider, Historical  
aspirin delayed-release 81 mg tablet Take 81 mg by mouth daily. Provider, Historical  
acetaminophen (TYLENOL EXTRA STRENGTH) 500 mg tablet Take 1,000 mg by mouth every six (6) hours as needed for Pain. Provider, Historical  
docusate sodium (STOOL SOFTENER) 100 mg capsule Take 300 mg by mouth every morning. Provider, Historical  
gabapentin (NEURONTIN) 100 mg capsule Take 1 Cap by mouth daily. Max Daily Amount: 100 mg. Patient taking differently: Take 100 mg by mouth two (2) times a day. 1/2/20   Carola Olivera NP Oxygen 2L    Provider, Historical  
naloxone (NARCAN) 4 mg/actuation nasal spray Use 1 spray intranasally, then discard. Repeat with new spray every 2 min as needed for opioid overdose symptoms, alternating nostrils. 12/17/19   Loren Green MD  
citalopram (CELEXA) 10 mg tablet Take 1 Tab by mouth nightly. 11/27/19   Reyes Pat, MD  
nystatin (MYCOSTATIN) topical cream Apply  to affected area two (2) times a day. 11/27/19   Frandy Shoemaker DO  
calcium-vitamin D (CALCIUM 500+D) 500 mg(1,250mg) -200 unit per tablet Take 1 Tab by mouth daily. Provider, Historical  
 
Allergies Allergen Reactions  Pcn [Penicillins] Unknown (comments) Childhood reaction rash Patient Active Problem List  
 Diagnosis Date Noted  Fall 03/06/2020  Moderate episode of recurrent major depressive disorder ([facility] [address] 03/06/2020  Physical debility 11/25/2019  Left hip pain 11/24/2019  Fall from ground level 11/24/2019  CKD (chronic kidney disease) stage 3, GFR 30-59 ml/min ([facility]) 11/24/2019  Abnormal urinalysis 11/24/2019  Lung crackles 10/29/2015  Dysuria 09/18/2015  Need for hepatitis C screening test 07/23/2015  Intertrigo 07/07/2015  UTI (lower urinary tract infection) 05/21/2015  Bronchitis 03/02/2015  Palpitations 07/24/2012  Anxiety 02/07/2012  Iron deficiency anemia, unspecified 01/12/2012  Osteoarthritis 12/30/2009  Senile osteoporosis 12/30/2009  Knee pain  CKD (chronic kidney disease) stage 2, GFR 60-89 ml/min  Insomnia Allergies Allergen Reactions  Pcn [Penicillins] Unknown (comments) Childhood reaction rash Past Medical History:  
Diagnosis Date  Anxiety  Arthritis   
 knees,shoulders,fingers,back,neck  CKD (chronic kidney disease) stage 2, GFR 60-89 ml/min Dr. Regina Edouard  Femur fracture, left ([facility] [address] 2008  Femur fracture, right ([facility] [address] 2009  Insomnia  Intertrigo 7/7/2015  Knee pain Dr. Shaunna Ariza  Moderate episode of recurrent major depressive disorder ([facility] [address] 3/6/2020  Osteopenia Dr. Shaunna Ariza  Psychiatric disorder   
 anxiety  Pyloric stenosis 2010  Sleep disorder Insomnia  Thoracic vertebral fracture ([facility] [address] 2005  Ulcer 2012 Perferatied ulcer Past Surgical History:  
Procedure Laterality Date  HX FEMUR FRACTURE TX Left femur - 2008, R femur - 2009  HX GI  2010 Perforated ulcer, in relation to pyloric stenosis  HX ORTHOPAEDIC    
 6 thoracic vertabrae that were fractured in 2005  [place] COLSC FLX W/NDSC US XM RCTM ET AL LMTD&ADJ STRUX  12/6/10 Normal except Diverticulosis Family History Problem Relation Age of Onset  Cancer Sister   
     metastatic lung Cancer  Cancer Mother   
     colon Social History Tobacco Use  Smoking status: Former Smoker  Smokeless tobacco: Never Used Substance Use Topics  Alcohol use: No  
  Alcohol/week: 0.0 standard drinks Review of Systems Skin:  
     Left leg wound All other systems reviewed and are negative. Physical Exam 
Musculoskeletal:  
     Legs: 
 
 
 
 
 
Objective:  
Vital Signs: (As obtained by patient/caregiver at home) There were no vitals taken for this visit. General: alert, cooperative, no distress Mental  status: normal mood, behavior, speech, dress, motor activity, and thought processes, able to follow commands HENT: NCAT Neck: no visualized mass Resp: no respiratory distress Neuro: no gross deficits Skin: Please see above. Psychiatric: normal affect, consistent with stated mood, no evidence of hallucinations We discussed the expected course, resolution and complications of the diagnosis(es) in detail. Medication risks, benefits, costs, interactions, and alternatives were discussed as indicated. I advised her to contact the office if her condition worsens, changes or fails to improve as anticipated. She expressed understanding with the diagnosis(es) and plan. Micheline Dias is a 77 y.o. female being evaluated by a video visit encounter for concerns as above. A caregiver was present when appropriate. Due to this being a TeleHealth encounter ([facility]), evaluation of the following organ systems was limited: Vitals/Constitutional/EENT/Resp/CV/GI//MS/Neuro/Skin/Heme-Lymph-Imm. Pursuant to the emergency declaration under the [facility], [facility] waiver authority and the [employer] Act, this Virtual  Visit was conducted, with patient's (and/or legal guardian's) consent, to reduce the patient's risk of exposure to COVID-19 and provide necessary medical care. Services were provided through a video synchronous discussion virtually to substitute for in-person clinic visit. Patient and provider were located at their individual homes.  
 
 
Elroy Franco MD

## 2020-04-30 NOTE — TELEPHONE ENCOUNTER
Spoke to Calion and relayed Dr Matt Greer message. Calion stated she understood and would have the daughter-in-law (caregiver) start pt's Bumex today. Patient should take her BUmex DAILY and when some of the discomfort and wound has improved, she can go back to using a compression stocking.

## 2020-04-30 NOTE — TELEPHONE ENCOUNTER
Bailey called stating pt has not been taking her Bumex and asking if pt can go back on it because leg edema is preventing wound from healing. I saw a Rx dated 3- for a 90 day supply of Bumex 1mg with 1 refill. David Porras also asked about compression stockings for pt.      David Porras [phone]

## 2020-05-05 NOTE — TELEPHONE ENCOUNTER
----- Message from Marquita Guzman sent at 5/5/2020 11:25 AM EDT -----  Regarding: Evelyn Garcia/ileana Hoskins Double called to request the information for the pain specialist and the neurologist that the patient was referred to.  Best contact number is [phone]

## 2020-05-05 NOTE — TELEPHONE ENCOUNTER
I gave her Dr Carlos Middleton and Dr Dada Quezada names and numbers. They have appt with Dr Carlos Middleton but Dr Dada Quezada needs our files sent to him. I faxed them over to him.

## 2020-05-18 NOTE — TELEPHONE ENCOUNTER
The pt's daughter in law is calling because the pt's almost out of the wellbutrin but when they called the pharmacy the script seems to be different this time. She wants to know if she is suppose to be taking it once in the morning and once at night still? It was only written for once a day. She doesn't want to pickup if it is wrong but the pt only has once dose left.        Please call Olamide Reyes with any questions   [phone]

## 2020-05-29 NOTE — TELEPHONE ENCOUNTER
kayla with [facility] - pt was seen 5/28/20 and family was worried about weeping fluid on Saturday, Sunday and Monday but yesterday no weeping 1 plus ankle - 3 plus in calf - all vitals fine except for a little crackle in right lower lobe - pt is taking dieretic as prescribed     Wound looked ok but wants to know if any labs need to be draw when going back to pt's home on Monday 6/1/20?     Call Angel Noriega at [phone]

## 2020-06-03 NOTE — PROGRESS NOTES
Please let patient know that labs are stable. Needs to drink more water. Renal insufficiency is slightly worse, but seems that her sodium is elevated and indicates not enough water. Would like to repeat labs in 2-weeks. Please also talk to her daughter in law.    Thanks,   Dr. Noam Pride

## 2020-06-03 NOTE — TELEPHONE ENCOUNTER
Spoke to pt and relayed message. Pt understood. Please let patient know that labs are stable. Needs to drink more water. Renal insufficiency is slightly worse, but seems that her sodium is elevated and indicates not enough water. Would like to repeat labs in 2-weeks. Please also talk to her daughter in law.

## 2020-06-05 NOTE — TELEPHONE ENCOUNTER
Spoke to daughter-in-law and relayed Dr. Mary Mckeon message. She stated she understood. Please let patient know that labs are stable. Needs to drink more water. Renal insufficiency is slightly worse, but seems that her sodium is elevated and indicates not enough water.    Would like to repeat labs in 2-weeks

## 2020-06-08 NOTE — TELEPHONE ENCOUNTER
----- Message from Loree Nelson sent at 6/8/2020 11:30 AM EDT -----  Regarding: Dr. Yeboah Wily: [phone]  Caller (if not patient): Giselle Sanchez  Relationship of caller (if not patient): Daughter-in-law  Best contact number(s): [phone]  Name of medication and dosage if known: \"Citalopram\" 10 mg  Is patient out of this medication (yes/no): yes  Pharmacy name: [facility] listed in chart? (yes/no): yes  Pharmacy phone number: n/a   Date of last visit: Tuesday, April 21, 2020 09:45 AM  Details to clarify the request: She has contacted the pt's pharmacy and they advised her to call the practice.

## 2020-06-09 NOTE — TELEPHONE ENCOUNTER
kayla with [facility] needs to have order changed to [address] [facility] for mon, wed, fri because pt has a wound issue    Call Kristie Burciaga at [phone] (will send over fax to have dr to sign)

## 2020-06-10 NOTE — TELEPHONE ENCOUNTER
Pharmacy called stating pt can't swallow Bupropion SR tabs and asking if rx can be changed to the regular tabs so medication can be crushed

## 2020-06-23 NOTE — TELEPHONE ENCOUNTER
Spoke to pts daughter daughter and relayed Dr Ethan Dennis message about lab work. Tova Rendonells understood. Please let family know that CBC-D was stable. However, the lab could not run the ecoATM Ultramar 112. It apparently was denatured. The home nurse will have to recheck this.

## 2020-06-23 NOTE — PROGRESS NOTES
Please let family know that CBC-D was stable. However, the lab could not run the [employer] Ultramar 112. It apparently was denatured. The home nurse will have to recheck this.    Thanks,   Dr. Maximiliano Monroy

## 2020-06-29 NOTE — TELEPHONE ENCOUNTER
Forwarded from the call center;       Inquiring about how to bring in a urine sample, Najma Elias is not doing well so please call Negrita Elam at best contact number

## 2020-06-29 NOTE — TELEPHONE ENCOUNTER
Spoke to Meron about pt's altered mental status. Home health nurse was out today and vitals are normal and stable, only issue is the altered mental status that is worse than usual. Meron suggested a possible UTI which I agree is possible. They are coming to  supplies so they can bring me a urine sample tomorrow for me to test. Dr Tali Lozano is ok with this.

## 2020-07-22 NOTE — TELEPHONE ENCOUNTER
----- Message from Coleen Siddiqi sent at 7/22/2020 10:45 AM EDT -----  Regarding: Dena South MD  Medication Refill    Caller (if not patient): Ronald Lopez-Pt's Son      Relationship of caller (if not patient):      Best contact number(s):  [phone]      Name of medication and dosage if known: diazePAM (VALIUM) 5 mg tablet; traMADoL (ULTRAM) 50 mg tablet       Is patient out of this medication (yes/no): Yes      Pharmacy name: [facility] [address], [address] [place] [address] listed in chart? (yes/no):  Pharmacy phone number:      Details to clarify the request: caller requesting to have rx fill sooner than 48 business hours       Coleen Siddiqi
Statement Selected

## 2020-07-30 NOTE — PROGRESS NOTES
Virtual visit [address] [facility],[address] 
[address] [facility]  
[address] [facility] [address] Jennie Teixeira   
[phone] Office [phone] Fax Neuropsychology Initial Diagnostic Interview Note Referral:  Marilee Acosta MD 
 
Ritchie Carrel is a 77 y.o. right handed   female who was accompanied by her son to the initial clinical interview on 7/30/20. Please refer to her medical records for details pertaining to her history. At the start of the appointment, I reviewed the patient's [facility] Epic Chart (including Media scanned in from previous providers) for the active Problem List, all pertinent Past Medical Hx, medications, recent radiologic and laboratory findings. In addition, I reviewed pt's documented Immunization Record and Encounter History. Pursuant to the emergency declaration under the [address] [place] [address], [facility] waiver authority and the [employer] Act, this Virtual  Visit (audiovisual) was conducted, with appropriate consent obtained, to reduce the patient's risk of exposure to COVID-19 and provide continuity of care   Services were provided in this manner to substitute for in-person clinic visit. The originating site is the patient's home and the distance site is [facility] the [facility]. These types of teleneuropsychology/telehealth/telemedicine visits were authorized by the President of the [place], though I/we cannot guarantee what a third party payor will do reimbursement/coverage wise. I indicated that I would evaluate the patient and recommend diagnostics and treatment based on my assessment and impressions, and that our sessions are not being recorded and that personal health information is protected to the best of our abilities. The patient completed one year of college without history of previously diagnosed LD and/or receipt of special education services. She currently lives in the house she and her spouse had built for their skilled nursing. He passed away two years ago. Son lives about 1.7 miles away. 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  The patient has noticed a progressive decline in short term memory. She forgets the content of conversations. Misplaces things. She has trouble with names and things. She noticed a decline sometime between January and now. Son notes memory issues as well. She had a fall of some sorts, don't know the details. She does not remember this event She finds herself more irritable. She called 911 after this fall after her children were out of town. From then on, she was forgetting everything. Children said she said she had pain and needed to go to the hospital, even though she could stand on her own. She has significant pain. ? Polypharm here. She insisted on going to the hospital.  She has ongoing decline in memory. She has increased difficulties as her family is doing more and more for her. She feels like she needs help but doesn't want to lose the independence. Family has been staying overnight and they do have someone come out to help during the day. She has not been driving. She gets assistance with medications and finances and such. She wants to be able to drive. Son notes short term memory issues. Long term memory is fantastic. No previous neuropsych. Neuropsychological Mental Status Exam (NMSE): 
 
 
Historian: Good 
Praxis: No UE apraxia R/L Orientation: Intact to self and to other Dress: within normal limits Weight: within normal limits Appearance/Hygiene: within normal limits Gait: Slow, with walker Assistive Devices: Glasses, on O2, walker Mood: within normal limits Affect: within normal limits Comprehension: within normal limits Thought Process: within normal limits Expressive Language: within normal limits Receptive Language: within normal limits Motor:  No cognitive or motor perseveration ETOH: denied Tobacco: Denied Illicit: Denied SI/HI: Denied Psychosis: Denied Insight: Within normal limits Judgment: Within normal limits Other Psych: 
 
 
Past Medical History:  
Diagnosis Date  Anxiety  Arthritis   
 knees,shoulders,fingers,back,neck  CKD (chronic kidney disease) stage 2, GFR 60-89 ml/min Dr. Rivera Mis  Femur fracture, left ([facility] [address] 2008  Femur fracture, right ([facility] [address] 2009  Insomnia  Intertrigo 7/7/2015  Knee pain Dr. Deshaun Saab  Moderate episode of recurrent major depressive disorder ([facility] [address] 3/6/2020  Osteopenia Dr. Deshaun Saab  Psychiatric disorder   
 anxiety  Pyloric stenosis 2010  Sleep disorder Insomnia  Thoracic vertebral fracture ([facility] [address] 2005  Ulcer 2012 Perferatied ulcer Past Surgical History:  
Procedure Laterality Date  HX FEMUR FRACTURE TX Left femur - 2008, R femur - 2009  HX GI  2010 Perforated ulcer, in relation to pyloric stenosis  HX ORTHOPAEDIC    
 6 thoracic vertabrae that were fractured in 2005  AL COLSC FLX W/NDSC US XM RCTM ET AL LMTD&ADJ STRUX  12/6/10 Normal except Diverticulosis Allergies Allergen Reactions  Pcn [Penicillins] Unknown (comments) Childhood reaction rash Family History Problem Relation Age of Onset  Cancer Sister   
     metastatic lung Cancer  Cancer Mother   
     colon Social History Tobacco Use  Smoking status: Former Smoker  Smokeless tobacco: Never Used Substance Use Topics  Alcohol use: No  
  Alcohol/week: 0.0 standard drinks  Drug use: Never Current Outpatient Medications Medication Sig Dispense Refill  diazePAM (VALIUM) 5 mg tablet TAKE 1 TABLET BY MOUTH NIGHTLY. MAX DAILY AMOUNT: 5MG 30 Tab 1  
  traMADoL (ULTRAM) 50 mg tablet Take 1-2 Tabs by mouth every eight (8) hours as needed for Pain for up to 30 days. Max Daily Amount: 300 mg. 150 Tab 0  
 lidocaine (LIDODERM) 5 % 1 Patch by TransDERmal route every twelve (12) hours. APPLY 1 PATCH TO AFFECTED AREA FOR 12 HOURS, THEN REMOVE FOR 12 HOURS DAILY FOR 30 DAYS    
 OXYGEN-AIR DELIVERY SYSTEMS 2 L/min by continuous inhalation route continuous.  atenoloL (TENORMIN) 50 mg tablet TAKE 1 TABLET BY MOUTH EVERY DAY AT NIGHT 90 Tab 1  
 buPROPion (WELLBUTRIN) 100 mg tablet Take 1 Tab by mouth two (2) times a day for 180 doses. 60 Tab 1  citalopram (CeleXA) 10 mg tablet Take 1 Tab by mouth nightly. 90 Tab 1  
 bumetanide (BUMEX) 1 mg tablet TAKE 1 TABLET BY MOUTH EVERY DAY 90 Tab 1  
 aspirin delayed-release 81 mg tablet Take 81 mg by mouth daily.  acetaminophen (TYLENOL EXTRA STRENGTH) 500 mg tablet Take 1,000 mg by mouth every six (6) hours as needed for Pain.  docusate sodium (STOOL SOFTENER) 100 mg capsule Take 300 mg by mouth every morning.  gabapentin (NEURONTIN) 100 mg capsule Take 1 Cap by mouth daily. Max Daily Amount: 100 mg. (Patient taking differently: Take 100 mg by mouth two (2) times a day.) 90 Cap 0  
 naloxone (NARCAN) 4 mg/actuation nasal spray Use 1 spray intranasally, then discard. Repeat with new spray every 2 min as needed for opioid overdose symptoms, alternating nostrils. 1 Each 3  
 calcium-vitamin D (CALCIUM 500+D) 500 mg(1,250mg) -200 unit per tablet Take 1 Tab by mouth daily. Plan:  Obtain authorization for testing from insurance company. Report to follow once testing, scoring, and interpretation completed. ? Organic based neurocognitive issues versus mood disorder or combination of same. ? Problems organic, functional, or both? This note will not be viewable in [address]

## 2020-09-16 NOTE — LETTER
9/22/20 Patient: Kari Pena YOB: 1945 Date of Visit: 9/16/2020 Andreea Berrios MD 
[address] [facility] [id number] VIA In Basket Dear Andreea Berrios MD, Thank you for referring Ms. Kari Pena to [facility] for evaluation. My notes for this consultation are attached. If you have questions, please do not hesitate to call me. I look forward to following your patient along with you. Sincerely, Lb Dahl PsyD

## 2020-09-22 NOTE — PROGRESS NOTES
[facility],5Th Floor 
[address] [facility]  
[phone] Office [phone] Fax Neuropsychological Evaluation Report Referral:  Mitzy Hernandez MD 
 
Marcelle Severs is a 77 y.o. right handed   female who was accompanied by her son to the initial clinical interview on 7/30/20. Please refer to her medical records for details pertaining to her history. At the start of the appointment, I reviewed the patient's [facility] Epic Chart (including Media scanned in from previous providers) for the active Problem List, all pertinent Past Medical Hx, medications, recent radiologic and laboratory findings. In addition, I reviewed pt's documented Immunization Record and Encounter History. Pursuant to the emergency declaration under the [facility], [facility] waiver authority and the [employer] Act, this Virtual  Visit (audiovisual) was conducted, with appropriate consent obtained, to reduce the patient's risk of exposure to COVID-19 and provide continuity of care   Services were provided in this manner to substitute for in-person clinic visit. The originating site is the patient's home and the distance site is [facility] at the [facility]. These types of teleneuropsychology/telehealth/telemedicine visits were authorized by the President of the [place], though I/we cannot guarantee what a third party payor will do reimbursement/coverage wise. I indicated that I would evaluate the patient and recommend diagnostics and treatment based on my assessment and impressions, and that our sessions are not being recorded and that personal health information is protected to the best of our abilities. The patient completed one year of college without history of previously diagnosed LD and/or receipt of special education services. She currently lives in the house she and her spouse had built for their prison. He passed away two years ago. Son lives about 1.7 miles away. 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  The patient has noticed a progressive decline in short term memory. She forgets the content of conversations. Misplaces things. She has trouble with names and things. She noticed a decline sometime between January and now. Son notes memory issues as well. She had a fall of some sorts, don't know the details. She does not remember this event She finds herself more irritable. She called 911 after this fall after her children were out of town. From then on, she was forgetting everything. Children said she said she had pain and needed to go to the hospital, even though she could stand on her own. She has significant pain. ? Polypharm here. She insisted on going to the hospital.  She has ongoing decline in memory. She has increased difficulties as her family is doing more and more for her. She feels like she needs help but doesn't want to lose the independence. Family has been staying overnight and they do have someone come out to help during the day. She has not been driving. She gets assistance with medications and finances and such. She wants to be able to drive. Son notes short term memory issues. Long term memory is fantastic. No previous neuropsych. Neuropsychological Mental Status Exam (NMSE): 
 
 
Historian: Good 
Praxis: No UE apraxia R/L Orientation: Intact to self and to other Dress: within normal limits Weight: within normal limits Appearance/Hygiene: within normal limits Gait: Slow, with walker Assistive Devices: Glasses, on O2, walker Mood: within normal limits Affect: within normal limits Comprehension: within normal limits Thought Process: within normal limits Expressive Language: within normal limits Receptive Language: within normal limits Motor:  No cognitive or motor perseveration ETOH: denied Tobacco: Denied Illicit: Denied SI/HI: Denied Psychosis: Denied Insight: Within normal limits Judgment: Within normal limits Other Psych: 
 
 
Past Medical History:  
Diagnosis Date  Anxiety  Arthritis   
 knees,shoulders,fingers,back,neck  CKD (chronic kidney disease) stage 2, GFR 60-89 ml/min Dr. Dennise Jacobs  Femur fracture, left ([facility] [address] 2008  Femur fracture, right ([facility] [address] 2009  Insomnia  Intertrigo 7/7/2015  Knee pain Dr. Kim Buenrostro  Moderate episode of recurrent major depressive disorder ([facility] [address] 3/6/2020  Osteopenia Dr. Kim Buenrostro  Psychiatric disorder   
 anxiety  Pyloric stenosis 2010  Sleep disorder Insomnia  Thoracic vertebral fracture ([facility] [address] 2005  Ulcer 2012 Perferatied ulcer Past Surgical History:  
Procedure Laterality Date  HX FEMUR FRACTURE TX Left femur - 2008, R femur - 2009  HX GI  2010 Perforated ulcer, in relation to pyloric stenosis  HX ORTHOPAEDIC    
 6 thoracic vertabrae that were fractured in 2005  FL COLSC FLX W/NDSC US XM RCTM ET AL LMTD&ADJ STRUX  12/6/10 Normal except Diverticulosis Allergies Allergen Reactions  Pcn [Penicillins] Unknown (comments) Childhood reaction rash Family History Problem Relation Age of Onset  Cancer Sister   
     metastatic lung Cancer  Cancer Mother   
     colon Social History Tobacco Use  Smoking status: Former Smoker  Smokeless tobacco: Never Used Substance Use Topics  Alcohol use: No  
  Alcohol/week: 0.0 standard drinks  Drug use: Never Current Outpatient Medications Medication Sig Dispense Refill  diazePAM (VALIUM) 5 mg tablet TAKE 1 TABLET BY MOUTH NIGHTLY. MAX DAILY AMOUNT: 5MG 30 Tab 1  
  lidocaine (LIDODERM) 5 % 1 Patch by TransDERmal route every twelve (12) hours. APPLY 1 PATCH TO AFFECTED AREA FOR 12 HOURS, THEN REMOVE FOR 12 HOURS DAILY FOR 30 DAYS    
 OXYGEN-AIR DELIVERY SYSTEMS 2 L/min by continuous inhalation route continuous.  atenoloL (TENORMIN) 50 mg tablet TAKE 1 TABLET BY MOUTH EVERY DAY AT NIGHT 90 Tab 1  citalopram (CeleXA) 10 mg tablet Take 1 Tab by mouth nightly. 90 Tab 1  
 bumetanide (BUMEX) 1 mg tablet TAKE 1 TABLET BY MOUTH EVERY DAY 90 Tab 1  
 aspirin delayed-release 81 mg tablet Take 81 mg by mouth daily.  acetaminophen (TYLENOL EXTRA STRENGTH) 500 mg tablet Take 1,000 mg by mouth every six (6) hours as needed for Pain.  docusate sodium (STOOL SOFTENER) 100 mg capsule Take 300 mg by mouth every morning.  gabapentin (NEURONTIN) 100 mg capsule Take 1 Cap by mouth daily. Max Daily Amount: 100 mg. (Patient taking differently: Take 100 mg by mouth two (2) times a day.) 90 Cap 0  
 naloxone (NARCAN) 4 mg/actuation nasal spray Use 1 spray intranasally, then discard. Repeat with new spray every 2 min as needed for opioid overdose symptoms, alternating nostrils. 1 Each 3  
 calcium-vitamin D (CALCIUM 500+D) 500 mg(1,250mg) -200 unit per tablet Take 1 Tab by mouth daily. Plan:  Obtain authorization for testing from insurance company. Report to follow once testing, scoring, and interpretation completed. ? Organic based neurocognitive issues versus mood disorder or combination of same. ? Problems organic, functional, or both? This note will not be viewable in [address] Neuropsychological Test Results Patient Testing 9/16/20 Report Completed 9/22/20 A Psychometrist Assisted w/ portions of this evaluation while under my direct  supervision The following evaluation procedures/tests were administered:   
 
Neuropsychologist Performed, Interpreted, & Reported:  Neuropsychological Mental Status Exam, Revised Memory & Behavior Checklist,  Mini Mental Status Exam, Clock Drawing Test, Laureen-Melzack Pain Questionnaire, Test Of Premorbid Functioning, History Taking  & Clinical Interview With The Patient, Additional History Taking w/ the Patient's Son, CASE, ABAS-3, MARVIN, CPT-III, Review Of Available Records. Psychometrist Administered under Neuropsychologist Supervision & Neuropsychologist Interpreted & Neuropsychologist Reported:  Verbal Fluency Tests, Marlon & Marlon  Revised, Trailmaking Test Parts A & B, Wechsler Adult Intelligence Scale - IV, [place] [employer]  3, Grooved Pegboard, Rock Depression Inventory  II, Rock Anxiety Inventory. Test Findings:  Test Findings:  Note:  The patients raw data have been compared with currently available norms which include demographic corrections for age, gender, and/or education. Sometimes, the patients scores are compared to demographically similar individuals as close to the patients age, education level, etc., as possible. \"Average\" is viewed as being +/- 1 standard deviation (SD) from the stated mean for a particular test score. \"Low average\" is viewed as being between 1 and 2 SD below the mean, and above average is viewed as being 1 and 2 SD above the mean. Scores falling in the borderline range (between 1-1/2 and 2 SD below the mean) are viewed with particular attention as to whether they are normal or abnormal neurocognitive test scores. Other methods of inference in analyzing the test data are also utilized, including the pattern and range of scores in the profile, bilateral motor functions, and the presence, if any, of pathognomonic signs. Behaviorally, the patient was friendly and cooperative and appeared motivated to perform well during this examination. She had item comprehension issues and test administration was adjusted accordingly. Within this context, the results of this evaluation are viewed as a valid reflection of the patients actual neurocognitive and emotional status. The patient's score of 24/30 on the Mini-Mental Status Exam was impaired. In this regard, she was not oriented to year, date. Serial 7s were 3/5 correct. Three step command was 2/3 correct. Visual construction was impaired. Clock drawing was impaired, Her structured word list fluency, as assessed by the FAS Test, was within the moderately to severely impaired range with a T score of 22. Category fluency was within the moderately to severely impaired range with a T score of 20. Confrontation naming ability, as assessed by the [facility]  Revised, was within the average range at 50/60 correct (T = 45). This pattern of performance is indicative of a patient who is at increased risk for day-to-day problems with verbal fluency and confrontation naming was normal.   
 
 The patient was administered the [facility] Continuous Performance Test  III and review of the subscales within this instrument revealed numerous concerns for inattentiveness without impulsivity. This pattern of performance is indicative of a patient who is at increased risk for day-to-day problems with sustained visual attention/concentration. The patient is showing problems with working memory capacity (4th %ile) and especially processing speed (<0.1st %ile) on the WAIS-IV. Her Verbal Comprehension Index score of 96 was within the average range. Her Perceptual Reasoning Index score of 65 was extremely low. Most of these scores reflect a decline in functioning based on an assessment of premorbid functioning. The patient was administered the [place] Verbal Learning Test  - 3 and generated an impaired range (and positive) learning curve over five repeated auditory word list learning trials.   An interference trial was impaired. Free and cued, short and long delayed recall were all impaired. Recognition recall was high average. Forced choice recall was normal, suggesting good effort. This pattern of performance is indicative of a patient who is at increased risk for day-to-day problems with auditory learning and/or memory. The fact that her recognition recall is normal raises concern for functional interference, though. Simple timed visual motor sequencing (Trailmaking Test Part A) was within the moderately to severely impaired range with a T score of 20. Her performance on a similar, but more complex task of timed visual motor sequencing (Trailmaking Test Part B) was within the severely impaired range with a T score of 19. This latter test was discontinued. This pattern of performance is indicative of a patient who is at increased risk for day-to-day problems with executive functioning. Fine motor dexterity was within the moderately to severely impaired range bilaterally.  strength was moderately impaired bilaterally. This does not raise concern for a particularly lateralized brain dysfunction. The patient rated her current level of pain as \"0/5- No Pain\" on the Laureen-Melzack Pain Questionnaire. She reported periodic pain in her shoulders, back, hands, legs, knees, and feet. Her Geriatric Depression Inventory score of 14 reflected mild depression. Her Rock Anxiety Inventory score of 27 reflected severe anxiety. The son  completed the ABAS-3 and reported concerns regarding the patient's general adaptive skills (4th %ile), conceptual skills (6th %ile), social skills (7th %ile), and practical skills (4th  %ile). On the CASE, the son reported concern regarding the patient's cognitive functioning as well as concern for psychosis, anxiety, depression, and fear of aging. Impressions & Recommendations:   This is an abnormal range Neuropsychological Evaluation with respect to neurocognitive functioning. In this regard, she is showing impairments with mental status, verbal fluency, visual attention, auditory learning, auditory memory, processing speed, working memory, verbal comprehension, perceptual reasoning, bilateral motor skills, and executive functioning. From an emotional standpoint, she reports severe levels of anxiety and more mild to moderate/chronic depression. Family has noticed progressive neurocognitive decline. On one hand, her free recall scores are severely impaired, which would suggest a very significant dementia. We operate under the assumption that if we ask a person the question, and they cannot come up with the answer to the question on their own, then they must not know the answer to that question. In other words, we make the presumption that the information is not there (in their mind). So, if I am to look at the patient's free recall scores, the profile would be consistent with dementia. However, she was then administered a recognition recall task. In other words, the full recall task is a fill in the blank type question. A recognition task is a multiple cysts choice/matching sort of task whereby the patient is simply to to answer with a \"yes/no\" when presented with a lengthy list of potential words that were on the original word list that they had learned. For this patient, she scores above average on a recognition recall task. So, her free recall is below the 1st percentile. Her recognition recall is above normal.  In dementia cases, the information/memory is gone. It does not matter whether or not we try to elicit memory and a dementia patient, because the memory is gone. For this patient, the memory is there and she does not know where to find it. This is why recognition tasks are so critical in delineating between what is a dementia and what is a pseudodementia. At the same time, I cannot ignore the fact that the majority of her other neurocognitive domain performances are also is impaired in a matter which is inconsistent with a pure pseudodementia issue. This, then, begs the question as to whether or not we are dealing with a dementia issue exacerbated by significant psychiatric distress, or if this is a mild cognitive impairment enhanced by emotional distress. Without serial testing, I cannot answer that question. Yet, that is the question that needs to be answered. In this conundrum, my fear is that a dementia issue is present and we will not treat it because we presume that all issues related to depression and anxiety. As such, I recommend consideration for appropriate medication for memory in conjunction with intensive psychiatric intervention for marked anxiety and depression. In addition, I recommend active engagement in psychotherapy (virtually her otherwise). I also recommend a consultation with speech and with OT. I would like to see her again in 6 months to track whether or not her memory has changed at all. If it is declining, then we know this is dementia. If it improves as her mood improves, then we know that it is a pseudodementia issue. In either scenario, and at least on a prophylactic basis, we are addressing the memory issue if not medically contraindicated. Irrespective of etiology, I do not find her competent to make medical decisions, financial decisions, devote, to  or to own a firearm. She is not to drive pending successful passing of a formal driving safety evaluation. She has rather severe attention deficits that warrant treatment as well and, I do not feel that she is safe to drive until her attention and focus improves. If anything, this gives her significant incentive to address her mental health concerns.   She needs supervision for day-to-day household safety, nutritional/meal preparation, finances, and medications. The patient should be encouraged to remain as mentally, physically, and socially active as possible. I hope the above rambling makes sense. We now have extensive baseline data on her. Follow-up PRN. Clinical correlation is, of course, indicated. I will discuss these findings with the patient when she follows up with me in the near future. A follow up Neuropsychological Evaluation is indicated on a prn basis, especially if there are any cognitive and/or emotional changes. 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 If there is any additional information of which I am currently unaware, I would be more than happy to review it upon having it made available to me. Thank you for the opportunity to see this interesting individual. 
 
 Sincerely, Chip Bowden PsyD, EdS 
 
CC:  Bobby Sicard, MD 
 
Time Documentation: 
 
69211*3 83480*2  
 
08364 x 1 
68606 x 5 Test Administration/Data Gathering By Technician: (3 hours). 85875 x 1 (first 30 minutes), 48837 x 5 (each additional 30 minutes) 71384 x 1 
96133 x 1 Testing Evaluation Services by Neuropsychologist (1 hour, 50 minutes) 96132 x 1 (first hour), 96133 x 1 (50 minutes) Definitions:   
 
[id number]:  Neurobehavioral Status Exam, Clinical interview. Clinical assessment of thinking, reasoning and judgment, by neuropsychologist, both face to face time with patient and time interpreting those test results and reporting, first and subsequent hours) 32441/76315: Neuropsychological Test Administration by Technician/Psychometrist, first 30 minutes and each additional 30 minutes. The above includes: Record review. Review of history provided by patient. Review of collaborative information. Testing by Clinician.   Review of raw data. Scoring. Report writing of individual tests administered by Clinician. Integration of individual tests administered by psychometrist with NSE/testing by clinician, review of records/history/collaborative information, case Conceptualization, treatment planning, clinical decision making, report writing, coordination Of Care. Psychometry test codes as time spent by psychometrist administering and scoring neurocognitive/psychological tests under supervision of neuropsychologist.  Integral services including scoring of raw data, data interpretation, case conceptualization, report writing etcetera were initiated after the patient finished testing/raw data collected and was completed on the date the report was signed.

## 2020-09-30 NOTE — TELEPHONE ENCOUNTER
Spoke to daughter about urine that was dropped. Pt thinks she has a UTI because she has burning sometimes and would like a urinalysis done please.

## 2020-10-01 NOTE — PROGRESS NOTES
Chief Complaint Patient presents with  UTI Mitchell Mack is a 77 y.o. female who was seen by synchronous (real-time) TELEPHONE CALL technology on 10/1/2020 for UTI Assessment & Plan:  
Diagnoses and all orders for this visit: 
 
1. Urinary tract infection without hematuria, site unspecified -     AMB POC URINALYSIS DIP STICK AUTO W/O MICRO Patient dropped off the urine yesterday and we asked for a virtual to be done today. There had not been a call to let us know that the urine was coming. She confirms UTI symptoms and has been started on antibiotics. -     trimethoprim-sulfamethoxazole (BACTRIM DS, SEPTRA DS) 160-800 mg per tablet; Take 1 Tab by mouth two (2) times a day for 5 days. - Urine culture also ordered. I spent at least 15 minutes on this visit with this established patient. Subjective:  
75F who presents today to discuss current symptoms of urinary burning, odor and frequency that started a few days ago. She denies abdominal pain. No back pain or fever. Her son is with her. She confirms UTI symptoms as noted above. A urine sample was dropped off yesterday without reference. However, with discussion, it was dropped off by her son and it was sent for analysis. LABS: 
Results for orders placed or performed in visit on 10/01/20 AMB POC URINALYSIS DIP STICK AUTO W/O MICRO Result Value Ref Range Color (UA POC) Yellow Clarity (UA POC) Clear Glucose (UA POC) Negative Negative Bilirubin (UA POC) Negative Negative Ketones (UA POC) Negative Negative Specific gravity (UA POC) 1.015 1.001 - 1.035 Blood (UA POC) Trace Negative pH (UA POC) 5 4.6 - 8.0 Protein (UA POC) Negative Negative Urobilinogen (UA POC) 0.2 mg/dL 0.2 - 1 Nitrites (UA POC) Negative Negative Leukocyte esterase (UA POC) 1+ Negative Will contact the patient when her culture returns. Problem List: 
Patient Active Problem List  
 Diagnosis Date Noted  Fall 03/06/2020  Moderate episode of recurrent major depressive disorder ([facility] [address] 03/06/2020  Physical debility 11/25/2019  Left hip pain 11/24/2019  Fall from ground level 11/24/2019  CKD (chronic kidney disease) stage 3, GFR 30-59 ml/min 11/24/2019  Abnormal urinalysis 11/24/2019  Lung crackles 10/29/2015  Dysuria 09/18/2015  Need for hepatitis C screening test 07/23/2015  Intertrigo 07/07/2015  UTI (lower urinary tract infection) 05/21/2015  Bronchitis 03/02/2015  Palpitations 07/24/2012  Anxiety 02/07/2012  Iron deficiency anemia, unspecified 01/12/2012  Osteoarthritis 12/30/2009  Senile osteoporosis 12/30/2009  Knee pain  CKD (chronic kidney disease) stage 2, GFR 60-89 ml/min  Insomnia Current Outpatient Medications Medication Sig Dispense Refill  diazePAM (VALIUM) 5 mg tablet TAKE 1 TABLET BY MOUTH NIGHTLY. MAX DAILY AMOUNT: 5MG 30 Tab 1  
 lidocaine (LIDODERM) 5 % 1 Patch by TransDERmal route every twelve (12) hours. APPLY 1 PATCH TO AFFECTED AREA FOR 12 HOURS, THEN REMOVE FOR 12 HOURS DAILY FOR 30 DAYS    
 OXYGEN-AIR DELIVERY SYSTEMS 2 L/min by continuous inhalation route continuous.  atenoloL (TENORMIN) 50 mg tablet TAKE 1 TABLET BY MOUTH EVERY DAY AT NIGHT 90 Tab 1  citalopram (CeleXA) 10 mg tablet Take 1 Tab by mouth nightly. 90 Tab 1  
 bumetanide (BUMEX) 1 mg tablet TAKE 1 TABLET BY MOUTH EVERY DAY 90 Tab 1  
 aspirin delayed-release 81 mg tablet Take 81 mg by mouth daily.  acetaminophen (TYLENOL EXTRA STRENGTH) 500 mg tablet Take 1,000 mg by mouth every six (6) hours as needed for Pain.  docusate sodium (STOOL SOFTENER) 100 mg capsule Take 300 mg by mouth every morning.  gabapentin (NEURONTIN) 100 mg capsule Take 1 Cap by mouth daily. Max Daily Amount: 100 mg.  (Patient taking differently: Take 100 mg by mouth two (2) times a day.) 90 Cap 0  
  naloxone (NARCAN) 4 mg/actuation nasal spray Use 1 spray intranasally, then discard. Repeat with new spray every 2 min as needed for opioid overdose symptoms, alternating nostrils. 1 Each 3  
 calcium-vitamin D (CALCIUM 500+D) 500 mg(1,250mg) -200 unit per tablet Take 1 Tab by mouth daily.  buPROPion (WELLBUTRIN) 100 mg tablet TAKE 1 TABLET BY MOUTH TWO TIMES A  Tab 1 Allergies Allergen Reactions  Pcn [Penicillins] Unknown (comments) Childhood reaction rash Past Medical History:  
Diagnosis Date  Anxiety  Arthritis   
 knees,shoulders,fingers,back,neck  CKD (chronic kidney disease) stage 2, GFR 60-89 ml/min Dr. Jovany Estrada  Femur fracture, left ([address] 2008  Femur fracture, right ([address] 2009  Insomnia  Intertrigo 7/7/2015  Knee pain Dr. Ranulfo Castillo  Moderate episode of recurrent major depressive disorder ([facility] [address] 3/6/2020  Osteopenia Dr. Ranulfo Castillo  Psychiatric disorder   
 anxiety  Pyloric stenosis 2010  Sleep disorder Insomnia  Thoracic vertebral fracture ([address] 2005  Ulcer 2012 Perferatied ulcer Past Surgical History:  
Procedure Laterality Date  HX FEMUR FRACTURE TX Left femur - 2008, R femur - 2009  HX GI  2010 Perforated ulcer, in relation to pyloric stenosis  HX ORTHOPAEDIC    
 6 thoracic vertabrae that were fractured in 2005  [facility] COLSC FLX W/NDSC US XM RCTM ET AL LMTD&ADJ STRUX  12/6/10 Normal except Diverticulosis Family History Problem Relation Age of Onset  Cancer Sister   
     metastatic lung Cancer  Cancer Mother   
     colon Social History Tobacco Use  Smoking status: Former Smoker  Smokeless tobacco: Never Used Substance Use Topics  Alcohol use: No  
  Alcohol/week: 0.0 standard drinks Review of Systems All other systems reviewed and are negative. Objective: There were no vitals taken for this visit. General: alert, cooperative, no distress Mental  status: normal mood, behavior, speech, dress, motor activity, and thought processes, able to follow commands HENT: NCAT Neck: no visualized mass Resp: no respiratory distress Neuro: no gross deficits Skin: no discoloration or lesions of concern on visible areas Psychiatric: normal affect, consistent with stated mood, no evidence of hallucinations We discussed the expected course, resolution and complications of the diagnosis(es) in detail. Medication risks, benefits, costs, interactions, and alternatives were discussed as indicated. I advised her to contact the office if her condition worsens, changes or fails to improve as anticipated. She expressed understanding with the diagnosis(es) and plan. Belia Giles, who was evaluated through a patient-initiated, synchronous (real-time) TELEPHONE CALL encounter, and/or her healthcare decision maker, is aware that it is a billable service, with coverage as determined by her insurance carrier. She provided verbal consent to proceed: Yes, and patient identification was verified. It was conducted pursuant to the emergency declaration under the [address] [place] [address] authority and the [employer] Act. A caregiver was present when appropriate. Ability to conduct physical exam was limited. I was in the office. The patient was at home. Follow-up and Dispositions · Return if symptoms worsen or fail to improve.  
  
 
 
Cheryle Connors, MD

## 2020-10-05 NOTE — TELEPHONE ENCOUNTER
Spoke to pt daughter and relayed message. She understood and said they would call tomorrow to schedule Medicare Annual Wellness visit.

## 2020-10-05 NOTE — TELEPHONE ENCOUNTER
----- Message from Cheryle Connors, MD sent at 10/5/2020 10:25 AM EDT -----  Please let patient know that she is on the right antibiotic for her infection. No other worrisome features were noted. Hopefully, she is feeling better. She does need to schedule for her annual wellness visit, which can be done virtually.    Thanks,   Dr. Kyra Hatchet

## 2020-10-05 NOTE — PROGRESS NOTES
Please let patient know that she is on the right antibiotic for her infection. No other worrisome features were noted. Hopefully, she is feeling better. She does need to schedule for her annual wellness visit, which can be done virtually. Thanks,  
Dr. Yamilka Hernandez

## 2020-11-04 NOTE — TELEPHONE ENCOUNTER
Spoke to son and he confirmed the last time gabapentin was filled was in 12/2019 by [facility] in [place]. Per Dr Arlene Kahn we will have to wait until pt's appointment on Friday 11/6/2020 to fill this medication. Pt understood.

## 2020-11-04 NOTE — TELEPHONE ENCOUNTER
----- Message from Terrance Fox sent at 11/4/2020 11:39 AM EST -----  Regarding: Dr. Manish Lamas first and last name: Nita Carbajal / Son  Reason for call: Medication prescription  Callback required yes/no and why: yes  Best contact number(s): [phone]  Details to clarify the request: Pt is out of gabapentin prescription. She has an apt this Friday 11/06/20 with Dr. Malka Adame to get prescription refilled. Pharmacy won't allow refill till pt sees doctor. Son wants to know if there is any way she can have a prescription to hold her over till Friday since pt is out of medication.

## 2020-11-05 NOTE — TELEPHONE ENCOUNTER
----- Message from Lexy Heath sent at 11/5/2020 12:47 PM EST -----  Regarding: Dr. Angel Just refill  Medication Refill    Caller (if not patient):      Relationship of caller (if not patient): Federico Lopez/ daughter-in-law      Best contact number(s): [phone]      Name of medication and dosage if known: gabapentin       Is patient out of this medication (yes/no): yes      Pharmacy name: [facility]    Pharmacy listed in chart? (yes/no): yes  Pharmacy phone number: [phone]       Details to clarify the request: Daughter-in-law is requesting a Rx for Gabapentin be called to the pharmacy on file and advised that the pt is in a lot of pain.       Lexy Heath

## 2020-11-06 NOTE — PROGRESS NOTES
Aliza Atkinson is a 77 y.o. female, evaluated via audio-only technology on 11/6/2020 for Pain (Chronic) and Medication Evaluation Assessment & Plan:  
Diagnoses and all orders for this visit: 
 
1. Chronic pain of multiple sites: continue with gabapentin and new Rx sent to pharmacy. -     gabapentin (NEURONTIN) 100 mg capsule; Take 1 Cap by mouth daily. Max Daily Amount: 100 mg. Subjective:  
History provided by patient and her son. Requests refill of gabapentin; takes 1 capsule daily for chronic pain (back, knees, neck). Tolerating medication well without adverse side effects. She has no additional concerns at this time. Prior to Admission medications Medication Sig Start Date End Date Taking? Authorizing Provider  
traMADoL (ULTRAM) 50 mg tablet Take 50 mg by mouth daily. Yes Provider, Historical  
citalopram (CELEXA) 10 mg tablet TAKE 1 TABLET BY MOUTH EVERY DAY AT NIGHT 10/25/20  Yes Francesco Ortega MD  
atenoloL (TENORMIN) 50 mg tablet TAKE 1 TABLET BY MOUTH EVERY DAY AT NIGHT 10/7/20  Yes Francesco Ortega MD  
lidocaine (LIDODERM) 5 % APPLY 1 PATCH TO AFFECTED AREA FOR 12 HOURS, THEN REMOVE FOR 12 HOURS DAILY FOR 30 DAYS 10/7/20  Yes Francesco Ortega MD  
buPROPion [facility]) 100 mg tablet TAKE 1 TABLET BY MOUTH TWO TIMES A DAY 10/3/20  Yes Francesco Ortega MD  
diazePAM (VALIUM) 5 mg tablet TAKE 1 TABLET BY MOUTH NIGHTLY. MAX DAILY AMOUNT: 5MG 9/22/20  Yes Francesco Ortega MD  
OXYGEN-AIR DELIVERY SYSTEMS 2 L/min by continuous inhalation route continuous. 6/18/20  Yes Francesco Ortega MD  
bumetanide (BUMEX) 1 mg tablet TAKE 1 TABLET BY MOUTH EVERY DAY 3/19/20  Yes Francesco Ortega MD  
aspirin delayed-release 81 mg tablet Take 81 mg by mouth daily. Yes Provider, Historical  
acetaminophen (TYLENOL EXTRA STRENGTH) 500 mg tablet Take 1,000 mg by mouth every six (6) hours as needed for Pain.    Yes Provider, Historical  
 docusate sodium (STOOL SOFTENER) 100 mg capsule Take 300 mg by mouth every morning. Yes Provider, Historical  
gabapentin (NEURONTIN) 100 mg capsule Take 1 Cap by mouth daily. Max Daily Amount: 100 mg. Patient taking differently: Take 100 mg by mouth two (2) times a day. 1/2/20  Yes Itzel Durán NP  
naloxone [facility]) 4 mg/actuation nasal spray Use 1 spray intranasally, then discard. Repeat with new spray every 2 min as needed for opioid overdose symptoms, alternating nostrils. 12/17/19  Yes Rossana Cowan MD  
calcium-vitamin D (CALCIUM 500+D) 500 mg(1,250mg) -200 unit per tablet Take 1 Tab by mouth daily. Yes Provider, Historical  
 
Allergies Allergen Reactions  Pcn [Penicillins] Unknown (comments) Childhood reaction rash Past Medical History:  
Diagnosis Date  Anxiety  Arthritis   
 knees,shoulders,fingers,back,neck  CKD (chronic kidney disease) stage 2, GFR 60-89 ml/min Dr. Erin Knapp  Femur fracture, left ([facility] [address] 2008  Femur fracture, right ([facility] [address] 2009  Insomnia  Intertrigo 7/7/2015  Knee pain Dr. Alin Rooney  Moderate episode of recurrent major depressive disorder ([facility] [address] 3/6/2020  Osteopenia Dr. Alin Rooney  Psychiatric disorder   
 anxiety  Pyloric stenosis 2010  Sleep disorder Insomnia  Thoracic vertebral fracture ([facility] [address] 2005  Ulcer 2012 Perferatied ulcer Social History Tobacco Use  Smoking status: Former Smoker  Smokeless tobacco: Never Used Substance Use Topics  Alcohol use: No  
  Alcohol/week: 0.0 standard drinks ROS Pertinent items noted in HPI No flowsheet data found. Anh Goddard, who was evaluated through a patient-initiated, synchronous (real-time) audio only encounter, and/or her healthcare decision maker, is aware that it is a billable service, with coverage as determined by her insurance carrier. She provided verbal consent to proceed: Yes.  She has not had a related appointment within my department in the past 7 days or scheduled within the next 24 hours. Total Time: minutes: 11 minutes Verito Rosas MD

## 2020-11-10 NOTE — PROGRESS NOTES
Prior to seeing the patient I reviewed the records, including the previously completed report, the records in [place], and any updated visits from other providers since I saw the patient last.   
 
Today, I engaged in a psychoeducational and supportive psychotherapy and feedback session with the patient and family. I reviewed the results of the recent Neuropsychological Evaluation, including discussing individual tests as well as patient's areas of neurocognitive strength versus weakness. We discussed, in detail, the following: This is an abnormal range Neuropsychological Evaluation with respect to neurocognitive functioning. In this regard, she is showing impairments with mental status, verbal fluency, visual attention, auditory learning, auditory memory, processing speed, working memory, verbal comprehension, perceptual reasoning, bilateral motor skills, and executive functioning. From an emotional standpoint, she reports severe levels of anxiety and more mild to moderate/chronic depression. Family has noticed progressive neurocognitive decline.   
  
            On one hand, her free recall scores are severely impaired, which would suggest a very significant dementia. We operate under the assumption that if we ask a person the question, and they cannot come up with the answer to the question on their own, then they must not know the answer to that question. In other words, we make the presumption that the information is not there (in their mind). So, if I am to look at the patient's free recall scores, the profile would be consistent with dementia. However, she was then administered a recognition recall task. In other words, the full recall task is a fill in the blank type question.   A recognition task is a multiple cysts choice/matching sort of task whereby the patient is simply to to answer with a \"yes/no\" when presented with a lengthy list of potential words that were on the original word list that they had learned. For this patient, she scores above average on a recognition recall task. So, her free recall is below the 1st percentile. Her recognition recall is above normal.  In dementia cases, the information/memory is gone. It does not matter whether or not we try to elicit memory and a dementia patient, because the memory is gone. For this patient, the memory is there and she does not know where to find it. This is why recognition tasks are so critical in delineating between what is a dementia and what is a pseudodementia. 
  
            At the same time, I cannot ignore the fact that the majority of her other neurocognitive domain performances are also is impaired in a matter which is inconsistent with a pure pseudodementia issue. This, then, begs the question as to whether or not we are dealing with a dementia issue exacerbated by significant psychiatric distress, or if this is a mild cognitive impairment enhanced by emotional distress. Without serial testing, I cannot answer that question. Yet, that is the question that needs to be answered. In this conundrum, my fear is that a dementia issue is present and we will not treat it because we presume that all issues related to depression and anxiety. As such, I recommend consideration for appropriate medication for memory in conjunction with intensive psychiatric intervention for marked anxiety and depression. In addition, I recommend active engagement in psychotherapy (virtually her otherwise). I also recommend a consultation with speech and with OT. I would like to see her again in 6 months to track whether or not her memory has changed at all. If it is declining, then we know this is dementia. If it improves as her mood improves, then we know that it is a pseudodementia issue.   In either scenario, and at least on a prophylactic basis, we are addressing the memory issue if not medically contraindicated. 
  
            Irrespective of etiology, I do not find her competent to make medical decisions, financial decisions, devote, to  or to own a firearm. She is not to drive pending successful passing of a formal driving safety evaluation. She has rather severe attention deficits that warrant treatment as well and, I do not feel that she is safe to drive until her attention and focus improves. If anything, this gives her significant incentive to address her mental health concerns. She needs supervision for day-to-day household safety, nutritional/meal preparation, finances, and medications. The patient should be encouraged to remain as mentally, physically, and socially active as possible. I hope the above rambling makes sense. We now have extensive baseline data on her. Follow-up PRN. Clinical correlation is, of course, indicated. 
  
             
            I will discuss these findings with the patient when she follows up with me in the near future. A follow up Neuropsychological Evaluation is indicated on a prn basis, especially if there are any cognitive and/or emotional changes.   
  
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 I also answered numerous questions related to the clinical findings, including discussing various methods to improve cognition and mood. Counseling provided regarding mood and cognition. CBT and supportive psychotherapy techniques were utilized. Supportive/Cognitive Behavioral/Solution Focused psychotherapy provided Discussed rational versus irrational thinking patterns and their consequences. Discussed healthy/adaptive and unhealthy/maladaptive coping. The patient needs to follow with pcp, psychiatry, psychology Specific areas which were addressed include: the above The patient had the following concerns which I deferred to their referring provider: meds Time spent today:  20 This note will not be viewable in MyChart for the following reason(s). Psychotherapy note

## 2020-11-24 NOTE — PROGRESS NOTES
Chief Complaint Patient presents with  Follow-up Recent neuropsyhological testing  Memory Loss Abnormal testing  Medication Evaluation Polypharmacology discussion Eli Cooper is a 77 y.o. female who was seen by synchronous (real-time) AUDIO technology on 2020 for Follow-up (Recent neuropsyhological testing); Memory Loss (Abnormal testing); and Medication Evaluation (Polypharmacology discussion) Assessment & Plan:  
Diagnoses and all orders for this visit: 
 
1. MCI (mild cognitive impairment) 2. Rash 
-     clindamycin (CLINDAGEL) 1 % topical gel; Apply  to affected area two (2) times a day. use thin film on affected area 3. At risk for polypharmacy I spent at least 20 minutes on this visit with this established patient. Subjective:  
75F who presents today with her son in the background for follow up and discussion about her recent neuropsychological testing that showed MCI but findings of dementia not made clear by testing. Her Free recall was severely impaired, but her recognition recall was above average. The discrepancy for the findings were confounding and in discussion with Dr. Quoc Grier, we explored that Polypharmacology may be a confounding factor. The patient has severe OA, Osteoporosis with pathological fractures and has been on Valium for great than 10-15 years. We have weaned her down to a 1 x per day regime of the followin. Valium 5 mgs 2. Tramadol 50 mgs Daily (had been as high as 3 x per day) 3. Gabapentin 100 mgs daily (had been up to 3 x per day). On this reduced regimen, she is doing better, but constantly is asking for a higher dose, which we had denied or declined. Her medications are also carefully managed by her family. Currently, the patient has daily care and is by herself at night, but her family lives just a minute away from her and she has been doing well during the night. She does not drive at this time. We discussed the findings above and once again discussed that polypharmacology could be playing a role in her MCI. However, she is not a surgical candidate. She has signed a  with our office and for the past 6-months, her medications have not been increased, infact, they have been decreased. The patient and her son verbalized understanding of the above discussion and also, we discussed that she might benefit from Behavioral therapy for her anxiety and depression, which are also a part of her neuropsychological testing findings. Patient Active Problem List  
 Diagnosis Date Noted  Fall 03/06/2020  Moderate episode of recurrent major depressive disorder ([facility] [address] 03/06/2020  Physical debility 11/25/2019  Left hip pain 11/24/2019  Fall from ground level 11/24/2019  CKD (chronic kidney disease) stage 3, GFR 30-59 ml/min 11/24/2019  Abnormal urinalysis 11/24/2019  Lung crackles 10/29/2015  Dysuria 09/18/2015  Need for hepatitis C screening test 07/23/2015  Intertrigo 07/07/2015  UTI (lower urinary tract infection) 05/21/2015  Bronchitis 03/02/2015  Palpitations 07/24/2012  Anxiety 02/07/2012  Iron deficiency anemia, unspecified 01/12/2012  Osteoarthritis 12/30/2009  Senile osteoporosis 12/30/2009  Knee pain  CKD (chronic kidney disease) stage 2, GFR 60-89 ml/min  Insomnia Current Outpatient Medications Medication Sig Dispense Refill  diazePAM (VALIUM) 5 mg tablet TAKE 1 TABLET BY MOUTH NIGHTLY. MAX DAILY AMOUNT: 5MG 30 Tab 1  clindamycin (CLINDAGEL) 1 % topical gel Apply  to affected area two (2) times a day. use thin film on affected area 60 g 5  
 traMADoL (ULTRAM) 50 mg tablet Take 50 mg by mouth daily.  gabapentin (NEURONTIN) 100 mg capsule Take 1 Cap by mouth daily. Max Daily Amount: 100 mg. 90 Cap 1  citalopram (CELEXA) 10 mg tablet TAKE 1 TABLET BY MOUTH EVERY DAY AT NIGHT 90 Tab 1  
  atenoloL (TENORMIN) 50 mg tablet TAKE 1 TABLET BY MOUTH EVERY DAY AT NIGHT 90 Tab 1  
 lidocaine (LIDODERM) 5 % APPLY 1 PATCH TO AFFECTED AREA FOR 12 HOURS, THEN REMOVE FOR 12 HOURS DAILY FOR 30 DAYS 30 Patch 3  
 buPROPion (WELLBUTRIN) 100 mg tablet TAKE 1 TABLET BY MOUTH TWO TIMES A  Tab 1  
 bumetanide (BUMEX) 1 mg tablet TAKE 1 TABLET BY MOUTH EVERY DAY 90 Tab 1  
 acetaminophen (TYLENOL EXTRA STRENGTH) 500 mg tablet Take 1,000 mg by mouth every six (6) hours as needed for Pain.  docusate sodium (STOOL SOFTENER) 100 mg capsule Take 300 mg by mouth every morning.  naloxone (NARCAN) 4 mg/actuation nasal spray Use 1 spray intranasally, then discard. Repeat with new spray every 2 min as needed for opioid overdose symptoms, alternating nostrils. 1 Each 3  
 calcium-vitamin D (CALCIUM 500+D) 500 mg(1,250mg) -200 unit per tablet Take 1 Tab by mouth daily.  OXYGEN-AIR DELIVERY SYSTEMS 2 L/min by continuous inhalation route continuous.  aspirin delayed-release 81 mg tablet Take 81 mg by mouth daily. Allergies Allergen Reactions  Pcn [Penicillins] Unknown (comments) Childhood reaction rash Past Medical History:  
Diagnosis Date  Anxiety  Arthritis   
 knees,shoulders,fingers,back,neck  CKD (chronic kidney disease) stage 2, GFR 60-89 ml/min Dr. Jovany Estrada  Femur fracture, left ([facility] [address] 2008  Femur fracture, right ([facility] [address] 2009  Insomnia  Intertrigo 7/7/2015  Knee pain Dr. Ranulfo Castillo  Moderate episode of recurrent major depressive disorder ([facility] [address] 3/6/2020  Osteopenia Dr. Ranulfo Castillo  Psychiatric disorder   
 anxiety  Pyloric stenosis 2010  Sleep disorder Insomnia  Thoracic vertebral fracture ([address] 2005  Ulcer 2012 Perferatied ulcer Past Surgical History:  
Procedure Laterality Date  HX FEMUR FRACTURE TX Left femur - 2008, R femur - 2009  HX GI  2010 Perforated ulcer, in relation to pyloric stenosis  HX ORTHOPAEDIC    
 6 thoracic vertabrae that were fractured in 2005  MD COLSC FLX W/NDSC US XM RCTM ET AL LMTD&ADJ STRUX  12/6/10 Normal except Diverticulosis Family History Problem Relation Age of Onset  Cancer Sister   
     metastatic lung Cancer  Cancer Mother   
     colon Social History Tobacco Use  Smoking status: Former Smoker  Smokeless tobacco: Never Used Substance Use Topics  Alcohol use: No  
  Alcohol/week: 0.0 standard drinks Review of Systems Constitutional: Negative. HENT: Negative. Eyes: Negative. Respiratory: Negative. Cardiovascular: Negative. Gastrointestinal: Negative. Genitourinary: Negative. Musculoskeletal: Positive for back pain, joint pain (multiple), myalgias and neck pain. Skin: Negative. Neurological: Positive for weakness. Endo/Heme/Allergies: Negative. Psychiatric/Behavioral: Positive for depression and memory loss. The patient is not nervous/anxious (mild). All other systems reviewed and are negative. Objective: There were no vitals taken for this visit. General: alert, cooperative, no distress Mental  status: normal mood, behavior, speech and thought processes, able to follow commands Psychiatric: normal affect, consistent with stated mood, no evidence of hallucinations We discussed the expected course, resolution and complications of the diagnosis(es) in detail. Medication risks, benefits, costs, interactions, and alternatives were discussed as indicated. I advised her to contact the office if her condition worsens, changes or fails to improve as anticipated. She expressed understanding with the diagnosis(es) and plan.   
 
Eleuterio Meigs, who was evaluated through a patient-initiated, synchronous (real-time) AUDIO encounter, and/or her healthcare decision maker, is aware that it is a billable service, with coverage as determined by her insurance carrier. She provided verbal consent to proceed: Yes, and patient identification was verified. It was conducted pursuant to the emergency declaration under the [address] [place] [address] and the [employer] and [employer] Act. A caregiver was present when appropriate. Ability to conduct physical exam was limited. I was at home. The patient was at home. Follow-up and Dispositions · Return if symptoms worsen or fail to improve.  
  
 
 
Jt President, MD

## 2020-12-04 NOTE — PROGRESS NOTES
HISTORY OF PRESENT ILLNESS  Rah Dooley is a 77 y.o. female. HPI  Pt presents with \"possible UTI\"  Visit was conducted via [employer], [employer] me  Pt was located at home, provider was located at [facility]  Visit lasted  Rah Dooley, who was evaluated through a synchronous (real-time) {virtual platform audio-video vs audio only:21238::\"audio-video\"} encounter, and/or her healthcare decision maker, is aware that it is a billable service, with coverage as determined by her insurance carrier. She provided verbal consent to proceed: {YES/NO/NA-Consent obtained within past 12 months:34040::\"Yes\"}, and patient identification was verified. It was conducted pursuant to the emergency declaration under the [address] [place] [address], [address] [place] [address] authority and the [employer] and [employer] General Act. A caregiver was present when appropriate. Ability to conduct physical exam was limited. I was {location home office other:15563::\"at home\"}. The patient was {location home office other:31719::\"at home\"}.             ROS    Physical Exam    ASSESSMENT and PLAN  {ASSESSMENT/PLAN:37649}

## 2020-12-08 NOTE — PROGRESS NOTES
Chief Complaint   Patient presents with    Urinary Frequency     urgency and decreased urine out put for several days. No fever or chills. no abdominal pain. Constipation. Alyx Huerta is a 77 y.o. female who was seen by synchronous (real-time)  AUDIO technology on 12/8/2020 for Urinary Frequency (urgency and decreased urine out put for several days. No fever or chills. no abdominal pain. Constipation. )        Assessment & Plan:   Diagnoses and all orders for this visit:    1. Dysuria  -     trimethoprim-sulfamethoxazole (BACTRIM DS, SEPTRA DS) 160-800 mg per tablet; Take 1 Tab by mouth two (2) times a day for 10 days. - If symptoms progress or worsen, then she should be seen in the office with sample. 2. Constipation:   Suggested daily Miralax mixed in juice or water. Increase fiber to diet   Increase fluid intake to 24-32 oz per day at the LEAST      I spent at least 15 minutes on this visit with this established patient. Subjective:   75F who presents today with a new problem of urinary frequency and concerns about possible UTI. Has had this on and off for the past 3-4 weeks. In addition, she admits to constipation or infrequent stools. She does wear protective undergarments and this further increases her risk. She also reports very little water intake something like 2 oz per day (she may be exaggerating). She denies fever, chills or any other constitutional symptoms. We will empirically treat as above. Pt verbalizes understanding of plan of care and denies further questions or concerns at this time.       Patient Active Problem List    Diagnosis Date Noted    Fall 03/06/2020    Moderate episode of recurrent major depressive disorder ([facility] [address] 03/06/2020    Physical debility 11/25/2019    Left hip pain 11/24/2019    Fall from ground level 11/24/2019    CKD (chronic kidney disease) stage 3, GFR 30-59 ml/min 11/24/2019    Abnormal urinalysis 11/24/2019    Lung crackles 10/29/2015    Dysuria 09/18/2015    Need for hepatitis C screening test 07/23/2015    Intertrigo 07/07/2015    UTI (lower urinary tract infection) 05/21/2015    Bronchitis 03/02/2015    Palpitations 07/24/2012    Anxiety 02/07/2012    Iron deficiency anemia, unspecified 01/12/2012    Osteoarthritis 12/30/2009    Senile osteoporosis 12/30/2009    Knee pain     CKD (chronic kidney disease) stage 2, GFR 60-89 ml/min     Insomnia      Current Outpatient Medications   Medication Sig Dispense Refill    trimethoprim-sulfamethoxazole (BACTRIM DS, SEPTRA DS) 160-800 mg per tablet Take 1 Tab by mouth two (2) times a day for 10 days. 20 Tab 0    diazePAM (VALIUM) 5 mg tablet TAKE 1 TABLET BY MOUTH NIGHTLY. MAX DAILY AMOUNT: 5MG 30 Tab 1    clindamycin (CLINDAGEL) 1 % topical gel Apply  to affected area two (2) times a day. use thin film on affected area 60 g 5    traMADoL (ULTRAM) 50 mg tablet Take 50 mg by mouth daily.  gabapentin (NEURONTIN) 100 mg capsule Take 1 Cap by mouth daily. Max Daily Amount: 100 mg. 90 Cap 1    citalopram (CELEXA) 10 mg tablet TAKE 1 TABLET BY MOUTH EVERY DAY AT NIGHT 90 Tab 1    atenoloL (TENORMIN) 50 mg tablet TAKE 1 TABLET BY MOUTH EVERY DAY AT NIGHT 90 Tab 1    lidocaine (LIDODERM) 5 % APPLY 1 PATCH TO AFFECTED AREA FOR 12 HOURS, THEN REMOVE FOR 12 HOURS DAILY FOR 30 DAYS 30 Patch 3    buPROPion (WELLBUTRIN) 100 mg tablet TAKE 1 TABLET BY MOUTH TWO TIMES A  Tab 1    OXYGEN-AIR DELIVERY SYSTEMS 2 L/min by continuous inhalation route continuous.  bumetanide (BUMEX) 1 mg tablet TAKE 1 TABLET BY MOUTH EVERY DAY 90 Tab 1    aspirin delayed-release 81 mg tablet Take 81 mg by mouth daily.  acetaminophen (TYLENOL EXTRA STRENGTH) 500 mg tablet Take 1,000 mg by mouth every six (6) hours as needed for Pain.  docusate sodium (STOOL SOFTENER) 100 mg capsule Take 300 mg by mouth every morning.       naloxone (NARCAN) 4 mg/actuation nasal spray Use 1 spray intranasally, then discard. Repeat with new spray every 2 min as needed for opioid overdose symptoms, alternating nostrils. 1 Each 3    calcium-vitamin D (CALCIUM 500+D) 500 mg(1,250mg) -200 unit per tablet Take 1 Tab by mouth daily. Allergies   Allergen Reactions    Pcn [Penicillins] Unknown (comments)     Childhood reaction rash     Past Medical History:   Diagnosis Date    Anxiety     Arthritis     knees,shoulders,fingers,back,neck     CKD (chronic kidney disease) stage 2, GFR 60-89 ml/min     Dr. Shannan Romo Femur fracture, left [facility]) 2008    Femur fracture, right ([facility] [address] 2009    Insomnia     Intertrigo 7/7/2015    Knee pain     Dr. Stormy Pineda    Moderate episode of recurrent major depressive disorder ([facility] [address] 3/6/2020    Osteopenia     Dr. Pacheco Howard disorder     anxiety    Pyloric stenosis 2010    Sleep disorder     Insomnia    Thoracic vertebral fracture ([facility] [address] 2005    Ulcer 2012    Perferatied ulcer     Past Surgical History:   Procedure Laterality Date    HX FEMUR FRACTURE TX      Left femur - 2008, R femur - 2009    HX GI  2010    Perforated ulcer, in relation to pyloric stenosis    HX ORTHOPAEDIC      6 thoracic vertabrae that were fractured in 2005     [facility]ADJ [facility]  12/6/10    Normal except Diverticulosis     Family History   Problem Relation Age of Onset    Cancer Sister         metastatic lung Cancer    Cancer Mother         colon     Social History     Tobacco Use    Smoking status: Former Smoker    Smokeless tobacco: Never Used   Substance Use Topics    Alcohol use: No     Alcohol/week: 0.0 standard drinks       Review of Systems   Constitutional: Negative. HENT: Negative. Eyes: Negative. Respiratory: Negative. Cardiovascular: Negative. Gastrointestinal: Positive for constipation. Genitourinary: Positive for dysuria and frequency. Musculoskeletal: Negative. Skin: Negative. Neurological: Negative. Endo/Heme/Allergies: Negative. Psychiatric/Behavioral: Negative. All other systems reviewed and are negative. Objective: There were no vitals taken for this visit. General: alert, cooperative, no distress   Mental  status: normal mood, behavior, speech and thought processes, able to follow commands   Psychiatric: normal affect, consistent with stated mood, no evidence of hallucinations       We discussed the expected course, resolution and complications of the diagnosis(es) in detail. Medication risks, benefits, costs, interactions, and alternatives were discussed as indicated. I advised her to contact the office if her condition worsens, changes or fails to improve as anticipated. She expressed understanding with the diagnosis(es) and plan. Alyx Huerta, who was evaluated through a patient-initiated, synchronous (real-time) AUDIO encounter, and/or her healthcare decision maker, is aware that it is a billable service, with coverage as determined by her insurance carrier. She provided verbal consent to proceed: Yes, and patient identification was verified. It was conducted pursuant to the emergency declaration under the [address] [place] [address], [address] [place] [address] authority and the [employer] and [employer] General Act. A caregiver was present when appropriate. Ability to conduct physical exam was limited. I was in the office. The patient was at home. Follow-up and Dispositions    · Return if symptoms worsen or fail to improve.          Luzmaria Guidry MD

## 2020-12-13 NOTE — ED NOTES
Dr. Sammy Isaac gave and reviewed discharge instructions with the patient and caregiver. The patient and caregiver verbalized understanding. The patient and caregiver was given opportunity for questions. Patient discharged in stable condition to the waiting room via wheelchair with son.

## 2020-12-13 NOTE — ED TRIAGE NOTES
Pt. Rehan Quiet in by EMS from home for fall. Pt. Has had multiple falls in the past 5 days, was dx with UTI over tele doc visit a few days ago, started on sulfa, given prescription for sulfa. Pt. Has been living by herself. Bruising noted to left side of body, patient complaining pain with breathing in.

## 2020-12-13 NOTE — ED NOTES
Pt required O2 at all times, 2L NC at baseline. Family brought home portable O2 tank to transport her back home, O2 tank that brought from home is empty. This RN asked family to bring another Full O2 tank from home. Pt's son states \" We live 40 minutes away from here. She will be fine for 40 minutes. I'll just take her home. \" This RN educated to patient and son regarding important of wearing O2. Charge nurse made aware.

## 2020-12-13 NOTE — ED PROVIDER NOTES
69-year-old female with a past medical history significant for anxiety, arthritis, chronic kidney disease, bilateral femur fracture, insomnia, osteopenia, anxiety and depression, probable stenosis, thoracic vertebral compression, perforated ulcer who presents to the ED by EMS after the patient tripped and fell while walking with a walker this evening. She was attempted to go to the bathroom. She is complaining of headache and left-sided chest pain after falling. She denies any loss of consciousness but admits to being dazed. She had been doing well prior to this incident. She denies any fever, chills, sore throat, cough or congestion, nausea, vomiting, abdominal pain, diarrhea, constipation, dysuria, dizziness, extremity weakness or numbness. Past Medical History:  
Diagnosis Date  Anxiety  Arthritis   
 knees,shoulders,fingers,back,neck  CKD (chronic kidney disease) stage 2, GFR 60-89 ml/min Dr. Alex Carr  Femur fracture, left ([address] 2008  Femur fracture, right ([address] 2009  Insomnia  Intertrigo 7/7/2015  Knee pain Dr. Jose Miguel Rivera  Moderate episode of recurrent major depressive disorder ([facility] 3/6/2020  Osteopenia Dr. Jose Miguel Rivera  Psychiatric disorder   
 anxiety  Pyloric stenosis 2010  Sleep disorder Insomnia  Thoracic vertebral fracture ([address] 2005  Ulcer 2012 Perferatied ulcer Past Surgical History:  
Procedure Laterality Date  HX FEMUR FRACTURE TX Left femur - 2008, R femur - 2009  HX GI  2010 Perforated ulcer, in relation to pyloric stenosis  HX ORTHOPAEDIC    
 6 thoracic vertabrae that were fractured in 2005  CT COLSC FLX W/NDSC US XM RCTM ET AL LMTD&ADJ STRUX  12/6/10 Normal except Diverticulosis Family History:  
Problem Relation Age of Onset  Cancer Sister   
     metastatic lung Cancer  Cancer Mother   
     colon Social History Socioeconomic History  Marital status:   
 Spouse name: Not on file  Number of children: Not on file  Years of education: Not on file  Highest education level: Not on file Occupational History  Not on file Social Needs  Financial resource strain: Not on file  Food insecurity Worry: Not on file Inability: Not on file  Transportation needs Medical: Not on file Non-medical: Not on file Tobacco Use  Smoking status: Former Smoker  Smokeless tobacco: Never Used Substance and Sexual Activity  Alcohol use: No  
  Alcohol/week: 0.0 standard drinks  Drug use: Never  Sexual activity: Yes  
  Partners: Male Lifestyle  Physical activity Days per week: Not on file Minutes per session: Not on file  Stress: Not on file Relationships  Social connections Talks on phone: Not on file Gets together: Not on file Attends Evangelical service: Not on file Active member of club or organization: Not on file Attends meetings of clubs or organizations: Not on file Relationship status: Not on file  Intimate partner violence Fear of current or ex partner: Not on file Emotionally abused: Not on file Physically abused: Not on file Forced sexual activity: Not on file Other Topics Concern   Service No  
 Blood Transfusions Yes Comment: x5 due to gastric ulcer  Caffeine Concern No  
 Occupational Exposure No  
 Hobby Hazards No  
 Sleep Concern Yes  Stress Concern Yes  Weight Concern Yes  Special Diet No  
 Back Care Yes  Exercise Yes  Bike Helmet No  
 Seat Belt Yes  Self-Exams Yes Social History Narrative  Not on file ALLERGIES: Pcn [penicillins] Review of Systems All other systems reviewed and are negative. Vitals:  
 12/13/20 6827 BP: 126/89 Pulse: 94 Resp: 17 Temp: 99 °F (37.2 °C) SpO2: 94% Weight: 76.3 kg (168 lb 4.8 oz) Physical Exam 
 Vitals signs and nursing note reviewed. Exam conducted with a chaperone present. CONSTITUTIONAL: Frail elderly female in no apparent distress.; in no apparent distress HEAD: Normocephalic; atraumatic EYES: PERRL; EOM intact; conjunctiva and sclera are clear bilaterally. ENT: No rhinorrhea; normal pharynx with no tonsillar hypertrophy; mucous membranes pink/moist, no erythema, no exudate. NECK: Supple; non-tender; no cervical lymphadenopathy CARD: Normal S1, S2; no murmurs, rubs, or gallops. Regular rate and rhythm. Left-sided chest wall tenderness on palpation and movement of the torso. RESP: Normal respiratory effort; breath sounds clear and equal bilaterally; no wheezes, rhonchi, or rales. ABD: Normal bowel sounds; non-distended; non-tender; no palpable organomegaly, no masses, no bruits. Back Exam: Normal inspection; no vertebral point tenderness, no CVA tenderness. Normal range of motion. EXT: Normal ROM in all four extremities; non-tender to palpation; no swelling or deformity; distal pulses are normal, no edema. SKIN: Warm; dry; no rash. NEURO:Alert and oriented x 3, coherent, ASHELY-XII grossly intact, sensory and motor are non-focal. 
 
. 
MDM Number of Diagnoses or Management Options Diagnosis management comments: Assessment: 80-year-old female who presents to the ED for evaluation status post fall with head and left shoulder and chest discomfort. The patient has a history of gait disability and frequent fall. She usually ambulate with a walker. She has severe lordosis with spinal deviation to the right. She will need evaluation for head injury, electrolyte abnormality, ACS, cervical spine injury and dehydration. Plan: Lab/EKG/chest x-ray/CT scan of the head and cervical spine/serial exam/ Monitor and Reevaluate. Amount and/or Complexity of Data Reviewed Clinical lab tests: ordered and reviewed Tests in the radiology section of CPT®: ordered and reviewed Tests in the medicine section of CPT®: reviewed and ordered Discussion of test results with the performing providers: yes Decide to obtain previous medical records or to obtain history from someone other than the patient: yes Obtain history from someone other than the patient: yes Review and summarize past medical records: yes Discuss the patient with other providers: yes Independent visualization of images, tracings, or specimens: yes Risk of Complications, Morbidity, and/or Mortality Presenting problems: moderate Diagnostic procedures: moderate Management options: moderate Patient Progress Patient progress: stable Procedures ED EKG interpretation: 
Rhythm: normal sinus rhythm; and regular . Rate (approx.): 91; Axis: normal; P wave: normal; QRS interval: normal ; ST/T wave: normal; in  Lead: Diffusely; Other findings: borderline ekg. This EKG was interpreted by Deepti Gonzalez MD,ED Provider. XRAY INTERPRETATION (ED MD) Chest Xray No acute process seen. Normal heart size. No bony abnormalities. No infiltrate. Chanel Braun MD 4:04 AM 
 
Progress Note:  
Pt has been reexamined by Deepti Gonzalez MD. Pt is feeling much better. Symptoms have improved. All available results have been reviewed with pt and any available family. The patient had an extensive work-up without any significant abnormality. the patient ambulated back to her baseline. She denies any further discomfort. Pt understands sx, dx, and tx in ED. Care plan has been outlined and questions have been answered. Pt is ready to go home. Will send home on fall with head injury and chest wall contusion and gait abnormality instruction. Prescription with Tylenol and ibuprofen for pain as needed. Outpatient referral with PCP as needed. Written by Deepti Gonzalez MD,8:30 AM 
 
. Thuy Maddox

## 2020-12-13 NOTE — PROGRESS NOTES
12/13/2020 
10:23 AM 
Case Management note Patient was seen in ED overnight for multiple falls. Referrals for home health placed. Sent to [facility]wait[facility] acceptance. Spoke with daughter and also sent resources for personal care. Will continue to follow Guero Noyola

## 2020-12-15 NOTE — TELEPHONE ENCOUNTER
----- Message from Omari Clinton sent at 12/15/2020 10:48 AM EST -----  Regarding: Dr. Shawna Green  General Message/Vendor Calls    Caller's first and last name:  [facility])      Reason for call: Orders      Callback required yes/no and why:      Best contact number(s):  [phone]      Details to clarify the request: pt has falls went to ER on Sunday 12/13/20 and needs to find sign for home health orders, to include physical and occupational therapy,  and a home health aide.       Omari Clinton

## 2020-12-16 NOTE — TELEPHONE ENCOUNTER
Daughter in law Justin Perez is calling and stated that they have been up for over 24 hours patient is not sleeping, hallucination , stating she is seeing deed cats, feels like someone is going to kill her, very aggregated  Please call Justin Perez to advise what to do at this point. Patient went to the ER Sunday due to a fall and it has been bad since then  [phone].

## 2020-12-16 NOTE — TELEPHONE ENCOUNTER
Elsa Puentes was given verbal order today from myself. She also wanted to report that Sukhwinder Bragg has been experiencing some low blood pressures 100s/60-70s and it seems to drop a little lower when she stands up. Pt gets dizziness when standing sometimes. Home health nurse plans to keep a log of sx and BPs and will send it to Dr. Wilfredo Verduzco for possible modification of BP medication soon.

## 2020-12-22 NOTE — TELEPHONE ENCOUNTER
Caller's first and last name: Gustavo Mullins with [facility] physical [facility]       Reason for call:  Update on a PT       Callback required yes/no and why: yes, would like a call back       Best contact number(s): [phone]       Details to clarify the request:  Sara calling to advise pt has low blood pressure 88/60 sitting and standing was 80/54.        Brisa Roy

## 2020-12-24 NOTE — TELEPHONE ENCOUNTER
Son is calling and stated that he wanted to give Dr. Pelon Grayson b/p that he has taken.       12/23    101/48   Hr 78  12/24     123/68  Hr 85    Any questions please call son at [phone]

## 2021-01-01 ENCOUNTER — VIRTUAL VISIT (OUTPATIENT)
Dept: FAMILY MEDICINE CLINIC | Age: 76
End: 2021-01-01
Payer: MEDICARE

## 2021-01-01 ENCOUNTER — APPOINTMENT (OUTPATIENT)
Dept: ULTRASOUND IMAGING | Age: 76
DRG: 871 | End: 2021-01-01
Attending: STUDENT IN AN ORGANIZED HEALTH CARE EDUCATION/TRAINING PROGRAM
Payer: MEDICARE

## 2021-01-01 ENCOUNTER — TELEPHONE (OUTPATIENT)
Dept: FAMILY MEDICINE CLINIC | Age: 76
End: 2021-01-01

## 2021-01-01 ENCOUNTER — HOSPITAL ENCOUNTER (INPATIENT)
Age: 76
LOS: 2 days | DRG: 871 | End: 2021-02-12
Attending: INTERNAL MEDICINE | Admitting: INTERNAL MEDICINE
Payer: OTHER MISCELLANEOUS

## 2021-01-01 ENCOUNTER — HOSPICE ADMISSION (OUTPATIENT)
Dept: HOSPICE | Facility: HOSPICE | Age: 76
End: 2021-01-01
Payer: MEDICARE

## 2021-01-01 ENCOUNTER — APPOINTMENT (OUTPATIENT)
Dept: GENERAL RADIOLOGY | Age: 76
DRG: 871 | End: 2021-01-01
Attending: EMERGENCY MEDICINE
Payer: MEDICARE

## 2021-01-01 ENCOUNTER — HOSPITAL ENCOUNTER (INPATIENT)
Age: 76
LOS: 2 days | Discharge: HOSPICE | DRG: 871 | End: 2021-02-10
Attending: EMERGENCY MEDICINE | Admitting: FAMILY MEDICINE
Payer: MEDICARE

## 2021-01-01 ENCOUNTER — APPOINTMENT (OUTPATIENT)
Dept: NON INVASIVE DIAGNOSTICS | Age: 76
DRG: 871 | End: 2021-01-01
Attending: STUDENT IN AN ORGANIZED HEALTH CARE EDUCATION/TRAINING PROGRAM
Payer: MEDICARE

## 2021-01-01 ENCOUNTER — APPOINTMENT (OUTPATIENT)
Dept: CT IMAGING | Age: 76
DRG: 871 | End: 2021-01-01
Attending: EMERGENCY MEDICINE
Payer: MEDICARE

## 2021-01-01 ENCOUNTER — APPOINTMENT (OUTPATIENT)
Dept: NUCLEAR MEDICINE | Age: 76
DRG: 871 | End: 2021-01-01
Attending: STUDENT IN AN ORGANIZED HEALTH CARE EDUCATION/TRAINING PROGRAM
Payer: MEDICARE

## 2021-01-01 VITALS
HEIGHT: 65 IN | OXYGEN SATURATION: 96 % | WEIGHT: 163.8 LBS | TEMPERATURE: 97.6 F | DIASTOLIC BLOOD PRESSURE: 34 MMHG | SYSTOLIC BLOOD PRESSURE: 62 MMHG | HEART RATE: 82 BPM | RESPIRATION RATE: 10 BRPM | BODY MASS INDEX: 27.29 KG/M2

## 2021-01-01 VITALS
WEIGHT: 163.8 LBS | DIASTOLIC BLOOD PRESSURE: 52 MMHG | SYSTOLIC BLOOD PRESSURE: 104 MMHG | RESPIRATION RATE: 14 BRPM | HEIGHT: 65 IN | HEART RATE: 106 BPM | BODY MASS INDEX: 27.29 KG/M2 | OXYGEN SATURATION: 72 % | TEMPERATURE: 97.7 F

## 2021-01-01 DIAGNOSIS — F41.9 ANXIETY: ICD-10-CM

## 2021-01-01 DIAGNOSIS — Z74.09 IMPAIRED MOBILITY: ICD-10-CM

## 2021-01-01 DIAGNOSIS — N39.0 UTI (LOWER URINARY TRACT INFECTION): ICD-10-CM

## 2021-01-01 DIAGNOSIS — Z91.81 AT HIGH RISK FOR FALLS: ICD-10-CM

## 2021-01-01 DIAGNOSIS — R65.21 SEPTIC SHOCK (HCC): ICD-10-CM

## 2021-01-01 DIAGNOSIS — M25.561 CHRONIC PAIN OF RIGHT KNEE: ICD-10-CM

## 2021-01-01 DIAGNOSIS — R82.90 ABNORMAL URINALYSIS: ICD-10-CM

## 2021-01-01 DIAGNOSIS — G89.29 CHRONIC PAIN OF RIGHT KNEE: ICD-10-CM

## 2021-01-01 DIAGNOSIS — R52 DIFFUSE PAIN: ICD-10-CM

## 2021-01-01 DIAGNOSIS — F33.1 MODERATE EPISODE OF RECURRENT MAJOR DEPRESSIVE DISORDER (HCC): ICD-10-CM

## 2021-01-01 DIAGNOSIS — N17.9 ACUTE RENAL FAILURE, UNSPECIFIED ACUTE RENAL FAILURE TYPE (HCC): ICD-10-CM

## 2021-01-01 DIAGNOSIS — D64.9 ANEMIA, UNSPECIFIED TYPE: ICD-10-CM

## 2021-01-01 DIAGNOSIS — K72.00 ACUTE LIVER FAILURE WITHOUT HEPATIC COMA: ICD-10-CM

## 2021-01-01 DIAGNOSIS — R65.20 SEPSIS WITH MULTIPLE ORGAN DYSFUNCTION (MOD) (HCC): ICD-10-CM

## 2021-01-01 DIAGNOSIS — I48.0 PAF (PAROXYSMAL ATRIAL FIBRILLATION) (HCC): ICD-10-CM

## 2021-01-01 DIAGNOSIS — A41.9 SEPTIC SHOCK (HCC): ICD-10-CM

## 2021-01-01 DIAGNOSIS — A41.9 SEPSIS WITH MULTIPLE ORGAN DYSFUNCTION (MOD) (HCC): ICD-10-CM

## 2021-01-01 DIAGNOSIS — M25.552 LEFT HIP PAIN: ICD-10-CM

## 2021-01-01 DIAGNOSIS — R65.21 SEPTIC SHOCK (HCC): Primary | ICD-10-CM

## 2021-01-01 DIAGNOSIS — M15.9 PRIMARY OSTEOARTHRITIS INVOLVING MULTIPLE JOINTS: ICD-10-CM

## 2021-01-01 DIAGNOSIS — R06.4 LABORED BREATHING: ICD-10-CM

## 2021-01-01 DIAGNOSIS — R77.8 ELEVATED TROPONIN: ICD-10-CM

## 2021-01-01 DIAGNOSIS — R41.89 DECREASED RESPONSIVENESS: ICD-10-CM

## 2021-01-01 DIAGNOSIS — Z74.3 REQUIRES CONTINUOUS SUPERVISION FOR ACTIVITIES OF DAILY LIVING (ADL): ICD-10-CM

## 2021-01-01 DIAGNOSIS — A41.9 SEPTIC SHOCK (HCC): Primary | ICD-10-CM

## 2021-01-01 DIAGNOSIS — G31.84 MCI (MILD COGNITIVE IMPAIRMENT): Primary | ICD-10-CM

## 2021-01-01 DIAGNOSIS — Z51.5 HOSPICE CARE PATIENT: ICD-10-CM

## 2021-01-01 DIAGNOSIS — R74.8 ELEVATED LIVER ENZYMES: ICD-10-CM

## 2021-01-01 DIAGNOSIS — N18.30 STAGE 3 CHRONIC KIDNEY DISEASE, UNSPECIFIED WHETHER STAGE 3A OR 3B CKD (HCC): ICD-10-CM

## 2021-01-01 LAB
ALBUMIN SERPL-MCNC: 3 G/DL (ref 3.5–5)
ALBUMIN SERPL-MCNC: 3 G/DL (ref 3.5–5)
ALBUMIN SERPL-MCNC: 3.2 G/DL (ref 3.5–5)
ALBUMIN/GLOB SERPL: 0.9 {RATIO} (ref 1.1–2.2)
ALBUMIN/GLOB SERPL: 0.9 {RATIO} (ref 1.1–2.2)
ALBUMIN/GLOB SERPL: 1.1 {RATIO} (ref 1.1–2.2)
ALP SERPL-CCNC: 128 U/L (ref 45–117)
ALP SERPL-CCNC: 136 U/L (ref 45–117)
ALP SERPL-CCNC: 148 U/L (ref 45–117)
ALT SERPL-CCNC: 372 U/L (ref 12–78)
ALT SERPL-CCNC: 723 U/L (ref 12–78)
ALT SERPL-CCNC: 904 U/L (ref 12–78)
ANION GAP SERPL CALC-SCNC: 18 MMOL/L (ref 5–15)
ANION GAP SERPL CALC-SCNC: 8 MMOL/L (ref 5–15)
ANION GAP SERPL CALC-SCNC: 9 MMOL/L (ref 5–15)
APAP SERPL-MCNC: 9 UG/ML (ref 10–30)
APPEARANCE UR: ABNORMAL
APTT PPP: 31.8 SEC (ref 22.1–31)
AST SERPL-CCNC: 1142 U/L (ref 15–37)
AST SERPL-CCNC: >2000 U/L (ref 15–37)
AST SERPL-CCNC: >2000 U/L (ref 15–37)
ATRIAL RATE: 118 BPM
ATRIAL RATE: 44 BPM
B PERT DNA SPEC QL NAA+PROBE: NOT DETECTED
BACTERIA SPEC CULT: ABNORMAL
BACTERIA URNS QL MICRO: ABNORMAL /HPF
BASOPHILS # BLD: 0 K/UL (ref 0–0.1)
BASOPHILS # BLD: 0 K/UL (ref 0–0.1)
BASOPHILS NFR BLD: 0 % (ref 0–1)
BASOPHILS NFR BLD: 0 % (ref 0–1)
BILIRUB SERPL-MCNC: 2 MG/DL (ref 0.2–1)
BILIRUB SERPL-MCNC: 2.1 MG/DL (ref 0.2–1)
BILIRUB SERPL-MCNC: 2.3 MG/DL (ref 0.2–1)
BILIRUB UR QL CFM: NEGATIVE
BORDETELLA PARAPERTUSSIS PCR, BORPAR: NOT DETECTED
BUN SERPL-MCNC: 19 MG/DL (ref 6–20)
BUN SERPL-MCNC: 21 MG/DL (ref 6–20)
BUN SERPL-MCNC: 26 MG/DL (ref 6–20)
BUN/CREAT SERPL: 5 (ref 12–20)
BUN/CREAT SERPL: 6 (ref 12–20)
BUN/CREAT SERPL: 7 (ref 12–20)
C PNEUM DNA SPEC QL NAA+PROBE: NOT DETECTED
CALCIUM SERPL-MCNC: 7.4 MG/DL (ref 8.5–10.1)
CALCIUM SERPL-MCNC: 7.6 MG/DL (ref 8.5–10.1)
CALCIUM SERPL-MCNC: 9.1 MG/DL (ref 8.5–10.1)
CALCULATED P AXIS, ECG09: 3 DEGREES
CALCULATED R AXIS, ECG10: 72 DEGREES
CALCULATED R AXIS, ECG10: 87 DEGREES
CALCULATED T AXIS, ECG11: -11 DEGREES
CAOX CRY URNS QL MICRO: ABNORMAL
CC UR VC: ABNORMAL
CHLORIDE SERPL-SCNC: 105 MMOL/L (ref 97–108)
CHLORIDE SERPL-SCNC: 109 MMOL/L (ref 97–108)
CHLORIDE SERPL-SCNC: 109 MMOL/L (ref 97–108)
CK SERPL-CCNC: 375 U/L (ref 26–192)
CO2 SERPL-SCNC: 18 MMOL/L (ref 21–32)
CO2 SERPL-SCNC: 24 MMOL/L (ref 21–32)
CO2 SERPL-SCNC: 27 MMOL/L (ref 21–32)
COLOR UR: ABNORMAL
COMMENT, HOLDF: NORMAL
COVID-19 RAPID TEST, COVR: NOT DETECTED
CREAT SERPL-MCNC: 3.61 MG/DL (ref 0.55–1.02)
CREAT SERPL-MCNC: 3.65 MG/DL (ref 0.55–1.02)
CREAT SERPL-MCNC: 3.92 MG/DL (ref 0.55–1.02)
CREAT UR-MCNC: 298 MG/DL
CRP SERPL-MCNC: 5.19 MG/DL (ref 0–0.6)
D DIMER PPP FEU-MCNC: 13.04 MG/L FEU (ref 0–0.65)
DIAGNOSIS, 93000: NORMAL
DIAGNOSIS, 93000: NORMAL
DIFFERENTIAL METHOD BLD: ABNORMAL
DIFFERENTIAL METHOD BLD: ABNORMAL
ECHO AO ASC DIAM: 3.34 CM
ECHO AO ROOT DIAM: 3.34 CM
ECHO EST RA PRESSURE: 8 MMHG
ECHO LA AREA 4C: 18.5 CM2
ECHO LA MAJOR AXIS: 2.54 CM
ECHO LA MINOR AXIS: 1.4 CM
ECHO LA VOL 2C: 45.8 ML (ref 22–52)
ECHO LA VOL 4C: 54.65 ML (ref 22–52)
ECHO LA VOL BP: 56.49 ML (ref 22–52)
ECHO LA VOL/BSA BIPLANE: 31.09 ML/M2 (ref 16–28)
ECHO LA VOLUME INDEX A2C: 25.2 ML/M2 (ref 16–28)
ECHO LA VOLUME INDEX A4C: 30.07 ML/M2 (ref 16–28)
ECHO LV INTERNAL DIMENSION DIASTOLIC: 3.3 CM (ref 3.9–5.3)
ECHO LV INTERNAL DIMENSION SYSTOLIC: 2.32 CM
ECHO LV IVSD: 0.65 CM (ref 0.6–0.9)
ECHO LV MASS 2D: 50.1 G (ref 67–162)
ECHO LV MASS INDEX 2D: 27.6 G/M2 (ref 43–95)
ECHO LV POSTERIOR WALL DIASTOLIC: 0.62 CM (ref 0.6–0.9)
ECHO LVOT DIAM: 1.86 CM
ECHO RIGHT VENTRICULAR SYSTOLIC PRESSURE (RVSP): 68.19 MMHG
ECHO RV TAPSE: 1.93 CM (ref 1.5–2)
ECHO TV REGURGITANT MAX VELOCITY: 387.9 CM/S
ECHO TV REGURGITANT PEAK GRADIENT: 60.19 MMHG
EOSINOPHIL # BLD: 0 K/UL (ref 0–0.4)
EOSINOPHIL # BLD: 0 K/UL (ref 0–0.4)
EOSINOPHIL NFR BLD: 0 % (ref 0–7)
EOSINOPHIL NFR BLD: 0 % (ref 0–7)
EPITH CASTS URNS QL MICRO: ABNORMAL /LPF
ERYTHROCYTE [DISTWIDTH] IN BLOOD BY AUTOMATED COUNT: 15.2 % (ref 11.5–14.5)
ERYTHROCYTE [DISTWIDTH] IN BLOOD BY AUTOMATED COUNT: 15.6 % (ref 11.5–14.5)
FERRITIN SERPL-MCNC: 1770 NG/ML (ref 8–252)
FIBRINOGEN PPP-MCNC: 235 MG/DL (ref 200–475)
FLUAV H1 2009 PAND RNA SPEC QL NAA+PROBE: NOT DETECTED
FLUAV H1 RNA SPEC QL NAA+PROBE: NOT DETECTED
FLUAV H3 RNA SPEC QL NAA+PROBE: NOT DETECTED
FLUAV SUBTYP SPEC NAA+PROBE: NOT DETECTED
FLUBV RNA SPEC QL NAA+PROBE: NOT DETECTED
FLUID CULTURE, SPNG2: NORMAL
GLOBULIN SER CALC-MCNC: 2.7 G/DL (ref 2–4)
GLOBULIN SER CALC-MCNC: 3.2 G/DL (ref 2–4)
GLOBULIN SER CALC-MCNC: 3.6 G/DL (ref 2–4)
GLUCOSE BLD STRIP.AUTO-MCNC: 104 MG/DL (ref 65–100)
GLUCOSE SERPL-MCNC: 136 MG/DL (ref 65–100)
GLUCOSE SERPL-MCNC: 171 MG/DL (ref 65–100)
GLUCOSE SERPL-MCNC: 45 MG/DL (ref 65–100)
GLUCOSE UR STRIP.AUTO-MCNC: NEGATIVE MG/DL
HADV DNA SPEC QL NAA+PROBE: NOT DETECTED
HCOV 229E RNA SPEC QL NAA+PROBE: NOT DETECTED
HCOV HKU1 RNA SPEC QL NAA+PROBE: NOT DETECTED
HCOV NL63 RNA SPEC QL NAA+PROBE: NOT DETECTED
HCOV OC43 RNA SPEC QL NAA+PROBE: NOT DETECTED
HCT VFR BLD AUTO: 33.2 % (ref 35–47)
HCT VFR BLD AUTO: 37.6 % (ref 35–47)
HGB BLD-MCNC: 10.1 G/DL (ref 11.5–16)
HGB BLD-MCNC: 11.6 G/DL (ref 11.5–16)
HGB UR QL STRIP: ABNORMAL
HMPV RNA SPEC QL NAA+PROBE: NOT DETECTED
HPIV1 RNA SPEC QL NAA+PROBE: NOT DETECTED
HPIV2 RNA SPEC QL NAA+PROBE: NOT DETECTED
HPIV3 RNA SPEC QL NAA+PROBE: NOT DETECTED
HPIV4 RNA SPEC QL NAA+PROBE: NOT DETECTED
HYALINE CASTS URNS QL MICRO: >20 /LPF (ref 0–5)
IMM GRANULOCYTES # BLD AUTO: 0 K/UL
IMM GRANULOCYTES # BLD AUTO: 0 K/UL
IMM GRANULOCYTES NFR BLD AUTO: 0 %
IMM GRANULOCYTES NFR BLD AUTO: 0 %
INR PPP: 2 (ref 0.9–1.1)
KETONES UR QL STRIP.AUTO: 15 MG/DL
L PNEUMO1 AG UR QL IA: NEGATIVE
LA VOL DISK BP: 51.21 ML (ref 22–52)
LACTATE BLD-SCNC: 6.25 MMOL/L (ref 0.4–2)
LACTATE SERPL-SCNC: 1.9 MMOL/L (ref 0.4–2)
LACTATE SERPL-SCNC: 1.9 MMOL/L (ref 0.4–2)
LACTATE SERPL-SCNC: 3.6 MMOL/L (ref 0.4–2)
LDH SERPL L TO P-CCNC: 2478 U/L (ref 81–246)
LEUKOCYTE ESTERASE UR QL STRIP.AUTO: ABNORMAL
LYMPHOCYTES # BLD: 0.4 K/UL (ref 0.8–3.5)
LYMPHOCYTES # BLD: 0.5 K/UL (ref 0.8–3.5)
LYMPHOCYTES NFR BLD: 3 % (ref 12–49)
LYMPHOCYTES NFR BLD: 3 % (ref 12–49)
M PNEUMO DNA SPEC QL NAA+PROBE: NOT DETECTED
M PNEUMO IGG SER IA-ACNC: 161 U/ML (ref 0–99)
M PNEUMO IGM SER IA-ACNC: <770 U/ML (ref 0–769)
MAGNESIUM SERPL-MCNC: 2.4 MG/DL (ref 1.6–2.4)
MCH RBC QN AUTO: 36.5 PG (ref 26–34)
MCH RBC QN AUTO: 37.1 PG (ref 26–34)
MCHC RBC AUTO-ENTMCNC: 30.4 G/DL (ref 30–36.5)
MCHC RBC AUTO-ENTMCNC: 30.9 G/DL (ref 30–36.5)
MCV RBC AUTO: 119.9 FL (ref 80–99)
MCV RBC AUTO: 120.1 FL (ref 80–99)
MONOCYTES # BLD: 1.2 K/UL (ref 0–1)
MONOCYTES # BLD: 1.3 K/UL (ref 0–1)
MONOCYTES NFR BLD: 8 % (ref 5–13)
MONOCYTES NFR BLD: 9 % (ref 5–13)
NEUTS BAND NFR BLD MANUAL: 17 % (ref 0–6)
NEUTS BAND NFR BLD MANUAL: 26 % (ref 0–6)
NEUTS SEG # BLD: 13 K/UL (ref 1.8–8)
NEUTS SEG # BLD: 13.9 K/UL (ref 1.8–8)
NEUTS SEG NFR BLD: 63 % (ref 32–75)
NEUTS SEG NFR BLD: 71 % (ref 32–75)
NITRITE UR QL STRIP.AUTO: NEGATIVE
NRBC # BLD: 0 K/UL (ref 0–0.01)
NRBC # BLD: 0.02 K/UL (ref 0–0.01)
NRBC BLD-RTO: 0 PER 100 WBC
NRBC BLD-RTO: 0.1 PER 100 WBC
ORGANISM ID, SPNG3: NORMAL
OSMOLALITY SERPL: 315 MOSM/KG H2O
OSMOLALITY UR: 344 MOSM/KG H2O
P-R INTERVAL, ECG05: 128 MS
PERIPHERAL SMEAR,PSM: NORMAL
PH UR STRIP: 5.5 [PH] (ref 5–8)
PHOSPHATE SERPL-MCNC: 5.7 MG/DL (ref 2.6–4.7)
PLATELET # BLD AUTO: 126 K/UL (ref 150–400)
PLATELET # BLD AUTO: 165 K/UL (ref 150–400)
PLEASE NOTE, SPNG4: NORMAL
POTASSIUM SERPL-SCNC: 5.1 MMOL/L (ref 3.5–5.1)
POTASSIUM SERPL-SCNC: 5.2 MMOL/L (ref 3.5–5.1)
POTASSIUM SERPL-SCNC: 5.3 MMOL/L (ref 3.5–5.1)
PROCALCITONIN SERPL-MCNC: 0.64 NG/ML
PROT SERPL-MCNC: 5.7 G/DL (ref 6.4–8.2)
PROT SERPL-MCNC: 6.2 G/DL (ref 6.4–8.2)
PROT SERPL-MCNC: 6.8 G/DL (ref 6.4–8.2)
PROT UR STRIP-MCNC: 100 MG/DL
PROTHROMBIN TIME: 19.8 SEC (ref 9–11.1)
Q-T INTERVAL, ECG07: 306 MS
Q-T INTERVAL, ECG07: 378 MS
QRS DURATION, ECG06: 128 MS
QRS DURATION, ECG06: 90 MS
QTC CALCULATION (BEZET), ECG08: 490 MS
QTC CALCULATION (BEZET), ECG08: 529 MS
RBC # BLD AUTO: 2.77 M/UL (ref 3.8–5.2)
RBC # BLD AUTO: 3.13 M/UL (ref 3.8–5.2)
RBC #/AREA URNS HPF: >100 /HPF (ref 0–5)
RBC MORPH BLD: ABNORMAL
RSV RNA SPEC QL NAA+PROBE: NOT DETECTED
RV+EV RNA SPEC QL NAA+PROBE: NOT DETECTED
S PNEUM AG SPEC QL LA: NEGATIVE
SALICYLATES SERPL-MCNC: <1.7 MG/DL (ref 2.8–20)
SAMPLES BEING HELD,HOLD: NORMAL
SARS-COV-2 PCR, COVPCR: NOT DETECTED
SARS-COV-2, COV2: NORMAL
SERVICE CMNT-IMP: ABNORMAL
SERVICE CMNT-IMP: ABNORMAL
SODIUM SERPL-SCNC: 141 MMOL/L (ref 136–145)
SODIUM SERPL-SCNC: 142 MMOL/L (ref 136–145)
SODIUM SERPL-SCNC: 144 MMOL/L (ref 136–145)
SODIUM UR-SCNC: 15 MMOL/L
SOURCE, COVRS: NORMAL
SP GR UR REFRACTOMETRY: 1.02 (ref 1–1.03)
SPECIMEN SOURCE: NORMAL
SPECIMEN SOURCE: NORMAL
SPECIMEN, SPNG1: NORMAL
THERAPEUTIC RANGE,PTTT: ABNORMAL SECS (ref 58–77)
TROPONIN I SERPL-MCNC: 5.3 NG/ML
TROPONIN I SERPL-MCNC: 5.66 NG/ML
TROPONIN I SERPL-MCNC: 5.76 NG/ML
TSH SERPL DL<=0.05 MIU/L-ACNC: 1.64 UIU/ML (ref 0.36–3.74)
UROBILINOGEN UR QL STRIP.AUTO: 1 EU/DL (ref 0.2–1)
VENTRICULAR RATE, ECG03: 118 BPM
VENTRICULAR RATE, ECG03: 154 BPM
WBC # BLD AUTO: 14.7 K/UL (ref 3.6–11)
WBC # BLD AUTO: 15.6 K/UL (ref 3.6–11)
WBC MORPH BLD: ABNORMAL
WBC URNS QL MICRO: >100 /HPF (ref 0–4)

## 2021-01-01 PROCEDURE — 82570 ASSAY OF URINE CREATININE: CPT

## 2021-01-01 PROCEDURE — 85379 FIBRIN DEGRADATION QUANT: CPT

## 2021-01-01 PROCEDURE — 99223 1ST HOSP IP/OBS HIGH 75: CPT | Performed by: SPECIALIST

## 2021-01-01 PROCEDURE — 74011250636 HC RX REV CODE- 250/636: Performed by: INTERNAL MEDICINE

## 2021-01-01 PROCEDURE — 96375 TX/PRO/DX INJ NEW DRUG ADDON: CPT

## 2021-01-01 PROCEDURE — 96365 THER/PROPH/DIAG IV INF INIT: CPT

## 2021-01-01 PROCEDURE — 83605 ASSAY OF LACTIC ACID: CPT

## 2021-01-01 PROCEDURE — 87426 SARSCOV CORONAVIRUS AG IA: CPT

## 2021-01-01 PROCEDURE — 99221 1ST HOSP IP/OBS SF/LOW 40: CPT | Performed by: SURGERY

## 2021-01-01 PROCEDURE — C1751 CATH, INF, PER/CENT/MIDLINE: HCPCS

## 2021-01-01 PROCEDURE — 82550 ASSAY OF CK (CPK): CPT

## 2021-01-01 PROCEDURE — 83615 LACTATE (LD) (LDH) ENZYME: CPT

## 2021-01-01 PROCEDURE — 85025 COMPLETE CBC W/AUTO DIFF WBC: CPT

## 2021-01-01 PROCEDURE — 85610 PROTHROMBIN TIME: CPT

## 2021-01-01 PROCEDURE — 83930 ASSAY OF BLOOD OSMOLALITY: CPT

## 2021-01-01 PROCEDURE — 36415 COLL VENOUS BLD VENIPUNCTURE: CPT

## 2021-01-01 PROCEDURE — 80179 DRUG ASSAY SALICYLATE: CPT

## 2021-01-01 PROCEDURE — 3017F COLORECTAL CA SCREEN DOC REV: CPT | Performed by: INTERNAL MEDICINE

## 2021-01-01 PROCEDURE — 93005 ELECTROCARDIOGRAM TRACING: CPT

## 2021-01-01 PROCEDURE — 93306 TTE W/DOPPLER COMPLETE: CPT | Performed by: STUDENT IN AN ORGANIZED HEALTH CARE EDUCATION/TRAINING PROGRAM

## 2021-01-01 PROCEDURE — 85384 FIBRINOGEN ACTIVITY: CPT

## 2021-01-01 PROCEDURE — 87086 URINE CULTURE/COLONY COUNT: CPT

## 2021-01-01 PROCEDURE — 74011250636 HC RX REV CODE- 250/636: Performed by: EMERGENCY MEDICINE

## 2021-01-01 PROCEDURE — 82728 ASSAY OF FERRITIN: CPT

## 2021-01-01 PROCEDURE — 84100 ASSAY OF PHOSPHORUS: CPT

## 2021-01-01 PROCEDURE — 71045 X-RAY EXAM CHEST 1 VIEW: CPT

## 2021-01-01 PROCEDURE — 77030019905 HC CATH URETH INTMIT MDII -A

## 2021-01-01 PROCEDURE — 80143 DRUG ASSAY ACETAMINOPHEN: CPT

## 2021-01-01 PROCEDURE — 85730 THROMBOPLASTIN TIME PARTIAL: CPT

## 2021-01-01 PROCEDURE — 74011250636 HC RX REV CODE- 250/636: Performed by: STUDENT IN AN ORGANIZED HEALTH CARE EDUCATION/TRAINING PROGRAM

## 2021-01-01 PROCEDURE — 86140 C-REACTIVE PROTEIN: CPT

## 2021-01-01 PROCEDURE — 2709999900 HC NON-CHARGEABLE SUPPLY

## 2021-01-01 PROCEDURE — 77010033678 HC OXYGEN DAILY

## 2021-01-01 PROCEDURE — 82962 GLUCOSE BLOOD TEST: CPT

## 2021-01-01 PROCEDURE — 78580 LUNG PERFUSION IMAGING: CPT

## 2021-01-01 PROCEDURE — 87077 CULTURE AEROBIC IDENTIFY: CPT

## 2021-01-01 PROCEDURE — 87899 AGENT NOS ASSAY W/OPTIC: CPT

## 2021-01-01 PROCEDURE — 87449 NOS EACH ORGANISM AG IA: CPT

## 2021-01-01 PROCEDURE — 87635 SARS-COV-2 COVID-19 AMP PRB: CPT

## 2021-01-01 PROCEDURE — G8427 DOCREV CUR MEDS BY ELIG CLIN: HCPCS | Performed by: INTERNAL MEDICINE

## 2021-01-01 PROCEDURE — 96374 THER/PROPH/DIAG INJ IV PUSH: CPT

## 2021-01-01 PROCEDURE — 0656 HSPC GENERAL INPATIENT

## 2021-01-01 PROCEDURE — 02HV33Z INSERTION OF INFUSION DEVICE INTO SUPERIOR VENA CAVA, PERCUTANEOUS APPROACH: ICD-10-PCS | Performed by: EMERGENCY MEDICINE

## 2021-01-01 PROCEDURE — 3288F FALL RISK ASSESSMENT DOCD: CPT | Performed by: INTERNAL MEDICINE

## 2021-01-01 PROCEDURE — 65610000006 HC RM INTENSIVE CARE

## 2021-01-01 PROCEDURE — 96361 HYDRATE IV INFUSION ADD-ON: CPT

## 2021-01-01 PROCEDURE — 84300 ASSAY OF URINE SODIUM: CPT

## 2021-01-01 PROCEDURE — 87040 BLOOD CULTURE FOR BACTERIA: CPT

## 2021-01-01 PROCEDURE — 74011000258 HC RX REV CODE- 258: Performed by: STUDENT IN AN ORGANIZED HEALTH CARE EDUCATION/TRAINING PROGRAM

## 2021-01-01 PROCEDURE — 81001 URINALYSIS AUTO W/SCOPE: CPT

## 2021-01-01 PROCEDURE — 74011000258 HC RX REV CODE- 258: Performed by: EMERGENCY MEDICINE

## 2021-01-01 PROCEDURE — 83935 ASSAY OF URINE OSMOLALITY: CPT

## 2021-01-01 PROCEDURE — 83735 ASSAY OF MAGNESIUM: CPT

## 2021-01-01 PROCEDURE — 71250 CT THORAX DX C-: CPT

## 2021-01-01 PROCEDURE — 84484 ASSAY OF TROPONIN QUANT: CPT

## 2021-01-01 PROCEDURE — 80053 COMPREHEN METABOLIC PANEL: CPT

## 2021-01-01 PROCEDURE — 96376 TX/PRO/DX INJ SAME DRUG ADON: CPT

## 2021-01-01 PROCEDURE — 3336500001 HSPC ELECTION

## 2021-01-01 PROCEDURE — 1090F PRES/ABSN URINE INCON ASSESS: CPT | Performed by: INTERNAL MEDICINE

## 2021-01-01 PROCEDURE — 84145 PROCALCITONIN (PCT): CPT

## 2021-01-01 PROCEDURE — 99285 EMERGENCY DEPT VISIT HI MDM: CPT

## 2021-01-01 PROCEDURE — 86738 MYCOPLASMA ANTIBODY: CPT

## 2021-01-01 PROCEDURE — 99238 HOSP IP/OBS DSCHRG MGMT 30/<: CPT | Performed by: FAMILY MEDICINE

## 2021-01-01 PROCEDURE — 74176 CT ABD & PELVIS W/O CONTRAST: CPT

## 2021-01-01 PROCEDURE — G0463 HOSPITAL OUTPT CLINIC VISIT: HCPCS | Performed by: INTERNAL MEDICINE

## 2021-01-01 PROCEDURE — 70450 CT HEAD/BRAIN W/O DYE: CPT

## 2021-01-01 PROCEDURE — 76705 ECHO EXAM OF ABDOMEN: CPT

## 2021-01-01 PROCEDURE — 99214 OFFICE O/P EST MOD 30 MIN: CPT | Performed by: INTERNAL MEDICINE

## 2021-01-01 PROCEDURE — 99233 SBSQ HOSP IP/OBS HIGH 50: CPT | Performed by: INTERNAL MEDICINE

## 2021-01-01 PROCEDURE — 65270000029 HC RM PRIVATE

## 2021-01-01 PROCEDURE — 77030020847 HC STATLOK BARD -A

## 2021-01-01 PROCEDURE — 74011000250 HC RX REV CODE- 250: Performed by: EMERGENCY MEDICINE

## 2021-01-01 PROCEDURE — 75810000455 HC PLCMT CENT VENOUS CATH LVL 2 5182

## 2021-01-01 PROCEDURE — G8417 CALC BMI ABV UP PARAM F/U: HCPCS | Performed by: INTERNAL MEDICINE

## 2021-01-01 PROCEDURE — G8536 NO DOC ELDER MAL SCRN: HCPCS | Performed by: INTERNAL MEDICINE

## 2021-01-01 PROCEDURE — 1100F PTFALLS ASSESS-DOCD GE2>/YR: CPT | Performed by: INTERNAL MEDICINE

## 2021-01-01 PROCEDURE — 99222 1ST HOSP IP/OBS MODERATE 55: CPT | Performed by: FAMILY MEDICINE

## 2021-01-01 PROCEDURE — 51702 INSERT TEMP BLADDER CATH: CPT

## 2021-01-01 PROCEDURE — 93306 TTE W/DOPPLER COMPLETE: CPT

## 2021-01-01 PROCEDURE — G9717 DOC PT DX DEP/BP F/U NT REQ: HCPCS | Performed by: INTERNAL MEDICINE

## 2021-01-01 PROCEDURE — 84443 ASSAY THYROID STIM HORMONE: CPT

## 2021-01-01 PROCEDURE — 74011250637 HC RX REV CODE- 250/637: Performed by: EMERGENCY MEDICINE

## 2021-01-01 RX ORDER — SODIUM CHLORIDE 0.9 % (FLUSH) 0.9 %
5-40 SYRINGE (ML) INJECTION AS NEEDED
Status: DISCONTINUED | OUTPATIENT
Start: 2021-01-01 | End: 2021-01-01 | Stop reason: HOSPADM

## 2021-01-01 RX ORDER — SODIUM CHLORIDE 0.9 % (FLUSH) 0.9 %
5 SYRINGE (ML) INJECTION AS NEEDED
Status: DISCONTINUED | OUTPATIENT
Start: 2021-01-01 | End: 2021-01-01 | Stop reason: HOSPADM

## 2021-01-01 RX ORDER — HEPARIN SODIUM 5000 [USP'U]/ML
5000 INJECTION, SOLUTION INTRAVENOUS; SUBCUTANEOUS EVERY 12 HOURS
Status: DISCONTINUED | OUTPATIENT
Start: 2021-01-01 | End: 2021-01-01 | Stop reason: SDUPTHER

## 2021-01-01 RX ORDER — HYDROMORPHONE HYDROCHLORIDE 1 MG/ML
0.5 INJECTION, SOLUTION INTRAMUSCULAR; INTRAVENOUS; SUBCUTANEOUS EVERY 4 HOURS
Status: DISCONTINUED | OUTPATIENT
Start: 2021-01-01 | End: 2021-01-01

## 2021-01-01 RX ORDER — ONDANSETRON 2 MG/ML
4 INJECTION INTRAMUSCULAR; INTRAVENOUS
Status: DISCONTINUED | OUTPATIENT
Start: 2021-01-01 | End: 2021-01-01

## 2021-01-01 RX ORDER — LORAZEPAM 2 MG/ML
1 INJECTION INTRAMUSCULAR
Status: DISCONTINUED | OUTPATIENT
Start: 2021-01-01 | End: 2021-01-01 | Stop reason: HOSPADM

## 2021-01-01 RX ORDER — DEXTROSE 50 % IN WATER (D50W) INTRAVENOUS SYRINGE
25
Status: COMPLETED | OUTPATIENT
Start: 2021-01-01 | End: 2021-01-01

## 2021-01-01 RX ORDER — NOREPINEPHRINE BITARTRATE/D5W 8 MG/250ML
.5-16 PLASTIC BAG, INJECTION (ML) INTRAVENOUS
Status: COMPLETED | OUTPATIENT
Start: 2021-01-01 | End: 2021-01-01

## 2021-01-01 RX ORDER — LORAZEPAM 2 MG/ML
1 INJECTION INTRAMUSCULAR
Status: COMPLETED | OUTPATIENT
Start: 2021-01-01 | End: 2021-01-01

## 2021-01-01 RX ORDER — NOREPINEPHRINE BITARTRATE/D5W 8 MG/250ML
.5-16 PLASTIC BAG, INJECTION (ML) INTRAVENOUS
Status: DISCONTINUED | OUTPATIENT
Start: 2021-01-01 | End: 2021-01-01

## 2021-01-01 RX ORDER — FACIAL-BODY WIPES
10 EACH TOPICAL DAILY PRN
Status: DISCONTINUED | OUTPATIENT
Start: 2021-01-01 | End: 2021-01-01 | Stop reason: HOSPADM

## 2021-01-01 RX ORDER — SCOLOPAMINE TRANSDERMAL SYSTEM 1 MG/1
1 PATCH, EXTENDED RELEASE TRANSDERMAL
Status: DISCONTINUED | OUTPATIENT
Start: 2021-01-01 | End: 2021-01-01 | Stop reason: HOSPADM

## 2021-01-01 RX ORDER — DEXTROSE 50 % IN WATER (D50W) INTRAVENOUS SYRINGE
Status: DISPENSED
Start: 2021-01-01 | End: 2021-01-01

## 2021-01-01 RX ORDER — HEPARIN SODIUM 5000 [USP'U]/ML
5000 INJECTION, SOLUTION INTRAVENOUS; SUBCUTANEOUS EVERY 12 HOURS
Status: DISCONTINUED | OUTPATIENT
Start: 2021-01-01 | End: 2021-01-01

## 2021-01-01 RX ORDER — VANCOMYCIN/0.9 % SOD CHLORIDE 1.5G/250ML
1500 PLASTIC BAG, INJECTION (ML) INTRAVENOUS
Status: COMPLETED | OUTPATIENT
Start: 2021-01-01 | End: 2021-01-01

## 2021-01-01 RX ORDER — GLYCOPYRROLATE 0.2 MG/ML
0.2 INJECTION INTRAMUSCULAR; INTRAVENOUS
Status: DISCONTINUED | OUTPATIENT
Start: 2021-01-01 | End: 2021-01-01 | Stop reason: HOSPADM

## 2021-01-01 RX ORDER — HYDROMORPHONE HYDROCHLORIDE 1 MG/ML
0.5 INJECTION, SOLUTION INTRAMUSCULAR; INTRAVENOUS; SUBCUTANEOUS
Status: DISCONTINUED | OUTPATIENT
Start: 2021-01-01 | End: 2021-01-01 | Stop reason: HOSPADM

## 2021-01-01 RX ORDER — ACETAMINOPHEN 650 MG/1
650 SUPPOSITORY RECTAL
Status: COMPLETED | OUTPATIENT
Start: 2021-01-01 | End: 2021-01-01

## 2021-01-01 RX ORDER — ONDANSETRON 2 MG/ML
4 INJECTION INTRAMUSCULAR; INTRAVENOUS
Status: DISCONTINUED | OUTPATIENT
Start: 2021-01-01 | End: 2021-01-01 | Stop reason: HOSPADM

## 2021-01-01 RX ORDER — NYSTATIN 100000 [USP'U]/G
POWDER TOPICAL 2 TIMES DAILY
Status: DISCONTINUED | OUTPATIENT
Start: 2021-01-01 | End: 2021-01-01

## 2021-01-01 RX ORDER — PROMETHAZINE HYDROCHLORIDE 25 MG/1
12.5 TABLET ORAL
Status: DISCONTINUED | OUTPATIENT
Start: 2021-01-01 | End: 2021-01-01

## 2021-01-01 RX ORDER — MORPHINE SULFATE 10 MG/ML
10 INJECTION, SOLUTION INTRAMUSCULAR; INTRAVENOUS
Status: DISCONTINUED | OUTPATIENT
Start: 2021-01-01 | End: 2021-01-01 | Stop reason: HOSPADM

## 2021-01-01 RX ORDER — SODIUM CHLORIDE 0.9 % (FLUSH) 0.9 %
5-10 SYRINGE (ML) INJECTION AS NEEDED
Status: DISCONTINUED | OUTPATIENT
Start: 2021-01-01 | End: 2021-01-01 | Stop reason: HOSPADM

## 2021-01-01 RX ORDER — KETOROLAC TROMETHAMINE 30 MG/ML
15 INJECTION, SOLUTION INTRAMUSCULAR; INTRAVENOUS
Status: DISCONTINUED | OUTPATIENT
Start: 2021-01-01 | End: 2021-01-01 | Stop reason: HOSPADM

## 2021-01-01 RX ORDER — SCOLOPAMINE TRANSDERMAL SYSTEM 1 MG/1
1 PATCH, EXTENDED RELEASE TRANSDERMAL
Status: DISCONTINUED | OUTPATIENT
Start: 2021-01-01 | End: 2021-01-01

## 2021-01-01 RX ORDER — HEPARIN SODIUM 10000 [USP'U]/100ML
12-25 INJECTION, SOLUTION INTRAVENOUS
Status: DISCONTINUED | OUTPATIENT
Start: 2021-01-01 | End: 2021-01-01

## 2021-01-01 RX ORDER — SODIUM CHLORIDE 0.9 % (FLUSH) 0.9 %
5-40 SYRINGE (ML) INJECTION EVERY 8 HOURS
Status: DISCONTINUED | OUTPATIENT
Start: 2021-01-01 | End: 2021-01-01 | Stop reason: HOSPADM

## 2021-01-01 RX ORDER — LORAZEPAM 2 MG/ML
1 INJECTION INTRAMUSCULAR EVERY 4 HOURS
Status: DISCONTINUED | OUTPATIENT
Start: 2021-01-01 | End: 2021-01-01

## 2021-01-01 RX ORDER — METRONIDAZOLE 500 MG/100ML
500 INJECTION, SOLUTION INTRAVENOUS EVERY 8 HOURS
Status: DISCONTINUED | OUTPATIENT
Start: 2021-01-01 | End: 2021-01-01

## 2021-01-01 RX ORDER — POLYETHYLENE GLYCOL 3350 17 G/17G
17 POWDER, FOR SOLUTION ORAL DAILY PRN
Status: DISCONTINUED | OUTPATIENT
Start: 2021-01-01 | End: 2021-01-01

## 2021-01-01 RX ORDER — METRONIDAZOLE 500 MG/100ML
500 INJECTION, SOLUTION INTRAVENOUS EVERY 12 HOURS
Status: DISCONTINUED | OUTPATIENT
Start: 2021-01-01 | End: 2021-01-01

## 2021-01-01 RX ORDER — SODIUM CHLORIDE 9 MG/ML
100 INJECTION, SOLUTION INTRAVENOUS CONTINUOUS
Status: DISCONTINUED | OUTPATIENT
Start: 2021-01-01 | End: 2021-01-01 | Stop reason: HOSPADM

## 2021-01-01 RX ORDER — HYDROMORPHONE HYDROCHLORIDE 1 MG/ML
0.5 INJECTION, SOLUTION INTRAMUSCULAR; INTRAVENOUS; SUBCUTANEOUS
Status: COMPLETED | OUTPATIENT
Start: 2021-01-01 | End: 2021-01-01

## 2021-01-01 RX ORDER — DIAZEPAM 5 MG/1
TABLET ORAL
Qty: 30 TAB | Refills: 1 | Status: SHIPPED | OUTPATIENT
Start: 2021-01-01

## 2021-01-01 RX ADMIN — SODIUM CHLORIDE 280 ML: 9 INJECTION, SOLUTION INTRAVENOUS at 20:28

## 2021-01-01 RX ADMIN — Medication 13 MCG/MIN: at 16:38

## 2021-01-01 RX ADMIN — HYDROMORPHONE HYDROCHLORIDE 0.5 MG: 1 INJECTION, SOLUTION INTRAMUSCULAR; INTRAVENOUS; SUBCUTANEOUS at 15:55

## 2021-01-01 RX ADMIN — LORAZEPAM 1 MG: 2 INJECTION INTRAMUSCULAR; INTRAVENOUS at 16:00

## 2021-01-01 RX ADMIN — FAMOTIDINE 20 MG: 10 INJECTION INTRAVENOUS at 13:39

## 2021-01-01 RX ADMIN — SODIUM CHLORIDE 1000 ML: 9 INJECTION, SOLUTION INTRAVENOUS at 19:51

## 2021-01-01 RX ADMIN — HYDROMORPHONE HYDROCHLORIDE 0.5 MG: 1 INJECTION, SOLUTION INTRAMUSCULAR; INTRAVENOUS; SUBCUTANEOUS at 05:36

## 2021-01-01 RX ADMIN — Medication 10 ML: at 22:00

## 2021-01-01 RX ADMIN — METRONIDAZOLE 500 MG: 500 INJECTION, SOLUTION INTRAVENOUS at 08:44

## 2021-01-01 RX ADMIN — LORAZEPAM 1 MG: 2 INJECTION INTRAMUSCULAR; INTRAVENOUS at 15:54

## 2021-01-01 RX ADMIN — SODIUM CHLORIDE 1000 ML: 9 INJECTION, SOLUTION INTRAVENOUS at 19:26

## 2021-01-01 RX ADMIN — LORAZEPAM 1 MG: 2 INJECTION INTRAMUSCULAR; INTRAVENOUS at 01:46

## 2021-01-01 RX ADMIN — LORAZEPAM 1 MG: 2 INJECTION INTRAMUSCULAR; INTRAVENOUS at 06:57

## 2021-01-01 RX ADMIN — VANCOMYCIN HYDROCHLORIDE 1500 MG: 10 INJECTION, POWDER, LYOPHILIZED, FOR SOLUTION INTRAVENOUS at 20:24

## 2021-01-01 RX ADMIN — SODIUM CHLORIDE 125 ML/HR: 9 INJECTION, SOLUTION INTRAVENOUS at 00:05

## 2021-01-01 RX ADMIN — HYDROMORPHONE HYDROCHLORIDE 0.5 MG: 1 INJECTION, SOLUTION INTRAMUSCULAR; INTRAVENOUS; SUBCUTANEOUS at 20:25

## 2021-01-01 RX ADMIN — HYDROMORPHONE HYDROCHLORIDE 0.5 MG: 1 INJECTION, SOLUTION INTRAMUSCULAR; INTRAVENOUS; SUBCUTANEOUS at 09:07

## 2021-01-01 RX ADMIN — CEFEPIME HYDROCHLORIDE 2 G: 2 INJECTION, POWDER, FOR SOLUTION INTRAVENOUS at 19:44

## 2021-01-01 RX ADMIN — HYDROMORPHONE HYDROCHLORIDE 0.5 MG: 1 INJECTION, SOLUTION INTRAMUSCULAR; INTRAVENOUS; SUBCUTANEOUS at 02:48

## 2021-01-01 RX ADMIN — MORPHINE SULFATE 10 MG: 10 INJECTION INTRAVENOUS at 21:13

## 2021-01-01 RX ADMIN — Medication 10 ML: at 06:04

## 2021-01-01 RX ADMIN — LORAZEPAM 1 MG: 2 INJECTION INTRAMUSCULAR; INTRAVENOUS at 09:08

## 2021-01-01 RX ADMIN — LORAZEPAM 1 MG: 2 INJECTION INTRAMUSCULAR; INTRAVENOUS at 12:08

## 2021-01-01 RX ADMIN — DEXTROSE MONOHYDRATE 1 MG/MIN: 50 INJECTION, SOLUTION INTRAVENOUS at 12:33

## 2021-01-01 RX ADMIN — DOXYCYCLINE 100 MG: 100 INJECTION, POWDER, LYOPHILIZED, FOR SOLUTION INTRAVENOUS at 07:00

## 2021-01-01 RX ADMIN — Medication 10 ML: at 00:06

## 2021-01-01 RX ADMIN — LORAZEPAM 1 MG: 2 INJECTION INTRAMUSCULAR; INTRAVENOUS at 21:41

## 2021-01-01 RX ADMIN — LORAZEPAM 1 MG: 2 INJECTION INTRAMUSCULAR; INTRAVENOUS at 12:39

## 2021-01-01 RX ADMIN — HEPARIN SODIUM 5000 UNITS: 5000 INJECTION INTRAVENOUS; SUBCUTANEOUS at 02:44

## 2021-01-01 RX ADMIN — LORAZEPAM 1 MG: 2 INJECTION INTRAMUSCULAR; INTRAVENOUS at 04:32

## 2021-01-01 RX ADMIN — SODIUM CHLORIDE 100 ML/HR: 9 INJECTION, SOLUTION INTRAVENOUS at 01:46

## 2021-01-01 RX ADMIN — LORAZEPAM 1 MG: 2 INJECTION INTRAMUSCULAR; INTRAVENOUS at 17:48

## 2021-01-01 RX ADMIN — HYDROMORPHONE HYDROCHLORIDE 0.5 MG: 1 INJECTION, SOLUTION INTRAMUSCULAR; INTRAVENOUS; SUBCUTANEOUS at 23:32

## 2021-01-01 RX ADMIN — LORAZEPAM 1 MG: 2 INJECTION INTRAMUSCULAR; INTRAVENOUS at 05:36

## 2021-01-01 RX ADMIN — HYDROMORPHONE HYDROCHLORIDE 0.5 MG: 1 INJECTION, SOLUTION INTRAMUSCULAR; INTRAVENOUS; SUBCUTANEOUS at 16:00

## 2021-01-01 RX ADMIN — HYDROMORPHONE HYDROCHLORIDE 0.5 MG: 1 INJECTION, SOLUTION INTRAMUSCULAR; INTRAVENOUS; SUBCUTANEOUS at 18:39

## 2021-01-01 RX ADMIN — HYDROMORPHONE HYDROCHLORIDE 0.5 MG: 1 INJECTION, SOLUTION INTRAMUSCULAR; INTRAVENOUS; SUBCUTANEOUS at 01:02

## 2021-01-01 RX ADMIN — HYDROMORPHONE HYDROCHLORIDE 0.5 MG: 1 INJECTION, SOLUTION INTRAMUSCULAR; INTRAVENOUS; SUBCUTANEOUS at 21:41

## 2021-01-01 RX ADMIN — SODIUM CHLORIDE 125 ML/HR: 9 INJECTION, SOLUTION INTRAVENOUS at 08:11

## 2021-01-01 RX ADMIN — LORAZEPAM 1 MG: 2 INJECTION INTRAMUSCULAR; INTRAVENOUS at 23:32

## 2021-01-01 RX ADMIN — HYDROMORPHONE HYDROCHLORIDE 0.5 MG: 1 INJECTION, SOLUTION INTRAMUSCULAR; INTRAVENOUS; SUBCUTANEOUS at 12:08

## 2021-01-01 RX ADMIN — LORAZEPAM 1 MG: 2 INJECTION INTRAMUSCULAR; INTRAVENOUS at 19:41

## 2021-01-01 RX ADMIN — HYDROMORPHONE HYDROCHLORIDE 0.5 MG: 1 INJECTION, SOLUTION INTRAMUSCULAR; INTRAVENOUS; SUBCUTANEOUS at 17:48

## 2021-01-01 RX ADMIN — LORAZEPAM 1 MG: 2 INJECTION INTRAMUSCULAR; INTRAVENOUS at 20:24

## 2021-01-01 RX ADMIN — LORAZEPAM 1 MG: 2 INJECTION INTRAMUSCULAR; INTRAVENOUS at 22:35

## 2021-01-01 RX ADMIN — DEXTROSE MONOHYDRATE 25 G: 25 INJECTION, SOLUTION INTRAVENOUS at 20:14

## 2021-01-01 RX ADMIN — DEXTROSE 150 MG: 50 INJECTION, SOLUTION INTRAVENOUS at 12:20

## 2021-01-01 RX ADMIN — Medication: at 06:00

## 2021-01-01 RX ADMIN — HEPARIN SODIUM 5000 UNITS: 5000 INJECTION INTRAVENOUS; SUBCUTANEOUS at 13:36

## 2021-01-01 RX ADMIN — HYDROMORPHONE HYDROCHLORIDE 0.5 MG: 1 INJECTION, SOLUTION INTRAMUSCULAR; INTRAVENOUS; SUBCUTANEOUS at 04:26

## 2021-01-01 RX ADMIN — LORAZEPAM 1 MG: 2 INJECTION INTRAMUSCULAR; INTRAVENOUS at 18:39

## 2021-01-01 RX ADMIN — ACETAMINOPHEN 650 MG: 650 SUPPOSITORY RECTAL at 19:38

## 2021-01-01 RX ADMIN — Medication 4 MCG/MIN: at 22:30

## 2021-01-01 RX ADMIN — LORAZEPAM 1 MG: 2 INJECTION INTRAMUSCULAR; INTRAVENOUS at 01:02

## 2021-01-01 RX ADMIN — LORAZEPAM 1 MG: 2 INJECTION INTRAMUSCULAR; INTRAVENOUS at 04:26

## 2021-01-01 RX ADMIN — DEXTROSE MONOHYDRATE 4 MCG/MIN: 50 INJECTION, SOLUTION INTRAVENOUS at 22:30

## 2021-01-01 RX ADMIN — LORAZEPAM 1 MG: 2 INJECTION INTRAMUSCULAR; INTRAVENOUS at 02:47

## 2021-01-15 NOTE — TELEPHONE ENCOUNTER
Pearl Lopez with [facility] called to let dr know pt fell on Tuesday evening 1/12/21 pt has minor bruising on upper back     Call Radha Grijalva at [phone] with any questions

## 2021-01-18 NOTE — TELEPHONE ENCOUNTER
Spoke to nurse Zainab Lewis and she said son and pt are not concerned about the fall. Brusing was very minimal and no other injuries or issues so virtual visit not needed at this time. She just wanted us to be aware or situation.

## 2021-01-18 NOTE — TELEPHONE ENCOUNTER
----- Message from Natividad Fall sent at 1/18/2021  2:37 PM EST -----  Regarding: Dr. Marline Corley Message/Vendor Calls    Caller's first and last name: Yaniv Ordonez      Reason for call:      Callback required yes/no and why: yes       Best contact number(s):[phone]      Details to clarify the request: patients son called regarding form that needs to be completed, please call [phone]      Natividad Fall

## 2021-01-19 NOTE — PROGRESS NOTES
Chief Complaint Patient presents with  Physical  
  Patient needs to have documents filled out and follow up for [facility] senior living yoshi Grijalva is a 77 y.o. female who was seen by synchronous (real-time) AUDIO  technology on 2021 for follow up and pre-admission physical to [facility]. Assessment & Plan:  
Diagnoses and all orders for this visit: 
 
1. MCI (mild cognitive impairment) with elements of dementia. 2. Chronic pain of right knee 3. Moderate episode of recurrent major depressive disorder (HCC) 4. At high risk for falls 5. Impaired mobility 6. Requires continuous supervision for activities of daily living (ADL) I spent at least 30 minutes on this visit with this established patient. Subjective:  
75F who presents today with her son in the background for follow up and physical prior to being admitted to [facility]. As a matter of background. The patient was recently neuropsychologically tested and found to have mild cognitive impairment, but also elements of dementia that are clearly progressing.  
  
The patient has severe OA, Osteoporosis with pathological fractures and has been on Valium for great than 10-15 years. We have weaned her down to a 1 x per day regime of the followin. Valium 5 mgs 2. Tramadol 50 mgs Daily (had been as high as 3 x per day) 3. Gabapentin 100 mgs daily (had been up to 3 x per day).  
  
 Her medications are also carefully managed by her family. 
  
Currently, the patient is requiring 24/7 support and monitoring. She does not drive at this time.  
  
With her history of severe osteoporosis, she has had multiple compression fractures. She is able to ambulate with walker and assistance, but mostly for >50 steps, requires her wheelchair. Patient Active Problem List  
 Diagnosis Date Noted  Fall 2020  Moderate episode of recurrent major depressive disorder ([facility] [address] 2020  Physical debility 11/25/2019  Left hip pain 11/24/2019  Fall from ground level 11/24/2019  CKD (chronic kidney disease) stage 3, GFR 30-59 ml/min 11/24/2019  Abnormal urinalysis 11/24/2019  Lung crackles 10/29/2015  Dysuria 09/18/2015  Need for hepatitis C screening test 07/23/2015  Intertrigo 07/07/2015  UTI (lower urinary tract infection) 05/21/2015  Bronchitis 03/02/2015  Palpitations 07/24/2012  Anxiety 02/07/2012  Iron deficiency anemia, unspecified 01/12/2012  Osteoarthritis 12/30/2009  Senile osteoporosis 12/30/2009  Knee pain  CKD (chronic kidney disease) stage 2, GFR 60-89 ml/min  Insomnia Current Outpatient Medications Medication Sig Dispense Refill  diazePAM (VALIUM) 5 mg tablet TAKE 1 TABLET BY MOUTH NIGHTLY. MAX DAILY AMOUNT: 5MG 30 Tab 1  clindamycin (CLINDAGEL) 1 % topical gel Apply  to affected area two (2) times a day. use thin film on affected area 60 g 5  
 traMADoL (ULTRAM) 50 mg tablet Take 50 mg by mouth daily.  gabapentin (NEURONTIN) 100 mg capsule Take 1 Cap by mouth daily. Max Daily Amount: 100 mg. 90 Cap 1  citalopram (CELEXA) 10 mg tablet TAKE 1 TABLET BY MOUTH EVERY DAY AT NIGHT 90 Tab 1  
 atenoloL (TENORMIN) 50 mg tablet TAKE 1 TABLET BY MOUTH EVERY DAY AT NIGHT 90 Tab 1  
 lidocaine (LIDODERM) 5 % APPLY 1 PATCH TO AFFECTED AREA FOR 12 HOURS, THEN REMOVE FOR 12 HOURS DAILY FOR 30 DAYS 30 Patch 3  
 buPROPion (WELLBUTRIN) 100 mg tablet TAKE 1 TABLET BY MOUTH TWO TIMES A  Tab 1  
 OXYGEN-AIR DELIVERY SYSTEMS 2 L/min by continuous inhalation route continuous.  bumetanide (BUMEX) 1 mg tablet TAKE 1 TABLET BY MOUTH EVERY DAY 90 Tab 1  
 aspirin delayed-release 81 mg tablet Take 81 mg by mouth daily.  acetaminophen (TYLENOL EXTRA STRENGTH) 500 mg tablet Take 1,000 mg by mouth every six (6) hours as needed for Pain.  docusate sodium (STOOL SOFTENER) 100 mg capsule Take 300 mg by mouth every morning.  naloxone (NARCAN) 4 mg/actuation nasal spray Use 1 spray intranasally, then discard. Repeat with new spray every 2 min as needed for opioid overdose symptoms, alternating nostrils. 1 Each 3  
 calcium-vitamin D (CALCIUM 500+D) 500 mg(1,250mg) -200 unit per tablet Take 1 Tab by mouth daily. Allergies Allergen Reactions  Pcn [Penicillins] Unknown (comments) Childhood reaction rash Past Medical History:  
Diagnosis Date  Anxiety  Arthritis   
 knees,shoulders,fingers,back,neck  CKD (chronic kidney disease) stage 2, GFR 60-89 ml/min Dr. Anjana Kunz  Femur fracture, left ([address] 2008  Femur fracture, right ([address] 2009  Insomnia  Intertrigo 7/7/2015  Knee pain Dr. Manuel Mcintyre  Moderate episode of recurrent major depressive disorder ([facility] [address] 3/6/2020  Osteopenia Dr. Manuel Mcintyre  Psychiatric disorder   
 anxiety  Pyloric stenosis 2010  Sleep disorder Insomnia  Thoracic vertebral fracture ([address] 2005  Ulcer 2012 Perferatied ulcer Past Surgical History:  
Procedure Laterality Date  HX FEMUR FRACTURE TX Left femur - 2008, R femur - 2009  HX GI  2010 Perforated ulcer, in relation to pyloric stenosis  HX ORTHOPAEDIC    
 6 thoracic vertabrae that were fractured in 2005  [place] COLSC FLX W/NDSC US XM RCTM ET AL LMTD&ADJ STRUX  12/6/10 Normal except Diverticulosis Family History Problem Relation Age of Onset  Cancer Sister   
     metastatic lung Cancer  Cancer Mother   
     colon Social History Tobacco Use  Smoking status: Former Smoker  Smokeless tobacco: Never Used Substance Use Topics  Alcohol use: No  
  Alcohol/week: 0.0 standard drinks Review of Systems Constitutional: Negative. HENT: Negative. Eyes: Negative. Respiratory: Positive for shortness of breath (on chronic oxygen. ). Cardiovascular: Negative. Gastrointestinal: Negative. Genitourinary: Negative. Musculoskeletal: Positive for back pain, falls, joint pain and myalgias. Skin: Negative. Neurological: Negative. Endo/Heme/Allergies: Negative. Psychiatric/Behavioral: Positive for depression and memory loss. All other systems reviewed and are negative. Objective:  
-112/55-57. She is O2 dependent. NC 2-3 L General: alert, cooperative, no distress Mental  status: normal mood, behavior, speech. Disorganized thought processes, able to follow commands Psychiatric: normal affect, consistent with stated mood, no evidence of hallucinations We discussed the expected course, resolution and complications of the diagnosis(es) in detail. Medication risks, benefits, costs, interactions, and alternatives were discussed as indicated. I advised her to contact the office if her condition worsens, changes or fails to improve as anticipated. She expressed understanding with the diagnosis(es) and plan. Kiya Richter, who was evaluated through a patient-initiated, synchronous (real-time) audio-video encounter, and/or her healthcare decision maker, is aware that it is a billable service, with coverage as determined by her insurance carrier. She provided verbal consent to proceed: Yes, and patient identification was verified. It was conducted pursuant to the emergency declaration under the [address] [place] [address], [address] [place] [address] authority and the [employer] General Act. A caregiver was present when appropriate. Ability to conduct physical exam was limited. I was in the office. The patient was at home. Follow-up and Dispositions · Return in about 6 months (around 7/19/2021), or if symptoms worsen or fail to improve.  
  
 
 
Zunilda Toth MD

## 2021-01-19 NOTE — TELEPHONE ENCOUNTER
L/M that I was returning his call. I also let him know that he needs to set up a virtual visit with Dr Ash Flannery to get paper work for pt filled out. I asked him to call me back if he has any questions.

## 2021-01-20 NOTE — TELEPHONE ENCOUNTER
----- Message from Jah Oneal sent at 1/20/2021  1:46 PM EST -----  Regarding: Dr. Padma Weiss Message/Vendor Calls    Caller's first and last name: Carla Alonzo      Reason for call: Jorden Runner required yes/no and why: yes       Best contact number(s): [phone]       Details to clarify the request: returning phone call regarding patient       Jah Oneal

## 2021-01-23 NOTE — TELEPHONE ENCOUNTER
ON CALL NOTE:   I received a call from pt. She seemed very confused and could not remember her doctor's name ( Dr. Hesham Rendon) who she had a virtual visit with 3 days ago. Pt claims she has been sleeping a lot and not eating. She lives with her son and daughter. She was requesting lab work to evaluate why she is feeling badly. I explained that it is the weekend and the office is closed. The most expedient way to be evaluated will be the ER. I spoke with her son as well who thought our office was open on weekends. I explained that if pt's mental status has changed significantly from baseline, he should take her to ER for evaluation. He agrees to this.

## 2021-02-08 PROBLEM — R65.21 SEPTIC SHOCK (HCC): Status: ACTIVE | Noted: 2021-01-01

## 2021-02-08 PROBLEM — A41.9 SEPTIC SHOCK (HCC): Status: ACTIVE | Noted: 2021-01-01

## 2021-02-08 NOTE — ED TRIAGE NOTES
Pt arrives via EMS from nursing facility for c/o AMS/less-responsive since around 1400 today. Pt responsive to pain on EMS arrival and hypotensive. Received approx 450 mL bolus PTA.

## 2021-02-09 PROBLEM — N17.9 ACUTE RENAL FAILURE (ARF) (HCC): Status: ACTIVE | Noted: 2021-01-01

## 2021-02-09 PROBLEM — R77.8 ELEVATED TROPONIN: Status: ACTIVE | Noted: 2021-01-01

## 2021-02-09 PROBLEM — D64.9 ANEMIA: Status: ACTIVE | Noted: 2021-01-01

## 2021-02-09 PROBLEM — K57.90 DIVERTICULOSIS: Status: ACTIVE | Noted: 2021-01-01

## 2021-02-09 PROBLEM — R94.31 PROLONGED Q-T INTERVAL ON ECG: Status: ACTIVE | Noted: 2021-01-01

## 2021-02-09 PROBLEM — K72.00 ACUTE LIVER FAILURE: Status: ACTIVE | Noted: 2021-01-01

## 2021-02-09 PROBLEM — R74.8 ELEVATED LIVER ENZYMES: Status: ACTIVE | Noted: 2021-01-01

## 2021-02-09 PROBLEM — I48.0 PAF (PAROXYSMAL ATRIAL FIBRILLATION) (HCC): Status: ACTIVE | Noted: 2021-01-01

## 2021-02-09 PROBLEM — T07.XXXA MULTIPLE BRUISES: Status: ACTIVE | Noted: 2021-01-01

## 2021-02-09 NOTE — PROGRESS NOTES
Echo getting started, placing leads.  Noting bruising on chest mid sternum, lower chest. Red rash under left breast.

## 2021-02-09 NOTE — ED NOTES
Called family practice to report decreased urine output (15ml from 7214-8030). Was not given any new orders at this time. Will continue to monitor.

## 2021-02-09 NOTE — ED NOTES
Bedside and Verbal shift change report given to Madonna Echeverria RN (oncoming nurse) by SIMON Kaufman (offgoing nurse). Report included the following information SBAR, Kardex and ED Summary.

## 2021-02-09 NOTE — ED NOTES
Bedside and Verbal shift change report given to Magnolia Brunner RN (oncoming nurse) by True Cuevas RN (offgoing nurse). Report included the following information SBAR, Kardex, ED Summary, Procedure Summary, Intake/Output, MAR, Recent Results and Cardiac Rhythm Sinus Tach.

## 2021-02-09 NOTE — CONSULTS
[facility] Name: Brice Iyer MRN: [id number] : 1945 Hospital: [address] Date: 2021 Impression Plan 1. Septic shock 2. UTI 3. PulmHTN 4. Pleural effusions- small 5. GOPI on CKD · IVF- difficult balance not to overload the RV. Will slow fluids down to 100cc at this time · Agree with cefepime for UTI · CT chest not convincing for PNA- can d/c flagyl. +/- doxy/vanc · Titrate pressors to map >65 · Echo repeat pending · LFTs due to hepatic congestion and hypotension combined · Heparin SQ · Speech eval 
· famotidine Pt is critically ill. Critical care time spent with pt exclusive of procedures was 36  minutes. Pt at risk for further decline due to septic shock and severe pulmHTN Radiology ( personally reviewed) CT chest reviewed: small pleural effusions with associated atelectasis ABG No results for input(s): PHI, PO2I, PCO2I in the last 72 hours. Subjective Cc: hypotension 76 yo with PMHX of CKD presenting with altered mental status and hypotension. Echo from 2019 had evidence of pulmHTN (PASP 75mmHg) however per chart patient has not been seen for pulmHTN. Currently on Levophed 7 mcg/min. Pt is confused and unable to provide any hx. WBC 14.7, Cr 3.61 and LFTs elevated. LA 1.9. Toponin elevated, but decreasing this morning. CTA chest with small pleural effusions and associated atelectasis. UA +, Urine culture pending Review of Systems: 
Review of systems not obtained due to patient factors. Past Medical History:  
Diagnosis Date  Anxiety  Arthritis   
 knees,shoulders,fingers,back,neck  CKD (chronic kidney disease) stage 2, GFR 60-89 ml/min Dr. Avi Lopez  Femur fracture, left ([facility] [address]   Femur fracture, right ([facility] [address]   Insomnia  Intertrigo 2015  Knee pain Dr. Oleg Parks  Moderate episode of recurrent major depressive disorder ([facility] [address] 3/6/2020  Osteopenia Dr. Lio Díaz  Psychiatric disorder   
 anxiety  Pyloric stenosis 2010  Sleep disorder Insomnia  Thoracic vertebral fracture ([address] 2005  Ulcer 2012 Perferatied ulcer Past Surgical History:  
Procedure Laterality Date  HX FEMUR FRACTURE TX Left femur - 2008, R femur - 2009  HX GI  2010 Perforated ulcer, in relation to pyloric stenosis  HX ORTHOPAEDIC    
 6 thoracic vertabrae that were fractured in 2005  [place] COLSC FLX W/NDSC US XM RCTM ET AL LMTD&ADJ STRUX  12/6/10 Normal except Diverticulosis Prior to Admission medications Medication Sig Start Date End Date Taking? Authorizing Provider  
diazePAM (VALIUM) 5 mg tablet TAKE 1 TABLET BY MOUTH NIGHTLY. MAX DAILY AMOUNT: 5MG 1/22/21   Jacob Kerr MD  
clindamycin (CLINDAGEL) 1 % topical gel Apply  to affected area two (2) times a day. use thin film on affected area 11/24/20   Jacob Kerr MD  
traMADoL Celestino Lade) 50 mg tablet Take 50 mg by mouth daily. Provider, Historical  
gabapentin (NEURONTIN) 100 mg capsule Take 1 Cap by mouth daily. Max Daily Amount: 100 mg. 11/6/20   Ton Moss MD  
citalopram (CELEXA) 10 mg tablet TAKE 1 TABLET BY MOUTH EVERY DAY AT NIGHT 10/25/20   Jacob Kerr MD  
atenoloL (TENORMIN) 50 mg tablet TAKE 1 TABLET BY MOUTH EVERY DAY AT NIGHT 10/7/20   Jacob Kerr MD  
lidocaine (LIDODERM) 5 % APPLY 1 PATCH TO AFFECTED AREA FOR 12 HOURS, THEN REMOVE FOR 12 HOURS DAILY FOR 30 DAYS 10/7/20   Jacob Kerr MD  
buPROPion [facility]) 100 mg tablet TAKE 1 TABLET BY MOUTH TWO TIMES A DAY 10/3/20   Jacob Kerr MD  
OXYGEN-AIR DELIVERY SYSTEMS 2 L/min by continuous inhalation route continuous. 6/18/20   Jacob Kerr MD  
bumetanide (BUMEX) 1 mg tablet TAKE 1 TABLET BY MOUTH EVERY DAY 3/19/20   Jacob Kerr MD  
aspirin delayed-release 81 mg tablet Take 81 mg by mouth daily.     Provider, Historical  
 acetaminophen (TYLENOL EXTRA STRENGTH) 500 mg tablet Take 1,000 mg by mouth every six (6) hours as needed for Pain. Provider, Historical  
docusate sodium (STOOL SOFTENER) 100 mg capsule Take 300 mg by mouth every morning. Provider, Historical  
naloxone (NARCAN) 4 mg/actuation nasal spray Use 1 spray intranasally, then discard. Repeat with new spray every 2 min as needed for opioid overdose symptoms, alternating nostrils. 12/17/19   Michael Santa MD  
calcium-vitamin D (CALCIUM 500+D) 500 mg(1,250mg) -200 unit per tablet Take 1 Tab by mouth daily. Provider, Historical  
 
Current Facility-Administered Medications Medication Dose Route Frequency  nystatin (MYCOSTATIN) 100,000 unit/gram powder   Topical BID  Vancomycin: Rx to dose based on levels   Other Rx Dosing/Monitoring  doxycycline (VIBRAMYCIN) 100 mg in 0.9% sodium chloride (MBP/ADV) 100 mL MBP  100 mg IntraVENous Q12H  
 heparin (porcine) injection 5,000 Units  5,000 Units SubCUTAneous Q12H  Vancomycin Random Level: please obtain at 2000 on 2/9/21. Thank you! Other ONCE  
 metroNIDAZOLE (FLAGYL) IVPB premix 500 mg  500 mg IntraVENous Q12H  
 NOREPINephrine (LEVOPHED) 8 mg in 5% dextrose 250mL (32 mcg/mL) infusion  0.5-16 mcg/min IntraVENous TITRATE  cefepime (MAXIPIME) 2 g in sterile water (preservative free) 10 mL IV syringe  2 g IntraVENous Q24H  
 sodium chloride (NS) flush 5-40 mL  5-40 mL IntraVENous Q8H  
 0.9% sodium chloride infusion  125 mL/hr IntraVENous CONTINUOUS Allergies Allergen Reactions  Pcn [Penicillins] Unknown (comments) Childhood reaction rash Social History Tobacco Use  Smoking status: Former Smoker  Smokeless tobacco: Never Used Substance Use Topics  Alcohol use: No  
  Alcohol/week: 0.0 standard drinks Family History Problem Relation Age of Onset  Cancer Sister   
     metastatic lung Cancer  Cancer Mother   
     colon Laboratory: I have personally reviewed the critical care flowsheet and labs. Recent Labs 02/09/21 
0336 02/08/21 
1845 WBC 14.7* 15.6* HGB 10.1* 11.6 HCT 33.2* 37.6 * 165 Recent Labs 02/09/21 
0830 02/09/21 
8424 02/09/21 
0115 02/08/21 
1845   --  142 141  
K 5.1  --  5.2* 5.3*  
*  --  109* 105 CO2 27  --  24 18* *  --  171* 45* BUN 26*  --  21* 19  
CREA 3.61*  --  3.65* 3.92* CA 7.4*  --  7.6* 9.1 MG  --   --   --  2.4 PHOS  --   --   --  5.7* ALB 3.0*  --  3.0* 3.2* *  --  723* 372* INR  --  2.0*  --   --   
 
 
Objective: Mode Rate Tidal Volume Pressure FiO2 PEEP Vital Signs:   
 TMAX(24) Intake/Output:  
Last shift:        
Last 3 shifts: 02/09 0701 - 02/09 1900 In: 200 [I.V.:200] Out: - [id number] Intake/Output Summary (Last 24 hours) at 2/9/2021 1056 Last data filed at 2/9/2021 6412 Gross per 24 hour Intake 2980 ml Output  Net 2980 ml EXAM:  
GENERAL: elderly, frail, confused, AOx3, HEENT:  PERRL, EOMI, no alar flaring or epistaxis, oral mucosa moist without cyanosis, NECK:  no jugular vein distention, no retractions, no thyromegaly or masses, LUNGS: CTA, no w/r/r HEART:  Sternal heave, Regular rate and rhythm with no MGR; no edema is present, ABDOMEN:  soft with no tenderness, bowel sounds present, EXTREMITIES:  warm with no cyanosis, SKIN:  no jaundice or ecchymosis and NEUROLOGIC:  alert and oriented x0, moving all ext Cammie Luna MD 
[facility]

## 2021-02-09 NOTE — CONSULTS
Consult dictated GOPI due to sepsis, pre-renal azotemia CKD 3? Cr around 1.3 
UTI with sepsis, COVID neg Rec: 
IVF, Abx Avoid nephrotoxins Serial labs No acute indication RRT

## 2021-02-09 NOTE — ED PROVIDER NOTES
32-year-old female presents from nursing facility with complaints of altered mental status and decreased responsiveness since around 2 PM today. She is responsive to pain. Per EMS she is hypotensive. She received 450 mL IV fluid prior to arrival.  Her blood sugar was noted to be normal.  She had a low-grade temperature per EMS. They state that the nursing facility could not give him much history but states that she is more confused than normal and less responsive. She was on 6 L nasal cannula and her sats came up to 92%. Altered mental status Hypotension Past Medical History:  
Diagnosis Date  Anxiety  Arthritis   
 knees,shoulders,fingers,back,neck  CKD (chronic kidney disease) stage 2, GFR 60-89 ml/min Dr. Maritza Molina  Femur fracture, left ([address] 2008  Femur fracture, right ([address] 2009  Insomnia  Intertrigo 7/7/2015  Knee pain Dr. Bean Edwards  Moderate episode of recurrent major depressive disorder ([facility] [address] 3/6/2020  Osteopenia Dr. Bean Edwards  Psychiatric disorder   
 anxiety  Pyloric stenosis 2010  Sleep disorder Insomnia  Thoracic vertebral fracture ([address] 2005  Ulcer 2012 Perferatied ulcer Past Surgical History:  
Procedure Laterality Date  HX FEMUR FRACTURE TX Left femur - 2008, R femur - 2009  HX GI  2010 Perforated ulcer, in relation to pyloric stenosis  HX ORTHOPAEDIC    
 6 thoracic vertabrae that were fractured in 2005  [place] COLSC FLX W/NDSC US XM RCTM ET AL LMTD&ADJ STRUX  12/6/10 Normal except Diverticulosis Family History:  
Problem Relation Age of Onset  Cancer Sister   
     metastatic lung Cancer  Cancer Mother   
     colon Social History Socioeconomic History  Marital status:  Spouse name: Not on file  Number of children: Not on file  Years of education: Not on file  Highest education level: Not on file Occupational History  Not on file Social Needs  Financial resource strain: Not on file  Food insecurity Worry: Not on file Inability: Not on file  Transportation needs Medical: Not on file Non-medical: Not on file Tobacco Use  Smoking status: Former Smoker  Smokeless tobacco: Never Used Substance and Sexual Activity  Alcohol use: No  
  Alcohol/week: 0.0 standard drinks  Drug use: Never  Sexual activity: Yes  
  Partners: Male Lifestyle  Physical activity Days per week: Not on file Minutes per session: Not on file  Stress: Not on file Relationships  Social connections Talks on phone: Not on file Gets together: Not on file Attends Sabianist service: Not on file Active member of club or organization: Not on file Attends meetings of clubs or organizations: Not on file Relationship status: Not on file  Intimate partner violence Fear of current or ex partner: Not on file Emotionally abused: Not on file Physically abused: Not on file Forced sexual activity: Not on file Other Topics Concern   Service No  
 Blood Transfusions Yes Comment: x5 due to gastric ulcer  Caffeine Concern No  
 Occupational Exposure No  
 Hobby Hazards No  
 Sleep Concern Yes  Stress Concern Yes  Weight Concern Yes  Special Diet No  
 Back Care Yes  Exercise Yes  Bike Helmet No  
 Seat Belt Yes  Self-Exams Yes Social History Narrative  Not on file ALLERGIES: Pcn [penicillins] Review of Systems Unable to perform ROS: Acuity of condition Vitals:  
 02/08/21 1827 BP: (!) 76/48 Pulse: (!) 130 Resp: 26 Temp: 100.3 °F (37.9 °C) SpO2: 91% Weight: 74.3 kg (163 lb 12.8 oz) Physical Exam 
Constitutional:   
   Appearance: She is obese. She is ill-appearing and toxic-appearing. Eyes:  
   General: No scleral icterus. Comments: Slight periorbital ecchymosis. Neck: Trachea: No tracheal deviation. Comments: Neck contracted to the right. EMS states this is chronic Cardiovascular:  
   Rate and Rhythm: Tachycardia present. Pulmonary:  
   Effort: Pulmonary effort is normal. No respiratory distress. Comments: Ecchymosis present throughout the chest wall Abdominal:  
   General: There is no distension. Comments: Ecchymosis present Genitourinary: 
   Comments: deferred Musculoskeletal:     
   General: No deformity. Skin: 
   General: Skin is dry. Neurological:  
   General: No focal deficit present. Comments: Mumbles incoherently. Does not follow commands. Psychiatric:     
   Mood and Affect: Mood normal.  
 
  
 
MDM Number of Diagnoses or Management Options Diagnosis management comments: 50-year-old female presents with altered mental status and decreased responsiveness found to have hypotension, tachycardia, low-grade temperature of 100.3.  UA shows large pyuria. Treated for sepsis with 30 cc/kg IV fluids. Empiric for pain and vancomycin. Remains hypotensive after 30 cc/kg. Place central line and right femoral vein. Attempted to place central line and left IJ. Cannulated the left IJ but the wire would not thread. Ultrasound confirmed correct placement. Catheter was successfully placed in the right femoral vein. She started on Levophed. ED Course as of Feb 08 2215 Mon Feb 08, 2021 1927 Spoke to Hesham Justice Lopez(son POA):  would not want invasive procedures. Will hold off on central line. [phone] [TT] ED Course User Index [TT] Pallavi Chauhan MD  
 
 
CENTRAL VENOUS LINE INSERTION Date/Time: 2/8/2021 10:22 PM 
Performed by: Pallavi Chauhan MD 
Authorized by: Pallavi Chauhan MD  
 
Consent: Consent obtained:  Verbal and emergent situation (Son consented over phone after initially stating that she would not want it. He advised me to place the central line if I thought that it would help her care.) Consent given by:  Jass Bean Risks discussed:  Arterial puncture, incorrect placement, bleeding, infection, nerve damage and pneumothorax Alternatives discussed:  No treatment Pre-procedure details:  
  Hand hygiene: Hand hygiene performed prior to insertion Skin preparation:  2% chlorhexidine Anesthesia (see MAR for exact dosages): Anesthesia method:  None Procedure details: Location:  R femoral and L internal jugular Site selection rationale:  Attempted IJ but wire would not thread. Right femoral catheter placed successfully. Patient position:  Flat Procedural supplies:  Triple lumen Catheter size:  7 Fr Landmarks identified: yes Ultrasound guidance: yes Sterile ultrasound techniques: Sterile gel and sterile probe covers were used Number of attempts:  2 Successful placement: yes Post-procedure details: Post-procedure:  Dressing applied [employer]) Assessment:  Blood return through all ports and free fluid flow Patient tolerance of procedure: Tolerated well, no immediate complications Perfect Serve Consult for Admission 10:30 PM 
 
ED Room Number: ER06/06 Patient Name and age:  Brandin Campbell 77 y.o.  female Working Diagnosis: 1. Septic shock ([address] 2. Elevated troponin 3. Acute renal failure, unspecified acute renal failure type ([address] 4. Elevated liver enzymes COVID-19 Suspicion:  yes Sepsis present:  yes  Reassessment needed: yes Code Status:  Do Not Resuscitate Readmission: no 
Isolation Requirements:  yes Recommended Level of Care:  ICU Department:[facility] ED - [phone] Other: Patient arrived with hypotension, altered mental status. Given 30 cc/kg IV fluids. She remained hypotensive so I placed a central line and started her on Levophed. She received cefepime and vancomycin empirically. Her urine looks dirty. We will send her for CT scans as she has ecchymosis all over her chest. 
 
 
IMPRESSION: 
1. Septic shock ([address] 2. Elevated troponin 3. Acute renal failure, unspecified acute renal failure type ([facility] [address] 4. Elevated liver enzymes - Sepsis order set entered: YES 
- Broad Spectrum Antibiotics given: Cefepime and Vancomycin - Repeat lactic acid ordered for time 10:32 PM 
 
- Re-assessment performed at time 10:34 PM and clinical condition worsening.   
- Actions taken: central line place and started on levophed. - Hypotension or Lactic Acidosis present (SBP<90, MAP<65, Lactate >4): YES IVF:  30cc/kg actual Body Weight - Septic Shock present (Hypotension despite IVF resuscitation): YES  Vasopressors: Norepinephrine Plan: 
Admit to ICU Total critical care time spent exclusive of procedures:  44 minutes Murtaza Carrasco.  Nazanin Burnham MD

## 2021-02-09 NOTE — PROGRESS NOTES
[address] [address] Pharmacy Dosing Services: Antimicrobial Stewardship Daily Doc Consult for antibiotic dosing of Vancomycin by Dr. Raul Perez Cefepime, Metronidazole: dose adjustments per P&T approved protocols. Indication: sepsis Day of Therapy: 2 Vancomycin therapy: 
Current maintenance dose: dosing based on levels per protocol for patient with acute renal failure. Dose calculated to approximate a therapeutic trough of 15-20 mcg/mL. Assessment/Plan: SCr remains above baseline. Will continue dosing based on levels. Random level ordered to be obtained at 2000 today (approximately 24 hours after loading dose). Plan to redose ~15 mg/kg when random level less than 20 mcg/mL. Dose administration notes:   Doses given appropriately as scheduled Date Dose & Interval Measured (mcg/mL) Extrapolated (mcg/mL) ? ? ? ?  
? ? ? ?  
? ? ? ? Non-Kinetic Antimicrobial Dosing Regimen (Cefepime):  
Current Regimen: Cefepime 2 grams IV every 24 hours Recommendation: Est CrCl 14 mL/min. Continue current regimen. Dose administration notes:   Doses given appropriately as scheduled Non-Kinetic Antimicrobial Dosing Regimen (Metronidazole):  
Current Regimen: Metronidazole 500 mg IV every 8 hours Recommendation: Metronidazole changed to 500 mg IV every 12 hours per P&T approved protocol. Dose administration notes:   Doses given appropriately as scheduled Other Antimicrobial  
(not dosed by pharmacist) Doxycycline 100 mg IV every 12 hours Cultures 02/08/2021 Blood: in process 02/08/2021 Urine: in process Serum Creatinine Lab Results Component Value Date/Time Creatinine 3.65 (H) 02/09/2021 01:15 AM  
 Creatinine (POC) 1.1 12/01/2010 11:50 PM  
   
Creatinine Clearance CrCl cannot be calculated (Unknown ideal weight.). Temp Temp: 96.8 °F (36 °C) WBC Lab Results Component Value Date/Time WBC 14.7 (H) 02/09/2021 03:36 AM  
  
H/H Lab Results Component Value Date/Time HGB 10.1 (L) 02/09/2021 03:36 AM  
  
Platelets Lab Results Component Value Date/Time PLATELET 346 (L) 55/30/1829 03:36 AM  
  
Pharmacist Darryl Pope

## 2021-02-09 NOTE — CONSULTS
2/9/2021    Cardiology Consult  Note   [facility] Herminio Botello M.D. , F.A.C.C.   --------PCP:-Radha Nunn MD  
-----Subjective:  
Aleta Beebe is a 77 y.o. female Patient is in room ER 6 I have spoken to the [facility] practice resident Dr. Duke Marks. The patient we are consulted because of tachycardia. The EKG appears to show a heart rate of 150 with an irregular rhythm and no P waves most likely it is atrial fibrillation. The patient has now converted to a slow rhythm at 110 with probable P waves we need to get an EKG 12-lead to confirm. She is on Covid isolation at this time with testing. She is septic with acute kidney injury. She is unable to give any history. Face-to-face exam is deferred though the patient is seen from observation from the ER window and can be seen if there is an acute change. Her Covid rapid test is thus far negative Previous admission 11/24/2019 to the [facility] service with left hip pain. Prior to that admission in December 2010 for massive upper GI bleeding with a known history of PUD osteoporosis anxiety and previous right femur fracture. Admission January 2009 with fracture of left femur There are no previous cardiac consult notes. She had an echocardiogram 11/24/2019 which showed an EF of 60 to 65% with mildly dilated left atrium severe pulmonary hypertension and mild TR. Echo today is pending. EKG on arrival at 1021 last night showed sinus tachycardia with P waves clearly seen in right bundle branch block. Patient is a DNR she came from a nursing home at approximately 626 yesterday p.m. with altered mental status and seemed to be dehydrated and hypotensive and got fluids. She has a history of CKD 3. She was to [employer] [facility] living 
5.76 resident spoke to Dr. Pam Ryan who recommend the patient did not need to adjust the or continue on heparin given she would be not a candidate for the Cath Lab the troponin went She had a VQ scan which was considered low probability with heterogeneous perfusion. Her CT scan showed bilateral pleural effusions collapse of both lower lobes with possible superimposed pneumonia CAD cardiomegaly and diverticulosis CT of the head showed no acute infarction with positive for periventricular white matter disease Labs notable for an elevated white count of 15,000 with left shift on admission the hemoglobin today is 10.1 the white count is come down to 14,700The urine is turbid with greater than 100 white cells and 100 greater than 100 red cells with 2+ bacteria the INR is 2.0 the D-dimer is elevated at 13 TSH is 1.64 urine is growing greater than 100,000 colonies of Proteus blood cultures are ordered and pending renal function shows a creatinine of 3.65 potassium 5.2 AST is greater than 2000 ALT is 723 troponin went to 5.76 come to 5.3 
 
5.76 resident spoke to Dr. Pura Rajan who recommend the patient did not need to adjust the or continue on heparin given she would be not a candidate for the Cath Lab the troponin went Discussion/Plan/Impression: 1. Paroxysmal atrial fibrillation patient went from sinus tachycardia to a rapid A. fib at about 150 with no P waves seen now is gone back into probable sinus. Suspect this patient's A. fib is related to the acute and profound sepsis syndrome. Options are somewhat limited but amiodarone would be useful if she goes back with an elevated heart rate above 125 in A. fib. If she is sinus tach then I would not necessarily change therapy she is not a candidate for any meds that would drop her blood pressure such as beta-blockers or calcium channel blockers and I would avoid digoxin given the acute renal failure 2.  Elevated troponin consistent with an myocardial infarction there is no sign of ST elevation on the EKGs this is an NSTEMI. She is not a candidate for invasive procedures she is a DNR advanced age multiorgan failure. Medical therapy is reasonable but there is not much to offer. 3.  Shock liver from hypotension 4. Proteus Mirabella's urosepsis with septic shock 5. Acute kidney injury with class IV-V renal function. 6.  Cardiomegaly and coronary artery calcifications in the CT scan I suspect she has both epicardial coronary disease related to aging and probably a reduction in LV function. An echo is pending. I think the primary  is a sepsis but she could certainly have some element of cardiogenic shock also. Her outlook and prognosis are poor. We will follow with you. Lab Results Component Value Date/Time Creatinine (POC) 1.1 12/01/2010 11:50 PM  
 Creatinine 3.65 (H) 02/09/2021 01:15 AM  
  
Results for orders placed or performed during the hospital encounter of 02/08/21 EKG, 12 LEAD, INITIAL Result Value Ref Range Ventricular Rate 154 BPM  
 Atrial Rate 44 BPM  
 QRS Duration 90 ms Q-T Interval 306 ms QTC Calculation (Bezet) 490 ms Calculated R Axis 72 degrees Diagnosis Supraventricular tachycardia Abnormal QRS-T angle, consider primary T wave abnormality Abnormal ECG When compared with ECG of 08-FEB-2021 20:02, MANUAL COMPARISON REQUIRED, DATA IS UNCONFIRMED Cardiac Studies/Hx: 
No specialty comments available. Medical history notable for  has a past medical history of Anxiety, Arthritis, CKD (chronic kidney disease) stage 2, GFR 60-89 ml/min, Femur fracture, left ([address] (2008), Femur fracture, right ([address] (2009), Insomnia, Intertrigo (7/7/2015), Knee pain, Moderate episode of recurrent major depressive disorder ([address] (3/6/2020), Osteopenia, Psychiatric disorder, Pyloric stenosis (2010), Sleep disorder, Thoracic vertebral fracture ([address] (2005), and Ulcer (2012). She also has no past medical history of Abuse, Anemia NEC, Arrhythmia, Calculus of kidney, Chronic pain, Congestive heart failure, unspecified, COPD, GERD (gastroesophageal reflux disease), Headache(784.0), Hypercholesterolemia, Psychotic disorder ([address] Thyroid disease, or Trauma. Social history notable for  reports that she has quit smoking. She has never used smokeless tobacco. She reports that she does not drink alcohol or use drugs. Family history notable for family history includes Cancer in her mother and sister. Past Medical History:  
Diagnosis Date  Anxiety  Arthritis   
 knees,shoulders,fingers,back,neck  CKD (chronic kidney disease) stage 2, GFR 60-89 ml/min Dr. Marylene Cain  Femur fracture, left ([address] 2008  Femur fracture, right ([address] 2009  Insomnia  Intertrigo 7/7/2015  Knee pain Dr. Amada Elkins  Moderate episode of recurrent major depressive disorder ([address] 3/6/2020  Osteopenia Dr. Amada Elkins  Psychiatric disorder   
 anxiety  Pyloric stenosis 2010  Sleep disorder Insomnia  Thoracic vertebral fracture ([address] 2005  Ulcer 2012 Perferatied ulcer ROS-pertinents in the chart reviewed unable to interview patient due to sepsis the pertinent portions of the medical history,physician and nursing notes, meds,vitals , labs and Ins/Outs,are reviewed in the electronic record Social History Tobacco Use  Smoking status: Former Smoker  Smokeless tobacco: Never Used Substance Use Topics  Alcohol use: No  
  Alcohol/week: 0.0 standard drinks  Drug use: Never Family History Problem Relation Age of Onset  Cancer Sister   
     metastatic lung Cancer  Cancer Mother   
     colon Prior to Admission Medications Prescriptions Last Dose Informant Patient Reported? Taking? OXYGEN-AIR DELIVERY SYSTEMS   Yes No  
Si L/min by continuous inhalation route continuous. acetaminophen (TYLENOL EXTRA STRENGTH) 500 mg tablet   Yes No  
Sig: Take 1,000 mg by mouth every six (6) hours as needed for Pain. aspirin delayed-release 81 mg tablet   Yes No  
Sig: Take 81 mg by mouth daily. atenoloL (TENORMIN) 50 mg tablet   No No  
Sig: TAKE 1 TABLET BY MOUTH EVERY DAY AT NIGHT  
buPROPion (WELLBUTRIN) 100 mg tablet   No No  
Sig: TAKE 1 TABLET BY MOUTH TWO TIMES A DAY  
bumetanide (BUMEX) 1 mg tablet   No No  
Sig: TAKE 1 TABLET BY MOUTH EVERY DAY  
calcium-vitamin D (CALCIUM 500+D) 500 mg(1,250mg) -200 unit per tablet  Self Yes No  
Sig: Take 1 Tab by mouth daily. citalopram (CELEXA) 10 mg tablet   No No  
Sig: TAKE 1 TABLET BY MOUTH EVERY DAY AT NIGHT  
clindamycin (CLINDAGEL) 1 % topical gel   No No  
Sig: Apply  to affected area two (2) times a day. use thin film on affected area  
diazePAM (VALIUM) 5 mg tablet   No No  
Sig: TAKE 1 TABLET BY MOUTH NIGHTLY. MAX DAILY AMOUNT: 5MG docusate sodium (STOOL SOFTENER) 100 mg capsule   Yes No  
Sig: Take 300 mg by mouth every morning.  
gabapentin (NEURONTIN) 100 mg capsule   No No  
Sig: Take 1 Cap by mouth daily. Max Daily Amount: 100 mg.  
lidocaine (LIDODERM) 5 %   No No  
Sig: APPLY 1 PATCH TO AFFECTED AREA FOR 12 HOURS, THEN REMOVE FOR 12 HOURS DAILY FOR 30 DAYS  
naloxone (NARCAN) 4 mg/actuation nasal spray   No No  
Sig: Use 1 spray intranasally, then discard. Repeat with new spray every 2 min as needed for opioid overdose symptoms, alternating nostrils. traMADoL (ULTRAM) 50 mg tablet   Yes No  
Sig: Take 50 mg by mouth daily. Facility-Administered Medications: None Allergies Allergen Reactions  Pcn [Penicillins] Unknown (comments) Childhood reaction rash Objective:  
 Physical Exam:  
Patient Vitals for the past 12 hrs: 
 Temp Pulse Resp BP SpO2  
02/09/21 0836 (!) 96.5 °F (35.8 °C)      
02/09/21 0835  (!) 155 25 (!) 90/42 94 % 02/09/21 0830  (!) 155 19 (!) 87/38   
02/09/21 0827  (!) 155 22  94 % 02/09/21 0800  (!) 151 16 (!) 119/48 92 % 02/09/21 0730  (!) 103 15 (!) 117/50 94 % 02/09/21 0700  (!) 107 16 (!) 117/46   
02/09/21 0645  (!) 109 19 (!) 117/41 94 % 02/09/21 0530  (!) 102 15 (!) 115/56   
02/09/21 0500  (!) 103 15 (!) 127/59   
02/09/21 0430  (!) 103 16 132/63 98 % 02/09/21 0400 96.8 °F (36 °C) (!) 108 17 130/65 96 % 02/09/21 0330  (!) 112 16 (!) 96/37   
02/09/21 0300  (!) 109 18 (!) 122/54   
02/09/21 0230  (!) 111 16 (!) 125/57 (!) 78 % 02/09/21 0200  (!) 110 14 (!) 133/57 94 % 02/09/21 0130  (!) 109 16 (!) 136/53 96 % 02/09/21 0100  (!) 111 19 123/66   
02/09/21 0030  (!) 112 18 (!) 122/50 90 % 02/09/21 0020  (!) 112 15 123/60 93 % 02/09/21 0000 (!) 95.8 °F (35.4 °C) (!) 112 19 120/61 95 % 02/08/21 2355  (!) 113 15 127/65   
02/08/21 2345  (!) 112 21 (!) 114/49   
02/08/21 2330  (!) 114 16 (!) 113/44   
02/08/21 2300  (!) 112 19 122/60 94 % 02/08/21 2255  (!) 113 18 (!) 118/55 94 % 02/08/21 2250  (!) 114 18 (!) 106/55 96 % 02/08/21 2245  (!) 114 21 (!) 106/50 95 % 02/08/21 2240  (!) 114 18 (!) 107/59 95 % 02/08/21 2235  (!) 113 14 (!) 102/46 93 % 02/08/21 2230  (!) 114 18 (!) 81/34   
02/08/21 2225  (!) 114 20 (!) 89/55   
02/08/21 2220  (!) 114 28 (!) 103/46 95 % 02/08/21 2215  (!) 114 19 (!) 89/67   
02/08/21 2200  (!) 115 19 92/67 97 % 02/08/21 2145  (!) 115 20 (!) 96/49 96 % 02/08/21 2130  (!) 116 23 (!) 99/41   
02/08/21 2115  (!) 115 19 (!) 88/36   Deferred exam 
   
   
   
   
   
   
   
 
 Last 24hr Input/Output: 
 
Intake/Output Summary (Last 24 hours) at 2/9/2021 0900 Last data filed at 2/9/2021 5591 Gross per 24 hour Intake 2880 ml Output  Net 2880 ml Data Review:  
Recent Results (from the past 24 hour(s)) TROPONIN I Collection Time: 02/08/21  6:45 PM  
Result Value Ref Range Troponin-I, Qt. 5.66 (H) <0.05 ng/mL CBC WITH AUTOMATED DIFF Collection Time: 02/08/21  6:45 PM  
Result Value Ref Range WBC 15.6 (H) 3.6 - 11.0 K/uL  
 RBC 3.13 (L) 3.80 - 5.20 M/uL  
 HGB 11.6 11.5 - 16.0 g/dL HCT 37.6 35.0 - 47.0 % .1 (H) 80.0 - 99.0 FL  
 MCH 37.1 (H) 26.0 - 34.0 PG  
 MCHC 30.9 30.0 - 36.5 g/dL  
 RDW 15.6 (H) 11.5 - 14.5 % PLATELET 046 476 - 118 K/uL NRBC 0.0 0  WBC ABSOLUTE NRBC 0.00 0.00 - 0.01 K/uL NEUTROPHILS 63 32 - 75 % BAND NEUTROPHILS 26 (H) 0 - 6 % LYMPHOCYTES 3 (L) 12 - 49 % MONOCYTES 8 5 - 13 % EOSINOPHILS 0 0 - 7 % BASOPHILS 0 0 - 1 % IMMATURE GRANULOCYTES 0 %  
 ABS. NEUTROPHILS 13.9 (H) 1.8 - 8.0 K/UL  
 ABS. LYMPHOCYTES 0.5 (L) 0.8 - 3.5 K/UL  
 ABS. MONOCYTES 1.2 (H) 0.0 - 1.0 K/UL  
 ABS. EOSINOPHILS 0.0 0.0 - 0.4 K/UL  
 ABS. BASOPHILS 0.0 0.0 - 0.1 K/UL  
 ABS. IMM. GRANS. 0.0 K/UL  
 DF MANUAL    
 RBC COMMENTS ANISOCYTOSIS 1+ 
    
 RBC COMMENTS MACROCYTOSIS 
1+ WBC COMMENTS TOXIC GRANULATION    
METABOLIC PANEL, COMPREHENSIVE Collection Time: 02/08/21  6:45 PM  
Result Value Ref Range Sodium 141 136 - 145 mmol/L Potassium 5.3 (H) 3.5 - 5.1 mmol/L Chloride 105 97 - 108 mmol/L  
 CO2 18 (L) 21 - 32 mmol/L Anion gap 18 (H) 5 - 15 mmol/L Glucose 45 (LL) 65 - 100 mg/dL BUN 19 6 - 20 MG/DL Creatinine 3.92 (H) 0.55 - 1.02 MG/DL  
 BUN/Creatinine ratio 5 (L) 12 - 20 GFR est AA 14 (L) >60 ml/min/1.73m2 GFR est non-AA 11 (L) >60 ml/min/1.73m2 Calcium 9.1 8.5 - 10.1 MG/DL  Bilirubin, total 2.1 (H) 0.2 - 1.0 MG/DL  
 ALT (SGPT) 372 (H) 12 - 78 U/L  
 AST (SGOT) 1,142 (H) 15 - 37 U/L Alk. phosphatase 148 (H) 45 - 117 U/L Protein, total 6.8 6.4 - 8.2 g/dL Albumin 3.2 (L) 3.5 - 5.0 g/dL Globulin 3.6 2.0 - 4.0 g/dL A-G Ratio 0.9 (L) 1.1 - 2.2 SAMPLES BEING HELD Collection Time: 02/08/21  6:45 PM  
Result Value Ref Range [facility]PLES BEING HELD [facility]   
 COMMENT Add-on orders for these samples will be processed based on acceptable specimen integrity and analyte stability, which may vary by analyte. PHOSPHORUS Collection Time: 02/08/21  6:45 PM  
Result Value Ref Range Phosphorus 5.7 (H) 2.6 - 4.7 MG/DL  
CK Collection Time: 02/08/21  6:45 PM  
Result Value Ref Range  (H) 26 - 192 U/L  
ACETAMINOPHEN Collection Time: 02/08/21  6:45 PM  
Result Value Ref Range Acetaminophen level 9 (L) 10 - 30 ug/mL SALICYLATE Collection Time: 02/08/21  6:45 PM  
Result Value Ref Range Salicylate level <5.2 (L) 2.8 - 20.0 MG/DL  
TSH 3RD GENERATION Collection Time: 02/08/21  6:45 PM  
Result Value Ref Range TSH 1.64 0.36 - 3.74 uIU/mL MAGNESIUM Collection Time: 02/08/21  6:45 PM  
Result Value Ref Range Magnesium 2.4 1.6 - 2.4 mg/dL PROCALCITONIN Collection Time: 02/08/21  6:45 PM  
Result Value Ref Range Procalcitonin 0.64 ng/mL URINALYSIS W/ RFLX MICROSCOPIC Collection Time: 02/08/21  8:02 PM  
Result Value Ref Range Color DARK YELLOW Appearance TURBID (A) CLEAR Specific gravity 1.022 1.003 - 1.030    
 pH (UA) 5.5 5.0 - 8.0 Protein 100 (A) NEG mg/dL Glucose Negative NEG mg/dL Ketone 15 (A) NEG mg/dL Blood LARGE (A) NEG Urobilinogen 1.0 0.2 - 1.0 EU/dL Nitrites Negative NEG Leukocyte Esterase LARGE (A) NEG    
 WBC >100 (H) 0 - 4 /hpf  
 RBC >100 (H) 0 - 5 /hpf Epithelial cells MANY (A) FEW /lpf Bacteria 2+ (A) NEG /hpf  
 CA Oxalate crystals 1+ (A) NEG Hyaline cast >20 (H) 0 - 5 /lpf SAMPLES BEING HELD  
 Collection Time: 02/08/21  8:02 PM  
Result Value Ref Range SAMPLES BEING HELD UR GRAY   
 COMMENT Add-on orders for these samples will be processed based on acceptable specimen integrity and analyte stability, which may vary by analyte. BILIRUBIN, CONFIRM Collection Time: 02/08/21  8:02 PM  
Result Value Ref Range Bilirubin UA, confirm Negative NEG    
EKG, 12 LEAD, INITIAL Collection Time: 02/08/21  8:02 PM  
Result Value Ref Range Ventricular Rate 118 BPM  
 Atrial Rate 118 BPM  
 P-R Interval 128 ms QRS Duration 128 ms Q-T Interval 378 ms QTC Calculation (Bezet) 529 ms Calculated P Axis 3 degrees Calculated R Axis 87 degrees Calculated T Axis -11 degrees Diagnosis Sinus tachycardia Right bundle branch block Abnormal ECG When compared with ECG of 13-DEC-2020 04:01, Right bundle branch block is now present GLUCOSE, POC Collection Time: 02/08/21  8:31 PM  
Result Value Ref Range Glucose (POC) 104 (H) 65 - 100 mg/dL Performed by Agatha Holloway POC LACTIC ACID Collection Time: 02/08/21 10:44 PM  
Result Value Ref Range Lactic Acid (POC) 6.25 (HH) 0.40 - 2.00 mmol/L  
D DIMER Collection Time: 02/09/21  1:15 AM  
Result Value Ref Range D-dimer 13.04 (H) 0.00 - 0.65 mg/L FEU METABOLIC PANEL, COMPREHENSIVE Collection Time: 02/09/21  1:15 AM  
Result Value Ref Range Sodium 142 136 - 145 mmol/L Potassium 5.2 (H) 3.5 - 5.1 mmol/L Chloride 109 (H) 97 - 108 mmol/L  
 CO2 24 21 - 32 mmol/L Anion gap 9 5 - 15 mmol/L Glucose 171 (H) 65 - 100 mg/dL BUN 21 (H) 6 - 20 MG/DL Creatinine 3.65 (H) 0.55 - 1.02 MG/DL  
 BUN/Creatinine ratio 6 (L) 12 - 20 GFR est AA 15 (L) >60 ml/min/1.73m2 GFR est non-AA 12 (L) >60 ml/min/1.73m2 Calcium 7.6 (L) 8.5 - 10.1 MG/DL Bilirubin, total 2.3 (H) 0.2 - 1.0 MG/DL  
 ALT (SGPT) 723 (H) 12 - 78 U/L  
 AST (SGOT) >2,000 (H) 15 - 37 U/L Alk.  phosphatase 136 (H) 45 - 117 U/L  
 Protein, total 6.2 (L) 6.4 - 8.2 g/dL Albumin 3.0 (L) 3.5 - 5.0 g/dL Globulin 3.2 2.0 - 4.0 g/dL A-G Ratio 0.9 (L) 1.1 - 2.2 C REACTIVE PROTEIN, QT Collection Time: 02/09/21  1:15 AM  
Result Value Ref Range C-Reactive protein 5.19 (H) 0.00 - 0.60 mg/dL LD Collection Time: 02/09/21  1:15 AM  
Result Value Ref Range LD 2,478 (H) 81 - 246 U/L  
TROPONIN I Collection Time: 02/09/21  1:15 AM  
Result Value Ref Range Troponin-I, Qt. 5.76 (H) <0.05 ng/mL LACTIC ACID Collection Time: 02/09/21  2:58 AM  
Result Value Ref Range Lactic acid 3.6 (HH) 0.4 - 2.0 MMOL/L  
CBC WITH AUTOMATED DIFF Collection Time: 02/09/21  3:36 AM  
Result Value Ref Range WBC 14.7 (H) 3.6 - 11.0 K/uL  
 RBC 2.77 (L) 3.80 - 5.20 M/uL  
 HGB 10.1 (L) 11.5 - 16.0 g/dL HCT 33.2 (L) 35.0 - 47.0 % .9 (H) 80.0 - 99.0 FL  
 MCH 36.5 (H) 26.0 - 34.0 PG  
 MCHC 30.4 30.0 - 36.5 g/dL  
 RDW 15.2 (H) 11.5 - 14.5 % PLATELET 162 (L) 054 - 400 K/uL NRBC 0.1 (H) 0  WBC ABSOLUTE NRBC 0.02 (H) 0.00 - 0.01 K/uL NEUTROPHILS 71 32 - 75 % BAND NEUTROPHILS 17 (H) 0 - 6 % LYMPHOCYTES 3 (L) 12 - 49 % MONOCYTES 9 5 - 13 % EOSINOPHILS 0 0 - 7 % BASOPHILS 0 0 - 1 % IMMATURE GRANULOCYTES 0 %  
 ABS. NEUTROPHILS 13.0 (H) 1.8 - 8.0 K/UL  
 ABS. LYMPHOCYTES 0.4 (L) 0.8 - 3.5 K/UL  
 ABS. MONOCYTES 1.3 (H) 0.0 - 1.0 K/UL  
 ABS. EOSINOPHILS 0.0 0.0 - 0.4 K/UL  
 ABS. BASOPHILS 0.0 0.0 - 0.1 K/UL  
 ABS. IMM. GRANS. 0.0 K/UL  
 DF MANUAL    
 RBC COMMENTS NORMOCYTIC, NORMOCHROMIC    
SARS-COV-2 Collection Time: 02/09/21  3:43 AM  
Result Value Ref Range SARS-CoV-2 Please find results under separate order COVID-19 RAPID TEST Collection Time: 02/09/21  3:43 AM  
Result Value Ref Range Specimen source Nasopharyngeal    
 COVID-19 rapid test Not detected NOTD LACTIC ACID Collection Time: 02/09/21  7:14 AM  
Result Value Ref Range Lactic acid 1.9 0.4 - 2.0 MMOL/L  
SAMPLES BEING HELD Collection Time: 02/09/21  7:14 AM  
Result Value Ref Range SAMPLES BEING HELD 1BL COMMENT Add-on orders for these samples will be processed based on acceptable specimen integrity and analyte stability, which may vary by analyte. TROPONIN I Collection Time: 02/09/21  7:14 AM  
Result Value Ref Range Troponin-I, Qt. 5.30 (H) <0.05 ng/mL SAMPLES BEING HELD Collection Time: 02/09/21  7:14 AM  
Result Value Ref Range SAMPLES BEING HELD NO SAMPLE RECEIVED COMMENT Add-on orders for these samples will be processed based on acceptable specimen integrity and analyte stability, which may vary by analyte. PROTHROMBIN TIME + INR Collection Time: 02/09/21  7:14 AM  
Result Value Ref Range INR 2.0 (H) 0.9 - 1.1 Prothrombin time 19.8 (H) 9.0 - 11.1 sec FIBRINOGEN Collection Time: 02/09/21  7:14 AM  
Result Value Ref Range Fibrinogen 235 200 - 475 mg/dL EKG, 12 LEAD, INITIAL Collection Time: 02/09/21  8:17 AM  
Result Value Ref Range Ventricular Rate 154 BPM  
 Atrial Rate 44 BPM  
 QRS Duration 90 ms Q-T Interval 306 ms QTC Calculation (Bezet) 490 ms Calculated R Axis 72 degrees Diagnosis Supraventricular tachycardia Abnormal QRS-T angle, consider primary T wave abnormality Abnormal ECG When compared with ECG of 08-FEB-2021 20:02, MANUAL COMPARISON REQUIRED, DATA IS UNCONFIRMED Lab Results Component Value Date/Time  Cholesterol, total 182 02/22/2019 11:17 AM  
 Cholesterol, total 181 04/26/2018 11:02 AM  
 Cholesterol, total 194 11/01/2016 11:34 AM  
 Cholesterol, total 189 07/23/2015 10:00 AM  
 Cholesterol, total 213 (H) 01/13/2015 12:00 AM  
 HDL Cholesterol 75 02/22/2019 11:17 AM  
 HDL Cholesterol 77 04/26/2018 11:02 AM  
 HDL Cholesterol 81 11/01/2016 11:34 AM  
 HDL Cholesterol 87 07/23/2015 10:00 AM  
 HDL Cholesterol 104 01/13/2015 12:00 AM  
 LDL, calculated 84 02/22/2019 11:17 AM  
 LDL, calculated 83 04/26/2018 11:02 AM  
 LDL, calculated 93 11/01/2016 11:34 AM  
 LDL, calculated 84 07/23/2015 10:00 AM  
 LDL, calculated 92 01/13/2015 12:00 AM  
 Triglyceride 113 02/22/2019 11:17 AM  
 Triglyceride 107 04/26/2018 11:02 AM  
 Triglyceride 99 11/01/2016 11:34 AM  
 Triglyceride 91 07/23/2015 10:00 AM  
 Triglyceride 83 01/13/2015 12:00 AM  
 CHOL/HDL Ratio 2.4 06/28/2010 08:51 AM  
 CHOL/HDL Ratio 1.7 03/31/2010 08:33 AM  
 CHOL/HDL Ratio 2.4 12/30/2009 09:26 AM  
 CHOL/HDL Ratio 2.7 05/22/2009 10:21 AM  
  
Citlalli De Santiago MD 2/9/2021

## 2021-02-09 NOTE — PROGRESS NOTES
Pt significantly more alert now, pulling lines out, not wanting to keep oxygen in nose, and pushing blankets off. Redirect patient. Mittens placed to protect patient. Cleaned pt and placed in clean gown, sheets.

## 2021-02-09 NOTE — CONSULTS
[facility] Consultation for: rule out cholangitis Requesting Physician: González Barnes MD 
 
History of Present Illness:  
  
Walker Landeros is a 77 y.o. female who presented to the emergency department with altered mental status. She is a patient at [facility] and was found to have AMS by the staff there. History is per chart and nurse as the patient is unable to answer questions. She was found to have bilirubinemia and elevated liver enzymes with cholelithiasis on CT. I was consulted for ruling out cholangitis as a cause of her sepsis. Since presentation, she has been on pressors, and has had runs of atrial fibrillation. Per the nurse she has become slightly more alert, but is unable to answer questions at this time. Past Medical History:  
Diagnosis Date  Anxiety  Arthritis   
 knees,shoulders,fingers,back,neck  CKD (chronic kidney disease) stage 2, GFR 60-89 ml/min Dr. Leela Blum  Femur fracture, left ([address] 2008  Femur fracture, right ([address] 2009  Insomnia  Intertrigo 7/7/2015  Knee pain Dr. Vin Jenkins  Moderate episode of recurrent major depressive disorder ([address] 3/6/2020  Osteopenia Dr. Vin Jenkins  Psychiatric disorder   
 anxiety  Pyloric stenosis 2010  Sleep disorder Insomnia  Thoracic vertebral fracture ([address] 2005  Ulcer 2012 Perferatied ulcer Past Surgical History:  
Procedure Laterality Date  HX FEMUR FRACTURE TX Left femur - 2008, R femur - 2009  HX GI  2010 Perforated ulcer, in relation to pyloric stenosis  HX ORTHOPAEDIC    
 6 thoracic vertabrae that were fractured in 2005  OK COLSC FLX W/NDSC US XM RCTM ET AL LMTD&ADJ STRUX  12/6/10 Normal except Diverticulosis Social History Socioeconomic History  Marital status:  Spouse name: Not on file  Number of children: Not on file  Years of education: Not on file  Highest education level: Not on file Occupational History  Not on file Social Needs  Financial resource strain: Not on file  Food insecurity Worry: Not on file Inability: Not on file  Transportation needs Medical: Not on file Non-medical: Not on file Tobacco Use  Smoking status: Former Smoker  Smokeless tobacco: Never Used Substance and Sexual Activity  Alcohol use: No  
  Alcohol/week: 0.0 standard drinks  Drug use: Never  Sexual activity: Yes  
  Partners: Male Lifestyle  Physical activity Days per week: Not on file Minutes per session: Not on file  Stress: Not on file Relationships  Social connections Talks on phone: Not on file Gets together: Not on file Attends Tenriism service: Not on file Active member of club or organization: Not on file Attends meetings of clubs or organizations: Not on file Relationship status: Not on file  Intimate partner violence Fear of current or ex partner: Not on file Emotionally abused: Not on file Physically abused: Not on file Forced sexual activity: Not on file Other Topics Concern   Service No  
 Blood Transfusions Yes Comment: x5 due to gastric ulcer  Caffeine Concern No  
 Occupational Exposure No  
 Hobby Hazards No  
 Sleep Concern Yes  Stress Concern Yes  Weight Concern Yes  Special Diet No  
 Back Care Yes  Exercise Yes  Bike Helmet No  
 Seat Belt Yes  Self-Exams Yes Social History Narrative  Not on file Family History Problem Relation Age of Onset  Cancer Sister   
     metastatic lung Cancer  Cancer Mother   
     colon Current Facility-Administered Medications:  
  nystatin (MYCOSTATIN) 100,000 unit/gram powder, , Topical, BID, Antoine Red MD 
  Vancomycin: Rx to dose based on levels, , Other, Rx Dosing/Monitoring, Antoine Red MD 
   doxycycline (VIBRAMYCIN) 100 mg in 0.9% sodium chloride (MBP/ADV) 100 mL MBP, 100 mg, IntraVENous, Q12H, David Braun MD, Stopped at 02/09/21 0810 
  heparin (porcine) injection 5,000 Units, 5,000 Units, SubCUTAneous, Q12H, David Braun MD 
  Vancomycin Random Level: please obtain at 2000 on 2/9/21. Thank you!, , Other, ONCE, David Braun MD 
  famotidine (PF) (PEPCID) 20 mg in 0.9% sodium chloride 10 mL injection, 20 mg, IntraVENous, DAILY, Teresa Amezquita MD 
  amiodarone (CORDARONE) 375 mg in dextrose 5% 250 mL infusion, 0.5-1 mg/min, IntraVENous, TITRATE, Kathie Figueroa DO, Last Rate: 40 mL/hr at 02/09/21 1233, 1 mg/min at 02/09/21 1233   sodium chloride (NS) flush 5-10 mL, 5-10 mL, IntraVENous, PRN, Aaron Hendrix MD 
  NOREPINephrine (LEVOPHED) 8 mg in 5% dextrose 250mL (32 mcg/mL) infusion, 0.5-16 mcg/min, IntraVENous, TITRATE, Aaron Hendrix MD, Last Rate: 15 mL/hr at 02/09/21 1112, 8 mcg/min at 02/09/21 1112   cefepime (MAXIPIME) 2 g in sterile water (preservative free) 10 mL IV syringe, 2 g, IntraVENous, Q24H, David Braun MD 
  sodium chloride (NS) flush 5-40 mL, 5-40 mL, IntraVENous, Q8H, David Braun MD, 10 mL at 02/09/21 4410   sodium chloride (NS) flush 5-40 mL, 5-40 mL, IntraVENous, PRN, David Braun MD 
  polyethylene glycol [facility]) packet 17 g, 17 g, Oral, DAILY PRN, David Braun MD 
  0.9% sodium chloride infusion, 100 mL/hr, IntraVENous, CONTINUOUS, Treesa Amezquiat MD, Last Rate: 125 mL/hr at 02/09/21 0811, 125 mL/hr at 02/09/21 8772 Current Outpatient Medications:  
  diazePAM (VALIUM) 5 mg tablet, TAKE 1 TABLET BY MOUTH NIGHTLY. MAX DAILY AMOUNT: 5MG, Disp: 30 Tab, Rfl: 1   clindamycin (CLINDAGEL) 1 % topical gel, Apply  to affected area two (2) times a day. use thin film on affected area, Disp: 60 g, Rfl: 5 
  traMADoL (ULTRAM) 50 mg tablet, Take 50 mg by mouth daily. , Disp: , Rfl:  
   gabapentin (NEURONTIN) 100 mg capsule, Take 1 Cap by mouth daily. Max Daily Amount: 100 mg., Disp: 90 Cap, Rfl: 1   citalopram (CELEXA) 10 mg tablet, TAKE 1 TABLET BY MOUTH EVERY DAY AT NIGHT, Disp: 90 Tab, Rfl: 1 
  atenoloL (TENORMIN) 50 mg tablet, TAKE 1 TABLET BY MOUTH EVERY DAY AT NIGHT, Disp: 90 Tab, Rfl: 1 
  lidocaine (LIDODERM) 5 %, APPLY 1 PATCH TO AFFECTED AREA FOR 12 HOURS, THEN REMOVE FOR 12 HOURS DAILY FOR 30 DAYS, Disp: 30 Patch, Rfl: 3 
  buPROPion (WELLBUTRIN) 100 mg tablet, TAKE 1 TABLET BY MOUTH TWO TIMES A DAY, Disp: 180 Tab, Rfl: 1 
  OXYGEN-AIR DELIVERY SYSTEMS, 2 L/min by continuous inhalation route continuous. , Disp: , Rfl:  
  bumetanide (BUMEX) 1 mg tablet, TAKE 1 TABLET BY MOUTH EVERY DAY, Disp: 90 Tab, Rfl: 1 
  aspirin delayed-release 81 mg tablet, Take 81 mg by mouth daily. , Disp: , Rfl:  
  acetaminophen (TYLENOL EXTRA STRENGTH) 500 mg tablet, Take 1,000 mg by mouth every six (6) hours as needed for Pain., Disp: , Rfl:  
  docusate sodium (STOOL SOFTENER) 100 mg capsule, Take 300 mg by mouth every morning., Disp: , Rfl:  
  naloxone (NARCAN) 4 mg/actuation nasal spray, Use 1 spray intranasally, then discard. Repeat with new spray every 2 min as needed for opioid overdose symptoms, alternating nostrils. , Disp: 1 Each, Rfl: 3 
  calcium-vitamin D (CALCIUM 500+D) 500 mg(1,250mg) -200 unit per tablet, Take 1 Tab by mouth daily. , Disp: , Rfl:  
 
Allergies Allergen Reactions  Pcn [Penicillins] Unknown (comments) Childhood reaction rash ROS Review of systems is unable to be obtained due to patient mental status Physical Exam:  
 
Visit Vitals BP (!) 89/70 Pulse (!) 102 Temp (!) 96.6 °F (35.9 °C) Resp 28 Ht 5' 5\" (1.651 m) Wt 163 lb 12.8 oz (74.3 kg) SpO2 (!) 71% BMI 27.26 kg/m² General -obtunded but intermittently responding to commands HEENT - NC/AT, mild scleral icterus Pulm -bilateral crackles CV -tachycardia, hypotension on 8 mcg of Levophed Abd -soft, nondistended possible tenderness to palpation in the right upper quadrant although somewhat unclear response by patient Ext -slightly cool some edema edema Lymphatics -no palpable inguinal or cervical nodes Skin - supple and no rashes Psychiatric -unable to assess due to altered mental status Neuro-opens eyes weakly to command, otherwise does not respond to commands Labs Recent Results (from the past 24 hour(s)) TROPONIN I Collection Time: 02/08/21  6:45 PM  
Result Value Ref Range Troponin-I, Qt. 5.66 (H) <0.05 ng/mL CBC WITH AUTOMATED DIFF Collection Time: 02/08/21  6:45 PM  
Result Value Ref Range WBC 15.6 (H) 3.6 - 11.0 K/uL  
 RBC 3.13 (L) 3.80 - 5.20 M/uL  
 HGB 11.6 11.5 - 16.0 g/dL HCT 37.6 35.0 - 47.0 % .1 (H) 80.0 - 99.0 FL  
 MCH 37.1 (H) 26.0 - 34.0 PG  
 MCHC 30.9 30.0 - 36.5 g/dL  
 RDW 15.6 (H) 11.5 - 14.5 % PLATELET 193 203 - 592 K/uL NRBC 0.0 0  WBC ABSOLUTE NRBC 0.00 0.00 - 0.01 K/uL NEUTROPHILS 63 32 - 75 % BAND NEUTROPHILS 26 (H) 0 - 6 % LYMPHOCYTES 3 (L) 12 - 49 % MONOCYTES 8 5 - 13 % EOSINOPHILS 0 0 - 7 % BASOPHILS 0 0 - 1 % IMMATURE GRANULOCYTES 0 %  
 ABS. NEUTROPHILS 13.9 (H) 1.8 - 8.0 K/UL  
 ABS. LYMPHOCYTES 0.5 (L) 0.8 - 3.5 K/UL  
 ABS. MONOCYTES 1.2 (H) 0.0 - 1.0 K/UL  
 ABS. EOSINOPHILS 0.0 0.0 - 0.4 K/UL  
 ABS. BASOPHILS 0.0 0.0 - 0.1 K/UL  
 ABS. IMM. GRANS. 0.0 K/UL  
 DF MANUAL    
 RBC COMMENTS ANISOCYTOSIS 1+ 
    
 RBC COMMENTS MACROCYTOSIS 
1+ WBC COMMENTS TOXIC GRANULATION    
METABOLIC PANEL, COMPREHENSIVE Collection Time: 02/08/21  6:45 PM  
Result Value Ref Range Sodium 141 136 - 145 mmol/L Potassium 5.3 (H) 3.5 - 5.1 mmol/L Chloride 105 97 - 108 mmol/L  
 CO2 18 (L) 21 - 32 mmol/L Anion gap 18 (H) 5 - 15 mmol/L Glucose 45 (LL) 65 - 100 mg/dL BUN 19 6 - 20 MG/DL  Creatinine 3.92 (H) 0.55 - 1.02 MG/DL  
 BUN/Creatinine ratio 5 (L) 12 - 20 GFR est AA 14 (L) >60 ml/min/1.73m2 GFR est non-AA 11 (L) >60 ml/min/1.73m2 Calcium 9.1 8.5 - 10.1 MG/DL Bilirubin, total 2.1 (H) 0.2 - 1.0 MG/DL  
 ALT (SGPT) 372 (H) 12 - 78 U/L  
 AST (SGOT) 1,142 (H) 15 - 37 U/L Alk. phosphatase 148 (H) 45 - 117 U/L Protein, total 6.8 6.4 - 8.2 g/dL Albumin 3.2 (L) 3.5 - 5.0 g/dL Globulin 3.6 2.0 - 4.0 g/dL A-G Ratio 0.9 (L) 1.1 - 2.2 SAMPLES BEING HELD Collection Time: 02/08/21  6:45 PM  
Result Value Ref Range SAMPLES BEING HELD [facility]   
 COMMENT Add-on orders for these samples will be processed based on acceptable specimen integrity and analyte stability, which may vary by analyte. PHOSPHORUS Collection Time: 02/08/21  6:45 PM  
Result Value Ref Range Phosphorus 5.7 (H) 2.6 - 4.7 MG/DL  
CK Collection Time: 02/08/21  6:45 PM  
Result Value Ref Range  (H) 26 - 192 U/L  
ACETAMINOPHEN Collection Time: 02/08/21  6:45 PM  
Result Value Ref Range Acetaminophen level 9 (L) 10 - 30 ug/mL SALICYLATE Collection Time: 02/08/21  6:45 PM  
Result Value Ref Range Salicylate level <0.6 (L) 2.8 - 20.0 MG/DL  
TSH 3RD GENERATION Collection Time: 02/08/21  6:45 PM  
Result Value Ref Range TSH 1.64 0.36 - 3.74 uIU/mL MAGNESIUM Collection Time: 02/08/21  6:45 PM  
Result Value Ref Range Magnesium 2.4 1.6 - 2.4 mg/dL FERRITIN Collection Time: 02/08/21  6:45 PM  
Result Value Ref Range Ferritin 1,770 (H) 8 - 252 NG/ML  
PROCALCITONIN Collection Time: 02/08/21  6:45 PM  
Result Value Ref Range Procalcitonin 0.64 ng/mL URINALYSIS W/ RFLX MICROSCOPIC Collection Time: 02/08/21  8:02 PM  
Result Value Ref Range Color DARK YELLOW Appearance TURBID (A) CLEAR Specific gravity 1.022 1.003 - 1.030    
 pH (UA) 5.5 5.0 - 8.0 Protein 100 (A) NEG mg/dL Glucose Negative NEG mg/dL Ketone 15 (A) NEG mg/dL  Blood LARGE (A) NEG    
 Urobilinogen 1.0 0.2 - 1.0 EU/dL Nitrites Negative NEG Leukocyte Esterase LARGE (A) NEG    
 WBC >100 (H) 0 - 4 /hpf  
 RBC >100 (H) 0 - 5 /hpf Epithelial cells MANY (A) FEW /lpf Bacteria 2+ (A) NEG /hpf  
 CA Oxalate crystals 1+ (A) NEG Hyaline cast >20 (H) 0 - 5 /lpf SAMPLES BEING HELD Collection Time: 02/08/21  8:02 PM  
Result Value Ref Range SAMPLES BEING HELD UR GRAY   
 COMMENT Add-on orders for these samples will be processed based on acceptable specimen integrity and analyte stability, which may vary by analyte. BILIRUBIN, CONFIRM Collection Time: 02/08/21  8:02 PM  
Result Value Ref Range Bilirubin UA, confirm Negative NEG    
EKG, 12 LEAD, INITIAL Collection Time: 02/08/21  8:02 PM  
Result Value Ref Range Ventricular Rate 118 BPM  
 Atrial Rate 118 BPM  
 P-R Interval 128 ms QRS Duration 128 ms Q-T Interval 378 ms QTC Calculation (Bezet) 529 ms Calculated P Axis 3 degrees Calculated R Axis 87 degrees Calculated T Axis -11 degrees Diagnosis Sinus tachycardia Right bundle branch block Abnormal ECG When compared with ECG of 13-DEC-2020 04:01, Right bundle branch block is now present GLUCOSE, POC Collection Time: 02/08/21  8:31 PM  
Result Value Ref Range Glucose (POC) 104 (H) 65 - 100 mg/dL Performed by Ivonne Cooley POC LACTIC ACID Collection Time: 02/08/21 10:44 PM  
Result Value Ref Range Lactic Acid (POC) 6.25 (HH) 0.40 - 2.00 mmol/L  
OSMOLALITY, SERUM/PLASMA Collection Time: 02/09/21  1:15 AM  
Result Value Ref Range Osmolality, serum/plasma 315 mOsm/kg H2O  
D DIMER Collection Time: 02/09/21  1:15 AM  
Result Value Ref Range D-dimer 13.04 (H) 0.00 - 0.65 mg/L FEU METABOLIC PANEL, COMPREHENSIVE Collection Time: 02/09/21  1:15 AM  
Result Value Ref Range Sodium 142 136 - 145 mmol/L Potassium 5.2 (H) 3.5 - 5.1 mmol/L  Chloride 109 (H) 97 - 108 mmol/L  
 CO2 24 21 - 32 mmol/L  
 Anion gap 9 5 - 15 mmol/L Glucose 171 (H) 65 - 100 mg/dL BUN 21 (H) 6 - 20 MG/DL Creatinine 3.65 (H) 0.55 - 1.02 MG/DL  
 BUN/Creatinine ratio 6 (L) 12 - 20 GFR est AA 15 (L) >60 ml/min/1.73m2 GFR est non-AA 12 (L) >60 ml/min/1.73m2 Calcium 7.6 (L) 8.5 - 10.1 MG/DL Bilirubin, total 2.3 (H) 0.2 - 1.0 MG/DL  
 ALT (SGPT) 723 (H) 12 - 78 U/L  
 AST (SGOT) >2,000 (H) 15 - 37 U/L Alk. phosphatase 136 (H) 45 - 117 U/L Protein, total 6.2 (L) 6.4 - 8.2 g/dL Albumin 3.0 (L) 3.5 - 5.0 g/dL Globulin 3.2 2.0 - 4.0 g/dL A-G Ratio 0.9 (L) 1.1 - 2.2 C REACTIVE PROTEIN, QT Collection Time: 02/09/21  1:15 AM  
Result Value Ref Range C-Reactive protein 5.19 (H) 0.00 - 0.60 mg/dL LD Collection Time: 02/09/21  1:15 AM  
Result Value Ref Range LD 2,478 (H) 81 - 246 U/L  
TROPONIN I Collection Time: 02/09/21  1:15 AM  
Result Value Ref Range Troponin-I, Qt. 5.76 (H) <0.05 ng/mL LACTIC ACID Collection Time: 02/09/21  2:58 AM  
Result Value Ref Range Lactic acid 3.6 (HH) 0.4 - 2.0 MMOL/L  
CBC WITH AUTOMATED DIFF Collection Time: 02/09/21  3:36 AM  
Result Value Ref Range WBC 14.7 (H) 3.6 - 11.0 K/uL  
 RBC 2.77 (L) 3.80 - 5.20 M/uL  
 HGB 10.1 (L) 11.5 - 16.0 g/dL HCT 33.2 (L) 35.0 - 47.0 % .9 (H) 80.0 - 99.0 FL  
 MCH 36.5 (H) 26.0 - 34.0 PG  
 MCHC 30.4 30.0 - 36.5 g/dL  
 RDW 15.2 (H) 11.5 - 14.5 % PLATELET 126 (L) 239 - 400 K/uL NRBC 0.1 (H) 0  WBC ABSOLUTE NRBC 0.02 (H) 0.00 - 0.01 K/uL NEUTROPHILS 71 32 - 75 % BAND NEUTROPHILS 17 (H) 0 - 6 % LYMPHOCYTES 3 (L) 12 - 49 % MONOCYTES 9 5 - 13 % EOSINOPHILS 0 0 - 7 % BASOPHILS 0 0 - 1 % IMMATURE GRANULOCYTES 0 %  
 ABS. NEUTROPHILS 13.0 (H) 1.8 - 8.0 K/UL  
 ABS. LYMPHOCYTES 0.4 (L) 0.8 - 3.5 K/UL  
 ABS. MONOCYTES 1.3 (H) 0.0 - 1.0 K/UL  
 ABS. EOSINOPHILS 0.0 0.0 - 0.4 K/UL  
 ABS. BASOPHILS 0.0 0.0 - 0.1 K/UL  
 ABS. IMM. GRANS. 0.0 K/UL  
 DF MANUAL RBC COMMENTS NORMOCYTIC, NORMOCHROMIC    
SARS-COV-2 Collection Time: 02/09/21  3:43 AM  
Result Value Ref Range SARS-CoV-2 Please find results under separate order RESPIRATORY VIRUS PANEL W/COVID-19, PCR Collection Time: 02/09/21  3:43 AM  
 Specimen: Nasopharyngeal  
Result Value Ref Range Adenovirus Not detected NOTD Coronavirus 229E Not detected NOTD Coronavirus HKU1 Not detected NOTD Coronavirus CVNL63 Not detected NOTD Coronavirus OC43 Not detected NOTD Metapneumovirus Not detected NOTD Rhinovirus and Enterovirus Not detected NOTD Influenza A Not detected NOTD Influenza A, subtype H1 Not detected NOTD Influenza A, subtype H3 Not detected NOTD INFLUENZA A H1N1 PCR Not detected NOTD Influenza B Not detected NOTD Parainfluenza 1 Not detected NOTD Parainfluenza 2 Not detected NOTD Parainfluenza 3 Not detected NOTD Parainfluenza virus 4 Not detected NOTD    
 RSV by PCR Not detected NOTD B. parapertussis, PCR Not detected NOTD Bordetella pertussis - PCR Not detected NOTD Chlamydophila pneumoniae DNA, QL, PCR Not detected NOTD Mycoplasma pneumoniae DNA, QL, PCR Not detected NOTD    
 SARS-CoV-2, PCR Not detected NOTD COVID-19 RAPID TEST Collection Time: 02/09/21  3:43 AM  
Result Value Ref Range Specimen source Nasopharyngeal    
 COVID-19 rapid test Not detected NOTD LACTIC ACID Collection Time: 02/09/21  7:14 AM  
Result Value Ref Range Lactic acid 1.9 0.4 - 2.0 MMOL/L  
SAMPLES BEING HELD Collection Time: 02/09/21  7:14 AM  
Result Value Ref Range SAMPLES BEING HELD 1BL COMMENT Add-on orders for these samples will be processed based on acceptable specimen integrity and analyte stability, which may vary by analyte. PTT Collection Time: 02/09/21  7:14 AM  
Result Value Ref Range  aPTT 31.8 (H) 22.1 - 31.0 sec  
 aPTT, therapeutic range     58.0 - 77.0 SECS  
 TROPONIN I Collection Time: 02/09/21  7:14 AM  
Result Value Ref Range Troponin-I, Qt. 5.30 (H) <0.05 ng/mL SAMPLES BEING HELD Collection Time: 02/09/21  7:14 AM  
Result Value Ref Range SAMPLES BEING HELD NO SAMPLE RECEIVED COMMENT Add-on orders for these samples will be processed based on acceptable specimen integrity and analyte stability, which may vary by analyte. PROTHROMBIN TIME + INR Collection Time: 02/09/21  7:14 AM  
Result Value Ref Range INR 2.0 (H) 0.9 - 1.1 Prothrombin time 19.8 (H) 9.0 - 11.1 sec FIBRINOGEN Collection Time: 02/09/21  7:14 AM  
Result Value Ref Range Fibrinogen 235 200 - 475 mg/dL EKG, 12 LEAD, INITIAL Collection Time: 02/09/21  8:17 AM  
Result Value Ref Range Ventricular Rate 154 BPM  
 Atrial Rate 44 BPM  
 QRS Duration 90 ms Q-T Interval 306 ms QTC Calculation (Bezet) 490 ms Calculated R Axis 72 degrees Diagnosis Supraventricular tachycardia Abnormal QRS-T angle, consider primary T wave abnormality Abnormal ECG When compared with ECG of 08-FEB-2021 20:02, MANUAL COMPARISON REQUIRED, DATA IS UNCONFIRMED 
  
LACTIC ACID Collection Time: 02/09/21  8:30 AM  
Result Value Ref Range Lactic acid 1.9 0.4 - 2.0 MMOL/L  
METABOLIC PANEL, COMPREHENSIVE Collection Time: 02/09/21  8:30 AM  
Result Value Ref Range Sodium 144 136 - 145 mmol/L Potassium 5.1 3.5 - 5.1 mmol/L Chloride 109 (H) 97 - 108 mmol/L  
 CO2 27 21 - 32 mmol/L Anion gap 8 5 - 15 mmol/L Glucose 136 (H) 65 - 100 mg/dL BUN 26 (H) 6 - 20 MG/DL Creatinine 3.61 (H) 0.55 - 1.02 MG/DL  
 BUN/Creatinine ratio 7 (L) 12 - 20 GFR est AA 15 (L) >60 ml/min/1.73m2 GFR est non-AA 12 (L) >60 ml/min/1.73m2 Calcium 7.4 (L) 8.5 - 10.1 MG/DL Bilirubin, total 2.0 (H) 0.2 - 1.0 MG/DL  
 ALT (SGPT) 904 (H) 12 - 78 U/L  
 AST (SGOT) >2,000 (H) 15 - 37 U/L Alk.  phosphatase 128 (H) 45 - 117 U/L  
 Protein, total 5.7 (L) 6.4 - 8.2 g/dL Albumin 3.0 (L) 3.5 - 5.0 g/dL Globulin 2.7 2.0 - 4.0 g/dL A-G Ratio 1.1 1.1 - 2.2 ECHO ADULT COMPLETE Collection Time: 02/09/21 10:26 AM  
Result Value Ref Range IVSd 0.65 0.6 - 0.9 cm LVIDd 3.30 (A) 3.9 - 5.3 cm LVIDs 2.32 cm  
 LVOT d 1.86 cm  
 LVPWd 0.62 0.6 - 0.9 cm  
 RVSP 68.19 mmHg Left Atrium Major Axis 2.54 cm  
 LA Volume 56.49 22 - 52 mL  
 LA Area 4C 18.50 cm2 LA Vol 2C 45.80 22 - 52 mL  
 LA Vol 4C 54.65 (A) 22 - 52 mL  
 LA Volume DISK BP 51.21 22 - 52 mL Est. RA Pressure 8.00 mmHg Tapse 1.93 1.5 - 2.0 cm Triscuspid Valve Regurgitation Peak Gradient 60.19 mmHg  
 TR Max Velocity 387.90 cm/s  
 AO ASC D 3.34 cm Ao Root D 3.34 cm  
 LV Mass AL 50.1 67 - 162 g  
 LV Mass AL Index 27.6 43 - 95 g/m2 Left Atrium Minor Axis 1.40 cm LA Vol Index 31.09 16 - 28 ml/m2 LA Vol Index 25.20 16 - 28 ml/m2 LA Vol Index 30.07 16 - 28 ml/m2 CREATININE, UR, RANDOM Collection Time: 02/09/21 11:14 AM  
Result Value Ref Range Creatinine, urine 298.00 mg/dL Imaging CT chest abdomen and pelvis: 
 
IMPRESSION 
  
1. Small bilateral pleural effusions. 2. Partial collapse of both lower lobes. Superimposed pneumonia cannot be 
excluded. 3. Cardiomegaly with coronary artery atherosclerosis. 4. Cholelithiasis. 5. Multiple age-indeterminate compression fractures involving the thoracic and 
lumbar spine. 6. Diverticulosis of the colon. No acute diverticulitis. Echo: 
· Image quality for this study was technically difficult. · Echo study was limited due to patient's tolerance. · LV: Estimated LVEF is 65 - 70%. Normal systolic function (ejection fraction normal). Small left ventricle. Upper normal wall thickness. · RV: Dilated right ventricle. Moderately to severely reduced systolic function. · LA: Dilated left atrium. · RA: Severely dilated right atrium. · AV: Aortic valve leaflet calcification present without reduced excursion. · MV: Mitral valve thickening. Mitral valve leaflet calcification. Mild to moderate mitral valve regurgitation is present. · TV: Dilated annulus of the tricuspid valve. · PA: Severe pulmonary hypertension. ·  
I have personally reviewed all of the pertinent images Assessment:  
 
Faith Mehta is a 77 y.o. female with altered mental status who presented from her care home. I was consulted for ruling out cholangitis due to her elevated liver enzymes. She appears to be in acute sepsis with endorgan dysfunction. She also has a history of pulmonary hypertension with evidence of moderately reduced right ventricular systolic function on her echo. I believe that the liver dysfunction is due to her sepsis and hypotension combined with the pulmonary hypertension and right ventricular insufficiency. Sepsis is likely due to UTI versus possible pneumonia. I believe that it is unlikely to be related to her cholangitis. Recommendations: 1. Recommended an abdominal ultrasound to further evaluate the biliary tract. If there is evidence of biliary obstruction, then I would recommend gastroenterology consult for possible ERCP. even if patient's clinical picture were to become more consistent with cholangitis, she would not be a good surgical candidate after ERCP. She would likely require cholecystostomy tube. In the meantime, I agree with pulmonary recommendations for judicious resuscitation and treatment of her underlying sepsis. I will continue to follow along with the patient for now  Aleshia Francis MD

## 2021-02-09 NOTE — H&P
Yeny Delgado Jesus [address]  Office [phone] Fax [phone] Admission H&P Name: Brunilda Beebe MRN: [id number]  Sex: Female YOB: 1945  Age: 77 y.o. PCP: Ward White MD  
 
Source of Information: patient, medical records Chief complaint: AMS History of Present Illness Brunilda Bebee is a 77 y.o. female with PMHx of CKD3, recurrent UTI, anxiety, severe pulmonary HTN, chronic pain, osteoporosis who presents to the ED complaining of AMS. Pt lives at [facility] and was found to have AMS and hypotension that first started yesterday around 1400. EMS was called and pt was found to have a low grade fever and low BP, at which point she was brought into the hospital. Pt unable to provide history due to AMS/unresponsiveness. COVID Questions:  
Experiencing any of the following symptoms: fever, chills, cough, SOB, diarrhea, URI symptoms. Yes Any Sick contacts with fever, cough, diarrhea, SOB, URI symptoms. No 
Traveled out of state or out of country. No 
Lives in congregate living. Has been staying at home. Yes In the ED: 
Vitals: Temp 100.3   BP 76/48      RR 26   SatO2  91% on 6L Labs:  
-CBC: WBC 15.6 
-CMP: K 5.3, creat 3.92, Tbili 2.1, , AST 1142 
-Trop: 5.66 
-UA: Large blood, large leuk esterase, >100 WBC, 2+ bacteria Imaging: CT head no acute intracranial abnormalities. CT chest b/l pleural effusion, partial collapse of both lower lobes. Treatment: vanc, cefepime, 30cc/kg bolus EKG: Sinus tach, R bundle branch block, QTc 529. Patient Vitals for the past 12 hrs: 
 Temp Pulse Resp BP SpO2  
02/08/21 2250  (!) 114 18 (!) 106/55 96 % 02/08/21 2245  (!) 114 21 (!) 106/50 95 % 02/08/21 2240  (!) 114 18 (!) 107/59 95 % 02/08/21 2235  (!) 113 14 (!) 102/46 93 % 02/08/21 2230  (!) 114 18 (!) 81/34   
02/08/21 2225  (!) 114 20 (!) 89/55   
02/08/21 2220  (!) 114 28 (!) 103/46 95 % 02/08/21 2215  (!) 114 19 (!) 89/67   
02/08/21 2200  (!) 115 19 92/67 97 % 02/08/21 2145  (!) 115 20 (!) 96/49 96 % 02/08/21 2130  (!) 116 23 (!) 99/41   
02/08/21 2115  (!) 115 19 (!) 88/36   
02/08/21 2100  (!) 115 22 (!) 96/44   
02/08/21 2045  (!) 116 20 (!) 98/21   
02/08/21 2030  (!) 114 18    
02/08/21 2015  (!) 114 20 (!) 46/17   
02/08/21 2000  (!) 119 22 (!) 82/18   
02/08/21 1945  (!) 120 23 (!) 68/44 (!) 53 % 02/08/21 1930  (!) 122 26 (!) 81/51   
02/08/21 1915  (!) 123 23 (!) 75/39   
02/08/21 1900  (!) 123 29 (!) 38/24   
02/08/21 1827 100.3 °F (37.9 °C) (!) 130 26 (!) 76/48 91 % Review of Systems Unable to complete ROS as pt is unresponsive. Home Medications Prior to Admission medications Medication Sig Start Date End Date Taking? Authorizing Provider  
diazePAM (VALIUM) 5 mg tablet TAKE 1 TABLET BY MOUTH NIGHTLY. MAX DAILY AMOUNT: 5MG 1/22/21   Giovanny Harrison MD  
clindamycin (CLINDAGEL) 1 % topical gel Apply  to affected area two (2) times a day. use thin film on affected area 11/24/20   Giovanny Harrison MD  
traMADoL [facility]) 50 mg tablet Take 50 mg by mouth daily. Provider, Historical  
gabapentin (NEURONTIN) 100 mg capsule Take 1 Cap by mouth daily. Max Daily Amount: 100 mg. 11/6/20   Chaparro Mancia MD  
citalopram (CELEXA) 10 mg tablet TAKE 1 TABLET BY MOUTH EVERY DAY AT NIGHT 10/25/20   Giovanny Harrison MD  
atenoloL (TENORMIN) 50 mg tablet TAKE 1 TABLET BY MOUTH EVERY DAY AT NIGHT 10/7/20   Giovanny Harrison MD  
lidocaine (LIDODERM) 5 % APPLY 1 PATCH TO AFFECTED AREA FOR 12 HOURS, THEN REMOVE FOR 12 HOURS DAILY FOR 30 DAYS 10/7/20   Giovanny Harrison MD  
buPROPion [facility]) 100 mg tablet TAKE 1 TABLET BY MOUTH TWO TIMES A DAY 10/3/20   Giovanny Harrison MD  
OXYGEN-AIR DELIVERY SYSTEMS 2 L/min by continuous inhalation route continuous.  6/18/20   Giovanny Harrison MD  
 bumetanide (BUMEX) 1 mg tablet TAKE 1 TABLET BY MOUTH EVERY DAY 3/19/20   Gustavo Garrison MD  
aspirin delayed-release 81 mg tablet Take 81 mg by mouth daily. Provider, Historical  
acetaminophen (TYLENOL EXTRA STRENGTH) 500 mg tablet Take 1,000 mg by mouth every six (6) hours as needed for Pain. Provider, Historical  
docusate sodium (STOOL SOFTENER) 100 mg capsule Take 300 mg by mouth every morning. Provider, Historical  
naloxone (NARCAN) 4 mg/actuation nasal spray Use 1 spray intranasally, then discard. Repeat with new spray every 2 min as needed for opioid overdose symptoms, alternating nostrils. 12/17/19   Gustavo Garrison MD  
calcium-vitamin D (CALCIUM 500+D) 500 mg(1,250mg) -200 unit per tablet Take 1 Tab by mouth daily. Provider, Historical  
 
 
Allergies Allergies Allergen Reactions  Pcn [Penicillins] Unknown (comments) Childhood reaction rash Past Medical History Past Medical History:  
Diagnosis Date  Anxiety  Arthritis   
 knees,shoulders,fingers,back,neck  CKD (chronic kidney disease) stage 2, GFR 60-89 ml/min Dr. Benton Ganser  Femur fracture, left ([address] 2008  Femur fracture, right ([address] 2009  Insomnia  Intertrigo 7/7/2015  Knee pain Dr. Doron Bolden  Moderate episode of recurrent major depressive disorder [address] [address] 3/6/2020  Osteopenia Dr. Doron Bolden  Psychiatric disorder   
 anxiety  Pyloric stenosis 2010  Sleep disorder Insomnia  Thoracic vertebral fracture ([address] 2005  Ulcer 2012 Perferatied ulcer Previous Hospitalization(s) Past Surgical History:  
Procedure Laterality Date  HX FEMUR FRACTURE TX Left femur - 2008, R femur - 2009  HX GI  2010 Perforated ulcer, in relation to pyloric stenosis  HX ORTHOPAEDIC    
 6 thoracic vertabrae that were fractured in 2005  TX COLSC FLX W/NDSC US XM RCTM ET AL LMTD&ADJ STRUX  12/6/10 Normal except Diverticulosis Family History Family History Problem Relation Age of Onset  Cancer Sister   
     metastatic lung Cancer  Cancer Mother   
     colon Social History (per chart review) Alcohol history: Not at all Smoking history: Non-smoker Illicit drug history: Not at all Living arrangement: patient recently living in SNF setting. Ambulates: w/ cane however has hx of frequent falls Vital Signs Visit Vitals BP (!) 106/55 Pulse (!) 114 Temp 100.3 °F (37.9 °C) Resp 18 Wt 163 lb 12.8 oz (74.3 kg) SpO2 96% BMI 27.26 kg/m² *due to COVID-19 pandemic and limitations on patient contact the physical exam has been performed and reported by [facility] Medicine senior resident Dr. Kezia Vásquez Physical Exam 
Constitutional:   
   Appearance: She is obese. She is ill-appearing. Neck:  
   Comments: Neck contracted to the R Cardiovascular:  
   Rate and Rhythm: Tachycardia present. Heart sounds: No murmur. No friction rub. No gallop. Pulmonary:  
   Breath sounds: No wheezing or rales. Comments: Tachypnea Abdominal:  
   General: Abdomen is flat. Bowel sounds are normal.  
   Palpations: Abdomen is soft. Skin: 
   Comments: Scattered ecchymosis to chest wall and b/l LE's. Intertrigo rash present under breasts b/l. Neurological:  
   Comments: Opens eyes to verbal stimuli. Unable to answer questions or participate in neuro exam.  
 
Laboratory Data Recent Results (from the past 8 hour(s)) TROPONIN I Collection Time: 02/08/21  6:45 PM  
Result Value Ref Range Troponin-I, Qt. 5.66 (H) <0.05 ng/mL CBC WITH AUTOMATED DIFF Collection Time: 02/08/21  6:45 PM  
Result Value Ref Range WBC 15.6 (H) 3.6 - 11.0 K/uL  
 RBC 3.13 (L) 3.80 - 5.20 M/uL  
 HGB 11.6 11.5 - 16.0 g/dL HCT 37.6 35.0 - 47.0 % .1 (H) 80.0 - 99.0 FL  
 MCH 37.1 (H) 26.0 - 34.0 PG  
 MCHC 30.9 30.0 - 36.5 g/dL  
 RDW 15.6 (H) 11.5 - 14.5 % PLATELET 372 417 - 620 K/uL NRBC 0.0 0  WBC ABSOLUTE NRBC 0.00 0.00 - 0.01 K/uL NEUTROPHILS 63 32 - 75 % BAND NEUTROPHILS 26 (H) 0 - 6 % LYMPHOCYTES 3 (L) 12 - 49 % MONOCYTES 8 5 - 13 % EOSINOPHILS 0 0 - 7 % BASOPHILS 0 0 - 1 % IMMATURE GRANULOCYTES 0 %  
 ABS. NEUTROPHILS 13.9 (H) 1.8 - 8.0 K/UL  
 ABS. LYMPHOCYTES 0.5 (L) 0.8 - 3.5 K/UL  
 ABS. MONOCYTES 1.2 (H) 0.0 - 1.0 K/UL  
 ABS. EOSINOPHILS 0.0 0.0 - 0.4 K/UL  
 ABS. BASOPHILS 0.0 0.0 - 0.1 K/UL  
 ABS. IMM. GRANS. 0.0 K/UL  
 DF MANUAL    
 RBC COMMENTS ANISOCYTOSIS 1+ 
    
 RBC COMMENTS MACROCYTOSIS 
1+ WBC COMMENTS TOXIC GRANULATION    
METABOLIC PANEL, COMPREHENSIVE Collection Time: 02/08/21  6:45 PM  
Result Value Ref Range Sodium 141 136 - 145 mmol/L Potassium 5.3 (H) 3.5 - 5.1 mmol/L Chloride 105 97 - 108 mmol/L  
 CO2 18 (L) 21 - 32 mmol/L Anion gap 18 (H) 5 - 15 mmol/L Glucose 45 (LL) 65 - 100 mg/dL BUN 19 6 - 20 MG/DL Creatinine 3.92 (H) 0.55 - 1.02 MG/DL  
 BUN/Creatinine ratio 5 (L) 12 - 20 GFR est AA 14 (L) >60 ml/min/1.73m2 GFR est non-AA 11 (L) >60 ml/min/1.73m2 Calcium 9.1 8.5 - 10.1 MG/DL Bilirubin, total 2.1 (H) 0.2 - 1.0 MG/DL  
 ALT (SGPT) 372 (H) 12 - 78 U/L  
 AST (SGOT) 1,142 (H) 15 - 37 U/L Alk. phosphatase 148 (H) 45 - 117 U/L Protein, total 6.8 6.4 - 8.2 g/dL Albumin 3.2 (L) 3.5 - 5.0 g/dL Globulin 3.6 2.0 - 4.0 g/dL A-G Ratio 0.9 (L) 1.1 - 2.2 SAMPLES BEING HELD Collection Time: 02/08/21  6:45 PM  
Result Value Ref Range SAMPLES BEING HELD SST.RED.LORENA   
 COMMENT Add-on orders for these samples will be processed based on acceptable specimen integrity and analyte stability, which may vary by analyte. URINALYSIS W/ RFLX MICROSCOPIC Collection Time: 02/08/21  8:02 PM  
Result Value Ref Range Color DARK YELLOW Appearance TURBID (A) CLEAR Specific gravity 1.022 1.003 - 1.030    
 pH (UA) 5.5 5.0 - 8.0 Protein 100 (A) NEG mg/dL Glucose Negative NEG mg/dL Ketone 15 (A) NEG mg/dL Blood LARGE (A) NEG Urobilinogen 1.0 0.2 - 1.0 EU/dL Nitrites Negative NEG Leukocyte Esterase LARGE (A) NEG    
 WBC >100 (H) 0 - 4 /hpf  
 RBC >100 (H) 0 - 5 /hpf Epithelial cells MANY (A) FEW /lpf Bacteria 2+ (A) NEG /hpf  
 CA Oxalate crystals 1+ (A) NEG Hyaline cast >20 (H) 0 - 5 /lpf SAMPLES BEING HELD Collection Time: 02/08/21  8:02 PM  
Result Value Ref Range SAMPLES BEING HELD UR GRAY   
 COMMENT Add-on orders for these samples will be processed based on acceptable specimen integrity and analyte stability, which may vary by analyte. BILIRUBIN, CONFIRM Collection Time: 02/08/21  8:02 PM  
Result Value Ref Range Bilirubin UA, confirm Negative NEG    
EKG, 12 LEAD, INITIAL Collection Time: 02/08/21  8:02 PM  
Result Value Ref Range Ventricular Rate 118 BPM  
 Atrial Rate 118 BPM  
 P-R Interval 128 ms QRS Duration 128 ms Q-T Interval 378 ms QTC Calculation (Bezet) 529 ms Calculated P Axis 3 degrees Calculated R Axis 87 degrees Calculated T Axis -11 degrees Diagnosis Sinus tachycardia Right bundle branch block Abnormal ECG When compared with ECG of 13-DEC-2020 04:01, Right bundle branch block is now present GLUCOSE, POC Collection Time: 02/08/21  8:31 PM  
Result Value Ref Range Glucose (POC) 104 (H) 65 - 100 mg/dL Performed by [employer] [facility] CXR Results  (Last 48 hours) 02/08/21 1912  XR CHEST PORT Final result Impression:  Normal bibasilar atelectasis. Narrative:  INDICATION: Altered mental status, hypotension. Portable AP semiupright view of the chest.  
   
Direct comparison made to prior chest x-ray dated December 2020. Cardiomediastinal silhouette is stable. Lungs are hypoinflated. There is minimal  
bibasilar atelectasis. No pleural fluid is visualized. There is no pneumothorax. The osseous structures are diffusely demineralized CT Results  (Last 48 hours) 02/08/21 2334  CT HEAD WO CONT Final result Impression:  1. No evidence of acute infarct or intracranial hemorrhage. 2. Periventricular white matter disease is likely secondary to chronic small  
vessel ischemic changes. Narrative: Indication:  AMS Comparison: CT December 2020 Findings: 5 mm axial images were obtained from the skull base through the  
vertex. CT dose reduction was achieved through the use of a standardized protocol  
tailored for this examination and automatic exposure control for dose  
modulation. The ventricles and cortical sulci are prominent, compatible with age related  
volume loss. There is no evidence of intracranial hemorrhage, mass, mass effect,  
or acute infarct. There is periventricular white matter disease. No extra-axial  
fluid collections are seen. The visualized paranasal sinuses and mastoid air  
cells are clear. The orbital structures are unremarkable. No osseous  
abnormalities are seen. 02/08/21 2334  CT CHEST WO CONT Final result Impression: 1. Small bilateral pleural effusions. 2. Partial collapse of both lower lobes. Superimposed pneumonia cannot be  
excluded. 3. Cardiomegaly with coronary artery atherosclerosis. 4. Cholelithiasis. 5. Multiple age-indeterminate compression fractures involving the thoracic and  
lumbar spine. 6. Diverticulosis of the colon. No acute diverticulitis. Narrative:  EXAM: CT CHEST WO CONT, CT ABD PELV WO CONT INDICATION: Ecchymosis, altered mental status COMPARISON: CT November 2019. TECHNIQUE: Helical CT of the chest, abdomen, and pelvis following the uneventful  
intravenous administration of 100 mL Isovue-370. Oral contrast was not utilized. Coronal and sagittal reformats were generated. CT dose reduction was achieved through use of a standardized protocol tailored for this examination and  
automatic exposure control for dose modulation. FINDINGS:  
   
THYROID: No nodule. MEDIASTINUM: No mass or lymphadenopathy. ROSY: No mass or lymphadenopathy. THORACIC AORTA: No aneurysm. MAIN PULMONARY ARTERY: Normal in caliber. HEART: Cardiomegaly with coronary artery atherosclerosis ESOPHAGUS: No wall thickening or dilatation. TRACHEA/BRONCHI: Patent. PLEURA: Small bilateral pleural effusions LUNGS: Paraseptal emphysema. Partial collapse of both lower lobes Liver: No mass or biliary dilatation. Biliary tree: Filled with gallstones. The CBD is not dilated. Spleen: Within normal limits. Pancreas: No inflammation, mass or ductal dilatation. Adrenals: Unremarkable. Kidneys: Punctate nonobstructing left renal stones Stomach: Unremarkable. Small bowel: No dilatation or wall thickening. Colon: Diverticulosis of the colon, with no evidence of acute diverticulitis. Appendix: Not visualized Peritoneum: No ascites or pneumoperitoneum. Retroperitoneum: Infrarenal abdominal aortic aneurysm measuring 2.6 cm Reproductive organs: Uterus is noted Urinary bladder: No mass or calculus. Bones: Multiple age-indeterminate compression fractures throughout the thoracic  
and lumbar spine Abdominal wall: No mass or hernia. Additional comments: Right inguinal central venous catheter 02/08/21 2334  CT ABD PELV WO CONT Final result Impression: 1. Small bilateral pleural effusions. 2. Partial collapse of both lower lobes. Superimposed pneumonia cannot be  
excluded. 3. Cardiomegaly with coronary artery atherosclerosis. 4. Cholelithiasis. 5. Multiple age-indeterminate compression fractures involving the thoracic and  
lumbar spine. 6. Diverticulosis of the colon. No acute diverticulitis. Narrative:  EXAM: CT CHEST WO CONT, CT ABD PELV WO CONT INDICATION: Ecchymosis, altered mental status COMPARISON: CT November 2019. TECHNIQUE: Helical CT of the chest, abdomen, and pelvis following the uneventful  
intravenous administration of 100 mL Isovue-370. Oral contrast was not utilized. Coronal and sagittal reformats were generated. CT dose reduction was achieved  
through use of a standardized protocol tailored for this examination and  
automatic exposure control for dose modulation. FINDINGS:  
   
THYROID: No nodule. MEDIASTINUM: No mass or lymphadenopathy. ROSY: No mass or lymphadenopathy. THORACIC AORTA: No aneurysm. MAIN PULMONARY ARTERY: Normal in caliber. HEART: Cardiomegaly with coronary artery atherosclerosis ESOPHAGUS: No wall thickening or dilatation. TRACHEA/BRONCHI: Patent. PLEURA: Small bilateral pleural effusions LUNGS: Paraseptal emphysema. Partial collapse of both lower lobes Liver: No mass or biliary dilatation. Biliary tree: Filled with gallstones. The CBD is not dilated. Spleen: Within normal limits. Pancreas: No inflammation, mass or ductal dilatation. Adrenals: Unremarkable. Kidneys: Punctate nonobstructing left renal stones Stomach: Unremarkable. Small bowel: No dilatation or wall thickening. Colon: Diverticulosis of the colon, with no evidence of acute diverticulitis. Appendix: Not visualized Peritoneum: No ascites or pneumoperitoneum. Retroperitoneum: Infrarenal abdominal aortic aneurysm measuring 2.6 cm Reproductive organs: Uterus is noted Urinary bladder: No mass or calculus. Bones: Multiple age-indeterminate compression fractures throughout the thoracic  
and lumbar spine Abdominal wall: No mass or hernia. Additional comments: Right inguinal central venous catheter Assessment and Plan Veronica Poole is a 77 y.o. female with a PMHx of  CKD3, recurrent UTI, anxiety, severe pulmonary HTN, chronic pain, osteoporosis who is admitted for septic shock. NEURO: 
Altered Mental Status: Pt unresponsive in ED but on previous encounters in this hospital has been A&O x3. Likely 2/2 severe sepsis. CT head w/ no evidence of acute infarct, periventricular white matter disease noted. -Acetaminophen, salicylate, TSH pending -UCx pending 
-NPO, bedside swallow once more alert CARDIOVASCULAR Tropinemia: POA trop 5.66. Likely 2/2 supply/demand ischemia vs possible NSTEMI in the setting of septic shock and IVVD. Last Echo 11/2019 w/ LVEF of 61-65%, severe pulmonary HTN. EKG w/ sinus tach, new R bundle branch block, QTc 529. 
-Trend troponin Q6h 
-Cardiology consulted 
-Echo pending  
-Per intensivist on call will start heparin gtt QTc prolongation: EKG w/ QTc 529. 
-Avoid QTc prolonging agents Sinus tachycardia: Noted in the OP setting and on previous admissions. POA EKG w/ sinus tach. 
-Hold home atenolol 50mg QHS while NPO 
 
RESPIRATORY 
AHRF: On 2L O2 at baseline due to severe pulmonary HTN. Desatted to low 90s on admission and placed on 6L O2 via NC. CT chest w/ small b/l pleural effusions, partial collapse of both lower lobes. -Covid/RVP, mycoplasma, legionella, strep pneumo pending 
-On vanc, cefepime 
-Wean O2 as tolerated 
-Echo pending 
-Pulm consulted Covid PUI: Pt coming from congregate living setting. Per son pt had tested negative for Covid 12 days ago and received her first Covid vaccine on 1/25. 
-Covid/RVP pending 
-Will get CRP, d-dimer, ferritin, LD x1 
-Droplet plus Severe pulmonary HTN: Seen on Echo 11/2019. No record of pt following w/ pulm/cardiology OP. Pt placed on 6L O2 on arrival.  
-Repeat Echo pending 
-Cards and pulm consulted GASTROENTEROLOGY Shock liver: POA , AST 1142, alk phos 148, Tbili 2.1. Likely 2/2 to severe sepsis, IVVD. S/p 30cc/kg bolus in ED. -Follow CMP 
-MIVF 
-Acetaminophen, salicylate pending RENAL Acute renal failure in the setting of CKD3: POA creatinine 3.92 (b/l 1.3). Likely 2/2 to severe sepsis, IVVD. S/p 30cc/kg fluid resuscitation in ED. Pt has no nephrologist on file. -Urine lytes pending 
-Will get CK to r/o rhabdo as pt was noted to have numerous ecchymoses -Nephro consulted - per nephro on call no further interventions at this time -MIVF 
-CMP Q8h 
 
ID Septic Shock: POA 3/4 SIRS (HR, RR, WBC), pt unresponsive to 30cc/kg fluid bolus in ED and placed on pressors. 2/2 UTI vs PNA. UA w/ large blood, large leuk esterase, >100 WBC, 2+ bacteria. CT chest w/ small b/l pleural effusions, partial collapse of both lower lobes. S/p vanc and cefepime in ED.  
-Admit to ICU, vitals per unit routine 
-On levophed, wean as tolerated 
-Continue vanc, cefepime - doxy added for atypical coverage 
-Procal pending -BCx, UCx pending 
-Pulm consulted 
-Lactic acid pending, will trend Q4h if elevated Recurrent UTI: Pt w/ multiple positive urine cultures on file w/ proteus and e coli seen. These infections look to be sensitive to most abx. -UCx pending 
-Cont vanc, cefepime MSK/DERM Osteoporosis w/ recurrent falls: Pt seen multiple times in hospital for GLF, chronic compression fractures to thoracic and lumbar spine seen on CT scan this admission as well.  
-Hold home calcium, vitamin D while NPO Intertrigo: Pt w/ hx of rash to skin folds on abdomen. 
-Start nystatin powder BID 
 
PSYCHIATRIC Anxiety: 
-Hold home wellbutrin 100mg BID, celexa 10mg QHS, valium 5mg QHS while NPO 
 
OTHER Chronic pain: 
-Hold home gabapentin 100mg daily, tramadol 50mg daily FEN/GI - NPO. NS at 125 mL/hr. Activity - Bedrest 
DVT prophylaxis - Heparin gtt GI prophylaxis - Not indicated at this time Fall prophylaxis - Not indicated at this time. Disposition - Admit to ICU. Plan to d/c to TBD. Code Status - DNR. Discussed with patient / caregivers. Next of Kin Name and Contact - Justine Rivera lock - [phone] Patient Aleta Beebe will be discussed with Dr. Matt Carias. 12:11 AM, 02/09/21 Jun Cortes MD 
Family Medicine Resident For Billing Chief Complaint Patient presents with  Altered mental status  Hypotension Hospital Problems  Date Reviewed: 1/19/2021 Codes Class Noted POA Septic shock (HCC) ICD-10-CM: A41.9, R65.21 ICD-9-CM: 038.9, 785.52, 995.92  2/8/2021 Unknown

## 2021-02-09 NOTE — ED NOTES
Dr. Gricel Leary and RN at bedside setting up for central line. 2130  Attempt for central line at left IJ unsuccessful.   Will try femoral.

## 2021-02-09 NOTE — PROGRESS NOTES
Updated patient's son, Len Leonard (listed as health care decision maker on ACP), on patient's clinical status. Given clinical worsening son decided to transition patient to comfort care. Patient's two sons will come to patient's bedside. Hoang Navarro is traveling from [place] and will arrive in 3-3.5 hours. Comfort orders signed and held. RN to release comfort orders when family members at bedside. ER RN informed and updated.   
 
Yolande Closs, MD

## 2021-02-09 NOTE — PROGRESS NOTES
[facility]: MED RECONCILIATION Comments/Recommendations:  
Patient is a resident at [facility]. Pharmacist updated prior to admission medication list per transfer papers in Media tab in [address] Allergies: Pcn [penicillins] Prior to Admission Medications Prescriptions Last Dose Informant Patient Reported? Taking?  
acetaminophen (TYLENOL EXTRA STRENGTH) 500 mg tablet  Transfer Papers Yes Yes Sig: Take 1,000 mg by mouth every eight (8) hours as needed for Pain. buPROPion (WELLBUTRIN) 100 mg tablet  Transfer Papers No Yes Sig: TAKE 1 TABLET BY MOUTH TWO TIMES A DAY  
calcium-vitamin D (CALCIUM 500+D) 500 mg(1,250mg) -200 unit per tablet  Transfer Papers Yes Yes Sig: Take 1 Tab by mouth daily. citalopram (CELEXA) 10 mg tablet  Transfer Papers No Yes Sig: TAKE 1 TABLET BY MOUTH EVERY DAY AT NIGHT  
diazePAM (VALIUM) 5 mg tablet  Transfer Papers No Yes Sig: TAKE 1 TABLET BY MOUTH NIGHTLY. MAX DAILY AMOUNT: 5MG docusate sodium (STOOL SOFTENER) 100 mg capsule  Transfer Papers Yes Yes Sig: Take 300 mg by mouth every morning.  
gabapentin (NEURONTIN) 100 mg capsule  Transfer Papers No Yes Sig: Take 1 Cap by mouth daily. Max Daily Amount: 100 mg.  
traMADoL (ULTRAM) 50 mg tablet  Transfer Papers Yes Yes Sig: Take 50 mg by mouth daily.   
  
 
Sawyer Guardado, Pharmacist

## 2021-02-09 NOTE — PROGRESS NOTES
2/9/2021 
4:01 PM 
Case management note Reason for Admission:   Septic shock Patient has been at [facility] for 2 weeks. She present with increased confusion. Her family had been caring for her. Patient uses a walker. Patient has history of CKD3, afib, pulmonary HTN and wears oxygen 24/7. EVAL done with family via phone, daughter in law told me they struggled with putting in central line, I discussed options for them if they did not want aggressive treatment. Tomy De Oliveira  Daughter in law Jennifer Claire  RUR Score:     23% PCP: First and Last name:  Dr. Mary Ellen Suazo Name of Practice:  
 Are you a current patient: Yes/No: yes Approximate date of last visit: 6 weeks Can you participate in a virtual visit if needed: no Do you (patient/family) have any concerns for transition/discharge? Plan for utilizing home health:   PT/OT to eval 
 
Current Advanced Directive/Advance Care Plan:  DNR on file Transition of Care Plan: 1. Return to [address] [place] [address]  2. Palliative consult 3. CM to follow for discharge needs Care Management Interventions PCP Verified by CM: Yes(Dr. Teri Gimenez) Mode of Transport at Discharge: Self Current Support Network: [address] Confirm Follow Up Transport: Family The Plan for Transition of Care is Related to the Following Treatment Goals : Septic shock Discharge Location Discharge Placement: Skilled nursing facility [address] [place] [address]

## 2021-02-09 NOTE — PROGRESS NOTES
[facility]  
Senior Resident Admission Note CC: AMS, hypotension HPI: 
Naima Tapia is a 77 y.o. female with PMHx of CKD stage 3, anxiety/depression, osteoporosis, severe pulmonary HTN (on 2-3L NC), chronic pain, mild cognitive impairment, who presents to the ER from [facility] with AMS and hypotension. Patient unable to provide hx due to AMS. Per chart review, patient with decreased responsiveness starting yesterday around 1400. EMS was called and found patient to be hypotensive with low grade temp. Patient given 450cc bolus by EMS and placed on 6L NC with O2 improvement to 92%. In the ED:  
Vitals:  
Patient Vitals for the past 4 hrs: 
 Pulse Resp BP SpO2  
02/08/21 2250 (!) 114 18 (!) 106/55 96 % 02/08/21 2245 (!) 114 21 (!) 106/50 95 % 02/08/21 2240 (!) 114 18 (!) 107/59 95 % 02/08/21 2235 (!) 113 14 (!) 102/46 93 % 02/08/21 2230 (!) 114 18 (!) 81/34   
02/08/21 2225 (!) 114 20 (!) 89/55   
02/08/21 2220 (!) 114 28 (!) 103/46 95 % 02/08/21 2215 (!) 114 19 (!) 89/67   
02/08/21 2200 (!) 115 19 92/67 97 % 02/08/21 2145 (!) 115 20 (!) 96/49 96 % 02/08/21 2130 (!) 116 23 (!) 99/41   
02/08/21 2115 (!) 115 19 (!) 88/36   
02/08/21 2100 (!) 115 22 (!) 96/44   
02/08/21 2045 (!) 116 20 (!) 98/21   
02/08/21 2030 (!) 114 18    
02/08/21 2015 (!) 114 20 (!) 46/17  Labs: Remarkable for K 5.3, Cr 3.92 (bl 1.2), glucose 45->104, , AST 1,142, alk phos 148, WBC 15.6, UA 2+ bacteria, >100WBC, >100RBC, large blood, large LE, neg nitrites, trop 5.66 EKG: Sinus tachy with new RBBB, QTc 529 Imaging: CXR- bibasilar atelectasis, CT head- no acute changes but chronic small vessel ischemic changes, CT chest/abd/pelvis- small b/l pleural effusions, partial collapse of both lower lobes, superimposed pneumonia cannot be excluded, cardiomegaly with coronary artery atherosclerosis, cholelithiasis, multiple age indeterminate compression fractures involving thoracic and lumbar spine, diverticulosis 
Pt received: 1x cefepime and vanc, 30cc/kg fluid challenge, central line was placed and levo was started 
 
SH: Alcohol Consumption: none per chart review, Smoking: former smoker per chart review, Living arrangement: [facility], Ambulation: unknown  
Code Status: DNR 
 
Chart reviewed. Patient seen, examined, and discussed with Dr. Cade (PGY-1). See Resident H&P note for more details. 
 
Pertinent PE Findings:  
Physical Exam 
Constitutional:   
   Appearance: She is obese. She is ill-appearing.  
Neck:  
   Comments: Neck contracted to the R 
Cardiovascular:  
   Rate and Rhythm: Tachycardia present.  
   Heart sounds: No murmur. No friction rub. No gallop.   
Pulmonary:  
   Breath sounds: No wheezing or rales.  
   Comments: Tachypnea  
Abdominal:  
   General: Abdomen is flat. Bowel sounds are normal.  
   Palpations: Abdomen is soft.  
Skin: 
   Comments: Scattered ecchymosis to chest wall and b/l LE's. Intertrigo rash present under breasts b/l.   
Neurological:  
   Comments: Opens eyes to verbal stimuli. Unable to answer questions or participate in neuro exam.  
 
  
 
A/P: 76 y.o. female with PMHx of CKD stage 3, anxiety/depression, osteoporosis, severe pulmonary HTN (on 2-3L NC), chronic pain, mild cognitive impairment admitted for septic shock 
 
 AMS in the setting of Septic shock: Etiology likely UTI, but also possible PNA. UA with large LE, >100WBC, 2+ bacteria, neg nitrites. Chest CT bl small pl effusions, unable to exclude superimposed pneumonia. Sp 30cc/kg bolus in ED. R femoral catheter in place. 
- Admit to ICU 
- Continue titrating levo to maintain MAP>65 
- Add on LA and trend q4hrs - Cont Cef, Vanc - Follow up urine culture - Follow up blood cultures - PNA labs - Spoke with on call intensivist, Dr. Jesús Apodaca who suggested adding atypical coverage- Doxy was added to Abx regimen Acute renal failure: In the setting of CKD3. Likely 2/2 hypotension/septic shock. Differential includes rhabdo, as patient has scattered ecchymosis on chest and has a hx of recurrent falls in the past and currently unable to provide hx. Cr 3.92 on admission (bl 1.2) 
- Urine lytes - Metabolic panel [id number] - Strict I/Os 
- CK 
- Consult nephro, discussed with nephro on call who recommended no additional w/u tonight and they will see in the AM 
 
AHRF: Up to [facility] on admission (home O2 2-3L NC). Chest CT unable to exclude superimposed PNA 
- Wean O2 as able to keep sats 90% - Follow up [address] labs - PNA labs - RVP  
- Consider adding Vit C, Zinc, lipitor once able to take PO Troponemia: Supply demand ischemia vs NSTEMI. Trop 5.6 on admission, EKG with new RBBB. Last echo 2019 with EF 60-65% and severe pulm HTN 
- Consult cards - On call intensivist suggested starting hep gtt while awaiting repeat trop - Trend trop q6hrs - Mag, phos - Follow up echo I agree with remaining assessment and plan as documented in Dr. Anum Bustamante note. Pt discussed with Dr. Jes Rutledge (on-call attending physician) Timothy Martínez DO Family Medicine Resident PGY-2

## 2021-02-09 NOTE — PROGRESS NOTES
[address] Dosing Services: Antimicrobial Stewardship Progress Note Consult for antibiotic dosing of Vancomycin by Dr. Hair Pharmacist reviewed antibiotic appropriateness for 77year old , female  for indication of sepsis. Day of Therapy 2 Plan: 
Vancomycin therapy: 
Start Vancomycin therapy, with loading dose of 1500 mg given at ~2030 2/8/21 Follow with maintenance dosing based on levels due to renal dysfunction - SrCr is currently 3.92 Will re-dose when random level at or below 15. Last trough level / Plan for level: next 24-48 hours Pharmacy to follow daily and will make changes to dose and/or frequency based on clinical status. Non-Kinetic Antimicrobial Dosing: Cefepime - see other note Serum Creatinine Lab Results Component Value Date/Time Creatinine 3.92 (H) 02/08/2021 06:45 PM  
 Creatinine (POC) 1.1 12/01/2010 11:50 PM  
   
WBC Lab Results Component Value Date/Time WBC 15.6 (H) 02/08/2021 06:45 PM  
   
H/H Lab Results Component Value Date/Time HGB 11.6 02/08/2021 06:45 PM  
  
Platelets Lab Results Component Value Date/Time PLATELET 541 78/45/7547 06:45 PM  
 
  
 
 
Pharmacist: [address] information:

## 2021-02-09 NOTE — PROGRESS NOTES
[facility] Dosing Services: 2/9/21 Pharmacist Renal Dosing Progress Note for famotidine Physician Dr. Jose Blackburn The following medication: famotidine was automatically dose-adjusted per [facility] P&T Committee Protocol, with respect to renal function. Consult provided for this   77 y.o. , female , for the indication of SUP. Pt Weight:  
Wt Readings from Last 1 Encounters:  
02/09/21 74.3 kg (163 lb 12.8 oz) Previous Regimen Famotidine 20mg IV every 12 horus Serum Creatinine Lab Results Component Value Date/Time Creatinine 3.61 (H) 02/09/2021 08:30 AM  
 Creatinine (POC) 1.1 12/01/2010 11:50 PM  
   
Creatinine Clearance Estimated Creatinine Clearance: 13.6 mL/min (A) (based on SCr of 3.61 mg/dL (H)). BUN Lab Results Component Value Date/Time BUN 26 (H) 02/09/2021 08:30 AM  
 BUN (POC) 36 (H) 12/01/2010 11:50 PM  
   
Dosage changed to:  famotidine 20mg IV every 24 hours for CrCl <50mL/min Additional notes: 
 
 
Pharmacy to continue to monitor patient daily. Will make dosage adjustments based upon changing renal function. Signed Deepali Pfeiffer. Contact information:  [phone]

## 2021-02-09 NOTE — PROGRESS NOTES
[facility] Dosing Services: 2/8/21 Pharmacist Renal Dosing Progress Note for Dr Isabella Murphy The following medication: cefepime was automatically dose-adjusted per [facility] P&T Committee Protocol, with respect to renal function. Pt Weight:  
Wt Readings from Last 1 Encounters:  
02/08/21 74.3 kg (163 lb 12.8 oz) Previous Regimen  Cefepime 2gm q8h Serum Creatinine Lab Results Component Value Date/Time Creatinine 3.92 (H) 02/08/2021 06:45 PM  
 Creatinine (POC) 1.1 12/01/2010 11:50 PM  
   
Creatinine Clearance CrCl calculated: 14ml/min BUN Lab Results Component Value Date/Time BUN 19 02/08/2021 06:45 PM  
 BUN (POC) 36 (H) 12/01/2010 11:50 PM  
   
Dosage changed to: 2gm q24h Pharmacy to continue to monitor patient daily. Will make dosage adjustments based upon changing renal function. Taty Zepeda PHARMD. Contact information:  [phone]

## 2021-02-09 NOTE — CONSULTS
[facility] Name:  Marito Becker 
MR#:  [id number] :  1945 ACCOUNT #:  [de-identified] DATE OF SERVICE:  2021 NEPHROLOGY CONSULTATION 
 
REQUESTING PHYSICIAN:  Dr. Jude Collier. CHIEF COMPLAINT:  Elevated creatinine. HISTORY OF PRESENT ILLNESS:  The patient is a 71-year-old white female, who has a history of chronic kidney disease and apparently saw Dr. Israel Patricia in the past, but has not seen a nephrologist with our group recently. She had a creatinine of 1.38 in 2020. She is brought in from nursing facility with altered mental status and sepsis. Creatinine was 3.9 on arrival yesterday but has improved a bit to 3.6 today. She is receiving IV fluids and antibiotics for urinary tract infection. She has a Jovel catheter placed in the emergency room today and urine output is low. REVIEW OF SYSTEMS:  Unable to obtain due to altered mental status. PAST MEDICAL HISTORY:  Chronic kidney disease stage III, anxiety and depression, edema, pulmonary hypertension. FAMILY HISTORY:  No significant family history of renal disease. SOCIAL HISTORY:  She currently lives in senior living. She does not smoke. PHYSICAL EXAMINATION: 
VITAL SIGNS:  Temperature 96.6, pulse 115, blood pressure 115/51. GENERAL:  Elderly, ill-appearing white female. EYES:  Anicteric. Extraocular movements intact. ENMT:  Mucous membranes are dry. There is no epistaxis. NECK:  Nontender. There is no JVD. HEART:  Tachycardic. There is no lower extremity edema. RESPIRATORY:  Lungs are fairly clear with limited effort. GI:  Abdomen is soft, nontender. There is no guarding or rebound. SKIN:  Warm, normal turgor. There is no rash. MUSCULOSKELETAL:  There is no cyanosis or clubbing. There is no synovitis in fingers or wrists bilaterally. LABORATORY DATA:  Sodium 144, potassium 5.1, chloride 109, bicarb 27, BUN 36, creatinine 3.61, lactate 1.9. White count 14.7, hemoglobin 10.1, platelets 338. Urinalysis shows 100 mg/dL protein, positive for ketones, large blood, greater than 100 wbc's, greater than 100 rbc's. ASSESSMENT:  Acute renal failure superimposed on chronic kidney disease due to sepsis and prerenal azotemia occurring in the setting of apparent urinary tract infection in an elderly patient. PLAN: 
1. Continue IV fluids. 2.  Continue broad-spectrum antibiotics and adjust according culture results. 3.  Avoid nephrotoxins, if possible. 4.  There is no urgent indication for dialysis. We will monitor closely and follow with you to assist in her management. We will be available to provide renal replacement therapy if needed. Mary Dumont MD 
 
 
[id number] 
D:  02/09/2021 12:04 
T:  02/09/2021 13:59 
JOB #:  [id number]

## 2021-02-09 NOTE — DISCHARGE SUMMARY
Yeny Vasquez [address]  Office [phone] Fax [phone] Discharge / Transfer / Off-Service Note Name: Jaren Kim MRN: [id number]  Sex: Female YOB: 1945  Age: 77 y.o. PCP: Sridhar Crawford MD  
 
Date of admission: 2/8/2021 Date of discharge/transfer: 2/10/2021 Attending physician at admission: Amada Argueta MD  
 
Attending physician at discharge/transfer: Yulia Anderson Resident physician at discharge/transfer: Jeffry Kincaid MD 
  
Consultants during hospitalization IP CONSULT TO FAMILY PRACTICE 
IP CONSULT TO NEPHROLOGY 
IP CONSULT TO CARDIOLOGY 
IP CONSULT TO PULMONOLOGY 
IP CONSULT TO GENERAL SURGERY  
IP CONSULT TO HOSPICE Admission diagnoses Septic shock ([facility] [address] [A41.9, R65.21] Recommended follow-up after discharge Per Hospice History of Present Illness Per admitting provider,  
 
Jaren Kim is a 77 y.o. female with PMHx of CKD3, recurrent UTI, anxiety, severe pulmonary HTN, chronic pain, osteoporosis who presents to the ED complaining of AMS. Pt lives at [facility] and was found to have AMS and hypotension that first started yesterday around 1400. EMS was called and pt was found to have a low grade fever and low BP, at which point she was brought into the hospital. Pt unable to provide history due to AMS/unresponsiveness.  
 
 
HOSPITAL COURSE 
 
 
 Septic Shock with multiorgan failure: POA 3/4 SIRS (HR, RR, WBC), LA 6.25, LD 2478, CRP 5.19, Ddimer 13. Likely 2/2 UTI (considering h/o multiple recurrent UTI) VS less likely PNA or Ascending Cholangitis. Unresponsive to 30cc/kg fluid bolus and required pressors. CT chest w/ small b/l pleural effusions, partial collapse of both lower lobes. RUQ US cholelithiasis w/o biliary dilation. Treated with Vanc, Cefepime, Doxycycline, Flagyl. Urine culture grew Aerococcus. Blood cultures negative and pending at discharge. Pulmonology, cardiology, nephrology and general surgery following during admission. Due to patient's declining clinical status and prognosis, patient's family decided to transition patient to comfort care. Hospice was consulted. - Discharge to Hospice AHRF: On 2L O2 at baseline due to severe pulmonary HTN. Desatted to low 50s, requiring increased O2 on nasal canula and non rebreather. CT chest w/ small b/l pleural effusions, partial collapse of both lower lobes. VQ scan was negative. RVP & COVID PCR negative. PNA labs pending at [facility]. Pulmonary following during admission. New onset paroxysmal atrial fib w/RVR: Sinus tachycardia noted in OP setting and on previous admissions. POA EKG w/ sinus tach and prolonged QTc. Home atenolol 50mg QHS held during admission. Developed new onset paroxysmal afib w/RVR that required control with amiodarone drip. Cardiology following during admission. Tropinemia: POA trop 5.66. Likely 2/2 supply/demand ischemia in the setting of septic shock. ECHO LVEF 61-65% with dilated RV/RA and moderately-severely reduced systolic function, severe pulmonary HTN. Cardiology following during admission.  
  
Shock liver:  2/2 septic shock. POA , AST 1142, alk phos 148, Tbili 2.1. ALT & AST doubled during admission. Acute renal failure in the setting of CKD3: 2/2 to septic shock. POA creatinine 3.92 (b/l 1.3). Nephrology following during admission. Altered Mental Status: POA unresponsive likely 2/2 septic shock. CT head w/ no evidence of acute infarct, periventricular white matter disease noted. Acetaminophen, salicylate, TSH level normal.  
  
Osteoporosis w/ recurrent falls: Chronic compression fractures to thoracic and lumbar spine seen on CT scan. Bruising on chest and legs noted on admission. Son reports frequent falls and no concern for mistreatment at senior living facility.  
  
Anxiety/MDD: Home wellbutrin 100mg BID, celexa 10mg QHS, valium 5mg QHS held during admission.  
  
Chronic pain: Home gabapentin 100mg daily and tramadol 50mg daily held during admission. Physical exam at discharge: 
 
Vitals Reviewed. Visit Vitals BP (!) 104/52 (BP 1 Location: Right upper arm, BP Patient Position: Supine) Pulse (!) 106 Temp 97.7 °F (36.5 °C) Resp 14 Ht 5' 5\" (1.651 m) Wt 163 lb 12.8 oz (74.3 kg) SpO2 (!) 72% BMI 27.26 kg/m² General In no acute distress. Clean bandage covering previous port site. HENT Head Normocephalic and atraumatic. Eyes Closed. Mouth Oral mucosa moist   
Cardio Tachycardic. Pulmonary Unlabored breathing on NRB. No wheezes. Abdominal Non distended.  Jovel in place Extremities No edema of lower extremities. Neurological Unresponsive. Dermatology No visible rashes. Condition at discharge: Stable Labs Recent Labs 02/09/21 
0336 02/08/21 
1845 WBC 14.7* 15.6* HGB 10.1* 11.6 HCT 33.2* 37.6 * 165 Recent Labs 02/09/21 
0830 02/09/21 
0115 02/08/21 
1845  142 141  
K 5.1 5.2* 5.3*  
* 109* 105 CO2 27 24 18* BUN 26* 21* 19  
CREA 3.61* 3.65* 3.92* * 171* 45* CA 7.4* 7.6* 9.1 MG  --   --  2.4 PHOS  --   --  5.7* Recent Labs 02/09/21 
0830 02/09/21 
0115 02/08/21 
1845 * 723* 372* * 136* 148* TBILI 2.0* 2.3* 2.1*  
TP 5.7* 6.2* 6.8 ALB 3.0* 3.0* 3.2*  
GLOB 2.7 3.2 3.6 Recent Labs 02/09/21 8315 02/09/21 
0115 02/08/21 2031 02/08/21 
1845 TROIQ 5.30* 5.76*  --  5.66* INR 2.0*  --   --   --   
PTP 19.8*  --   --   --   
APTT 31.8*  --   --   --   
FERR  --   --   --  1,770* [facility]  --   --  104*  --   
 
 
Microbiology Results Procedure Component Value Units Date/Time S. Chaz Almodovar, UR/CSF [[id number]] Collected: 02/09/21 1114 Order Status: Completed Updated: 02/09/21 1129 LEGIONELLA PNEUMOPHILA AG, URINE [[id number]] Collected: 02/09/21 1114 Order Status: Completed Specimen: Urine Updated: 02/09/21 1128 RESPIRATORY VIRUS PANEL W/COVID-19, PCR [[id number]] Collected: 02/09/21 0343 Order Status: Completed Specimen: Nasopharyngeal Updated: 02/09/21 1000 Adenovirus Not detected Coronavirus 229E Not detected Coronavirus HKU1 Not detected Coronavirus CVNL63 Not detected Coronavirus OC43 Not detected Metapneumovirus Not detected Rhinovirus and Enterovirus Not detected Influenza A Not detected Influenza A, subtype H1 Not detected Influenza A, subtype H3 Not detected INFLUENZA A H1N1 PCR Not detected Influenza B Not detected Parainfluenza 1 Not detected Parainfluenza 2 Not detected Parainfluenza 3 Not detected Parainfluenza virus 4 Not detected RSV by PCR Not detected B. parapertussis, PCR Not detected Bordetella pertussis - PCR Not detected Chlamydophila pneumoniae DNA, QL, PCR Not detected Mycoplasma pneumoniae DNA, QL, PCR Not detected SARS-CoV-2, PCR Not detected COVID-19 RAPID TEST [[id number]] Collected: 02/09/21 0343 Order Status: Completed Specimen: Nasopharyngeal Updated: 02/09/21 7367 Specimen source Nasopharyngeal     
  COVID-19 rapid test Not detected Comment: Rapid Abbott ID Now Rapid NAAT:  The specimen is NEGATIVE for SARS-CoV-2, the novel coronavirus associated with COVID-19. Negative results should be treated as presumptive and, if inconsistent with clinical signs and symptoms or necessary for patient management, should be tested with an alternative molecular assay. Negative results do not preclude SARS-CoV-2 infection and should not be used as the sole basis for patient management decisions. This test has been authorized by the FDA under an Emergency Use Authorization (EUA) for use by authorized laboratories. Fact sheet for Healthcare Providers: [employer] Fact sheet for Patients: [employer] Methodology: Isothermal Nucleic Acid Amplification RESPIRATORY VIRUS PANEL W/COVID-19, PCR [[id number]] Order Status: Canceled Specimen: NASOPHARYNGEAL SWAB CULTURE, BLOOD, PAIRED [[id number]] Collected: 02/08/21 1845 Order Status: Completed Specimen: Blood Updated: 02/10/21 0417 Special Requests: NO SPECIAL REQUESTS Culture result: NO GROWTH 2 DAYS     
 CULTURE, URINE [[id number]]  (Abnormal) Collected: 02/08/21 1845 Order Status: Completed Specimen: Urine from Clean catch Updated: 02/10/21 0320 Special Requests: NO SPECIAL REQUESTS [place] Count --     
  >100,000 COLONIES/mL Culture result:    
  AEROCOCCUS URINAE A. URINAE HAS BEEN DESCRIBED AS SUSCEPTIBLE TO PENICILLIN, AMOXICILLIN, PIPERACILLIN, CEFIPIME, RIFAMPIN AND NITROFURANTOIN, BUT RESISTANT TO SULFONAMIDES. Procedures / Diagnostic Studies Echo Results  (Last 48 hours) None Imaging EXAM: CT CHEST WO CONT, CT ABD PELV WO CONT 
  
INDICATION: Ecchymosis, altered mental status 
  
COMPARISON: CT November 2019. 
  
TECHNIQUE: Helical CT of the chest, abdomen, and pelvis following the uneventful 
intravenous administration of 100 mL Isovue-370. Oral contrast was not utilized. Coronal and sagittal reformats were generated.  CT dose reduction was achieved through use of a standardized protocol tailored for this examination and 
automatic exposure control for dose modulation. 
  
FINDINGS: 
  
THYROID: No nodule. MEDIASTINUM: No mass or lymphadenopathy. ROSY: No mass or lymphadenopathy. THORACIC AORTA: No aneurysm. MAIN PULMONARY ARTERY: Normal in caliber. HEART: Cardiomegaly with coronary artery atherosclerosis ESOPHAGUS: No wall thickening or dilatation. TRACHEA/BRONCHI: Patent. PLEURA: Small bilateral pleural effusions LUNGS: Paraseptal emphysema. Partial collapse of both lower lobes 
  
Liver: No mass or biliary dilatation. Biliary tree: Filled with gallstones. The CBD is not dilated. Spleen: Within normal limits. Pancreas: No inflammation, mass or ductal dilatation. Adrenals: Unremarkable. Kidneys: Punctate nonobstructing left renal stones Stomach: Unremarkable. Small bowel: No dilatation or wall thickening. Colon: Diverticulosis of the colon, with no evidence of acute diverticulitis. Appendix: Not visualized Peritoneum: No ascites or pneumoperitoneum. Retroperitoneum: Infrarenal abdominal aortic aneurysm measuring 2.6 cm Reproductive organs: Uterus is noted Urinary bladder: No mass or calculus. Bones: Multiple age-indeterminate compression fractures throughout the thoracic 
and lumbar spine Abdominal wall: No mass or hernia. Additional comments: Right inguinal central venous catheter 
  
IMPRESSION 
  
1. Small bilateral pleural effusions. 2. Partial collapse of both lower lobes. Superimposed pneumonia cannot be 
excluded. 3. Cardiomegaly with coronary artery atherosclerosis. 4. Cholelithiasis. 5. Multiple age-indeterminate compression fractures involving the thoracic and 
lumbar spine. 6. Diverticulosis of the colon. No acute diverticulitis.    
  
  
Study Result--NM LUNG SCAN PERF 
  
  
  
Heterogeneous perfusion within both lungs.  Evidence of bilateral pleural 
 effusions. No large mismatch is present. Low probability for pulmonary embolism. 
  
Preliminary report was provided by Dr. Luc Sinclair, the on-call radiologist, at [address] [facility] [address] AM 
  
  
  
  
Clinical History: Hypotension, decreased responsiveness, elevated d-dimer 
  
Comparison to chest radiograph dated 2/8/2021 
  
Ventilation: Ventilation images were not obtained due to Covid precautions. 
  
Perfusion: Perfusion images were obtained in 6 projections utilizing 4.2 mCi Tc-99 M MAA. There is heterogeneous tracer distribution throughout the periphery 
of both lung fields, however no specific focal area of decreased attenuation is 
identified. 
  
IMPRESSION Heterogeneous perfusion throughout both lung fields remains low 
probability despite the lack of ventilation imaging. 
  
   
 
EXAM: US ABD LTD INDICATION: Concern for ascending cholangitis COMPARISON: None TECHNIQUE: 
Routine ultrasound images of the abdomen were obtained. FINDINGS: 
LIVER: No significant enlargement or evidence of parenchymal lesion. MAIN PORTAL VEIN: Patent with appropriate hepatopetal flow direction. GALLBLADDER: Cholelithiasis. Kathrene Bolk COMMON BILE DUCT: 3 mm in diameter. No significant dilatation. PANCREAS: Not well-visualized secondary to overlying bowel gas pattern. RIGHT KIDNEY: 7.7 cm in length. No hydronephrosis or nephrolithiasis. OTHER: N/A. IMPRESSION Limited study due to patient's immobility. Cholelithiasis without biliary Dilatation EXAM XR CHEST PORT INDICATION: Altered mental status, hypotension. Portable AP semiupright view of the chest. 
Direct comparison made to prior chest x-ray dated December 2020. Cardiomediastinal silhouette is stable. Lungs are hypoinflated. There is minimal 
bibasilar atelectasis. No pleural fluid is visualized. There is no pneumothorax. The osseous structures are diffusely demineralized IMPRESSION Normal bibasilar atelectasis. Chronic diagnoses Problem List as of 2/10/2021 Date Reviewed: 1/19/2021 Codes Class Noted - Resolved Sepsis with multiple organ dysfunction (MOD) (HCC) ICD-10-CM: A41.9, R65.20 ICD-9-CM: 038.9, 995.92  2/10/2021 - Present Diverticulosis ICD-10-CM: K57.90 ICD-9-CM: 562.10  2/9/2021 - Present Acute renal failure (ARF) (HCC) ICD-10-CM: N17.9 ICD-9-CM: 584.9  2/9/2021 - Present Acute liver failure ICD-10-CM: K72.00 ICD-9-CM: 414  2/9/2021 - Present Prolonged Q-T interval on ECG ICD-10-CM: R94.31 
ICD-9-CM: 794.31  2/9/2021 - Present Anemia ICD-10-CM: D64.9 ICD-9-CM: 285.9  2/9/2021 - Present Elevated troponin ICD-10-CM: R77.8 ICD-9-CM: 790.6  2/9/2021 - Present Multiple bruises ICD-10-CM: T07. Maame Egan ICD-9-CM: 924.8  2/9/2021 - Present Elevated liver enzymes ICD-10-CM: R74.8 ICD-9-CM: 790.5  2/9/2021 - Present PAF (paroxysmal atrial fibrillation) (HCC) ICD-10-CM: I48.0 ICD-9-CM: 427.31  2/9/2021 - Present * (Principal) Septic shock ([facility] [address] ICD-10-CM: A41.9, R65.21 ICD-9-CM: 038.9, 785.52, 995.92  2/8/2021 - Present Fall ICD-10-CM: W19. Maame Egan ICD-9-CM: E888.9  3/6/2020 - Present Moderate episode of recurrent major depressive disorder (HCC) ICD-10-CM: F33.1 ICD-9-CM: 296.32  3/6/2020 - Present Physical debility ICD-10-CM: R53.81 ICD-9-CM: 799.3  11/25/2019 - Present Left hip pain ICD-10-CM: M25.552 ICD-9-CM: 719.45  11/24/2019 - Present Fall from ground level ICD-10-CM: I19.01JQ ICD-9-CM: E888.9  11/24/2019 - Present CKD (chronic kidney disease) stage 3, GFR 30-59 ml/min ICD-10-CM: N18.30 ICD-9-CM: 585.3  11/24/2019 - Present Abnormal urinalysis ICD-10-CM: R82.90 ICD-9-CM: 791.9  11/24/2019 - Present Lung crackles ICD-10-CM: R09.89 ICD-9-CM: 786.7  10/29/2015 - Present Dysuria ICD-10-CM: R30.0 ICD-9-CM: 788.1  9/18/2015 - Present Need for hepatitis C screening test ICD-10-CM: Z11.59 
ICD-9-CM: V73.89  7/23/2015 - Present Intertrigo ICD-10-CM: L30.4 ICD-9-CM: 695.89  7/7/2015 - Present UTI (lower urinary tract infection) ICD-10-CM: N39.0 ICD-9-CM: 599.0  5/21/2015 - Present Bronchitis ICD-10-CM: J40 ICD-9-CM: 631  3/2/2015 - Present Palpitations ICD-10-CM: R00.2 ICD-9-CM: 785.1  7/24/2012 - Present Anxiety ICD-10-CM: F41.9 ICD-9-CM: 300.00  2/7/2012 - Present Iron deficiency anemia, unspecified ICD-10-CM: D50.9 ICD-9-CM: 280.9  1/12/2012 - Present Osteoarthritis ICD-10-CM: M19.90 ICD-9-CM: 715.90  12/30/2009 - Present Senile osteoporosis ICD-10-CM: M81.0 ICD-9-CM: 733.01  12/30/2009 - Present Knee pain ICD-10-CM: M25.569 ICD-9-CM: 719.46  Unknown - Present Insomnia ICD-10-CM: G47.00 ICD-9-CM: 780.52  Unknown - Present RESOLVED: Fracture of right pelvis (HCC) (Chronic) ICD-10-CM: S32. 9XXA ICD-9-CM: 808.8  3/6/2020 - 3/6/2020 RESOLVED: Medicare annual wellness visit, subsequent ICD-10-CM: Z00.00 ICD-9-CM: V70.0  1/15/2016 - 9/26/2019 RESOLVED: Screening for cholesterol level ICD-10-CM: E85.633 ICD-9-CM: V77.91  7/23/2015 - 9/26/2019 RESOLVED: Osteopenia ICD-10-CM: M85.80 ICD-9-CM: 733.90  Unknown - 12/30/2009 RESOLVED: Anxiety ICD-10-CM: F41.9 ICD-9-CM: 300.00  Unknown - 7/24/2012 Discharge/Transfer Medications: Per hospice Diet:  Per hospice Activity:  Per hospice Disposition: Hospice Follow up plans/appointments: Per hospice Agustin Romero MD 
Family Medicine Resident For Billing Chief Complaint Patient presents with  Altered mental status  Hypotension Hospital Problems  Date Reviewed: 1/19/2021 Codes Class Noted POA Diverticulosis ICD-10-CM: K57.90 ICD-9-CM: 562.10  2/9/2021 Unknown Acute renal failure (ARF) (HCC) ICD-10-CM: N17.9 ICD-9-CM: 584.9  2/9/2021 Unknown Acute liver failure ICD-10-CM: K72.00 ICD-9-CM: 566  2/9/2021 Unknown Prolonged Q-T interval on ECG ICD-10-CM: R94.31 
ICD-9-CM: 794.31  2/9/2021 Unknown Anemia ICD-10-CM: D64.9 ICD-9-CM: 285.9  2/9/2021 Unknown Elevated troponin ICD-10-CM: R77.8 ICD-9-CM: 790.6  2/9/2021 Unknown Multiple bruises ICD-10-CM: T07. Sumaya  ICD-9-CM: 924.8  2/9/2021 Unknown Elevated liver enzymes ICD-10-CM: R74.8 ICD-9-CM: 790.5  2/9/2021 Unknown PAF (paroxysmal atrial fibrillation) (HCC) ICD-10-CM: I48.0 ICD-9-CM: 427.31  2/9/2021 Unknown * (Principal) Septic shock ([facility] [address] ICD-10-CM: A41.9, R65.21 ICD-9-CM: 038.9, 785.52, 995.92  2/8/2021 Unknown Fall ICD-10-CM: W19. Sumaya  ICD-9-CM: E888.9  3/6/2020 Yes Moderate episode of recurrent major depressive disorder (HCC) ICD-10-CM: F33.1 ICD-9-CM: 296.32  3/6/2020 Yes Physical debility ICD-10-CM: R53.81 ICD-9-CM: 799.3  11/25/2019 Yes CKD (chronic kidney disease) stage 3, GFR 30-59 ml/min ICD-10-CM: N18.30 ICD-9-CM: 585.3  11/24/2019 Yes Anxiety ICD-10-CM: F41.9 ICD-9-CM: 300.00  2/7/2012 Yes Iron deficiency anemia, unspecified ICD-10-CM: D50.9 ICD-9-CM: 280.9  1/12/2012 Yes Osteoarthritis ICD-10-CM: M19.90 ICD-9-CM: 715.90  12/30/2009 Yes

## 2021-02-09 NOTE — PROGRESS NOTES
Spoke with Dr. Evan Wolff, on call cardiology, due to increasing troponin from 5.66 to 5.76. He recommended stopping the heparin gtt at this time as the patient is not a candidate for the cath lab. Dr. Holly Bazan will see the patient today. Katina Patrick, [facility] Resident

## 2021-02-09 NOTE — ED NOTES
Pts sats dropping to 60s-70s, called family practice and notified them of change in pts condition. FP states they will come down to evaluate pt.

## 2021-02-09 NOTE — ED NOTES
This RN called Family Practice to notify them that pt's heart rate in 160's. No verbal orders given, resident states they will come assess pt. Bharat Morales, primary RN at bedside.

## 2021-02-10 PROBLEM — R65.20 SEPSIS WITH MULTIPLE ORGAN DYSFUNCTION (MOD) (HCC): Status: ACTIVE | Noted: 2021-01-01

## 2021-02-10 PROBLEM — A41.9 SEPSIS WITH MULTIPLE ORGAN DYSFUNCTION (MOD) (HCC): Status: ACTIVE | Noted: 2021-01-01

## 2021-02-10 NOTE — PROGRESS NOTES
Spiritual Care Assessment/Progress Note [address] 
 
 
NAME: Brunilda Beebe      MRN: [id number] AGE: 77 y.o. SEX: female Uatsdin Affiliation: No preference Language: English  
 
2/9/2021     Total Time (in minutes): 16 Spiritual Assessment begun in [facility] EMERGENCY DEPT through conversation with: 
  
    []Patient        [x] Family    [] Friend(s) Reason for Consult: End-of-life support Spiritual beliefs: (Please include comment if needed) [x] Identifies with a ridge tradition:     
   [] Supported by a ridge community:        
   [] Claims no spiritual orientation:       
   [] Seeking spiritual identity:            
   [x] Adheres to an individual form of spirituality:       
   [] Not able to assess:                   
 
    
Identified resources for coping:  
   [] Prayer                           
   [] Music                  [] Guided Imagery [x] Family/friends                 [] Pet visits [] Devotional reading                         [] Unknown 
   [] Other:                                        
 
 
Interventions offered during this visit: (See comments for more details) Family/Friend(s): Catharsis/review of pertinent events in supportive environment, Coping skills reviewed/reinforced, Affirmation of ridge Plan of Care: 
 
 [] Support spiritual and/or cultural needs  
 [] Support AMD and/or advance care planning process [x] Support grieving process 
 [] Coordinate Rites and/or Rituals  
 [] Coordination with community clergy [] No spiritual needs identified at this time 
 [] Detailed Plan of Care below (See Comments)  [] Make referral to Music Therapy 
[] Make referral to Pet Therapy    
[] Make referral to Addiction services 
[] Make referral to [facility] 
[] Make referral to Spiritual Care Partner 
[] No future visits requested       
[] Follow up upon further referrals Comments:  responded to a request from nursing staff to visit with Mrs. Cecily Reeves in the ER as she is being transitioned to comfort care. Consulted with her nurse who shared that her two sons, Wagner Rider and Shaheen Land, were at the bedside. Mrs. Cecily Reeves was lying in bed and appeared to be resting comfortably. She became somewhat restless throughout the visit and seemed to become more agitated, but did not acknowledge the chaplains voice or presence. Nurse was notified of 's observations.  introduced herself to Mrs. Cecily Reeves sons who greeted the  warmly. They openly shared about Mrs. Cecily Reeves illness journey and engaged in story telling and life review.  inquired if ridge was important to Mrs. Cecily Reeves and they shared that she identified as Druid and believed in a higher power, but did not participate in any formalized Catholic.  inquired what would be most comforting to Mrs. Cecily Reeves at this time and her son's shared they felt she was comfortable. Mrs. Cecily Reeves sons thanked the  for her visit and expressed no additional needs at this time. Chaplains are available for further support upon referral 
[address] Edyth Severe.  Paging Service: [phone])

## 2021-02-10 NOTE — PROGRESS NOTES
IV Levophod titrated off. Morphine 10mg INH given for sob and tachypnea. Sons remain at bedside. Pastoral care called.

## 2021-02-10 NOTE — PROGRESS NOTES
Report received and care assumed. Both sons at are bedside. Family Practice called and notified of family arrival. Pt has removed o2 % and is agitated, and hypoxic. See MAR. IV Ativan given for agitation.

## 2021-02-10 NOTE — HOSPICE
[employer] Good Help to Those in Need 
[phone] Patient Name: Walker Landeros YOB: 1945 Age: 77 y.o. [employer] RN Note:  Hospice consult noted. Chart reviewed. Plan of care discussed with patients nurse & care manager. In to meet with patient, and her two sons, Marivel Baptiste and Zelalem Blanco. Discussed Hospice philosophy, general plan of care, levels of care, services and on call procedures. Family information packet provided & reviewed with sons bedside. Patient's sons are in agreement with hospice plan of care, and state they would like to admit mother into hospice care at [facility]. If patient were to improve, and no longer meet [facility] LOC, plan would be for patient to return to [facility] with hospice care. Dr. Kortney Lynch has given verbal CTI for the Hospice Diagnosis of Sepsis with multi-organ failure. Will review hospice consents with sons, and plan to admit into hospice care. Thank you for the opportunity to be of service to this patient. 14:00 Contact made with Dr. Bermudez Began through [facility]. Dr. Shanon Cohen agreed to hospice plan of care, and will defer to patient to hospice MD. Verbal order received to discharge patient once consents have been obtained. Marycruz Smith RN, [facility] Nurse Liaison [phone] Mobile [phone] Office

## 2021-02-10 NOTE — HOSPICE
[facility] Good Help to Those in Need 
[phone] Patient Name: Eb Kay YOB: 1945 Age: 77 y.o. [facility] RN Note:  Hospice consult received, reviewing chart. Will follow up with Unit Nurse and Care Manager to discuss plan of care, patient status and discharge disposition within the hour. Thank you for the opportunity to be of service to this patient. Rehana De Leon RN, [facility] Nurse Liaison [phone] [place] [phone] Office

## 2021-02-10 NOTE — PROGRESS NOTES
11:28 AM 
CM reviewed EMR and spoke to MD, pt has now transitioned to comfort care and a hospice order has been placed. CM sent referral and called hospice liaison to review for possible GIP. Will follow. Juanjose Melton

## 2021-02-10 NOTE — PROGRESS NOTES
Problem: Falls - Risk of 
Goal: *Absence of Falls Description: Document Starleen Freeze Fall Risk and appropriate interventions in the flowsheet. 2/10/2021 1750 by Angela León RN Outcome: Progressing Towards Goal 
Note: Fall Risk Interventions: 
 
 
Medication Interventions: Bed/chair exit alar 2/10/2021 1748 by Angela León RN Outcome: Progressing Towards Goal 
Note: Fall Risk Interventions: 
 
  
 
Medication Interventions: Bed/chair exit alarm Problem: Pressure Injury - Risk of 
Goal: *Prevention of pressure injury Description: Document Morteza Scale and appropriate interventions in the flowsheet. 2/10/2021 1750 by Angela León RN Outcome: Not Progressing Towards Goal 
Note: Pressure Injury Interventions: 
  
2/10/2021 1748 by Angela León RN Outcome: Not Progressing Towards Goal 
Note: Pressure Injury Interventions: 
  
 
  
Problem: Infection - Risk of, Urinary Catheter-Associated Urinary Tract Infection Goal: *Absence of infection signs and symptoms Outcome: Not Progressing Towards Goal 
  
Problem: Breathing Pattern - Ineffective Goal: *Absence of hypoxia Outcome: Not Progressing Towards Goal 
Goal: *Use of effective breathing techniques Outcome: Not Progressing Towards Goal

## 2021-02-10 NOTE — PROGRESS NOTES
Report given to uQirino Shields, RN and pt transported via stretcher to medical Rm #[id number]. Sons at bedside.

## 2021-02-10 NOTE — HOSPICE
[facility] 
[phone] Inpatient Nursing Admission Patient Name: Frantz Groves YOB: 1945 Age: 77 y.o. Date of Hospice Admission: 2/10/2021 Hospice Attending Elected by Patient: Renée Acosta MD 
Primary Care Physician: Hermelindo Puentes MD 
Admitting RN: Dayanna Jordan, RN, [facility] : Not available Level of Care (GIP/Routine/Respite): GIP Facility of Care: [facility] Patient Room: 424/01 HOSPICE SUMMARY  
ER Visits/ Hospitalizations in past year: 0 Hospice Diagnosis: Sepsis with multiple organ dysfunction (MOD) ([facility] [address] [A41.9, R65.20] Onset Date of Hospice Diagnosis: 1 week Summary of Disease Progression Leading to Hospice Diagnosis:  
 
From Mary Beatty NP: 
Mya Linton is a 77y.o. year old who was admitted to [facility]. She lived at [facility] and was found to have [address] and hypotension yesterday and she was admitted with septic shock. Her BP was unresponsive to fluid bolus and she was started on pressors. CT chest showed small bilateral pleural effusions with partial collapse of both lower lobes. She also had a UA concerning for infection and a RUQ US which showed cholelithiasis without biliary dilation. She was given vanc, cefepime, doxy, and flagyl. Due to her declining status, family elected to transition her to hospice care. Her sons both stated that did not want life extended and did not want to suffer. She has been unresponsive since yesterday evening. She also had new onset a fib with RVR which was first treated with amiodarone. Her troponin was 5.77 and this was felt to be supply/demand ischemia in the setting of septic shock. She has acute respiratory failure with sats in the 50's before a rebreather was started.   She also has shock liver.   
  
The patient's principle diagnosis has resulted in multi-organ failure and obtundation\" 
  
 
Co-Morbidities:  
Patient Active Problem List  
 Diagnosis Code  Knee pain M25.569  Insomnia G47.00  
 Osteoarthritis M19.90  Senile osteoporosis M81.0  Iron deficiency anemia, unspecified D50.9  Anxiety F41.9  Palpitations R00.2  Bronchitis J40  
 UTI (lower urinary tract infection) N39.0  Intertrigo L30.4  Need for hepatitis C screening test Z11.59  Dysuria R30.0  Lung crackles R09.89  Left hip pain M25.552  Fall from ground level O88.39OW  CKD (chronic kidney disease) stage 3, GFR 30-59 ml/min N18.30  Abnormal urinalysis R82.90  Physical debility R53.81  
 Fall W19. [place]   Moderate episode of recurrent major depressive disorder (HCC) F33.1  Septic shock ([facility]) A41.9, R65.21  
 Diverticulosis K57.90  Acute renal failure (ARF) ([facility]) N17.9  Acute liver failure K72.00  Prolonged Q-T interval on ECG R94.31  
 Anemia D64.9  Elevated troponin R77.8  Multiple bruises T07. [place]   Elevated liver enzymes R74.8  PAF (paroxysmal atrial fibrillation) ([facility]) I48.0  Sepsis with multiple organ dysfunction (MOD) ([facility]) A41.9, R65.20 Principle Hospice Diagnosis: Sepsis with multi-organ failure Diagnoses RELATED to the terminal prognosis: acute on chronic kidney disease, recurrent UTI's, anxiety, severe pulmonary HTN, chronic pain Other Diagnoses: Osteoporosis with chronic compression fractures of lumbar and thoracic spine.    
 
 
Rationale for a prognosis of life expectancy of 6 months or less if the disease follows its normal course (Disease Specific History):  
 
 Jaime Christensen is a 77 y.o. who was admitted to [facility]. The patient's principle diagnosis of Sepsis with multiple organ dysfunction (MOD) ([facility] [address] [A41.9, R65.20]  has resulted in rapid decline and oxygen demands requiring high flow oxygen. Functionally, the patient's Palliative Performance Scale has declined over a period of 1 week and is estimated at 10. Objective information that support this patients limited prognosis includes:  
Results for [facility] (MRN [id number]) as of 2/10/2021 18:14 Ref. Range 2/9/2021 11:14 Creatinine, urine Latest Units: mg/dL 298.00 Sodium,urine random Latest Units: MMOL/L 15  
Osmolality,urine Latest Units: MOSM/kg H2O 344 Results for [facility] (MRN [id number]) as of 2/10/2021 18:14 Ref. Range 2/9/2021 08:30 Sodium Latest Ref Range: 136 - 145 mmol/L 144 Potassium Latest Ref Range: 3.5 - 5.1 mmol/L 5.1 Chloride Latest Ref Range: 97 - 108 mmol/L 109 (H) CO2 Latest Ref Range: 21 - 32 mmol/L 27 Anion gap Latest Ref Range: 5 - 15 mmol/L 8 Glucose Latest Ref Range: 65 - 100 mg/dL 136 (H) BUN Latest Ref Range: 6 - 20 MG/DL 26 (H) Creatinine Latest Ref Range: 0.55 - 1.02 MG/DL 3.61 (H) BUN/Creatinine ratio Latest Ref Range: 12 - 20   7 (L) Calcium Latest Ref Range: 8.5 - 10.1 MG/DL 7.4 (L)  
GFR est non-AA Latest Ref Range: >60 ml/min/1.73m2 12 (L)  
GFR est AA Latest Ref Range: >60 ml/min/1.73m2 15 (L) Bilirubin, total Latest Ref Range: 0.2 - 1.0 MG/DL 2.0 (H) Protein, total Latest Ref Range: 6.4 - 8.2 g/dL 5.7 (L) Albumin Latest Ref Range: 3.5 - 5.0 g/dL 3.0 (L) Globulin Latest Ref Range: 2.0 - 4.0 g/dL 2.7 A-G Ratio Latest Ref Range: 1.1 - 2.2   1.1 ALT Latest Ref Range: 12 - 78 U/L 904 (H) AST Latest Ref Range: 15 - 37 U/L >2,000 (H) Alk. phosphatase Latest Ref Range: 45 - 117 U/L 128 (H) Lactic acid Latest Ref Range: 0.4 - 2.0 MMOL/L 1.9 Results for [facility] (MRN [id number]) as of 2/10/2021 18:14 
 Ref. Range 2/9/2021 03:36 WBC Latest Ref Range: 3.6 - 11.0 K/uL 14.7 (H) NRBC Latest Ref Range: 0  WBC 0.1 (H) RBC Latest Ref Range: 3.80 - 5.20 M/uL 2.77 (L) HGB Latest Ref Range: 11.5 - 16.0 g/dL 10.1 (L) HCT Latest Ref Range: 35.0 - 47.0 % 33.2 (L) MCV Latest Ref Range: 80.0 - 99.0 .9 (H) MCH Latest Ref Range: 26.0 - 34.0 PG 36.5 (H) MCHC Latest Ref Range: 30.0 - 36.5 g/dL 30.4 RDW Latest Ref Range: 11.5 - 14.5 % 15.2 (H) PLATELET Latest Ref Range: 150 - 400 K/uL 126 (L) NEUTROPHILS Latest Ref Range: 32 - 75 % 71 BAND NEUTROPHILS Latest Ref Range: 0 - 6 % 17 (H) LYMPHOCYTES Latest Ref Range: 12 - 49 % 3 (L) MONOCYTES Latest Ref Range: 5 - 13 % 9 EOSINOPHILS Latest Ref Range: 0 - 7 % 0  
BASOPHILS Latest Ref Range: 0 - 1 % 0 IMMATURE GRANULOCYTES Latest Units: % 0  
DF Latest Units:   MANUAL ABSOLUTE NRBC Latest Ref Range: 0.00 - 0.01 K/uL 0.02 (H)  
ABS. NEUTROPHILS Latest Ref Range: 1.8 - 8.0 K/UL 13.0 (H)  
ABS. IMM. GRANS. Latest Units: K/UL 0.0  
ABS. LYMPHOCYTES Latest Ref Range: 0.8 - 3.5 K/UL 0.4 (L)  
ABS. MONOCYTES Latest Ref Range: 0.0 - 1.0 K/UL 1.3 (H)  
ABS. EOSINOPHILS Latest Ref Range: 0.0 - 0.4 K/UL 0.0  
ABS. BASOPHILS Latest Ref Range: 0.0 - 0.1 K/UL 0.0 Abdom Ultrasound: 
INDICATION: Concern for ascending cholangitis IMPRESSION Limited study due to patient's immobility. Cholelithiasis without biliary 
dilatation Lung Nuclear Imaging: 
IMPRESSION Heterogeneous perfusion throughout both lung fields remains low 
probability despite the lack of ventilation imaging CT of ABD Pelvis: 
IMPRESSION 1. Small bilateral pleural effusions. 2. Partial collapse of both lower lobes. Superimposed pneumonia cannot be 
excluded. 3. Cardiomegaly with coronary artery atherosclerosis. 4. Cholelithiasis. 5. Multiple age-indeterminate compression fractures involving the thoracic and 
lumbar spine. 6. Diverticulosis of the colon. No acute diverticulitis. The patient/family chose comfort measures with the support of Hospice. Patient meets for GIP LOC as evidenced by High Flow oxygen and required IV dosing medications for symptom managment Prognosis estimated based on 02/10/21 clinical assessment is:  
[x] Few to Many Hours ASSESSMENT Patient self-reports:  []  Yes    [x] No 
 
Adult Non-Verbal Pain Assessment Score: 9/10 Face 
[] 0   No particular expression or smile 
[] 1   Occasional grimace, tearing, frowning, wrinkled forehead 
[x] 2   Frequent grimace, tearing, frowning, wrinkled forehead Activity (movement) [] 0   Lying quietly, normal position 
[x] 1   Seeking attention through movement or slow, cautious movement 
[] 2   Restless, excessive activity and/or withdrawal reflexes Guarding 
[] 0   Lying quietly, no positioning of hands over areas of body 
[] 1   Splinting areas of the body, tense 
[x] 2   Rigid, stiff Physiology (vital signs) 
[] 0   Stable vital signs [x] 1   Change in any of the following: SBP > 20mm Hg; HR > 20/minute 
[] 2   Change in any of the following: SBP > 30mm Hg; HR > 25/minute Respiratory 
[] 0   Baseline RR/SpO2, compliant with ventilator 
[] 1   RR > 10 above baseline, or 5% drop SpO2, mild asynchrony with ventilator 
[x] 2   RR > 20 above baseline, or 10% drop SpO2, asynchrony with ventilator SYMPTOMS: 
Active Symptoms: 
1. Tachypnea 2. Hypoxia 3. Pain 4. Anxiety SIGNS/PHYSICAL FINDINGS: Patient is pale, with face mask covering her face. Noted some facial twitching. KARNOFSKY: 10 
 
FAST for all dementia:   
 
Learning Assessment: 
Patient Is patient willing/able to learn? NO What is the highest level of education completed? Learning preference (written material, demonstration, visual)? Learning barriers (ESOL, [place], poor vision)? Caregiver Is caregiver willing to learn care for patient?  YES 
 What is the highest level of education completed? Learning preference (written material, demonstration, visual)? Learning barriers (ESOL, [place], poor vision)? CLINICAL INFORMATION Wt Readings from Last 3 Encounters:  
02/10/21 74.3 kg (163 lb 12.8 oz) 02/09/21 74.3 kg (163 lb 12.8 oz) 12/13/20 76.3 kg (168 lb 4.8 oz) Ht Readings from Last 3 Encounters:  
02/10/21 5' 5\" (1.651 m)  
02/09/21 5' 5\" (1.651 m)  
04/23/20 5' 5\" (1.651 m) Body mass index is 27.26 kg/m². Visit Vitals Ht 5' 5\" (1.651 m) Wt 74.3 kg (163 lb 12.8 oz) BMI 27.26 kg/m² LAB VALUES No results found for this visit on 02/10/21 (from the past 12 hour(s)). No results found for this visit on 02/10/21 (from the past 6 hour(s)). Lab Results Component Value Date/Time Protein, total 5.7 (L) 02/09/2021 08:30 AM  
 Albumin 3.0 (L) 02/09/2021 08:30 AM  
 
 
Currently this patient has: 
[x] Supplemental O2 [x] Peripheral IV  [] PICC    [] PORT [x] Jovel Catheter to be inserted for end of life care and comfort [] NG Tube   [] PEG Tube [] Ostomy   
[] AICD: Has ICD been deactivated? [] Yes [] No:______ PLAN Plan of Care: 1. Education of patient and family regarding end of life care and Hospice plan of care 2. Provide education and support to unit staff caring for hospice patient and family. Provide staff with direct contact information to reach hospice team [phone] 3. Provide support and frequent rounds for patient comfort and safety ongoing 4. Provide  support ongoing, continue to discuss discharge plan if patient becomes stevne and does not require acute nursing care interventions for GIP level of care 5. Provide  and bereavement support ongoing 6.  D/C morphine secondary to GFR of 12. Dilaudid 0.5 mg IV every four hours scheduled and IV every 15 minutes prn. Ativan 1 mg IV every 4 hours and every 15 minutes prn. Scopolamine patch applied Robinul prn every prn IV Toradol 15 mg every 8 hours prn fever 7.. Maintain skin integrity as tolerated for hospice patient, turning and repositioning for comfort, and specialty mattress if appropriate 8.. Jovel care per hospital policy for infection prevention 9. Peripheral line care as per hospital policy for infection prevention 10. Titrate from Midflow oxygen face mask of 15 lpm to 10 lpm NC for comfort Hospice Team Frequency Orders: 
Skilled Nurse -  Daily x 7 days /every other day x 7 days  with 5 PRN visits for symptom control. MSW  1 visit for initial assessment/evaluation for family support and need for volunteer services. Daniel Gagnon  1 visit for initial assessment/evaluation for spiritual support. ADVANCE CARE PLANNING (Complete in ACP Flow Sheet) Code Status: DNR Durable DNR: [x]  Yes  []  No 
Code Status Discussed/Confirmed: YES Preference for Other Life Sustaining Treatment Discussed/Confirmed: YES Hospitalization Preference: YES No flowsheet data found.  Service: [] Yes  []  No      [] Unknown Appropriate for Pinning Ceremony:  [] Yes     [x] No 
Jain: NO PREFERENCE  Home: TBD 
 
DISCHARGE PLANNING 1. Discharge Plan: If patient were to stabilize and be safe for transport, patient son's would like patient to return to [facility] ([facility]) 2. Patient/Family teaching:  Cortney Asencio and Esmer Delgado bedside 3. Response to patient/family teaching: Both Bart Edwards and Hailey Yu state they are in agreement with hospice plan of care. SOCIAL/EMOTIONAL/SPIRITUAL NEEDS Spiritual Issues Identified: None. Open to Hospice Chaplain support Psych/ Social/ Emotional Issues Identified: Patient's   in [facility] care in the home in 2018. No obvious emotional issues noted; both son's bedside seem to be supporting each other. Caregiver Support: 
[x] Provided information on End of Life Care  
[x] Material Provided: Nicole Naranjo From My [facility]ight or [facility]  
 
CARE COORDINATION Dr. Karey Mcmillan contacted, discharge to hospice order received Dr. Michelle Woo contacted, agrees to serve as attending provider for hospice and provided verbal certification of terminal illness with life expectancy of 6 months or less. Orders for hospice admission, medications and plan of treatment received. Medication reconciliation completed. MEDS: See medication list below DME: Per hospital 
Supplies: Per hospital 
IDT communication to include MD, SN, SW, CH and support team 
 
ALLERGIES AND MEDICATIONS Allergies: Allergies Allergen Reactions  Pcn [Penicillins] Unknown (comments) Childhood reaction rash Current Facility-Administered Medications Medication Dose Route Frequency  LORazepam (ATIVAN) injection 1 mg  1 mg IntraVENous Q15MIN PRN  
 ketorolac (TORADOL) injection 15 mg  15 mg IntraVENous Q8H PRN  
 glycopyrrolate (ROBINUL) injection 0.2 mg  0.2 mg IntraVENous Q4H PRN  
 bisacodyL (DULCOLAX) suppository 10 mg  10 mg Rectal DAILY PRN  
 sodium chloride (NS) flush 5 mL  5 mL InterCATHeter PRN  
 [START ON 2021] scopolamine (TRANSDERM-SCOP) 1 mg over 3 days 1 Patch  1 Patch TransDERmal Q72H  
 HYDROmorphone (DILAUDID) syringe 0.5 mg  0.5 mg IntraVENous Q4H  
 HYDROmorphone (DILAUDID) syringe 0.5 mg  0.5 mg IntraVENous Q15MIN PRN  
 LORazepam (ATIVAN) injection 1 mg  1 mg IntraVENous Q4H Sheryle Roulette, RN, [facility] Hospice Nurse Liaison [phone] Mobile [phone] Office

## 2021-02-10 NOTE — PROGRESS NOTES
Yeny Vasquez [address] Office [phone] Fax [phone] Assessment and Plan Gregory Santos is a 77 y.o. female with a PMHx of  CKD3, recurrent UTI, anxiety, severe pulmonary HTN, chronic pain, osteoporosis who was admitted for septic shock. 24 Hour Events: patient's family decided to transition patient to comfort care ID Septic Shock: POA 3/4 SIRS (HR, RR, WBC), LA 6.25, LD 2478, CRP 5.19, Ddimer 13. Possibly 2/2 UTI vs PNA vs Ascending Cholangitis. Unresponsive to 30cc/kg fluid bolus and started on pressors. UA concerning for infection. CT chest w/ small b/l pleural effusions, partial collapse of both lower lobes. RUQ US cholelithiasis w/o biliary dilation. Blood and urine cultures pending. S/p vanc, cefepime, doxycycline, flagyl. Pulmonary, cardiology, neprhology, general surgery, following during admission. Due to patient's declining clinical status and prognosis, patient's family decided to transition patient to comfort care. - Continue comfort care - Consult hospice H/o recurrent UTI: Pt w/ multiple positive urine cultures on file w/ Proteus and Ecoli. RESPIRATORY 
AHRF: On 2L O2 at baseline due to severe pulmonary HTN. Desatted to low 50s, requiring increased O2 on nasal canula and non rebreather. CT chest w/ small b/l pleural effusions, partial collapse of both lower lobes. VQ scan was negative. RVP & COVID PCR negative. PNA labs pending. Pulmonary following during admission.  
- Continue comfort care  
  
Covid Negative: PCR & Rapid negative.  
  
Severe pulmonary HTN: First noted on Echo in 11/2019.  
- Continue comfort care  
  
NEURO: 
Altered Mental Status: POA unresponsive likely 2/2 septic shock. CT head w/ no evidence of acute infarct, periventricular white matter disease noted.  Acetaminophen, salicylate, TSH level normal.  
- Continue comfort care  
  
CARDIOVASCULAR 
 New onset paroxysmal atrial fib w/RVR: Required treatment with amiodarone drip. Cardiology following during admission.  
- Continue comfort care  
  
Tropinemia: POA trop 5.66. Likely 2/2 supply/demand ischemia in the setting of septic shock. ECHO LVEF 61-65% with dilated RV/RA and moderately-severely reduced systolic function, severe pulmonary HTN. Cardiology following during admission.  
- Continue comfort care  
  
QTc prolongation: EKG w/ QTc 529.  
  
Sinus tachycardia: Noted in the OP setting and on previous admissions. POA EKG w/ sinus tach. Home atenolol 50mg QHS held during admission.  
- Continue comfort care  
  
GASTROENTEROLOGY Shock liver: POA , AST 1142, alk phos 148, Tbili 2.1. Likely 2/2 septic shock.  
- Continue comfort care  
  
RENAL Acute renal failure in the setting of CKD3: POA creatinine 3.92 (b/l 1.3). Likely 2/2 to septic shock. Nephrology following during admission.  
 - Continue comfort care  
  
MSK/DERM Osteoporosis w/ recurrent falls: Chronic compression fractures to thoracic and lumbar spine seen on CT scan. - Continue comfort care  
  
Intertrigo: Pt w/ hx of rash to skin folds on abdomen. Treated with nystatin powder. - Continue comfort care  
  
PSYCHIATRIC  
Anxiety: Home wellbutrin 100mg BID, celexa 10mg QHS, valium 5mg QHS held during admission.  
- Continue comfort care  
  
OTHER Chronic pain: Home gabapentin 100mg daily and tramadol 50mg daily held during admission.  
- Continue comfort care Tamara Bob MD 
Family Medicine Resident Subjective / Objective Subjective: Pt was seen and resting comfortably in bed. Family is at bedside and reports patient is slightly agitated at times, but pain appears well controlled. Would like to talk to Hospice. Objective: 
Temp (24hrs), Av.1 °F (36.2 °C), Min:96.5 °F (35.8 °C), Max:97.7 °F (36.5 °C) Visit Vitals BP (!) 104/52 (BP 1 Location: Right upper arm, BP Patient Position: Supine) Pulse (!) 106 Temp 97.7 °F (36.5 °C) Resp 14 Ht 5' 5\" (1.651 m) Wt 163 lb 12.8 oz (74.3 kg) SpO2 (!) 72% BMI 27.26 kg/m² Physical Exam: 
General: No acute distress. Resting comfortably in bed. Respiratory: Unlabored breathing on NRB. GI: Nondistended. Extremities: No LE edema. Skin: No visible rashes. Respiratory: O2 Flow Rate (L/min): 15 l/min O2 Device: Non-rebreather mask I/O: 
Date 02/09/21 0700 - 02/10/21 0357 02/10/21 0700 - 02/11/21 4026 Shift [id number] 8288-9098 24 Hour Total 6687-3995 1183-5855 24 Hour Total  
INTAKE  
I.V.(mL/kg/hr) 300(0.3)  300(0.2) Amiodarone Volume 100  100 Volume (doxycycline (VIBRAMYCIN) 100 mg in 0.9% sodium chloride (MBP/ADV) 100 mL MBP) 100  100 Volume (metroNIDAZOLE (FLAGYL) IVPB premix 500 mg) 100  100 Shift Total(mL/kg) 300(4)  300(4) OUTPUT Urine(mL/kg/hr) 40(0) 20(0) 60(0) Urine Output (mL) (Urinary Catheter 02/09/21 Jovel) 40 20 60 Shift Total(mL/kg) 40(0.5) 20(0.3) 60(0.8)  -20 240 Weight (kg) 74.3 74.3 74.3 74.3 74.3 74.3 Inpatient Medications Current Facility-Administered Medications Medication Dose Route Frequency  ondansetron (ZOFRAN) injection 4 mg  4 mg IntraVENous Q4H PRN  
 LORazepam (ATIVAN) injection 1 mg  1 mg IntraVENous Q15MIN PRN  
 scopolamine (TRANSDERM-SCOP) 1 mg over 3 days 1 Patch  1 Patch TransDERmal Q72H PRN  
 morphine 10 mg/ml injection 10 mg  10 mg Inhalation Q1H PRN  
 sodium chloride (NS) flush 5-10 mL  5-10 mL IntraVENous PRN  
 sodium chloride (NS) flush 5-40 mL  5-40 mL IntraVENous Q8H  
 sodium chloride (NS) flush 5-40 mL  5-40 mL IntraVENous PRN  
 0.9% sodium chloride infusion  100 mL/hr IntraVENous CONTINUOUS Allergies Allergies Allergen Reactions  Pcn [Penicillins] Unknown (comments) Childhood reaction rash CBC: 
Recent Labs 02/09/21 
0336 02/08/21 
1845 WBC 14.7* 15.6* HGB 10.1* 11.6 HCT 33.2* 37.6 * 165 Metabolic Panel: 
Recent Labs 02/09/21 
0830 02/09/21 
7227 02/09/21 
0115 02/08/21 
1845   --  142 141  
K 5.1  --  5.2* 5.3*  
*  --  109* 105 CO2 27  --  24 18* BUN 26*  --  21* 19  
CREA 3.61*  --  3.65* 3.92* *  --  171* 45* CA 7.4*  --  7.6* 9.1 MG  --   --   --  2.4 PHOS  --   --   --  5.7* ALB 3.0*  --  3.0* 3.2* *  --  723* 372* INR  --  2.0*  --   --   
 
 
 
 
Imaging/Diagnostic Studies: 
Results from Hospital Encounter encounter on 02/08/21 XR CHEST PORT Narrative INDICATION: Altered mental status, hypotension. Portable AP semiupright view of the chest. 
 
Direct comparison made to prior chest x-ray dated December 2020. Cardiomediastinal silhouette is stable. Lungs are hypoinflated. There is minimal 
bibasilar atelectasis. No pleural fluid is visualized. There is no pneumothorax. The osseous structures are diffusely demineralized Impression Normal bibasilar atelectasis. Results from [facility] Encounter encounter on 02/08/21  ABD LTD Narrative INDICATION: Concern for ascending cholangitis COMPARISON: None TECHNIQUE: 
Routine ultrasound images of the abdomen were obtained. FINDINGS: 
LIVER: No significant enlargement or evidence of parenchymal lesion. MAIN PORTAL VEIN: Patent with appropriate hepatopetal flow direction. GALLBLADDER: Cholelithiasis. Rawleigh Billing COMMON BILE DUCT: 3 mm in diameter. No significant dilatation. PANCREAS: Not well-visualized secondary to overlying bowel gas pattern. RIGHT KIDNEY: 7.7 cm in length. No hydronephrosis or nephrolithiasis. OTHER: N/A. Impression Limited study due to patient's immobility. Cholelithiasis without biliary 
dilatation Results from [facility] Encounter encounter on 02/08/21 CT ABD PELV WO CONT Narrative EXAM: CT CHEST WO CONT, CT ABD PELV WO CONT INDICATION: Ecchymosis, altered mental status COMPARISON: CT November 2019. 
 
TECHNIQUE: Helical CT of the chest, abdomen, and pelvis following the uneventful 
intravenous administration of 100 mL Isovue-370. Oral contrast was not utilized. 
Coronal and sagittal reformats were generated.  CT dose reduction was achieved 
through use of a standardized protocol tailored for this examination and 
automatic exposure control for dose modulation. 
 
FINDINGS: 
 
THYROID: No nodule. 
MEDIASTINUM: No mass or lymphadenopathy. 
ROSY: No mass or lymphadenopathy. 
THORACIC AORTA: No aneurysm. 
MAIN PULMONARY ARTERY: Normal in caliber. 
HEART: Cardiomegaly with coronary artery atherosclerosis 
ESOPHAGUS: No wall thickening or dilatation. 
TRACHEA/BRONCHI: Patent. 
PLEURA: Small bilateral pleural effusions 
LUNGS: Paraseptal emphysema. Partial collapse of both lower lobes 
 
Liver: No mass or biliary dilatation. 
Biliary tree: Filled with gallstones. The CBD is not dilated. 
Spleen: Within normal limits. 
Pancreas: No inflammation, mass or ductal dilatation. 
Adrenals: Unremarkable. 
Kidneys: Punctate nonobstructing left renal stones 
Stomach: Unremarkable. 
Small bowel: No dilatation or wall thickening. 
Colon: Diverticulosis of the colon, with no evidence of acute diverticulitis. 
Appendix: Not visualized 
Peritoneum: No ascites or pneumoperitoneum. 
Retroperitoneum: Infrarenal abdominal aortic aneurysm measuring 2.6 cm 
Reproductive organs: Uterus is noted 
Urinary bladder: No mass or calculus. 
Bones: Multiple age-indeterminate compression fractures throughout the thoracic 
and lumbar spine 
Abdominal wall: No mass or hernia. 
Additional comments: Right inguinal central venous catheter 
  
 Impression 1. Small bilateral pleural effusions. 
2. Partial collapse of both lower lobes. Superimposed pneumonia cannot be 
excluded. 
3. Cardiomegaly with coronary artery atherosclerosis. 
4. Cholelithiasis. 
 5. Multiple age-indeterminate compression fractures involving the thoracic and 
lumbar spine. 6. Diverticulosis of the colon. No acute diverticulitis. For Billing Chief Complaint Patient presents with  Altered mental status  Hypotension Hospital Problems  Date Reviewed: 1/19/2021 Codes Class Noted POA Diverticulosis ICD-10-CM: K57.90 ICD-9-CM: 562.10  2/9/2021 Unknown Acute renal failure (ARF) (HCC) ICD-10-CM: N17.9 ICD-9-CM: 584.9  2/9/2021 Unknown Acute liver failure ICD-10-CM: K72.00 ICD-9-CM: 189  2/9/2021 Unknown Prolonged Q-T interval on ECG ICD-10-CM: R94.31 
ICD-9-CM: 794.31  2/9/2021 Unknown Anemia ICD-10-CM: D64.9 ICD-9-CM: 285.9  2/9/2021 Unknown Elevated troponin ICD-10-CM: R77.8 ICD-9-CM: 790.6  2/9/2021 Unknown Multiple bruises ICD-10-CM: T07. Linda Hamilton ICD-9-CM: 924.8  2/9/2021 Unknown Elevated liver enzymes ICD-10-CM: R74.8 ICD-9-CM: 790.5  2/9/2021 Unknown PAF (paroxysmal atrial fibrillation) (HCC) ICD-10-CM: I48.0 ICD-9-CM: 427.31  2/9/2021 Unknown * (Principal) Septic shock ([facility] [address] ICD-10-CM: A41.9, R65.21 ICD-9-CM: 038.9, 785.52, 995.92  2/8/2021 Unknown Fall ICD-10-CM: W19. Linda Hamilton ICD-9-CM: E888.9  3/6/2020 Yes Moderate episode of recurrent major depressive disorder (HCC) ICD-10-CM: F33.1 ICD-9-CM: 296.32  3/6/2020 Yes Physical debility ICD-10-CM: R53.81 ICD-9-CM: 799.3  11/25/2019 Yes CKD (chronic kidney disease) stage 3, GFR 30-59 ml/min ICD-10-CM: N18.30 ICD-9-CM: 585.3  11/24/2019 Yes Anxiety ICD-10-CM: F41.9 ICD-9-CM: 300.00  2/7/2012 Yes Iron deficiency anemia, unspecified ICD-10-CM: D50.9 ICD-9-CM: 280.9  1/12/2012 Yes Osteoarthritis ICD-10-CM: M19.90 ICD-9-CM: 715.90  12/30/2009 Yes

## 2021-02-10 NOTE — H&P
[facility] Good Help to Those in Need 
[phone] Patient Name: Brandin Campbell YOB: 1945 Date of Provider Hospice Visit: 02/10/21 Level of Care:   [x] General Inpatient (GIP)    [] Routine   [] Respite Current Location of Care: 
[] [facility] [x] [facility] [] [facility] [] [facility] [] [facility], patient referred from: 
[] [facility] [] [facility] [] [facility] [] [facility] [] Home [] Other:  
 
Date of Original Hospice Admission:  2/10/2021 Hospice Medical Director at time of admission: Dr. Danita Mancia Principle Hospice Diagnosis: Sepsis with multi-organ failure Diagnoses RELATED to the terminal prognosis: acute on chronic kidney disease, recurrent UTI's, anxiety, severe pulmonary HTN, chronic pain Other Diagnoses: Osteoporosis with chronic compression fractures of lumbar and thoracic spine. Yelena Campbell is a 77y.o. year old who was admitted to [facility]. She lived at [facility] and was found to have [address] and hypotension yesterday and she was admitted with septic shock. Her BP was unresponsive to fluid bolus and she was started on pressors. CT chest showed small bilateral pleural effusions with partial collapse of both lower lobes. She also had a UA concerning for infection and a RUQ US which showed cholelithiasis without biliary dilation. She was given vanc, cefepime, doxy, and flagyl. Due to her declining status, family elected to transition her to hospice care. Her sons both stated that did not want life extended and did not want to suffer. She has been unresponsive since yesterday evening. She also had new onset a fib with RVR which was first treated with amiodarone. Her troponin was 5.77 and this was felt to be supply/demand ischemia in the setting of septic shock. She has acute respiratory failure with sats in the 50's before a rebreather was started. She also has shock liver. The patient's principle diagnosis has resulted in multi-organ failure and obtundation Functionally, the patient's Karnofsky and/or Palliative Performance Scale has declined over a period of 2 days and is estimated at 10. The patient is dependent on the following ADLs: 
 
Objective information that support this patients limited prognosis includes: kidney failure, shock liver and obtundation. See testing below: Component Value Flag Ref Range Units Status Sodium 144   136 - 145 mmol/L Final  
Potassium 5.1   3.5 - 5.1 mmol/L Final  
Chloride 109  High   97 - 108 mmol/L Final  
CO2 27   21 - 32 mmol/L Final  
Anion gap 8   5 - 15 mmol/L Final  
Glucose 136  High   65 - 100 mg/dL Final  
BUN 26  High   6 - 20 MG/DL Final  
Creatinine 3.61  High   0.55 - 1.02 MG/DL Final  
BUN/Creatinine ratio 7  Low   12 - 20   Final  
GFR est AA 15  Low   >60 ml/min/1.73m2 Final  
GFR est non-AA 12  Low   >60 ml/min/1.73m2 Final  
Calcium 7.4  Low   8.5 - 10.1 MG/DL Final  
Bilirubin, total 2.0  High   0.2 - 1.0 MG/DL Final  
ALT (SGPT) 904  High   12 - 78 U/L Final  
AST (SGOT) >2,000  High   15 - 37 U/L Final  
Alk. phosphatase 128  High   45 - 117 U/L Final  
Protein, total 5.7  Low   6.4 - 8.2 g/dL Final  
Albumin 3.0  Low   3.5 - 5.0 g/dL Final  
Globulin 2.7   2.0 - 4.0 g/dL Final  
A-G Ratio 1.1   1.1 - 2.2   Final  
 
Component Value Flag Ref Range Units Status Sodium 142   136 - 145 mmol/L Final  
Potassium 5.2  High   3.5 - 5.1 mmol/L Final  
Chloride 109  High   97 - 108 mmol/L Final  
CO2 24   21 - 32 mmol/L Final  
Anion gap 9   5 - 15 mmol/L Final  
Glucose 171  High   65 - 100 mg/dL Final  
BUN 21  High   6 - 20 MG/DL Final  
Creatinine 3.65  High   0.55 - 1.02 MG/DL Final  
BUN/Creatinine ratio 6  Low   12 - 20   Final  
GFR est AA 15  Low   >60 ml/min/1.73m2 Final  
GFR est non-AA 12  Low   >60 ml/min/1.73m2 Final  
Calcium 7.6  Low   8.5 - 10.1 MG/DL Final  
 Bilirubin, total 2.3  High   0.2 - 1.0 MG/DL Final  
ALT (SGPT) 723  High   12 - 78 U/L Final  
AST (SGOT) >2,000  High   15 - 37 U/L Final  
Alk. phosphatase 136  High   45 - 117 U/L Final  
Protein, total 6.2  Low   6.4 - 8.2 g/dL Final  
Albumin 3.0  Low   3.5 - 5.0 g/dL Final  
Globulin 3.2   2.0 - 4.0 g/dL Final  
A-G Ratio 0.9  Low   1.1 - 2.2   Final  
 
 
 
 
Study Result EXAM: CT CHEST WO CONT, CT ABD PELV WO CONT 
  
INDICATION: Ecchymosis, altered mental status 
  
COMPARISON: CT November 2019. 
  
TECHNIQUE: Helical CT of the chest, abdomen, and pelvis following the uneventful 
intravenous administration of 100 mL Isovue-370. Oral contrast was not utilized. Coronal and sagittal reformats were generated. CT dose reduction was achieved 
through use of a standardized protocol tailored for this examination and 
automatic exposure control for dose modulation. 
  
FINDINGS: 
  
THYROID: No nodule. MEDIASTINUM: No mass or lymphadenopathy. ROSY: No mass or lymphadenopathy. THORACIC AORTA: No aneurysm. MAIN PULMONARY ARTERY: Normal in caliber. HEART: Cardiomegaly with coronary artery atherosclerosis ESOPHAGUS: No wall thickening or dilatation. TRACHEA/BRONCHI: Patent. PLEURA: Small bilateral pleural effusions LUNGS: Paraseptal emphysema. Partial collapse of both lower lobes 
  
Liver: No mass or biliary dilatation. Biliary tree: Filled with gallstones. The CBD is not dilated. Spleen: Within normal limits. Pancreas: No inflammation, mass or ductal dilatation. Adrenals: Unremarkable. Kidneys: Punctate nonobstructing left renal stones Stomach: Unremarkable. Small bowel: No dilatation or wall thickening. Colon: Diverticulosis of the colon, with no evidence of acute diverticulitis. Appendix: Not visualized Peritoneum: No ascites or pneumoperitoneum. Retroperitoneum: Infrarenal abdominal aortic aneurysm measuring 2.6 cm Reproductive organs: Uterus is noted Urinary bladder: No mass or calculus. Bones: Multiple age-indeterminate compression fractures throughout the thoracic 
and lumbar spine Abdominal wall: No mass or hernia. Additional comments: Right inguinal central venous catheter 
  
IMPRESSION 
  
1. Small bilateral pleural effusions. 2. Partial collapse of both lower lobes. Superimposed pneumonia cannot be 
excluded. 3. Cardiomegaly with coronary artery atherosclerosis. 4. Cholelithiasis. 5. Multiple age-indeterminate compression fractures involving the thoracic and 
lumbar spine. 6. Diverticulosis of the colon. No acute diverticulitis. Study Result--NM LUNG SCAN PERF 
 
  
  
Heterogeneous perfusion within both lungs. Evidence of bilateral pleural 
effusions. No large mismatch is present. Low probability for pulmonary embolism. 
  
Preliminary report was provided by Dr. Kaushal Rod, the on-call radiologist, at [address] [facility] AM 
  
  
  
  
Clinical History: Hypotension, decreased responsiveness, elevated d-dimer 
  
Comparison to chest radiograph dated 2/8/2021 
  
Ventilation: Ventilation images were not obtained due to Covid precautions. 
  
Perfusion: Perfusion images were obtained in 6 projections utilizing 4.2 mCi Tc-99 M MAA. There is heterogeneous tracer distribution throughout the periphery 
of both lung fields, however no specific focal area of decreased attenuation is 
identified. 
  
IMPRESSION Heterogeneous perfusion throughout both lung fields remains low 
probability despite the lack of ventilation imaging. 
  
   
 
 
Functionally, the patient's Karnofsky and/or Palliative Performance Scale has declined over a period of 2 days and is estimated at 10. The patient is dependent on the following ADLs: 
 
Objective information that support this patients limited prognosis includes: kidney failure, liver The patient/family chose comfort measures with the support of Hospice. HOSPICE DIAGNOSES Active Symptoms: 
1. Tachypnea 2. Hypoxia 3. Pain 4. Anxiety 5. Hospice care patient PLAN 1. Patient admitted as GIP to hospice. She is requiring IV medications, frequent nursing assessments, and would not survive transport home. 2.  D/C morphine secondary to GFR of 12. Dilaudid 0.5 mg IV every four hours scheduled and IV every 15 minutes prn. 
3.  Ativan 1 mg IV every 4 hours and every 15 minutes prn. 
4.  Scopolamine patch applied 5. Robinul prn every prn IV 6. Toradol 15 mg every 8 hours prn fever 7. Continue rojas catheter 8.  and SW to support family needs 9. Disposition: Likely to pass in hospital 
10. Hospice Plan of care was reviewed in detail and agree with current plan of care Prognosis estimated based on 02/10/21 clinical assessment is:  
[x] Hours to Days   
[] Days to Weeks   
[] Other: 
 
Communicated plan of care with: Hospice Case Manager; Hospice IDT; Care Team 
 
 GOALS OF CARE Patient/Medical POA stated Goal of Care: comfort/hospice care 
 
[x] I have reviewed and/or updated ACP information in the Advance Care Planning Navigator. This information is available in the [facility] [address] link in the patient's chart header. Primary Decision Maker (Health Care Agent): Francis Simpson and Alisson Otero Resuscitation Status: DNR If DNR is there a Durable DNR on file? : [x] Yes [] No (If no, complete Durable DNR) HISTORY History obtained from: family, bedside nurse and chart. CHIEF COMPLAINT: patient is unresponsive The patient is:  
[] Verbal 
[] Nonverbal 
[x] Unresponsive HPI/SUBJECTIVE:  Patient admitted for sepsis and declined rapidly and transitioned to hospice this afternoon. She has been unresponsive per family and nurse. She has been receiving ativan prn frequently and one dose of morphine. She has not liked the face mask and family are hoping it can be weaned and removed for her comfort. Her sons state she never liked wearing any type of oxygen. Family wants to honor mother's wishes and not prolong life and will stay with her. REVIEW OF SYSTEMS The following systems were: [] reviewed  [x] unable to be reviewed--patient unresponsive Adult Non-Verbal Pain Assessment Score: 3 Face [x] 0   No particular expression or smile 
[] 1   Occasional grimace, tearing, frowning, wrinkled forehead 
[] 2   Frequent grimace, tearing, frowning, wrinkled forehead Activity (movement) [] 0   Lying quietly, normal position 
[x] 1   Seeking attention through movement or slow, cautious movement 
[] 2   Restless, excessive activity and/or withdrawal reflexes Guarding 
[x] 0   Lying quietly, no positioning of hands over areas of body 
[] 1   Splinting areas of the body, tense 
[] 2   Rigid, stiff Physiology (vital signs) 
[] 0   Stable vital signs [x] 1   Change in any of the following: SBP > 20mm Hg; HR > 20/minute 
[] 2   Change in any of the following: SBP > 30mm Hg; HR > 25/minute Respiratory 
[] 0   Baseline RR/SpO2, compliant with ventilator 
[x] 1   RR > 10 above baseline, or 5% drop SpO2, mild asynchrony with ventilator 
[] 2   RR > 20 above baseline, or 10% drop SpO2, asynchrony with ventilator FUNCTIONAL ASSESSMENT Palliative Performance Scale (PPS):10 
 
 PSYCHOSOCIAL/SPIRITUAL ASSESSMENT Active Problems: 
  Sepsis with multiple organ dysfunction (MOD) ([facility] (2/10/2021) Past Medical History:  
Diagnosis Date  Anxiety  Arthritis   
 knees,shoulders,fingers,back,neck  CKD (chronic kidney disease) stage 2, GFR 60-89 ml/min Dr. Maritza Molina  Femur fracture, left ([facility] 2008  Femur fracture, right ([facility] 2009  Insomnia  Intertrigo 7/7/2015  Knee pain Dr. Bean Edwards  Moderate episode of recurrent major depressive disorder ([facility] 3/6/2020  Osteopenia Dr. Bean Edwards  PAF (paroxysmal atrial fibrillation) ([facility] 2/9/2021  Psychiatric disorder   
 anxiety  Pyloric stenosis 2010  Sleep disorder Insomnia  Thoracic vertebral fracture ([facility] 2005  Ulcer 2012 Perferatied ulcer Past Surgical History:  
Procedure Laterality Date  HX FEMUR FRACTURE TX Left femur - 2008, R femur - 2009  HX GI  2010 Perforated ulcer, in relation to pyloric stenosis  HX ORTHOPAEDIC    
 6 thoracic vertabrae that were fractured in 2005  AR COLSC FLX W/NDSC US XM RCTM ET AL LMTD&ADJ STRUX  12/6/10 Normal except Diverticulosis Social History Tobacco Use  Smoking status: Former Smoker  Smokeless tobacco: Never Used Substance Use Topics  Alcohol use: No  
  Alcohol/week: 0.0 standard drinks Family History Problem Relation Age of Onset  Cancer Sister   
     metastatic lung Cancer  Cancer Mother   
     colon Allergies Allergen Reactions  Pcn [Penicillins] Unknown (comments) Childhood reaction rash Current Facility-Administered Medications Medication Dose Route Frequency  LORazepam (ATIVAN) injection 1 mg  1 mg IntraVENous Q15MIN PRN  
 ketorolac (TORADOL) injection 15 mg  15 mg IntraVENous Q8H PRN  
 glycopyrrolate (ROBINUL) injection 0.2 mg  0.2 mg IntraVENous Q4H PRN  
 bisacodyL (DULCOLAX) suppository 10 mg  10 mg Rectal DAILY PRN  
 sodium chloride (NS) flush 5 mL  5 mL InterCATHeter PRN  
 [START ON 2/12/2021] scopolamine (TRANSDERM-SCOP) 1 mg over 3 days 1 Patch  1 Patch TransDERmal Q72H  
 HYDROmorphone (DILAUDID) syringe 0.5 mg  0.5 mg IntraVENous Q4H  
 HYDROmorphone (DILAUDID) syringe 0.5 mg  0.5 mg IntraVENous Q15MIN PRN  
  LORazepam (ATIVAN) injection 1 mg  1 mg IntraVENous Q4H PHYSICAL EXAM  
 
Wt Readings from Last 3 Encounters:  
02/09/21 74.3 kg (163 lb 12.8 oz) 12/13/20 76.3 kg (168 lb 4.8 oz) 08/06/20 74.8 kg (165 lb) There were no vitals taken for this visit. Supplemental O2  [x] Yes  [] NO Last bowel movement:  
 
Currently this patient has: 
[x] Peripheral IV [] PICC  [] PORT [] ICD [x] Jovel Catheter [] NG Tube   [] PEG Tube   
[] Rectal Tube [] Drain 
[] Other:  
 
Constitutional:Patient unresponsive, lying in bed with neck and head to right and difficult to move from that position Eyes: closed ENMT: Oral mucosa moist 
Cardiovascular:  Tachycardic at 107, S1, S2 no GRM's 
Respiratory: scattered rales, very decreased in bases Gastrointestinal: BS hypoactive, NTTP, no distention Musculoskeletal: Moves fingers and toes when touched Skin:  Multiple ecchymotic areas, thick scabbing over dorsum of left hand; toes bilaterally are purple and cold Neurologic:  Not responsive Psychiatric:  
Other:  
 
 
Pertinent Lab and or Imaging Tests: 
Lab Results Component Value Date/Time Sodium 144 02/09/2021 08:30 AM  
 Potassium 5.1 02/09/2021 08:30 AM  
 Chloride 109 (H) 02/09/2021 08:30 AM  
 CO2 27 02/09/2021 08:30 AM  
 Anion gap 8 02/09/2021 08:30 AM  
 Glucose 136 (H) 02/09/2021 08:30 AM  
 BUN 26 (H) 02/09/2021 08:30 AM  
 Creatinine 3.61 (H) 02/09/2021 08:30 AM  
 BUN/Creatinine ratio 7 (L) 02/09/2021 08:30 AM  
 GFR est AA 15 (L) 02/09/2021 08:30 AM  
 GFR est non-AA 12 (L) 02/09/2021 08:30 AM  
 Calcium 7.4 (L) 02/09/2021 08:30 AM  
 
Lab Results Component Value Date/Time  Protein, total 5.7 (L) 02/09/2021 08:30 AM  
 Albumin 3.0 (L) 02/09/2021 08:30 AM

## 2021-02-10 NOTE — PROGRESS NOTES
General Surgery Transition to comfort care/hospice noted. No further recommendations at this time. Will sign off. Kelsey Nguyen MD 
[facility]

## 2021-02-11 NOTE — HOSPICE
Hospice Liaison Note: 
 
Chart reviewed for updates in plan of care. Plan to visit with patient and family this morning. Please call with updates Obi Donovan RN, [facility] Nurse Liaison [phone] [place] [phone] Office

## 2021-02-11 NOTE — [facility] IDG SOCIAL WORKER NOTES
LCSW remaining available for support for family and patient as well as discharge planning should patient stabilize. RYLEY Payton, [facility] Work[facility] 
[phone]

## 2021-02-11 NOTE — [facility] IDG MASTER NOTE
[address] [facility] Date: 02/11/21 Time: 6:45 AM 
Ms. Ki Rivera is a recent GIP admission to [facility]. The plan is for the hospice chaplain to visit Ms. Ki Rivera and her family to offer spiritual support. The  will coordinate care with the care team.  
___________________ Patient: Jennifer Organ Coverage Information: 
   Payor: True Risk Plan: [place] MEDICARE PART A & B Subscriber ID: [id number] Phone Number: MRN: [id number] CCN: 
[place] Claim No. :  
 
Hospice Election Date:  
Current Benefit Period: Benefit Period 1 Start Date: 2/10/2021 End Date: 5/10/2021 Medical Director:  
Hospice Attending Provider: MD Shaun Florez [address] [place] [address] Phone: [phone] Fax: [phone] Level of Care: General Inpatient Care 
 
 
___________________ Diagnoses: There were no encounter diagnoses. Current Medications: 
 
Current Facility-Administered Medications:  
  LORazepam (ATIVAN) injection 1 mg, 1 mg, IntraVENous, Q15MIN PRN, Elodia Franks MD 
  ketorolac (TORADOL) injection 15 mg, 15 mg, IntraVENous, Q8H PRN, Elodia Franks MD 
  glycopyrrolate (ROBINUL) injection 0.2 mg, 0.2 mg, IntraVENous, Q4H PRN, Elodia Franks MD 
  bisacodyL (DULCOLAX) suppository 10 mg, 10 mg, Rectal, DAILY PRN, Elodia Franks MD 
  sodium chloride (NS) flush 5 mL, 5 mL, InterCATHeter, PRN, Hoa Miranda MD 
  [START ON 2/12/2021] scopolamine (TRANSDERM-SCOP) 1 mg over 3 days 1 Patch, 1 Patch, TransDERmal, Q72H, Elodia Franks MD 
  HYDROmorphone (DILAUDID) syringe 0.5 mg, 0.5 mg, IntraVENous, Q4H, Elodia Franks MD, 0.5 mg at 02/11/21 0426 
  HYDROmorphone (DILAUDID) syringe 0.5 mg, 0.5 mg, IntraVENous, Q15MIN PRN, Elodia Franks MD 
  LORazepam (ATIVAN) injection 1 mg, 1 mg, IntraVENous, Q4H, Elodia Franks MD, 1 mg at 02/11/21 0046 Orders: 
Orders Placed This Encounter [address] patient with sons bedside Hospice patient with robert bedside Standing Status:   Standing Number of Occurrences:   1 Order Specific Question:   Reason for Consult: Answer:   Hospice patient with robert bedside  NURSING-MISCELLANEOUS: NO admission labs, x-rays or other diagnostic tests, unless pertinent to symptom control 1.  .  CONTINUOUS  
  1.  .  
  Standing Status:   Standing Number of Occurrences:   1 Order Specific Question:   Description of Order: Answer:   NO admission labs, x-rays or other diagnostic tests, unless pertinent to symptom control  COMFORT MEASURES ONLY Standing Status:   Standing Number of Occurrences:   [address] Daily in the am. Oxygen SAT PRN Standing Status:   Standing Number of Occurrences:   1  
 NOTIFY PROVIDER: SPECIFY [facility] [phone] FOR PAIN, DYSPNEA, AGITATION, OTHER DISTRESS OR NOT RESPONDING TO ORDERED INTERVENTIONS ONE TIME Routine Standing Status:   Standing Number of Occurrences:   1 Order Specific Question:   Please describe the test or procedure you would like to order. Answer:   [facility] [phone] FOR PAIN, DYSPNEA, AGITATION, OTHER DISTRESS OR NOT RESPONDING TO ORDERED INTERVENTIONS  
 NURSING-MISCELLANEOUS: [facility] at time of death [phone] CONTINUOUS Standing Status:   Standing Number of Occurrences:   1 Order Specific Question:   Description of Order: Answer:   [facility] at time of death [phone]  TURN & POSITION  
  TURN & POSITION EVERY 6 HOURS - PATIENT MAY REFUSE Standing Status:   Standing Number of Occurrences:   1  
 NURSING-MISCELLANEOUS: BITES AND SIPS FOR COMFORT CONTINUOUS Standing Status:   Standing Number of Occurrences:   1 Order Specific Question:   Description of Order: Answer:   BITES AND SIPS FOR COMFORT  
 BEDREST, COMPLETE Standing Status:   Standing   Number of Occurrences:   1  
 ORAL CARE  
 Keep mouth moisturized with sponge sticks/toothettes and tap water. Vaseline to lips and nares as needed. Standing Status:   Standing Number of Occurrences:   1  
 GARCIA CATHETER, CARE As per hospital policy for infection prevention Standing Status:   Standing Number of Occurrences:   1  
 NURSING-MISCELLANEOUS: Admit to [facility] level of care; SN visit daily X 7 with 5 visits PRN symptom control; SW visit 1 X weekly and 5 visits PRN family support,  visit 1 X weekly and 5 visits PRN spiritual support. CONTINUOUS Standing Status:   Standing Number of Occurrences:   1 Order Specific Question:   Description of Order: Answer:   Admit to GIP level of care; SN visit daily X 7 with 5 visits PRN symptom control; SW visit 1 X weekly and 5 visits PRN family support,  visit 1 X weekly and 5 visits PRN spiritual support.  NURSING-MISCELLANEOUS: Do Not Deep Suction patient. CONTINUOUS Standing Status:   Standing Number of Occurrences:   1 Order Specific Question:   Description of Order: Answer:   Do Not Deep Suction patient.  NURSING-MISCELLANEOUS: Please give dose of Lorzepam with Dilaudid. After 30-60minutes, decrease oxgyen from 15 lpm to 10 lpm NC and monitor for symptoms. Call Hospice with questions: [phone] CONTINUOUS Standing Status:   Standing Number of Occurrences:   1 Order Specific Question:   Description of Order: Answer:   Please give dose of Lorzepam with Dilaudid. After 30-60minutes, decrease oxgyen from 15 lpm to 10 lpm NC and monitor for symptoms. Call Hospice with questions: [phone]  DO NOT RESUSCITATE Standing Status:   Standing Number of Occurrences:   1  
 OXYGEN CANNULA Liters per minute: 15; Indications for O2 therapy: OTHER CONTINUOUS Routine Continue with mid-flow oxygen until hospice provider makes changes prior to decreasing oxygen dosing. Oxygen as needed. Adjust for comfort. Discontinue if not contributing to patient comfort.  
  Standing Status:   Standing  
  Number of Occurrences:   1  
  Order Specific Question:   Liters per minute:  
  Answer:   15  
  Order Specific Question:   Indications for O2 therapy  
  Answer:   OTHER  
• INSERT PERIPHERAL IV Maintain peripheral IV for hospice comfort medications  ONE TIME Routine  
  Maintain peripheral IV for hospice comfort medications  
  Standing Status:   Standing  
  Number of Occurrences:   1  
• FALL PRECAUTIONS  
  Standing Status:   Standing  
  Number of Occurrences:   1  
• LORazepam (ATIVAN) injection 1 mg  
• ketorolac (TORADOL) injection 15 mg  
• DISCONTD: scopolamine (TRANSDERM-SCOP) 1 mg over 3 days 1 Patch  
• glycopyrrolate (ROBINUL) injection 0.2 mg  
• bisacodyL (DULCOLAX) suppository 10 mg  
• sodium chloride (NS) flush 5 mL  
• scopolamine (TRANSDERM-SCOP) 1 mg over 3 days 1 Patch  
• HYDROmorphone (DILAUDID) syringe 0.5 mg  
• HYDROmorphone (DILAUDID) syringe 0.5 mg  
• LORazepam (ATIVAN) injection 1 mg  
• HYDROmorphone (DILAUDID) syringe 0.5 mg  
• LORazepam (ATIVAN) injection 1 mg  
• INITIAL PHYSICIAN ORDER: INPATIENT Inpatient Hospice; 3. Patient receiving treatment that can only be provided in an inpatient setting (further clarification in H&P documentation)  
  Standing Status:   Standing  
  Number of Occurrences:   1  
  Order Specific Question:   Status:  
  Answer:   HOSPICE [114]  
  Order Specific Question:   Type of Bed  
  Answer:   Inpatient Hospice [5]  
  Order Specific Question:   Inpatient Hospitalization Certified Necessary for the Following Reasons  
  Answer:   3. Patient receiving treatment that can only be provided in an inpatient setting (further clarification in H&P documentation)  
  Order Specific Question:   Admitting Diagnosis  
  Answer:   Sepsis with multiple organ dysfunction (MOD) ([facility]) [432465]  
  Order Specific Question:   Admitting Physician  
 Answer:   Peetz Honey Order Specific Question:   Attending Physician Answer:   Peetz Honey Order Specific Question:   Estimated Length of Stay Answer:   3-4 Midnights Order Specific Question:   Discharge Plan: Answer:   Extended Care Facility (e.g. Adult Home, Nursing Home, etc.) Order Specific Question:   Comments Answer:   IF patient stabilizes, plan to return to [place]. Allergies: Allergies Allergen Reactions  Pcn [Penicillins] Unknown (comments) Childhood reaction rash Care Plan: 
Multidisciplinary Problems (Active) Problem: Anxiety Dates: Start: 02/11/21 Disciplines: Nurse, Interdisciplinary, RT  
 Goal: *Alleviation of anxiety Dates: Start: 02/11/21 Disciplines: Interdisciplinary Intervention: Anxiety assessment (eg: Attention span; behavior manifestation; coping mechanism;  self-perception; sleep pattern) Dates: Start: 02/11/21 Description: REMINDER(s): 
For patients at risk of anxiety, consider the use of an anxiety assessment scale. Intervention: Massage therapy Dates: Start: 02/11/21 Intervention: Music therapy Dates: Start: 02/11/21 Intervention: Comfort promotion (eg: Uninterrupted sleep; sensory stimuli reduction) Dates: Start: 02/11/21 Intervention: Consult, pastoral care Dates: Start: 02/11/21 Goal: *Alleviation of anxiety (Palliative Care) Dates: Start: 02/11/21 Disciplines: Nurse, Interdisciplinary, RT Intervention: Refer patient for holistic services per facility Dates: Start: 02/11/21 Problem: Breathing Pattern - Ineffective Dates: Start: 02/10/21 Disciplines: Interdisciplinary Goal: *Absence of hypoxia Dates: Start: 02/10/21   Expected End: 02/17/21 Disciplines: Interdisciplinary Intervention: Breathing pattern signs and symptoms assessment (eg: Apnea; bradypnea; pursed-lip; dyspnea; hyperpnea; paradoxical; periodic; hyperventilation; hypoventilation; tachypnea) Dates: Start: 02/10/21 Intervention: Monitor for change in patient condition (eg: Vital signs; hypoxemia; mental status; level of consciousness; skin temperature, color) Dates: Start: 02/10/21 Intervention: Respiratory management (eg: Adequate humidification; early mobilization; perform manual percussion/vibration; postural drainage; suctioning; splinting) Dates: Start: 02/10/21 Intervention: Review for respiratory depression and administer medications as prescribed Dates: Start: 02/10/21 Goal: *Use of effective breathing techniques Dates: Start: 02/10/21   Expected End: 02/17/21 Disciplines: Interdisciplinary Intervention: Anxiety assessment (eg: Attention span; behavior manifestation; coping mechanism;  self-perception; sleep pattern) Dates: Start: 02/10/21 Intervention: Mental status assessment Dates: Start: 02/10/21 Intervention: Position change Dates: Start: 02/10/21 Problem: Falls - Risk of   
 Dates: Start: 02/10/21 Disciplines: Interdisciplinary Goal: *Absence of Falls Dates: Start: 02/10/21   Expected End: 02/17/21 Description: Document Francheska Starch Fall Risk and appropriate interventions in the flowsheet. Disciplines: Interdisciplinary Problem: Infection - Risk of, Urinary Catheter-Associated Urinary Tract Infection Dates: Start: 02/10/21 Disciplines: Interdisciplinary Goal: *Absence of infection signs and symptoms Dates: Start: 02/10/21   Expected End: 02/17/21 Disciplines: Interdisciplinary Intervention: Urinary catheter needs assessment (eg: Impaired neurologic status; post void residual; spinal cord injury; urinary outflow obstruction; urinary retention; urinary incontinence; fluid imbalance) Dates: Start: 02/10/21 Intervention: Urine assessment (eg: Clarity; color; odor; volume) Dates: Start: 02/10/21 Intervention: Infection signs and symptoms monitoring - urinary tract (eg: Flank or suprapubic pain; burning; fever; dysuria; frequency; urgency; cloudy/bloody/foul odor urine; confusion/agitation; incontinence) Dates: Start: 02/10/21 Intervention: Urinary catheter management (eg: Closed urinary catheter system maintenance; urinary catheter patency with free downhill flow; perineal care; monitor intake and output) Dates: Start: 02/10/21 Intervention: Urinary catheter discontinuation Dates: Start: 02/10/21 Description: REMINDER(s): 
Urinary catheter discontinuation promptly as indicated; remind physician to remove at or before day 5. Problem: Pain Dates: Start: 02/11/21 Disciplines: Nurse, Interdisciplinary, RT  
 Goal: *Control of Pain Dates: Start: 02/11/21 Disciplines: Nurse, Interdisciplinary, RT Intervention: Assess pain characteristics (eg: Intensity scale; onset; location; quality; severity; duration; frequency; radiation) Dates: Start: 02/11/21 Intervention: Assess pain management - barriers (eg: Past pain experiences) Dates: Start: 02/11/21 Intervention: Identify pain expectations (eg: Patient's pain goal; somatic experiences; behavioral changes; affect) Dates: Start: 02/11/21 Intervention: Identify pain medication concerns (eg: Cultural considerations; addiction concerns) Dates: Start: 02/11/21 Intervention: Support system identification (eg: Caregiver; community resource; family; friends; Faith; support group) Dates: Start: 02/11/21 Intervention: Monitor for change in patient condition (eg:  Vital signs changes; changes in level of consciousness; nausea; behavioral changes) Dates: Start: 02/11/21 Intervention: Medication side-effect assessment Dates: Start: 02/11/21 Intervention: Pain-relief response reassessment (eg: Frequency based on route of administration; effectiveness) Dates: Start: 02/11/21 Goal: *PALLIATIVE CARE:  Alleviation of Pain Dates: Start: 02/11/21 Disciplines: Nurse, Interdisciplinary, RT Intervention: Refer patient for holistic services per facility Dates: Start: 02/11/21 Problem: Patient Education: Go to Patient Education Activity Dates: Start: 02/10/21 Disciplines: Interdisciplinary Goal: Patient/Family Education Dates: Start: 02/10/21 Disciplines: Interdisciplinary Problem: Patient Education: Go to Patient Education Activity Dates: Start: 02/10/21 Disciplines: Interdisciplinary Goal: Patient/Family Education Dates: Start: 02/10/21 Disciplines: Interdisciplinary Problem: Patient Education: Go to Patient Education Activity Dates: Start: 02/11/21 Disciplines: Nurse, Interdisciplinary, RT  
 Goal: Patient/Family Education Dates: Start: 02/11/21 Disciplines: Nurse, Interdisciplinary, RT  
  
  
  
  
 Problem: Patient Education: Go to Patient Education Activity Dates: Start: 02/11/21 Disciplines: Interdisciplinary Goal: Patient/Family Education Dates: Start: 02/11/21 Disciplines: Interdisciplinary Problem: Pressure Injury - Risk of   
 Dates: Start: 02/10/21 Disciplines: Interdisciplinary Goal: *Prevention of pressure injury Dates: Start: 02/10/21   Expected End: 02/17/21 Description: Document Morteza Scale and appropriate interventions in the flowsheet. Disciplines: Interdisciplinary Care Plan Problems/Goals Progressing Towards Goal (13) *Absence of Falls (Falls - Risk of) Disciplines:  Interdisciplinary Expected end:  02/17/21 Outcome: Progressing Towards Goal By Farheen Alaniz RN on 02/11/21 5887 Patient/Family Education (Patient Education: Go to Patient Education Activity) Disciplines:  Interdisciplinary Expected end:  - Outcome: Progressing Towards Goal By Farheen Alaniz RN on 02/11/21 3753 *Prevention of pressure injury (Pressure Injury - Risk of) Disciplines:  Interdisciplinary Expected end:  02/17/21 Outcome: Progressing Towards Goal By Farheen Alaniz RN on 02/11/21 7463 Patient/Family Education (Patient Education: Go to Patient Education Activity) Disciplines:  Interdisciplinary Expected end:  - Outcome: Progressing Towards Goal By Farheen Alaniz RN on 02/11/21 8746 *Absence of infection signs and symptoms (Infection - Risk of, Urinary Catheter-Associated Urinary Tract Infection) Disciplines:  Interdisciplinary Expected end:  02/17/21 Outcome: Progressing Towards Goal By Farheen Alaniz RN on 02/11/21 6119 *Absence of hypoxia (Breathing Pattern - Ineffective) Disciplines:  Interdisciplinary Expected end:  02/17/21 Outcome: Progressing Towards Goal By Farheen Alaniz RN on 02/11/21 9435 *Use of effective breathing techniques (Breathing Pattern - Ineffective) Disciplines:  Interdisciplinary Expected end:  02/17/21 Outcome: Progressing Towards Goal By Farheen Alaniz RN on 02/11/21 6480 *Control of Pain (Pain) Disciplines:  Nurse, Interdisciplinary, RT Expected end:  - Outcome: Progressing Towards Goal By Farheen Alaniz RN on 02/11/21 2451 *PALLIATIVE CARE:  Alleviation of Pain (Pain) Disciplines:  Nurse, Interdisciplinary, RT Expected end:  - Outcome: Progressing Towards Goal By Olga Hu RN on 02/11/21 8631 Patient/Family Education (Patient Education: Go to Patient Education Activity) Disciplines:  Nurse, Interdisciplinary, RT Expected end:  - Outcome: Progressing Towards Goal By Olga Hu RN on 02/11/21 6526 *Alleviation of anxiety (Anxiety) Disciplines:  Interdisciplinary Expected end:  - Outcome: Progressing Towards Goal By Olga Hu RN on 02/11/21 7336 *Alleviation of anxiety (Palliative Care) (Anxiety) Disciplines:  Nurse, Interdisciplinary, RT Expected end:  - Outcome: Progressing Towards Goal By Olga Hu RN on 02/11/21 8270 Patient/Family Education (Patient Education: Go to Patient Education Activity) Disciplines:  Interdisciplinary Expected end:  - Outcome: Progressing Towards Goal By Olga Hu RN on 02/11/21 0459  
  
  
 
  
  
  
  
  
 
 
___________________ Care Team Notes POC/IDG Notes No notes of this type exist for this encounter. Care Team Present:

## 2021-02-11 NOTE — HOSPICE
The hospice chaplain completed an initial visit with Ms. Bard Hernandez' two sons who were at her bedside. The sons expressed appreciation to the staff for care received and the opportunity to remain at their mother's bedside. Both sons indicated that they had no immediate spiritual concerns or needs at this time. They appeared to be non-anxious and accepting of their mother's condition.   
 
The sons verbalized understanding that chaplains' services were available upon request.

## 2021-02-11 NOTE — PROGRESS NOTES
Tried several times to get vitals on patient but unable to accurately measure bloodpressure, oxygen saturation, or temperature. Attempted to get oxygen saturation with finger oximetry, ear lobe, and forehead. Manual blood pressure machine in need of service. Family does not want rectal temp for patient and is requesting to forgo vitals at this time.

## 2021-02-11 NOTE — [facility] IDG MASTER NOTE
[address] [facility] Date: 02/11/21 Time: 11:13 AM 
 
___________________ Patient: Justice Zhu Coverage Information: 
   Payor: Cloteal Foots Plan: [place] MEDICARE PART A & B Subscriber ID: [id number] Phone Number: MRN: [id number] Hospice Election Date: 2/10/2021 Current Benefit Period: Benefit Period 1 Start Date: 2/10/2021 End Date: 5/10/2021 Medical Director: Ethan Denn[facility] Attending Provider: MD Shaun Sewell [address] [place] [address] Phone: [phone] Fax: [phone] Level of Care: General Inpatient Care 
 
 
___________________ Diagnoses: There were no encounter diagnoses. Current Medications: 
 
Current Facility-Administered Medications:  
  LORazepam (ATIVAN) injection 1 mg, 1 mg, IntraVENous, Q15MIN PRN, Elodia Franks MD 
  ketorolac (TORADOL) injection 15 mg, 15 mg, IntraVENous, Q8H PRN, Elodia Franks MD 
  glycopyrrolate (ROBINUL) injection 0.2 mg, 0.2 mg, IntraVENous, Q4H PRN, Elodia Franks MD 
  bisacodyL (DULCOLAX) suppository 10 mg, 10 mg, Rectal, DAILY PRN, Elodia Franks MD 
  sodium chloride (NS) flush 5 mL, 5 mL, InterCATHeter, PRN, Lizbeth Murrell MD 
  [START ON 2/12/2021] scopolamine (TRANSDERM-SCOP) 1 mg over 3 days 1 Patch, 1 Patch, TransDERmal, Q72H, Elodia Franks MD 
  HYDROmorphone (DILAUDID) syringe 0.5 mg, 0.5 mg, IntraVENous, Q4H, Elodia Franks MD, 0.5 mg at 02/11/21 0907 
  HYDROmorphone (DILAUDID) syringe 0.5 mg, 0.5 mg, IntraVENous, Q15MIN PRN, Elodia Franks MD 
  LORazepam (ATIVAN) injection 1 mg, 1 mg, IntraVENous, Q4H, Elodia Franks MD, 1 mg at 02/11/21 1088 Orders: 
Orders Placed This Encounter [facility]  patient with sons bedside Hospice patient with sons bedside Standing Status:   Standing Number of Occurrences:   1 Order Specific Question:   Reason for Consult: Answer:   Hospice patient with sons bedside  NURSING-MISCELLANEOUS: NO admission labs, x-rays or other diagnostic tests, unless pertinent to symptom control 1.  .  CONTINUOUS  
  1.  .  
  Standing Status:   Standing Number of Occurrences:   1 Order Specific Question:   Description of Order: Answer:   NO admission labs, x-rays or other diagnostic tests, unless pertinent to symptom control  COMFORT MEASURES ONLY Standing Status:   Standing Number of Occurrences:   1 German Lugo 53 Daily in the am. Oxygen SAT PRN Standing Status:   Standing Number of Occurrences:   1  
 NOTIFY PROVIDER: SPECIFY [facility] [phone] FOR PAIN, DYSPNEA, AGITATION, OTHER DISTRESS OR NOT RESPONDING TO ORDERED INTERVENTIONS ONE TIME Routine Standing Status:   Standing Number of Occurrences:   1 Order Specific Question:   Please describe the test or procedure you would like to order. Answer:   [facility] [phone] FOR PAIN, DYSPNEA, AGITATION, OTHER DISTRESS OR NOT RESPONDING TO ORDERED INTERVENTIONS  
 NURSING-MISCELLANEOUS: Notif hospice at time of death [phone] CONTINUOUS Standing Status:   Standing Number of Occurrences:   1 Order Specific Question:   Description of Order: Answer:   [facility] hospice at time of death [phone]  TURN & POSITION  
  TURN & POSITION EVERY 6 HOURS - PATIENT MAY REFUSE Standing Status:   Standing Number of Occurrences:   1  
 NURSING-MISCELLANEOUS: BITES AND SIPS FOR COMFORT CONTINUOUS Standing Status:   Standing Number of Occurrences:   1 Order Specific Question:   Description of Order: Answer:   BITES AND SIPS FOR COMFORT  
 BEDREST, COMPLETE Standing Status:   Standing Number of Occurrences:   1  
 ORAL CARE Keep mouth moisturized with sponge sticks/toothettes and tap water. Vaseline to lips and nares as needed. Standing Status:   Standing   Number of Occurrences:   1  
 GARCIA CATHETER, CARE  
 As per hospital policy for infection prevention Standing Status:   Standing Number of Occurrences:   1  
 NURSING-MISCELLANEOUS: Admit to GIP level of care; SN visit daily X 7 with 5 visits PRN symptom control; SW visit 1 X weekly and 5 visits PRN family support,  visit 1 X weekly and 5 visits PRN spiritual support. CONTINUOUS Standing Status:   Standing Number of Occurrences:   1 Order Specific Question:   Description of Order: Answer:   Admit to GIP level of care; SN visit daily X 7 with 5 visits PRN symptom control; SW visit 1 X weekly and 5 visits PRN family support,  visit 1 X weekly and 5 visits PRN spiritual support.  NURSING-MISCELLANEOUS: Do Not Deep Suction patient. CONTINUOUS Standing Status:   Standing Number of Occurrences:   1 Order Specific Question:   Description of Order: Answer:   Do Not Deep Suction patient.  NURSING-MISCELLANEOUS: Please give dose of Lorzepam with Dilaudid. After 30-60minutes, decrease oxgyen from 15 lpm to 10 lpm NC and monitor for symptoms. Call Hospice with questions: [phone] CONTINUOUS Standing Status:   Standing Number of Occurrences:   1 Order Specific Question:   Description of Order: Answer:   Please give dose of Lorzepam with Dilaudid. After 30-60minutes, decrease oxgyen from 15 lpm to 10 lpm NC and monitor for symptoms. Call Hospice with questions: [phone]  DO NOT RESUSCITATE Standing Status:   Standing Number of Occurrences:   1  
 OXYGEN CANNULA Liters per minute: 15; Indications for O2 therapy: OTHER CONTINUOUS Routine Continue with mid-flow oxygen until hospice provider makes changes prior to decreasing oxygen dosing. Oxygen as needed. Adjust for comfort. Discontinue if not contributing to patient comfort. Standing Status:   Standing Number of Occurrences:   1 Order Specific Question:   Liters per minute:   Answer:   15  
 Order Specific Question:   Indications for O2 therapy Answer:   OTHER  
 INSERT PERIPHERAL IV Maintain peripheral IV for hospice comfort medications  ONE TIME Routine Maintain peripheral IV for hospice comfort medications Standing Status:   Standing Number of Occurrences:   1  
 FALL PRECAUTIONS Standing Status:   Standing Number of Occurrences:   1  
 LORazepam (ATIVAN) injection 1 mg  ketorolac (TORADOL) injection 15 mg  
 DISCONTD: scopolamine (TRANSDERM-SCOP) 1 mg over 3 days 1 Patch  
 glycopyrrolate (ROBINUL) injection 0.2 mg  
 bisacodyL (DULCOLAX) suppository 10 mg  
 sodium chloride (NS) flush 5 mL  scopolamine (TRANSDERM-SCOP) 1 mg over 3 days 1 Patch  
 HYDROmorphone (DILAUDID) syringe 0.5 mg  
 HYDROmorphone (DILAUDID) syringe 0.5 mg  
 LORazepam (ATIVAN) injection 1 mg  
 HYDROmorphone (DILAUDID) syringe 0.5 mg  
 LORazepam (ATIVAN) injection 1 mg  INITIAL PHYSICIAN ORDER: INPATIENT Inpatient Hospice; 3. Patient receiving treatment that can only be provided in an inpatient setting (further clarification in H&P documentation) Standing Status:   Standing Number of Occurrences:   1 Order Specific Question:   Status: Answer:   Eloise Welch Order Specific Question:   Type of Bed Answer:   Inpatient Hospice [5] Order Specific Question:   Inpatient Hospitalization Certified Necessary for the Following Reasons Answer:   3. Patient receiving treatment that can only be provided in an inpatient setting (further clarification in H&P documentation) Order Specific Question:   Admitting Diagnosis Answer:   [facility] [846995] Order Specific Question:   Admitting Physician Answer:   Manette Shock Order Specific Question:   Attending Physician Answer:   Manette Shock Order Specific Question:   Estimated Length of Stay Answer:   3-4 Midnights Order Specific Question:   Discharge Plan: Answer:   Extended Care Facility (e.g. Adult Home, Nursing Home, etc.) Order Specific Question:   Comments Answer:   IF patient stabilizes, plan to return to [place]. Allergies: Allergies Allergen Reactions  Pcn [Penicillins] Unknown (comments) Childhood reaction rash Care Plan: 
Multidisciplinary Problems (Active) Problem: Anxiety Dates: Start: 02/11/21 Disciplines: Nurse, Interdisciplinary, RT  
 Goal: *Alleviation of anxiety Dates: Start: 02/11/21 Disciplines: Interdisciplinary Intervention: Anxiety assessment (eg: Attention span; behavior manifestation; coping mechanism;  self-perception; sleep pattern) Dates: Start: 02/11/21 Description: REMINDER(s): 
For patients at risk of anxiety, consider the use of an anxiety assessment scale. Intervention: Massage therapy Dates: Start: 02/11/21 Intervention: Music therapy Dates: Start: 02/11/21 Intervention: Comfort promotion (eg: Uninterrupted sleep; sensory stimuli reduction) Dates: Start: 02/11/21 Intervention: Consult, pastoral care Dates: Start: 02/11/21 Goal: *Alleviation of anxiety (Palliative Care) Dates: Start: 02/11/21 Disciplines: Nurse, Interdisciplinary, RT Intervention: Refer patient for holistic services per facility Dates: Start: 02/11/21 Problem: Breathing Pattern - Ineffective Dates: Start: 02/10/21 Disciplines: Interdisciplinary Goal: *Absence of hypoxia Dates: Start: 02/10/21   Expected End: 02/17/21 Disciplines: Interdisciplinary Intervention: Breathing pattern signs and symptoms assessment (eg: Apnea; bradypnea; pursed-lip; dyspnea; hyperpnea; paradoxical; periodic; hyperventilation; hypoventilation; tachypnea) Dates: Start: 02/10/21 Intervention: Monitor for change in patient condition (eg: Vital signs; hypoxemia; mental status; level of consciousness; skin temperature, color) Dates: Start: 02/10/21 Intervention: Respiratory management (eg: Adequate humidification; early mobilization; perform manual percussion/vibration; postural drainage; suctioning; splinting) Dates: Start: 02/10/21 Intervention: Review for respiratory depression and administer medications as prescribed Dates: Start: 02/10/21 Goal: *Use of effective breathing techniques Dates: Start: 02/10/21   Expected End: 02/17/21 Disciplines: Interdisciplinary Intervention: Anxiety assessment (eg: Attention span; behavior manifestation; coping mechanism;  self-perception; sleep pattern) Dates: Start: 02/10/21 Intervention: Mental status assessment Dates: Start: 02/10/21 Intervention: Position change Dates: Start: 02/10/21 Problem: Falls - Risk of   
 Dates: Start: 02/10/21 Disciplines: Interdisciplinary Goal: *Absence of Falls Dates: Start: 02/10/21   Expected End: 02/17/21 Description: Document Sheila Blood Fall Risk and appropriate interventions in the flowsheet. Disciplines: Interdisciplinary Problem: Infection - Risk of, Urinary Catheter-Associated Urinary Tract Infection Dates: Start: 02/10/21 Disciplines: Interdisciplinary Goal: *Absence of infection signs and symptoms Dates: Start: 02/10/21   Expected End: 02/17/21 Disciplines: Interdisciplinary Intervention: Urinary catheter needs assessment (eg: Impaired neurologic status; post void residual; spinal cord injury; urinary outflow obstruction; urinary retention; urinary incontinence; fluid imbalance) Dates: Start: 02/10/21 Intervention: Urine assessment (eg: Clarity; color; odor; volume) Dates: Start: 02/10/21 Intervention: Infection signs and symptoms monitoring - urinary tract (eg: Flank or suprapubic pain; burning; fever; dysuria; frequency; urgency; cloudy/bloody/foul odor urine; confusion/agitation; incontinence) Dates: Start: 02/10/21 Intervention: Urinary catheter management (eg: Closed urinary catheter system maintenance; urinary catheter patency with free downhill flow; perineal care; monitor intake and output) Dates: Start: 02/10/21 Intervention: Urinary catheter discontinuation Dates: Start: 02/10/21 Description: REMINDER(s): 
Urinary catheter discontinuation promptly as indicated; remind physician to remove at or before day 5. Problem: Pain Dates: Start: 02/11/21 Disciplines: Nurse, Interdisciplinary, RT  
 Goal: *Control of Pain Dates: Start: 02/11/21 Disciplines: Nurse, Interdisciplinary, RT Intervention: Assess pain characteristics (eg: Intensity scale; onset; location; quality; severity; duration; frequency; radiation) Dates: Start: 02/11/21 Intervention: Assess pain management - barriers (eg: Past pain experiences) Dates: Start: 02/11/21 Intervention: Identify pain expectations (eg: Patient's pain goal; somatic experiences; behavioral changes; affect) Dates: Start: 02/11/21 Intervention: Identify pain medication concerns (eg: Cultural considerations; addiction concerns) Dates: Start: 02/11/21 Intervention: Support system identification (eg: Caregiver; community resource; family; friends; [facility]; support group) Dates: Start: 02/11/21 Intervention: Monitor for change in patient condition (eg:  Vital signs changes; changes in level of consciousness; nausea; behavioral changes) Dates: Start: 02/11/21 Intervention: Medication side-effect assessment Dates: Start: 02/11/21 Intervention: Pain-relief response reassessment (eg: Frequency based on route of administration; effectiveness) Dates: Start: 02/11/21 Goal: *PALLIATIVE CARE:  Alleviation of Pain Dates: Start: 02/11/21 Disciplines: Nurse, Interdisciplinary, RT Intervention: Refer patient for holistic services per facility Dates: Start: 02/11/21 Problem: Patient Education: Go to Patient Education Activity Dates: Start: 02/10/21 Disciplines: Interdisciplinary Goal: Patient/Family Education Dates: Start: 02/10/21 Disciplines: Interdisciplinary Problem: Patient Education: Go to Patient Education Activity Dates: Start: 02/10/21 Disciplines: Interdisciplinary Goal: Patient/Family Education Dates: Start: 02/10/21 Disciplines: Interdisciplinary Problem: Patient Education: Go to Patient Education Activity Dates: Start: 02/11/21 Disciplines: Nurse, Interdisciplinary, RT  
 Goal: Patient/Family Education Dates: Start: 02/11/21 Disciplines: Nurse, Interdisciplinary, RT  
  
  
  
  
 Problem: Patient Education: Go to Patient Education Activity Dates: Start: 02/11/21 Disciplines: Interdisciplinary Goal: Patient/Family Education Dates: Start: 02/11/21 Disciplines: Interdisciplinary Problem: Pressure Injury - Risk of   
 Dates: Start: 02/10/21 Disciplines: Interdisciplinary Goal: *Prevention of pressure injury Dates: Start: 02/10/21   Expected End: 02/17/21 Description: Document Morteza Scale and appropriate interventions in the flowsheet. Disciplines: Interdisciplinary Problem: Spiritual Evaluation Dates: Start: 02/11/21 Disciplines: Interdisciplinary Goal: Identify beliefs/practices that support hospice experience Dates: Start: 02/11/21 Description: Patient/family identify their beliefs/practices that impair Hospice experience. Patient/family identify their beliefs/practices that support Hospice experience. Patient coping identified. Spiritual distress identified and decreased with visit. Disciplines: Interdisciplinary Intervention: Provide spiritual support Dates: Start: 02/11/21 Description: Assist with coordination of spiritual needs. Needs may include communion, anointing, / visits, and fear. Care Plan Problems/Goals Progressing Towards Goal (13) *Absence of Falls (Falls - Risk of) Disciplines:  Interdisciplinary Expected end:  02/17/21 Outcome: Progressing Towards Goal By Marcial Willard RN on 02/11/21 2292 Patient/Family Education (Patient Education: Go to Patient Education Activity) Disciplines:  Interdisciplinary Expected end:  - Outcome: Progressing Towards Goal By Marcial Willard RN on 02/11/21 4595 *Prevention of pressure injury (Pressure Injury - Risk of) Disciplines:  Interdisciplinary Expected end:  02/17/21 Outcome: Progressing Towards Goal By Marcial Willard RN on 02/11/21 3937 Patient/Family Education (Patient Education: Go to Patient Education Activity) Disciplines:  Interdisciplinary Expected end:  - Outcome: Progressing Towards Goal By Marcial Willard RN on 02/11/21 4960 *Absence of infection signs and symptoms (Infection - Risk of, Urinary Catheter-Associated Urinary Tract Infection) Disciplines:  Interdisciplinary Expected end:  02/17/21 Outcome: Progressing Towards Goal By Marcial Willard RN on 02/11/21 3581 *Absence of hypoxia (Breathing Pattern - Ineffective) Disciplines:  Interdisciplinary Expected end:  02/17/21 Outcome: Progressing Towards Goal By Marcial Willard RN on 02/11/21 4410 *Use of effective breathing techniques (Breathing Pattern - Ineffective) Disciplines:  Interdisciplinary Expected end:  02/17/21 Outcome: Progressing Towards Goal By Eliecer Price RN on 02/11/21 9926 *Control of Pain (Pain) Disciplines:  Nurse, Interdisciplinary, RT Expected end:  - Outcome: Progressing Towards Goal By Eliecer Price RN on 02/11/21 8798 *PALLIATIVE CARE:  Alleviation of Pain (Pain) Disciplines:  Nurse, Interdisciplinary, RT Expected end:  - Outcome: Progressing Towards Goal By Eliecer Price RN on 02/11/21 6148 Patient/Family Education (Patient Education: Go to Patient Education Activity) Disciplines:  Nurse, Sai, RT Expected end:  - Outcome: Progressing Towards Goal By Eliecer Price RN on 02/11/21 0490 *Alleviation of anxiety (Anxiety) Disciplines:  Interdisciplinary Expected end:  - Outcome: Progressing Towards Goal By Eleicer Price RN on 02/11/21 1172 *Alleviation of anxiety (Palliative Care) (Anxiety) Disciplines:  Nurse, Sai, RT Expected end:  - Outcome: Progressing Towards Goal By Eliecer Price RN on 02/11/21 9308 Patient/Family Education (Patient Education: Go to Patient Education Activity) Disciplines:  Interdisciplinary Expected end:  - Outcome: Progressing Towards Goal By Eliecer Price RN on 02/11/21 1899 No Outcome (1) Identify beliefs/practices that support hospice experience (Spiritual Evaluation) Disciplines:  Interdisciplinary Expected end:  -   
  
 
  
  
  
  
  
 
 
___________________ Care Team Notes Admitted GIP for resp distress was on mid-flow oxygen weaning down to NC currently on scheduled Dilaudid and ativan every 4 hours given together, just now reduced oxygen to NC team to assess on rounds, discharged plan is to return to the [facility] living is symptoms are under control POC/IDG Notes [address] IDG  Notes by Shanice Rivera at 02/11/21 0950  Version 1 of 1 Author: Shanice Rivera Service: Hospice and Palliative Care Author Type:  Filed: 02/11/21 0952 Date of Service: 02/11/21 0950 Status: Signed : Shanice Rivera () LCSW remaining available for support for family and patient as well as discharge planning should patient stabilize. RYLEY You, LCSW Hospice Social Worker 
[phone] Care Team Present:  
Edison [facility] Ana Velasquez MSW Jose Eduardo Dia MSW Jose Suero RN MSN

## 2021-02-11 NOTE — PROGRESS NOTES
Patient's central line was charted as removed yesterday, however femoral central line is still present. Discussed with Dr. Balbina Kapadia and was told that if line is patent in can be used.  put in orders for continued use.

## 2021-02-11 NOTE — HOSPICE
[facility] Good Help to Those in Need 
[phone] Social Work Admission Note Patient Name: Brice Iyer YOB: 1945 Age: 77 y.o. Date of Visit: 02/11/21 Facility of Care: [facility] Patient Room: 424/01 Hospice Attending: Josephine Verdin MD 
Hospice Diagnosis: Sepsis with multiple organ dysfunction (MOD) ([facility] [address] [A41.9, R65.20] Level of Care:  
 [x]  GIP []  Respite 
 []  Routine NARRATIVE  
LCSW met with son, Kofi Elias at bedside. Kofi Elias and his wife, Larry Morales are local in [place] and have been working on patient's increasing care needs for some time. Kofi Elias shared that it became clear that patient needed 24/7 supervision and she was admitted to [facility] as Kofi Elias and Larry Morales were not able to meet those demands. Brother, Lion Ruelas lives in [address] [facility], but is in the area visiting and has been spending time at bedside with Kofi Elias throughout patient's admission. Kofi Elias and Lion Ruelas have been reminiscing, though Kofi Elias shared that it does often make them cry to discuss their past as they see patient declining. Kofi Elias shared that patient was a good mother to him and he has fond memories of his childhood. He has also been recognizing now that he has his own children what a strong influence she and his father were in his life growing up. Kofi Elias shared that his father passed away 2 years ago and that he and patient had clear ideas about their quality of life and were able to set up their final arrangements and put [facility] in advance of their decline. LCSW offered ongoing support and Darrel appreciative. LCSW will remain available for additional patient/family needs. ADVANCE CARE PLANNING Code Status: DNR Durable DNR: X Yes  _ No 
No flowsheet data found. Relationship Status: 
[]  Single    
[]       
[]     
[]  Domestic Partner    
[x]  / 
[]  Common Law 
[]   
[]  Unknown If in a relationship, name of partner/spouse: 
Duration of relationship: 
 
 Holiness: NO PREFERENCE  Home: [place] [facility] Resources Provided: Support at bedside Social Work Initial Assessment Gender: 
female Race/Ethnicity: (campbell all that apply) []  [place] or [place] Native 
[]   
[]  Black or [place] American 
[]   or  
[]   or [place] 
[x]  Jourdansa Alonzoeling 
[]  Unknown 
  
 Service:   
[]  Yes  
[]  No      
[x]  Unknown Appropriate for Pinning Ceremony:  
[]  Yes     
[]  No 
Is patient using VA benefits? []  Yes     
[]  No 
  
Primary Language: English 
[]   Needed 
[]   utilized during visit Ability to express thoughts/needs/feelings 
[]  Expressed thoughts/feelings/needs without difficulty 
[]  Requires extra time and cuing 
[]  Speech limited single words 
[]  Uses only gestures (eye, blinking eye or head movement/pointing) []  Unable to express thoughts/feelings/needs (speech unintelligible or inappropriate) [x]  Unresponsive Notes:  
  
Mental Status: 
[]  Alert-oriented to:   
 []  Person   
 []  Place   
 []  Time 
[]  Comatose-responds to:  
 []   Verbal stimuli  
 []  Tactile stimuli  
 []  Painful stimuli 
[]  Forgetful 
[]  Disoriented/Confused 
[x]  Lethargic 
[]  Agitated 
[]  Other (specify):   
Notes:  
  
Patients description of Illness/Current Health Status:   
[x]  Patient unable to discuss 
[]  Patient unwilling to discuss 
[]  (Specify) Knowledge/Understanding of Disease Process Patient:  
 []  Demonstrates knowledge/understanding of disease process 
 []  Demonstrates knowledge/understanding of treatment plan 
 []  Demonstrates knowledge/understanding of prognosis []  Demonstrates acceptance of prognosis []  Demonstrates knowledge/understanding of resuscitation status 
 []  Other (specify) Caregiver: 
 [x]  Demonstrates knowledge/understanding of disease process [x]  Demonstrates knowledge/understanding of treatment plan [x]  Demonstrates knowledge/understanding of prognosis [x]  Demonstrates acceptance of prognosis [x]  Demonstrates knowledge/understanding of resuscitation status 
 []  Other (specify) Notes:  
  
Patients living arrangement/care setting: 
Use the PRIOR COLUMN when the PATIENTS current health status necessitated a change in his/her primary residence. Prior Current Response  
           []             []    Patients own home/residence []             []    Home of family member/friend []             []    Boarding home  
           [x]             []    Assisted living facility/group home center []             []    Hospital/Acute care facility []             []    Skilled nursing facility []             []    Long term care facility/Nursing home  
           []             [x]    Hospice in Patient Primary Caregiver: 
[]  No Primary Caregiver Name of Primary Caregiver:  
Relationship or Primary Caregiver: Vandalia Ikers 
 []  Spouse/Significant other     
 [x]  Natural Child      
 []  Step child     
 []  Sibling 
 []  Parent 
 []  Friend/Neighbor 
 []  Community/[employer] Volunteer 
 []  Paid help 
 []  Other (specify):___________ Notes:   
  
Family members/Significant others: 
Name: Oralia Luna Relationship: Son Phone Number: Actively involved in care? [x]  Yes  []  No 
 
Name: Nadya Jourdan Relationship: DIL Phone Number: Actively involved in care? [x]  Yes  []  No 
 
Name: 
Relationship: 
Phone Number: Actively involved in care? []  Yes  []  No 
 
Social support systems: (select ONE best description) [x]  Excellent social support system which includes three or more family members or friends 
[]  Good social support system which includes two or less members or friends 
[]  Melanie Lindo support which includes one family member or friend 
[]  Poor social support; no family members or friends; basically ALONE Notes:  
  
 Emotional Status: (campbell all that apply) Patient Caregiver Response  
              []                [x]    Mood/Affect stable and appropriate    
              []                []    Angry  
              []                []    Anxious []                []    Apprehensive []                []    Avoidant  
              []                []    Clinging  
              []                []    Depressed  
              []                []    Distraught  
              []                []    Elated []                []    Euphoric  
              []                []    Fearful  
              []                []    Flat Affect  
              []                []    Helpless []                []    Hostile []                []    Impulsive []                []    Irritable  
              []                []    Labile  
              []                []    Manic  
              []                []    Restlessness []                []    Sad  
              []                []    Suspicious []                []    Tearful  
              []                []    Withdrawn Notes:  
 
Coping Skills (strengths/weakness):  
 Patient: Coping Skills (strength/weakness):  
 Family/caregiver (strength/weakness): 
  
[place] of care (campbell all that apply):    
[x]  No burden evident  
[]  Family must administer medications  
[]  Illness causing financial strain  
[]  Family/Support feels overwhelmed  
[]  Family/Support sleep disturbed with patients care  
[]  Patients care causes extra physical stress  of death 
[]  Illness causes changes in family lifestyle 
[]  Illness impacting family/support employment 
[]  Family experiencing increased time demands 
[]  Patients behavior endangers family 
[]  Denial of patients illness 
[]  Concern over outcome of illness/fear []  Patients behavior embarrassing to family Notes:  
  
Risk Factors: (campbell all that apply):   
[x]  No burden evident  
[]  Alcohol abuse 
[]  Financial resources inadequate to meet basic needs (food/house/etc) []  Financial resources inadequate to meet health care needs (supplies/equipment/medications) 
[]  Food/nutrition resources inadequate 
[]  Home environment unsafe/inadequate for home care 
[]  Homicidal risk 
[]  Lives alone or without concerned relatives 
[]  Multiple medications/complex schedule 
[]  Physical limitations increase likelihood of falls 
[]  Plan of care/treatments complicated 
[]  Substance use/abuse 
[]  Suicidal risk 
[]  Visual impairment threatens safety/ability to perform self-care 
[]  Other (specify): 
  
Abuse/Neglect (actual/potential risks): 
[x]  No signs of abuse/neglect 
[]  History of abuse/neglect                 []  [id number]          []  Sexual 
[]  History of domestic violence 
[]  Lacks adequate physical care 
[]  Lacks emotional nurturing/support 
[]  Lacks appropriate stimulation/cognitive experiences 
[]  Left alone inappropriately 
[]  Lacks necessary supervision 
[]  Inadequate or delayed medical care 
[]  Unsafe environment (i.e guns/drug use/history of violence in the home/etc.) []  Bruising or other physical signs of injury present 
[]  Other (specify): 
Notes:  
[]  Refer to child/adult protective services Current Sources of Stress (in Addition to Current Illness):  
[x]  None reported 
[]  Bills/Debt   
[]  Career/Job change   
[]   (short term)   
[]   (long term)   
[]  Death of a child (recent)   
[]  Death of a parent (recent)  
[]  Death of a spouse (recent)  
[]  Employment status changed  
[]  Family discord   
[]  Financial loss/Inadequate inther (specify):come 
[]  Job loss 
[]  Legal issues unresolved 
[]  Lifestyle change 
[]  Marital discord 
[]  Marriage within the last year []  Paperwork (insurance/legal/etc) overwhelming 
[]  Separation/Divorce 
[]  Other (specify): 
Notes:  
  
Current [employer] Being Utilized 1. [facility] care at [facility] Interventions/Plan of Care 1. Assess social and emotional factors related to coping with end of life issues 2. Community resource planning/referral  
3. Relocation to different care setting if/when symptoms stabilize Discharge Planning 1. Should patient stabilize will dc back to [facility] ([facility] [address] [facility] [address]) MSW Assessment Completed by: Martha Hernandez 02/11/21 Time In: 1200 Time Out: 1230

## 2021-02-11 NOTE — HOSPICE
Hospice Liaison Nurse: 
 
Bedside visit to assess the effectiveness of medication changes done earlier today. Patient appears to be comfortable. Pt son, Levi Maurer is now bedside. Reviewed end of life symptoms as well as the process that follows once patient has . Please call Hospice. Aristeo Cardona RN, [facility] Hospice Nurse Liaison [phone] Mobile [phone] Office

## 2021-02-11 NOTE — H&P
[facility] Good Help to Those in Need 
[phone] Patient Name: Laisha Gallegos YOB: 1945 Date of Provider Hospice Visit: 02/11/21 Level of Care:   [x] General Inpatient (GIP)    [] Routine   [] Respite Current Location of Care: 
[] [facility] [x] [facility] [] [facility] [] [address] [] [facility], patient referred from: 
[] [facility] [] [facility] [] [facility] [] [address] [] Home [] Other:  
 
Date of Original Hospice Admission:  2/10/2021 Hospice Medical Director at time of admission: Dr. Robbi Gaitan Principle Hospice Diagnosis: Sepsis with multi-organ failure Diagnoses RELATED to the terminal prognosis: acute on chronic kidney disease, recurrent UTI's, anxiety, severe pulmonary HTN, chronic pain Other Diagnoses: Osteoporosis with chronic compression fractures of lumbar and thoracic spine. Georgette Gallegos is a 77y.o. year old who was admitted to [facility]. She lived at [facility] and was found to have [address] and hypotension yesterday and she was admitted with septic shock. Her BP was unresponsive to fluid bolus and she was started on pressors. CT chest showed small bilateral pleural effusions with partial collapse of both lower lobes. She also had a UA concerning for infection and a RUQ US which showed cholelithiasis without biliary dilation. She was given vanc, cefepime, doxy, and flagyl. Due to her declining status, family elected to transition her to hospice care. Her sons both stated that did not want life extended and did not want to suffer. She has been unresponsive since yesterday evening. She also had new onset a fib with RVR which was first treated with amiodarone. Her troponin was 5.77 and this was felt to be supply/demand ischemia in the setting of septic shock. She has acute respiratory failure with sats in the 50's before a rebreather was started. She also has shock liver. The patient's principle diagnosis has resulted in multi-organ failure and obtundation Functionally, the patient's Karnofsky and/or Palliative Performance Scale has declined over a period of 2 days and is estimated at 10. The patient is dependent on the following ADLs: 
 
Objective information that support this patients limited prognosis includes: kidney failure, shock liver and obtundation. See testing below: Component Value Flag Ref Range Units Status Sodium 144   136 - 145 mmol/L Final  
Potassium 5.1   3.5 - 5.1 mmol/L Final  
Chloride 109  High   97 - 108 mmol/L Final  
CO2 27   21 - 32 mmol/L Final  
Anion gap 8   5 - 15 mmol/L Final  
Glucose 136  High   65 - 100 mg/dL Final  
BUN 26  High   6 - 20 MG/DL Final  
Creatinine 3.61  High   0.55 - 1.02 MG/DL Final  
BUN/Creatinine ratio 7  Low   12 - 20   Final  
GFR est AA 15  Low   >60 ml/min/1.73m2 Final  
GFR est non-AA 12  Low   >60 ml/min/1.73m2 Final  
Calcium 7.4  Low   8.5 - 10.1 MG/DL Final  
Bilirubin, total 2.0  High   0.2 - 1.0 MG/DL Final  
ALT (SGPT) 904  High   12 - 78 U/L Final  
AST (SGOT) >2,000  High   15 - 37 U/L Final  
Alk. phosphatase 128  High   45 - 117 U/L Final  
Protein, total 5.7  Low   6.4 - 8.2 g/dL Final  
Albumin 3.0  Low   3.5 - 5.0 g/dL Final  
Globulin 2.7   2.0 - 4.0 g/dL Final  
A-G Ratio 1.1   1.1 - 2.2   Final  
 
Component Value Flag Ref Range Units Status Sodium 142   136 - 145 mmol/L Final  
Potassium 5.2  High   3.5 - 5.1 mmol/L Final  
Chloride 109  High   97 - 108 mmol/L Final  
CO2 24   21 - 32 mmol/L Final  
Anion gap 9   5 - 15 mmol/L Final  
Glucose 171  High   65 - 100 mg/dL Final  
BUN 21  High   6 - 20 MG/DL Final  
Creatinine 3.65  High   0.55 - 1.02 MG/DL Final  
BUN/Creatinine ratio 6  Low   12 - 20   Final  
GFR est AA 15  Low   >60 ml/min/1.73m2 Final  
GFR est non-AA 12  Low   >60 ml/min/1.73m2 Final  
Calcium 7.6  Low   8.5 - 10.1 MG/DL Final  
 Bilirubin, total 2.3  High   0.2 - 1.0 MG/DL Final  
ALT (SGPT) 723  High   12 - 78 U/L Final  
AST (SGOT) >2,000  High   15 - 37 U/L Final  
Alk. phosphatase 136  High   45 - 117 U/L Final  
Protein, total 6.2  Low   6.4 - 8.2 g/dL Final  
Albumin 3.0  Low   3.5 - 5.0 g/dL Final  
Globulin 3.2   2.0 - 4.0 g/dL Final  
A-G Ratio 0.9  Low   1.1 - 2.2   Final  
 
 
 
 
Study Result EXAM: CT CHEST WO CONT, CT ABD PELV WO CONT 
  
INDICATION: Ecchymosis, altered mental status 
  
COMPARISON: CT November 2019. 
  
TECHNIQUE: Helical CT of the chest, abdomen, and pelvis following the uneventful 
intravenous administration of 100 mL Isovue-370. Oral contrast was not utilized. Coronal and sagittal reformats were generated. CT dose reduction was achieved 
through use of a standardized protocol tailored for this examination and 
automatic exposure control for dose modulation. 
  
FINDINGS: 
  
THYROID: No nodule. MEDIASTINUM: No mass or lymphadenopathy. ROSY: No mass or lymphadenopathy. THORACIC AORTA: No aneurysm. MAIN PULMONARY ARTERY: Normal in caliber. HEART: Cardiomegaly with coronary artery atherosclerosis ESOPHAGUS: No wall thickening or dilatation. TRACHEA/BRONCHI: Patent. PLEURA: Small bilateral pleural effusions LUNGS: Paraseptal emphysema. Partial collapse of both lower lobes 
  
Liver: No mass or biliary dilatation. Biliary tree: Filled with gallstones. The CBD is not dilated. Spleen: Within normal limits. Pancreas: No inflammation, mass or ductal dilatation. Adrenals: Unremarkable. Kidneys: Punctate nonobstructing left renal stones Stomach: Unremarkable. Small bowel: No dilatation or wall thickening. Colon: Diverticulosis of the colon, with no evidence of acute diverticulitis. Appendix: Not visualized Peritoneum: No ascites or pneumoperitoneum. Retroperitoneum: Infrarenal abdominal aortic aneurysm measuring 2.6 cm Reproductive organs: Uterus is noted Urinary bladder: No mass or calculus. Bones: Multiple age-indeterminate compression fractures throughout the thoracic 
and lumbar spine Abdominal wall: No mass or hernia. Additional comments: Right inguinal central venous catheter 
  
IMPRESSION 
  
1. Small bilateral pleural effusions. 2. Partial collapse of both lower lobes. Superimposed pneumonia cannot be 
excluded. 3. Cardiomegaly with coronary artery atherosclerosis. 4. Cholelithiasis. 5. Multiple age-indeterminate compression fractures involving the thoracic and 
lumbar spine. 6. Diverticulosis of the colon. No acute diverticulitis. Study Result--NM LUNG SCAN PERF 
 
  
  
Heterogeneous perfusion within both lungs. Evidence of bilateral pleural 
effusions. No large mismatch is present. Low probability for pulmonary embolism. 
  
Preliminary report was provided by Dr. Zeke Leo, the on-call radiologist, at [address] [facility] AM 
  
  
  
  
Clinical History: Hypotension, decreased responsiveness, elevated d-dimer 
  
Comparison to chest radiograph dated 2/8/2021 
  
Ventilation: Ventilation images were not obtained due to Covid precautions. 
  
Perfusion: Perfusion images were obtained in 6 projections utilizing 4.2 mCi Tc-99 M MAA. There is heterogeneous tracer distribution throughout the periphery 
of both lung fields, however no specific focal area of decreased attenuation is 
identified. 
  
IMPRESSION Heterogeneous perfusion throughout both lung fields remains low 
probability despite the lack of ventilation imaging. 
  
   
 
 
Functionally, the patient's Karnofsky and/or Palliative Performance Scale has declined over a period of 2 days and is estimated at 10. The patient is dependent on the following ADLs: 
 
Objective information that support this patients limited prognosis includes: kidney failure, liver The patient/family chose comfort measures with the support of Hospice. HOSPICE DIAGNOSES Active Symptoms: 1. Tachypnea/labored breathing 2. Decreased responsiveness 3. Pain: non verbal  
4. Anxiety 5. Hospice care patient PLAN 1. Patient admitted as GIP to hospice. She is requiring IV medications, frequent nursing assessments, and would not survive transport home. 2.  Change Dilaudid to 0.5 mg IV every 3 hours scheduled and IV every 15 minutes prn. 
3.  Change Ativan 1 mg IV every 3 hours and every 15 minutes prn. 
4.  Scopolamine patch to continue 5. Robinul prn every prn IV 6. Toradol 15 mg every 8 hours prn fever 7. Continue rojas catheter 8.  and SW to support family needs 9. Disposition: Likely to pass in hospital 
10. Hospice Plan of care was reviewed in detail and agree with current plan of care Prognosis estimated based on 02/11/21 clinical assessment is:  
[x] Hours to Days   
[] Days to Weeks   
[] Other: 
 
Communicated plan of care with: Hospice Case Manager; Hospice IDT; Care Team 
 
 GOALS OF CARE Patient/Medical POA stated Goal of Care: comfort/hospice care 
 
[x] I have reviewed and/or updated ACP information in the Advance Care Planning Navigator. This information is available in the [address] link in the patient's chart header. Primary Decision Maker (Health Care Agent): Silvia Clayton Resuscitation Status: DNR If DNR is there a Durable DNR on file? : [x] Yes [] No (If no, complete Durable DNR) HISTORY History obtained from: family, bedside nurse and chart. CHIEF COMPLAINT: patient is unresponsive The patient is:  
[] Verbal 
[] Nonverbal 
[x] Unresponsive HPI/SUBJECTIVE:  Patient admitted for sepsis and declined rapidly and transitioned to hospice this afternoon. She has been unresponsive per family and nurse. She has been receiving ativan prn frequently and one dose of morphine. She has not liked the face mask and family are hoping it can be weaned and removed for her comfort. Her sons state she never liked wearing any type of oxygen. Family wants to honor mother's wishes and not prolong life and will stay with her.   
 
2/11: pt remains unresponsive, appears to be in little labored breathing, diffuse mottling feet/toes. Has gotten all scheduled meds but no prn's. Son Monica Rascon at bedside and grieving appropriately. Other son has gone home just now and will be back soon. They both understand that mom is at end of life and imminent REVIEW OF SYSTEMS The following systems were: [] reviewed  [x] unable to be reviewed--patient unresponsive Adult Non-Verbal Pain Assessment Score: 3 Face [x] 0   No particular expression or smile 
[] 1   Occasional grimace, tearing, frowning, wrinkled forehead 
[] 2   Frequent grimace, tearing, frowning, wrinkled forehead Activity (movement) [] 0   Lying quietly, normal position 
[x] 1   Seeking attention through movement or slow, cautious movement 
[] 2   Restless, excessive activity and/or withdrawal reflexes Guarding 
[x] 0   Lying quietly, no positioning of hands over areas of body 
[] 1   Splinting areas of the body, tense 
[] 2   Rigid, stiff Physiology (vital signs) 
[] 0   Stable vital signs [x] 1   Change in any of the following: SBP > 20mm Hg; HR > 20/minute 
[] 2   Change in any of the following: SBP > 30mm Hg; HR > 25/minute Respiratory 
[] 0   Baseline RR/SpO2, compliant with ventilator 
[x] 1   RR > 10 above baseline, or 5% drop SpO2, mild asynchrony with ventilator 
[] 2   RR > 20 above baseline, or 10% drop SpO2, asynchrony with ventilator FUNCTIONAL ASSESSMENT Palliative Performance Scale (PPS):10 
 
 PSYCHOSOCIAL/SPIRITUAL ASSESSMENT Active Problems: 
  Sepsis with multiple organ dysfunction (MOD) ([facility] [address] (2/10/2021) Past Medical History:  
Diagnosis Date  Anxiety  Arthritis   
 knees,shoulders,fingers,back,neck  CKD (chronic kidney disease) stage 2, GFR 60-89 ml/min Dr. Hubbard Cheadle  Femur fracture, left ([facility] 2008  Femur fracture, right ([facility] 2009  Insomnia  Intertrigo 7/7/2015  Knee pain Dr. Rolo Lawrence  Moderate episode of recurrent major depressive disorder ([facility] 3/6/2020  Osteopenia Dr. Rolo Lawrence  PA[facility]oxysmal atrial fibrillation) [facility] 2/9/2021  Psychiatric disorder   
 anxiety  Pyloric stenosis 2010  Sleep disorder Insomnia  Thoracic vertebral fracture ([facility] 2005  Ulcer 2012 Perferatied ulcer Past Surgical History:  
Procedure Laterality Date  HX FEMUR FRACTURE TX Left femur - 2008, R femur - 2009  HX GI  2010 Perforated ulcer, in relation to pyloric stenosis  HX ORTHOPAEDIC    
 6 thoracic vertabrae that were fractured in 2005  TX COLSC FLX W/NDSC US XM RCTM ET AL LMTD&ADJ STRUX  12/6/10 Normal except Diverticulosis Social History Tobacco Use  Smoking status: Former Smoker  Smokeless tobacco: Never Used Substance Use Topics  Alcohol use: No  
  Alcohol/week: 0.0 standard drinks Family History Problem Relation Age of Onset  Cancer Sister   
     metastatic lung Cancer  Cancer Mother   
     colon Allergies Allergen Reactions  Pcn [Penicillins] Unknown (comments) Childhood reaction rash Current Facility-Administered Medications Medication Dose Route Frequency  LORazepam (ATIVAN) injection 1 mg  1 mg IntraVENous Q15MIN PRN  
 ketorolac (TORADOL) injection 15 mg  15 mg IntraVENous Q8H PRN  
 glycopyrrolate (ROBINUL) injection 0.2 mg  0.2 mg IntraVENous Q4H PRN  
  bisacodyL (DULCOLAX) suppository 10 mg  10 mg Rectal DAILY PRN  
 sodium chloride (NS) flush 5 mL  5 mL InterCATHeter PRN  
 [START ON 2/12/2021] scopolamine (TRANSDERM-SCOP) 1 mg over 3 days 1 Patch  1 Patch TransDERmal Q72H  
 HYDROmorphone (DILAUDID) syringe 0.5 mg  0.5 mg IntraVENous Q4H  
 HYDROmorphone (DILAUDID) syringe 0.5 mg  0.5 mg IntraVENous Q15MIN PRN  
 LORazepam (ATIVAN) injection 1 mg  1 mg IntraVENous Q4H PHYSICAL EXAM  
 
Wt Readings from Last 3 Encounters:  
02/10/21 74.3 kg (163 lb 12.8 oz) 02/09/21 74.3 kg (163 lb 12.8 oz) 12/13/20 76.3 kg (168 lb 4.8 oz) Visit Vitals BP (!) 104/52 Pulse (!) 115 Temp 97.7 °F (36.5 °C) Resp 14 Ht 5' 5\" (1.651 m) Wt 74.3 kg (163 lb 12.8 oz) BMI 27.26 kg/m² Supplemental O2  [x] Yes  [] NO Last bowel movement:  
 
Currently this patient has: 
[x] Peripheral IV [] PICC  [] PORT [] ICD [x] Jovel Catheter [] NG Tube   [] PEG Tube   
[] Rectal Tube [] Drain 
[] Other:  
 
Constitutional:Patient unresponsive, lying in bed with neck and head to right and difficult to move from that position Eyes: closed ENMT: Oral mucosa moist 
Cardiovascular:  Tachycardic, S1, S2 no GRM's 
Respiratory: labored breathing, decreased in bases Gastrointestinal: BS hypoactive, NTTP, no distention Musculoskeletal: Moves fingers and toes when touched Skin:  Multiple ecchymotic areas, thick scabbing over dorsum of left hand; toes bilaterally are purple and cold Neurologic:  Not responsive Psychiatric:  
Other:  
 
 
Pertinent Lab and or Imaging Tests: 
Lab Results Component Value Date/Time  Sodium 144 02/09/2021 08:30 AM  
 Potassium 5.1 02/09/2021 08:30 AM  
 Chloride 109 (H) 02/09/2021 08:30 AM  
 CO2 27 02/09/2021 08:30 AM  
 Anion gap 8 02/09/2021 08:30 AM  
 Glucose 136 (H) 02/09/2021 08:30 AM  
 BUN 26 (H) 02/09/2021 08:30 AM  
 Creatinine 3.61 (H) 02/09/2021 08:30 AM  
 BUN/Creatinine ratio 7 (L) 02/09/2021 08:30 AM  
 GFR est AA 15 (L) 02/09/2021 08:30 AM  
 GFR est non-AA 12 (L) 02/09/2021 08:30 AM  
 Calcium 7.4 (L) 02/09/2021 08:30 AM  
 
Lab Results Component Value Date/Time Protein, total 5.7 (L) 02/09/2021 08:30 AM  
 Albumin 3.0 (L) 02/09/2021 08:30 AM  
 
   
 
 Time spent: 35mts

## 2021-02-11 NOTE — PROGRESS NOTES
Problem: Falls - Risk of 
Goal: *Absence of Falls Description: Document Dayana Nguyen Fall Risk and appropriate interventions in the flowsheet. Outcome: Progressing Towards Goal 
Note: Fall Risk Interventions: 
  
 
  
 
Medication Interventions: Bed/chair exit alarm Elimination Interventions: Toileting schedule/hourly rounds Problem: Patient Education: Go to Patient Education Activity Goal: Patient/Family Education Outcome: Progressing Towards Goal 
  
Problem: Pressure Injury - Risk of 
Goal: *Prevention of pressure injury Description: Document Morteza Scale and appropriate interventions in the flowsheet. Outcome: Progressing Towards Goal 
Note: Pressure Injury Interventions: 
Sensory Interventions: Turn and reposition approx. every two hours (pillows and wedges if needed), Float heels, Keep linens dry and wrinkle-free Moisture Interventions: Internal/External urinary devices Activity Interventions: Pressure redistribution bed/mattress(bed type) Friction and Shear Interventions: Transferring/repositioning devices, Feet elevated on foot rest 
 
  
 
 
 
  
Problem: Patient Education: Go to Patient Education Activity Goal: Patient/Family Education Outcome: Progressing Towards Goal 
  
Problem: Infection - Risk of, Urinary Catheter-Associated Urinary Tract Infection Goal: *Absence of infection signs and symptoms Outcome: Progressing Towards Goal 
  
Problem: Breathing Pattern - Ineffective Goal: *Absence of hypoxia Outcome: Progressing Towards Goal 
Goal: *Use of effective breathing techniques Outcome: Progressing Towards Goal 
  
Problem: Pain Goal: *Control of Pain Outcome: Progressing Towards Goal 
Goal: *PALLIATIVE CARE:  Alleviation of Pain Outcome: Progressing Towards Goal 
  
Problem: Patient Education: Go to Patient Education Activity Goal: Patient/Family Education Outcome: Progressing Towards Goal 
  
Problem: Anxiety Goal: *Alleviation of anxiety Outcome: Progressing Towards Goal 
Goal: *Alleviation of anxiety (Palliative Care) Outcome: Progressing Towards Goal 
  
Problem: Patient Education: Go to Patient Education Activity Goal: Patient/Family Education Outcome: Progressing Towards Goal

## 2021-02-11 NOTE — [facility] IDG OTHER NOTES
BEREAVEMENT During the interdisciplinary group meeting on 2/11/2021, the primary care team reviewed the patient's admission, and bereavement was discussed. It was confirmed that miesha Andrade is the primary bereaved at low risk level due to no significant risk factors.

## 2021-02-11 NOTE — HOSPICE
[facility] Good Help to Those in Need 
[phone] [facility] Daily Nursing Note Patient Name: Olayinka Morse YOB: 1945 Age: 77 y.o. Date of Visit: 02/11/21 Facility of Care: [facility] Patient Room: [facility]/ Hospice Attending: Jorge L Brumfield MD 
Hospice Diagnosis: Sepsis with multiple organ dysfunction (MOD) ([facility] [A41.9, R65.20] Level of Care: GIP Current GIP Symptoms 1. Respiratory Distress- labored 2. Pulse 4 edema to upper legs. Facial edema noted 3. Pain 4. Restlessness/anxiety ASSESSMENT & PLAN  
ASSESSMENT: 
Pale with mottling of feet and left hand. No pulses found to extremities. Pt edema to upper legs. Respiratory rate is labored, uneven. HR is 114. Plan of Care: 1. Education of patient and family regarding end of life care and Hospice plan of care 2. Provide education and support to unit staff caring for hospice patient and family. Provide staff with direct contact information to reach hospice team [phone] 3. Provide support and frequent rounds for patient comfort and safety ongoing 4. Provide  support ongoing, continue to discuss discharge plan if patient becomes steven and does not require acute nursing care interventions for GIP level of care 5. Provide  and bereavement support ongoing. See note from [address] today. 6. Change Dilaudid 0.5 mg IV every four hours scheduled to every 3 and IV every 15 minutes prn.  
7. Change Ativan 1 mg IV every 4 hours to every 3 hours and every 15 minutes prn.   
8. Scopolamine patch applied Robinul prn every prn IV Toradol 15 mg every 8 hours prn fever 9. Maintain skin integrity as tolerated for hospice patient, turning and repositioning for comfort, and specialty mattress if appropriate 10. Jovel care per hospital policy for infection prevention 11. Peripheral line care as per hospital policy for infection prevention 12. Central line in right groin care as per hospital policy for infection prevention. 10. Titrate from oxygen NC from 8 lpm to 6 lpm NC for comfort Spiritual Interventions: Note from [address]: \"The hospice chaplain completed an initial visit with Ms. Bard Hernandez' two sons who were at her bedside. The sons expressed appreciation to the staff for care received and the opportunity to remain at their mother's bedside.  
  
Both sons indicated that they had no immediate spiritual concerns or needs at this time. They appeared to be non-anxious and accepting of their mother's condition.  
  
The sons verbalized understanding that chaplains' services were available upon request.\" Psych/ Social/ Emotional Interventions: Hospice nurse along with Dr. Robbi Gaitan, offered bedside support to patient and her son's Angelia Frankel and Pb Johnston. Hospice Social Worker, RYLEY Monahan, to contact family today, per IDG notes. Care Coordination Needs: Continue to coordinate Hospice plan of care with 4th floor nursing staff. Nurse, Max Bentley updated to plan of care. Care plan and New Orders discussed / approved with Rbobi Gaitan MD. Description History and Chart Review Narrative History of last 24 hours that demonstrates care cannot be provided in another setting: 
Frequent skilled nursing rounding for symptom management and safety. IV dosing of Dilaudid and Lorazepam, as patient is unable to tolerate PO route. What has been done to control the patient's symptoms in the last 24 hours? Titration of oxygen from 15 lpm face mask to 6 lpm NC, with scheduled and PRN dosing of IV medications for best symptom management. Does the patient currently require IV medications? YES Does the patient currently require scheduled medications? YES Does the patient currently require a PCA? NO List number of doses of PRN medications in last 24 hours: 
Medication 1: Hydromorphone IV Number of doses: 1 Medication 2: Lorazepam IV Number of doses: 1 Medication 3:  
Number of doses: Supporting documentation for GIP need for pain control: 
[x] Frequent evaluation by a doctor, nurse practitioner, nurse  
[x] Frequent medication adjustment   
[x] IVs that cannot be administered at home  
[] Aggressive pain management  
[] Complicated technical delivery of medications Supporting documentation for GIP need for symptom control: 
[x]  Sudden decline necessitating intensive nursing intervention 
[]  Uncontrolled / intractable nausea or vomiting  
[]  Pathological fractures 
[]  Advanced open wounds requiring frequent skilled care [x] Unmanageable respiratory distress [x] New or worsening delirium  
[] Delirium with behavior issues: Is 24 hour caregiver present due to safety concerns with agitation? (yes/no) [x] Imminent death  with skilled nursing needs documented above DISCHARGE PLANNING 1. Discharge Plan: If patient were to stabilize and be safe for transport, patient son's would like patient to return to [facility] ([facility]) Family will use [facility] for [employer] 2. Patient/Family teaching:  Tara Quiñonez and Corinna Schneider bedside 3. Response to patient/family teaching: Both Jose Donahue and Bella Stewart state they are in agreement with hospice plan of care. ASSESSMENT   
KARNOFSKY: 10 Prognosis estimated based on 02/11/21 clinical assessment is:  
[x] Few to Many Hours Quality Measure: Patient self-reports:  [] Yes    [x] No 
 
Adult Non-Verbal Pain Assessment Score: 5/10 Face 
[] 0   No particular expression or smile 
[x] 1   Occasional grimace, tearing, frowning, wrinkled forehead 
[] 2   Frequent grimace, tearing, frowning, wrinkled forehead Activity (movement) [x] 0   Lying quietly, normal position 
[] 1   Seeking attention through movement or slow, cautious movement 
[] 2   Restless, excessive activity and/or withdrawal reflexes Guarding [x] 0   Lying quietly, no positioning of hands over areas of body 
[] 1   Splinting areas of the body, tense 
[] 2   Rigid, stiff Physiology (vital signs) 
[] 0   Stable vital signs [] 1   Change in any of the following: SBP > 20mm Hg; HR > 20/minute [x] 2   Change in any of the following: SBP > 30mm Hg; HR > 25/minute Respiratory 
[] 0   Baseline RR/SpO2, compliant with ventilator 
[] 1   RR > 10 above baseline, or 5% drop SpO2, mild asynchrony with ventilator 
[x] 2   RR > 20 above baseline, or 10% drop SpO2, asynchrony with ventilator CLINICAL INFORMATION Patient Vitals for the past 12 hrs: 
 Pulse 02/11/21 0810 (!) 115 Currently this patient has: 
[x] Supplemental O2  
[] IV   
[] PICC [x] Central line in right groin [] NG Tube   
[] PEG Tube  
[] Ostomy    
[x] Jovel draining __yellow clear_____ urine 
[] Other:  
 
SIGNS/PHYSICAL FINDINGS Skin (including wound): 
[] Warm, dry, supple, intact and color normal for race [x] Warm  
[] Dry  
[] Cool [x] Clammy      
[] Diaphoretic Turgor 
 [] Normal 
 [x] Decreased Color: 
 [] Pink [x] Pale 
 [] Cyanotic 
 [] Erythema 
 [] Jaundice [] Normal for Race 
[]  Wounds: 
 
Neuro: 
[] Lethargy 
[] Restlessness / agitation 
[] Confusion / delirium 
[] Hallucinations 
[] Responds to maximal stimulation 
[x] Unresponsive 
[] Seizures Cardiac: 
[] Dyspnea on Exertion 
[] JVD [] Murmur 
[] Palpitations [] Hypotension 
[] Hypertension 
[] Tachycardia [] Bradycardia [x] Irregular HR 
[] Pulses Decreased 
[x] Pulses Absent [x] Edema:  Edema +4 upper legs 
[x] Mottling:   Feet and left hand Respiratory: 
Breath sounds:  
 [x] Diminished 
 [] Wheeze 
 [] Rhonchi 
 [] Rales  
[] Even and unlabored 
[x] Labored:           
[] Cough 
 [] Non Productive 
 [] Productive 
  [] Description:          
[] Deep suctioned  
[x] O2 at _6__ LPM 
[] High flow oxygen greater than 10 LPM 
[] Bi-Pap GI 
 [x] Abdomen soft non-tender  
[] Ascites 
[] Nausea 
[] Vomiting 
[] Incontinent of bowels [x] Bowel sounds no 
[] Diarrhea 
[] Constipation (see above including last bowel movement) [] Checked for impaction 
[] Last BM Not documented Nutrition Diet:Oral Care Only Appetite:  
[] Good  
[] Fair  
[] Poor  
[] Tube Feeding  
[] Voiding 
[] Incontinent [x] Jovel Musculoskeletal 
[] Balance/[place] Unsteady [x] Weak Strength:  
 [] Normal  
 [] Limited [x] Decreasing Activities:  
 [] Up as tolerated 
 [x] Bedridden  
 [] Specify: 
 
SAFETY [x] 24 hr. Caregiver [x] Side rails ? [x] Hospital bed  
[x] Reviewed Falls & Safety ALLERGIES AND MEDICATIONS Allergies: Allergies Allergen Reactions  Pcn [Penicillins] Unknown (comments) Childhood reaction rash Current Facility-Administered Medications Medication Dose Route Frequency  LORazepam (ATIVAN) injection 1 mg  1 mg IntraVENous Q15MIN PRN  
 ketorolac (TORADOL) injection 15 mg  15 mg IntraVENous Q8H PRN  
 glycopyrrolate (ROBINUL) injection 0.2 mg  0.2 mg IntraVENous Q4H PRN  
 bisacodyL (DULCOLAX) suppository 10 mg  10 mg Rectal DAILY PRN  
 sodium chloride (NS) flush 5 mL  5 mL InterCATHeter PRN  
 [START ON 2/12/2021] scopolamine (TRANSDERM-SCOP) 1 mg over 3 days 1 Patch  1 Patch TransDERmal Q72H  
 HYDROmorphone (DILAUDID) syringe 0.5 mg  0.5 mg IntraVENous Q4H  
 HYDROmorphone (DILAUDID) syringe 0.5 mg  0.5 mg IntraVENous Q15MIN PRN  
 LORazepam (ATIVAN) injection 1 mg  1 mg IntraVENous Q4H Visit Time In: 11:00 Visit Time Out: 12:30 Gage Buchanan RN, [facility] Hospice Nurse Liaison [phone] Odd [phone] Office

## 2021-02-12 NOTE — HOSPICE
[facility] 
[phone] Discharge/Death Nursing Note Patient Name: Veronica Poole YOB: 1945 Age: 77 y.o. Date of Death: 21 Admitted Date: 2/10/2021 Time of Death: 65 Facility of Care: [facility] Level of Care:GIP Patient Room: [facility] H[facility]ice Attending: Xin Cardona MD 
Hospice Diagnosis: Sepsis with multiple organ dysfunction (MOD) ([facility] [A41.9, R65.20] Death Pronouncement Pronouncement of death completed by: Dr Kory Cortez Agency staff was not  present at the time of death At the time of death the patient was documented as On exam she was found to be pulseless, breathless and without heart tones. The pt  within [facility] The following were notified of the patient's death: family at bedside, hospice intake, hospice MD 
 
Medications were disposed of per facility protocol Discharge Summary Discharge Reason: Death Summary of Care Provided: 
 
[x] Post mortem care provided by bedside RN [x] Notification of  home by nursing supervisor 
[] Referrals/Community resources provided:  
[] Goals completed 
[] Durable Medical Equipment vendor notified Disciplines involved: [x] RN [] SW [x]  [] KOVACS [] Vol [] PT [] OT [] ST [] BC 
 
[x] IDT communication/notification Attending Physician, Dr. Joellen Ramirez, notified of death Bereaved Verify bereaved identified with name, address, telephone number and risk level No flowsheet data found.

## 2021-02-12 NOTE — PROGRESS NOTES
Problem: Falls - Risk of 
Goal: *Absence of Falls Description: Document Vangie Dayton Fall Risk and appropriate interventions in the flowsheet. Outcome: Progressing Towards Goal 
Note: Fall Risk Interventions: 
  
 
  
 
Medication Interventions: Bed/chair exit alarm Elimination Interventions: Toileting schedule/hourly rounds Problem: Patient Education: Go to Patient Education Activity Goal: Patient/Family Education Outcome: Progressing Towards Goal 
  
Problem: Pressure Injury - Risk of 
Goal: *Prevention of pressure injury Description: Document Morteza Scale and appropriate interventions in the flowsheet. Outcome: Progressing Towards Goal 
Note: Pressure Injury Interventions: 
Sensory Interventions: Turn and reposition approx. every two hours (pillows and wedges if needed) Moisture Interventions: Absorbent underpads Activity Interventions: Pressure redistribution bed/mattress(bed type) Mobility Interventions: HOB 30 degrees or less Nutrition Interventions: Document food/fluid/supplement intake Friction and Shear Interventions: HOB 30 degrees or less Problem: Patient Education: Go to Patient Education Activity Goal: Patient/Family Education Outcome: Progressing Towards Goal 
  
Problem: Infection - Risk of, Urinary Catheter-Associated Urinary Tract Infection Goal: *Absence of infection signs and symptoms Outcome: Progressing Towards Goal 
  
Problem: Breathing Pattern - Ineffective Goal: *Absence of hypoxia Outcome: Progressing Towards Goal 
Goal: *Use of effective breathing techniques Outcome: Progressing Towards Goal 
  
Problem: Pain Goal: *Control of Pain Outcome: Progressing Towards Goal 
Goal: *PALLIATIVE CARE:  Alleviation of Pain Outcome: Progressing Towards Goal 
  
Problem: Patient Education: Go to Patient Education Activity Goal: Patient/Family Education Outcome: Progressing Towards Goal 
  
Problem: Anxiety Goal: *Alleviation of anxiety Outcome: Progressing Towards Goal 
Goal: *Alleviation of anxiety (Palliative Care) Outcome: Progressing Towards Goal 
  
Problem: Patient Education: Go to Patient Education Activity Goal: Patient/Family Education Outcome: Progressing Towards Goal 
  
Problem: Spiritual Evaluation Goal: Identify beliefs/practices that support hospice experience Description: Patient/family identify their beliefs/practices that impair Hospice experience. Patient/family identify their beliefs/practices that support Hospice experience. Patient coping identified. Spiritual distress identified and decreased with visit. Outcome: Progressing Towards Goal 
  
Problem: Emotional Support Needs Goal: Patient/family is receiving emotional support Description: Patient/family will receive emotional support during GIP stay at [facility] Outcome: Progressing Towards Goal

## 2021-02-12 NOTE — DEATH NOTE
Death note: 
 
Notified by Hospice Medical Director of patient's suspected passing. On exam she was found to be pulseless, breathless and without heart tones. I pronounced her dead at 10:44 am.  Her two sons were at bedside and were aware of death pronouncement. Patient had made arrangements with the [employer] [place]. Dr. Monica Garcia

## 2021-02-12 NOTE — PROGRESS NOTES
Patient  at this time. Problem: Falls - Risk of 
Goal: *Absence of Falls Description: Document Huber Greer Fall Risk and appropriate interventions in the flowsheet. Outcome: Resolved/Not Met Problem: Patient Education: Go to Patient Education Activity Goal: Patient/Family Education Outcome: Resolved/Not Met Problem: Pressure Injury - Risk of 
Goal: *Prevention of pressure injury Description: Document Morteza Scale and appropriate interventions in the flowsheet. Outcome: Resolved/Not Met Problem: Patient Education: Go to Patient Education Activity Goal: Patient/Family Education Outcome: Resolved/Not Met Problem: Infection - Risk of, Urinary Catheter-Associated Urinary Tract Infection Goal: *Absence of infection signs and symptoms Outcome: Resolved/Not Met Problem: Breathing Pattern - Ineffective Goal: *Absence of hypoxia Outcome: Resolved/Not Met 
Goal: *Use of effective breathing techniques Outcome: Resolved/Not Met Problem: Pain Goal: *Control of Pain Outcome: Resolved/Not Met 
Goal: *PALLIATIVE CARE:  Alleviation of Pain Outcome: Resolved/Not Met Problem: Patient Education: Go to Patient Education Activity Goal: Patient/Family Education Outcome: Resolved/Not Met Problem: Anxiety Goal: *Alleviation of anxiety Outcome: Resolved/Not Met 
Goal: *Alleviation of anxiety (Palliative Care) Outcome: Resolved/Not Met Problem: Patient Education: Go to Patient Education Activity Goal: Patient/Family Education Outcome: Resolved/Not Met Problem: Spiritual Evaluation Goal: Identify beliefs/practices that support hospice experience Description: Patient/family identify their beliefs/practices that impair Hospice experience. Patient/family identify their beliefs/practices that support Hospice experience. Patient coping identified. Spiritual distress identified and decreased with visit. Outcome: Resolved/Not Met Problem: Emotional Support Needs Goal: Patient/family is receiving emotional support Description: Patient/family will receive emotional support during GIP stay at [facility] Outcome: Resolved/Not Met

## 2021-02-12 NOTE — PROGRESS NOTES
Patient passed at 8:41 am. Family present at this time. Called and notified hospice and . Called [facility] and was given ok to release. Called hospice nurse to pronounce but was told that another doctor would need to come since nurse was not able to come to hospital due to snow. Reached out to Dr. Lian Araya and was told to call hospitalist on for call/consults which was listed as Dr. Shirley Clement. Called and left a message for Dr. Shirley Clement and when no response charge nurse reached out to nursing supervisor and was told to contact Dr. Akila Soliman. Nursing supervisor messaged Dr. Akila Soliman and when no response RN called and left message for Dr. Akila Soliman. Will continue to reach out for  to pronounce patient.

## 2021-02-12 NOTE — PROGRESS NOTES
Responded to notification of Miss Barrow Kaveh death on 4 Post Surg. Consulted with RN. Her sons were at bedside, a feeling of peace was in the room as they shared about their mother. Provided supportive presence and affirmation that she is now at peace with her family and dogs that have gone on before her. They have no other needs at this time. Contact Spiritual Care for any further referrals. Visited by: Carlos Alberto Balbuena., MS., [address] ([id number])

## 2021-02-13 LAB
BACTERIA SPEC CULT: NORMAL
SERVICE CMNT-IMP: NORMAL

## 2021-02-16 NOTE — DISCHARGE SUMMARY
Discharge Summary [facility] Good Help to Those in Need 
[phone] Date of Admission: 2/10/2021 Date of Discharge: 2/12/2021 Martha Matute is a 77y.o. year old who was admitted to [facility] at [facility] with a Hospice diagnosis of Sepsis with multiple organ dysfunction (MOD) ([facility] [A41.9, R65.20]. Pt was admitted for [facility] level care. Per HPI Active Symptoms: 
1. Tachypnea/labored breathing 2. Decreased responsiveness 3. Pain: non verbal  
4. Anxiety 5. Hospice care patient 
  
 PLAN 1. Patient admitted as GIP to hospice. She is requiring IV medications, frequent nursing assessments, and would not survive transport home. 2.  Change Dilaudid to 0.5 mg IV every 3 hours scheduled and IV every 15 minutes prn. 
3.  Change Ativan 1 mg IV every 3 hours and every 15 minutes prn. 
4.  Scopolamine patch to continue 5. Robinul prn every prn IV 6. Toradol 15 mg every 8 hours prn fever 7. Continue rojas catheter 8.  and SW to support family needs 9. Disposition: Likely to pass in hospital 
10. Hospice Plan of care was reviewed in detail and agree with current plan of care The patient's care was focused on comfort and the patient passed away on 2/12/2021.

## 2021-04-04 DIAGNOSIS — F33.1 MODERATE EPISODE OF RECURRENT MAJOR DEPRESSIVE DISORDER (HCC): ICD-10-CM

## 2021-04-04 RX ORDER — BUPROPION HYDROCHLORIDE 100 MG/1
TABLET ORAL
Qty: 180 TAB | Refills: 1 | OUTPATIENT
Start: 2021-04-04